# Patient Record
Sex: FEMALE | Race: WHITE
[De-identification: names, ages, dates, MRNs, and addresses within clinical notes are randomized per-mention and may not be internally consistent; named-entity substitution may affect disease eponyms.]

---

## 2018-01-29 ENCOUNTER — HOSPITAL ENCOUNTER (OUTPATIENT)
Dept: HOSPITAL 47 - ORWHC2ENDO | Age: 73
Discharge: HOME | End: 2018-01-29
Attending: SURGERY
Payer: MEDICARE

## 2018-01-29 VITALS — TEMPERATURE: 97.8 F | RESPIRATION RATE: 16 BRPM

## 2018-01-29 VITALS — HEART RATE: 60 BPM | DIASTOLIC BLOOD PRESSURE: 83 MMHG | SYSTOLIC BLOOD PRESSURE: 172 MMHG

## 2018-01-29 VITALS — BODY MASS INDEX: 36.6 KG/M2

## 2018-01-29 DIAGNOSIS — Z79.899: ICD-10-CM

## 2018-01-29 DIAGNOSIS — Z90.49: ICD-10-CM

## 2018-01-29 DIAGNOSIS — Z12.11: Primary | ICD-10-CM

## 2018-01-29 DIAGNOSIS — I10: ICD-10-CM

## 2018-01-29 DIAGNOSIS — K57.30: ICD-10-CM

## 2018-01-29 DIAGNOSIS — Z90.710: ICD-10-CM

## 2018-01-29 DIAGNOSIS — Z79.82: ICD-10-CM

## 2018-01-29 DIAGNOSIS — K63.5: ICD-10-CM

## 2018-01-29 PROCEDURE — 45385 COLONOSCOPY W/LESION REMOVAL: CPT

## 2018-01-29 PROCEDURE — 88305 TISSUE EXAM BY PATHOLOGIST: CPT

## 2018-01-29 NOTE — P.GSHP
History of Present Illness


H&P Date: 01/29/18


Chief Complaint: Screening colonoscopy





This a 72-year-old female for from Dr. Nj.  Patient presents today for 

screening colonoscopy.  Her last colonoscopy was or 14 years ago.





Past Medical History


Past Medical History: Hypertension


Additional Past Medical History / Comment(s): HX HEMMOROIDS


History of Any Multi-Drug Resistant Organisms: None Reported


Past Surgical History: Appendectomy, Hysterectomy


Additional Past Surgical History / Comment(s): HEMMOROIDECTOMY


Past Anesthesia/Blood Transfusion Reactions: No Reported Reaction


Smoking Status: Never smoker





- Past Family History


  ** Mother


Family Medical History: No Reported History





Medications and Allergies


 Home Medications











 Medication  Instructions  Recorded  Confirmed  Type


 


Aspirin [Adult Low Dose Aspirin EC] 81 mg PO DAILY 01/26/18 01/29/18 History


 


Cholecalciferol [Vitamin D3] 1,000 unit PO DAILY 01/26/18 01/29/18 History


 


Losartan/Hydrochlorothiazide 1 each PO QAM 01/26/18 01/29/18 History





[Hyzaar 100-25 Tablet]    


 


amLODIPine [Norvasc] 5 mg PO QAM 01/26/18 01/29/18 History











 Allergies











Allergy/AdvReac Type Severity Reaction Status Date / Time


 


No Known Allergies Allergy   Verified 01/26/18 09:23














Surgical - Exam


 Vital Signs











Temp Pulse Resp BP Pulse Ox


 


 97.8 F   85   16   196/92   99 


 


 01/29/18 09:06  01/29/18 09:06  01/29/18 09:06  01/29/18 09:06  01/29/18 09:06














- General


well developed, no distress





- Eyes


PERRL





- ENT


normal pinna





- Neck


no masses





- Respiratory


normal expansion





- Cardiovascular


Rhythm: regular





- Abdomen


Abdomen: soft, non tender





Assessment and Plan


Assessment: 





We'll perform screening colonoscopy.

## 2018-01-29 NOTE — P.OP
Date of Procedure: 01/29/18


Preoperative Diagnosis: 


Screening colonoscopy


Postoperative Diagnosis: 


Transverse colon polyp





Diverticulosis.


Procedure(s) Performed: 


Colonoscopy


Anesthesia: MAC


Surgeon: Edy Mon


Pathology: other (Transverse colon polyp)


Condition: stable


Disposition: PACU


Description of Procedure: 


The patient's placed on the endoscopy table in the lateral position.  She 

received IV sedation.  Digital rectal exam was performed which revealed no 

abnormalities.  The flexible colonoscope was then placed patient anus passed 

throughout the entire colon.  The ileocecal valve was visualized.  The cecum 

and ascending colon appeared normal.  In the transverse colon there were a few 

scattered diverticula.  There was also a small polyp seen this is removed with 

the snare.  Scope was then brought back the descending and; there is extensive 

diverticular changes.  Scope was then brought back the rectum and this appeared 

normal.  Scope was withdrawn for patient.

## 2018-04-18 ENCOUNTER — HOSPITAL ENCOUNTER (OUTPATIENT)
Dept: HOSPITAL 47 - LABPAT | Age: 73
Discharge: HOME | End: 2018-04-18
Payer: MEDICARE

## 2018-04-18 DIAGNOSIS — Z01.812: Primary | ICD-10-CM

## 2018-04-18 PROCEDURE — 87070 CULTURE OTHR SPECIMN AEROBIC: CPT

## 2018-06-07 ENCOUNTER — HOSPITAL ENCOUNTER (INPATIENT)
Dept: HOSPITAL 47 - 2ORMAIN | Age: 73
LOS: 2 days | Discharge: HOME | DRG: 470 | End: 2018-06-09
Payer: MEDICARE

## 2018-06-07 VITALS — BODY MASS INDEX: 36.1 KG/M2

## 2018-06-07 DIAGNOSIS — I10: ICD-10-CM

## 2018-06-07 DIAGNOSIS — N39.490: ICD-10-CM

## 2018-06-07 DIAGNOSIS — Z90.710: ICD-10-CM

## 2018-06-07 DIAGNOSIS — Z79.899: ICD-10-CM

## 2018-06-07 DIAGNOSIS — Z82.49: ICD-10-CM

## 2018-06-07 DIAGNOSIS — M17.12: Primary | ICD-10-CM

## 2018-06-07 DIAGNOSIS — J31.0: ICD-10-CM

## 2018-06-07 LAB
APTT BLD: 25.1 SEC (ref 22–30)
INR PPP: 1.1 (ref ?–1.2)
PT BLD: 10.3 SEC (ref 9–12)

## 2018-06-07 PROCEDURE — 84443 ASSAY THYROID STIM HORMONE: CPT

## 2018-06-07 PROCEDURE — 85610 PROTHROMBIN TIME: CPT

## 2018-06-07 PROCEDURE — 84550 ASSAY OF BLOOD/URIC ACID: CPT

## 2018-06-07 PROCEDURE — 84132 ASSAY OF SERUM POTASSIUM: CPT

## 2018-06-07 PROCEDURE — 83036 HEMOGLOBIN GLYCOSYLATED A1C: CPT

## 2018-06-07 PROCEDURE — 84439 ASSAY OF FREE THYROXINE: CPT

## 2018-06-07 PROCEDURE — 94760 N-INVAS EAR/PLS OXIMETRY 1: CPT

## 2018-06-07 PROCEDURE — 80053 COMPREHEN METABOLIC PANEL: CPT

## 2018-06-07 PROCEDURE — 0SRD0J9 REPLACEMENT OF LEFT KNEE JOINT WITH SYNTHETIC SUBSTITUTE, CEMENTED, OPEN APPROACH: ICD-10-PCS

## 2018-06-07 PROCEDURE — 85025 COMPLETE CBC W/AUTO DIFF WBC: CPT

## 2018-06-07 PROCEDURE — 85730 THROMBOPLASTIN TIME PARTIAL: CPT

## 2018-06-07 PROCEDURE — 80061 LIPID PANEL: CPT

## 2018-06-07 PROCEDURE — 88300 SURGICAL PATH GROSS: CPT

## 2018-06-07 RX ADMIN — POTASSIUM CHLORIDE SCH: 14.9 INJECTION, SOLUTION INTRAVENOUS at 13:54

## 2018-06-07 RX ADMIN — CEFAZOLIN SCH GM: 10 INJECTION, POWDER, FOR SOLUTION INTRAVENOUS at 20:02

## 2018-06-07 RX ADMIN — POTASSIUM CHLORIDE SCH: 14.9 INJECTION, SOLUTION INTRAVENOUS at 16:04

## 2018-06-07 RX ADMIN — DOCUSATE SODIUM AND SENNOSIDES SCH EACH: 50; 8.6 TABLET ORAL at 20:02

## 2018-06-07 RX ADMIN — ASPIRIN 325 MG ORAL TABLET SCH MG: 325 PILL ORAL at 20:02

## 2018-06-07 RX ADMIN — POTASSIUM CHLORIDE SCH MLS: 14.9 INJECTION, SOLUTION INTRAVENOUS at 10:40

## 2018-06-07 NOTE — XR
EXAMINATION TYPE: XR knee limited LT

 

DATE OF EXAM: 6/7/2018

 

COMPARISON: NONE

 

HISTORY: Post knee replacement

 

TECHNIQUE: 2 view left knee

 

FINDINGS: Postoperative changes are within the left knee. Tibial femoral components of in place.

 

IMPRESSION:

1.  No acute fractures post left knee replacement.

## 2018-06-07 NOTE — OP
OPERATIVE REPORT



DATE OF PROCEDURE:

06/07/2018.



PREOPERATIVE DIAGNOSIS:

Left knee osteoarthrosis.



POSTOPERATIVE DIAGNOSIS:

Left knee osteoarthrosis.



OPERATION:

Left total knee arthroplasty.



SURGEON:

Jordin Montemayor MD.



ASSISTANT:

Kendrick GARIBAY.



ANESTHESIA:

Spinal sedation.



ESTIMATED BLOOD LOSS:

100 mL.



TOURNIQUET TIME:

45 minutes at 250 mmHg.



COMPLICATIONS:

None apparent.



DRAINS:

None.



DISPOSITION:

Postanesthesia care unit.



INDICATIONS:

Chelsey is a 72-year-old female with longstanding history of left knee pain.  History

and physical examination are consistent with a left knee osteoarthrosis.  She has been

through significant nonoperative management up to this point.  Further treatment

options were discussed and she decided to go forward with left total knee arthroplasty.



The risks of procedure were discussed with her in detail.  These risks include, but are

not limited to risk of infection, nerve damage, bleeding, pain and risk of deep vein

thrombosis which could lead to fatal pulmonary embolism.  There is also risk of

loosening of the implant which could require revision operation.  The patient

understands these risks.  All of her questions with regard to the risks were answered

to her satisfaction.

Appropriate informed consent was obtained.



DESCRIPTION OF THE PROCEDURE:

The patient was identified in the preoperative holding area.  Surgical sites marked by

both the patient and myself.  She was given 2 g of Ancef IV for prophylactic purposes.

She was then transferred to the operative suite.  She was placed supine on the

operative table.  Spinal anesthetic was then administered and dosed per the anesthesia

without apparent complication.



Examination under anesthesia was then performed.  The patient was 2-3 degrees shy of

full extension.  She had 95 degrees of flexion.  The medial collateral ligament,

lateral collateral ligament and posterior cruciate ligaments were stable.



Tourniquet was then placed high on the left upper thigh well-padded in preparation for

surgery.  The patient's left lower extremity was then prepped and draped in usual

sterile fashion.  Standard surgical pause undertaken to ensure that we were operating

the correct site and that appropriate preop preoperative antibiotics were given.  All

staff in the room in agreement and we proceeded.



The outlines of the patella were marked with a surgical pen.  A planned 12 cm vertical

incision centered over the patella was marked surgical pen.  Leg was then exsanguinated

with an Esmarch dressing.  The knee was then flexed and the tourniquet was inflated to

250 mmHg.  The total tourniquet time for the procedure was 45 minutes.



Incision was then made with a 10 blade scalpel.  Dissection was carried down sharply

overlying fascia.  Great care was taken to minimize the skin flaps.  The knee was then

exposed using a standard medial parapatellar approach.  A small cuff of quadriceps

tendon was then left for suturing.



She was in a bit of varus preoperatively.  A standard medial release was then made.

Superficial medial and collateral ligaments were dissected off the bone around the

posterior aspect of the proximal tibia.  The medial meniscus was then excised as well.

The lateral meniscus was also released anteriorly.



The leg was then externally rotated.  The patella was everted and the knee was flexed.

The retractors were then placed to protect the collateral ligaments.



I then proceeded to remove the infrapatellar fat pad.  This was excised sharply

tangentially with the fibers of the patellar tendon.



I then proceeded to remove peripheral osteophytes.  This was done with a rongeur.  I

then proceed with the distal femoral resection.  She did have a small flexion

contracture and planned 9 mm resection was done.  The femoral canal was then entered in

midline of the femur approximately 10 mm anterior to the origin of the posterior

cruciate ligament.  The kelvin was then advanced down the center of the femur and placed

intramedullary.



Based on preoperative radiographs, the angle between the anatomic and mechanical axis

of the femur was approximately 4-5 degrees.  The valgus angle of this femoral cutting

guide was then set at 4 degrees for the left knee.  The distal femoral cutting guide

was then advanced over the intramedullary kelvin.  This was seated firmly against the

femur.  I then as mentioned planned to take 9 mm off the distal femur.



The cutting block was then secured to the femur with pins.  The jig was removed and the

distal femoral cut was made through the slot of the block.  The pins were then removed.

The distal femoral cutting block was removed.  The accuracy of the distal femoral cuts

was checked with 2 flat bars.



I then proceed with femoral sizing.  The posterior referencing sizing guide was held

firmly against the resected distal surface of the femur.  The posterior condyles were

resting on the posterior plane of the guide.  The sizing stylus was then placed onto

the anterior femur.  The size was measured as a size 7.



I then assessed for femoral rotation.  Plan was for 3 degrees of external rotation.

Three degrees of external rotation was placed onto the jig.  These holes were then

marked.  I then confirmed the rotation by 3 separate methods.  This was done using the

epicondylar axis as well as Whitesides line and posterior referencing.  It was deemed

that the external rotation was proper.  I then went forward with placing the femoral

cutting block.  This was placed over the previously placed pin holes.  The [place] wing

was then placed on the anterior slots to ensure that we would not notch the anterior

femur with the anterior femoral cut.  I then proceed with the anterior femoral cut.

This was flush with the anterior cortex of the femur.  Posterior cuts were then made

followed by the anterior chamfer cut, then the posterior chamfer cut.  The cutting

block was then removed.  Throughout the resection, the collateral ligaments were

protected with retractors.  I then placed a trial size 7 femur.  It fit very nice

medial-lateral and fit flush with the distal end of the femur.  The drill holes were

then made.



I then proceeded with the tibial cut.  I planned for cruciate retaining knee.  The

guide was placed and set for varus valgus and for slope.  The height was set for

approximate 2 mm resection from the medial tibial plateau which was the lower side.  I

was happy with the alignment of resection.  The cutting block was then pinned to the

proximal tibia.  The alignment rods were removed.  The proximal tibia was resected with

a reciprocating saw.  Again this was done with retractors protecting the collateral

ligaments as well as the posterior cruciate ligament.



I then proceeded to evaluate the flexion and extension gaps.  A 10 mm block was placed.

The flexion-extension gaps were equal.



I then proceeded with resection of posterior osteophytes.  She had fairly extensive

posterior osteophytes.  This was done using a curved osteotome.  This resected the

posterior osteophytes and posterior capsule stripping was also done off the posterior

aspect of the femur at this time.  The osteophytes were removed.



I then proceeded with resection of the patella.  The thickness of patella was measured

using the caliper.  The thickness was 22 mm.  The thickness of the anticipated patellar

dome was then taken into account.  Resection was then performed and confirmed to be

equal in 4 quadrants using a caliper.



Approximately 14 mm of bone remained after the resection.  A 29 x 8 standard patellar

trial was then placed.  The holes were drilled.  The trial was then placed.



I then proceeded with sizing the tibial plate.  A size E tibial plate fit very nicely.

I then placed the trial femur, the tibial tray and patellar button.  A 10 mm trial

tibial insert was also placed.  The components fit very nicely.  She had full extension

and flexion.  The extension flexion gaps were equal and stable to both varus and valgus

stress.  The patella tracked appropriately.  The tibial tray rotation was marked with a

Bovie.  This was externally rotated properly.



I then proceed with tibial preparation.  I first drilled the femoral holes and removed

femoral component.  The tibial tray was then set for proper external rotation as well

as mediolateral placement onto the tibia.  It was then pinned into place.  I then

proceeded with punching the keel.



I then decided to proceed with cementing of all of our components.  The knee was

thoroughly irrigated with sterile saline solution via pulse lavage.  The lateral

geniculate artery was identified and cauterized.  All blood was removed from the bone

of the tibia femur and patella with pulse lavage.  I then proceed with cementing.



Two packs of antibiotic bone cement were prepared on the back table by the surgical

tech.  I then proceed with cementing the tibia first.  The cement was impacted into the

keel as well as deeply seated into the bone.  A second coat of cement was then placed.

The tibia was then impacted into place.  Excess cement was removed with Cushing's and

jokers.



I then proceed with cementing the femoral component.  The femoral component was also

cemented using standard technique.  Excess cement was removed.  A 10 mm trial insert

was placed into the knee.  It was brought into full extension with a constant axial

load placed until the cement had hardened.



The patellar component was then cemented.  This was firmly held with a compressive

device until the cement had dried.



When the cement had dried, the knee was taken out of extension.  All excess cement was

removed from around the prosthesis.  I then trialed the knee with a 10 mm insert.  The

flexion-extension gaps were appropriate.  The knee was stable.  It came into full

extension.  I decided to go forward with the 10 mm cross-linked cruciate-retaining

tibial insert.



Polyethylene was then placed onto the tibial tray and locked into place.  The knee was

then reduced.  The knee was again further irrigated with sterile saline solution with

antibiotic added.  The tourniquet was then deflated.  The total tourniquet time for the

procedure was 45 minutes at 250 mmHg.  Final components were a Ingrid Persona size 7

cruciate-retaining femoral component, a size E tibial tray, a 10 mm medial congruent

cruciate-retaining polyethylene insert and a 29 x 8 patella.



I then proceeded with closure.  Again, the knee was thoroughly irrigated.  The

quadriceps tendon and the medial retinaculum were reapproximated with #2 Ethibond

suture.  The extensor mechanism was then closed with a running #2 Quill suture.



Subcutaneous tissues were closed with 2-0 Vicryl suture.  The skin was closed with a

running 3-0 Quill suture.  Dermabond was applied to the incision.



All sponge and needle counts were deemed correct prior to closure.  The patient

tolerated procedure without apparent complication.  She was transferred to recovery

room in stable condition.





MMODL / IJN: [id number] / Job#: [id number]

## 2018-06-08 LAB
BASOPHILS # BLD AUTO: 0 K/UL (ref 0–0.2)
BASOPHILS NFR BLD AUTO: 0 %
EOSINOPHIL # BLD AUTO: 0 K/UL (ref 0–0.7)
EOSINOPHIL NFR BLD AUTO: 0 %
ERYTHROCYTE [DISTWIDTH] IN BLOOD BY AUTOMATED COUNT: 3.96 M/UL (ref 3.8–5.4)
ERYTHROCYTE [DISTWIDTH] IN BLOOD: 13.5 % (ref 11.5–15.5)
HCT VFR BLD AUTO: 34 % (ref 34–46)
HGB BLD-MCNC: 11.4 GM/DL (ref 11.4–16)
LYMPHOCYTES # SPEC AUTO: 0.9 K/UL (ref 1–4.8)
LYMPHOCYTES NFR SPEC AUTO: 9 %
MCH RBC QN AUTO: 28.8 PG (ref 25–35)
MCHC RBC AUTO-ENTMCNC: 33.5 G/DL (ref 31–37)
MCV RBC AUTO: 86 FL (ref 80–100)
MONOCYTES # BLD AUTO: 0.5 K/UL (ref 0–1)
MONOCYTES NFR BLD AUTO: 5 %
NEUTROPHILS # BLD AUTO: 8.4 K/UL (ref 1.3–7.7)
NEUTROPHILS NFR BLD AUTO: 85 %
PLATELET # BLD AUTO: 262 K/UL (ref 150–450)
WBC # BLD AUTO: 9.9 K/UL (ref 3.8–10.6)

## 2018-06-08 RX ADMIN — DOCUSATE SODIUM AND SENNOSIDES SCH EACH: 50; 8.6 TABLET ORAL at 20:38

## 2018-06-08 RX ADMIN — POTASSIUM CHLORIDE SCH: 14.9 INJECTION, SOLUTION INTRAVENOUS at 03:37

## 2018-06-08 RX ADMIN — ACETAMINOPHEN PRN MG: 325 TABLET, FILM COATED ORAL at 09:34

## 2018-06-08 RX ADMIN — ASPIRIN 325 MG ORAL TABLET SCH MG: 325 PILL ORAL at 20:38

## 2018-06-08 RX ADMIN — ASPIRIN 325 MG ORAL TABLET SCH MG: 325 PILL ORAL at 09:36

## 2018-06-08 RX ADMIN — POTASSIUM CHLORIDE SCH: 14.9 INJECTION, SOLUTION INTRAVENOUS at 17:43

## 2018-06-08 RX ADMIN — CEFAZOLIN SCH GM: 10 INJECTION, POWDER, FOR SOLUTION INTRAVENOUS at 03:27

## 2018-06-08 RX ADMIN — THERA TABS SCH EACH: TAB at 15:20

## 2018-06-08 RX ADMIN — POTASSIUM CHLORIDE SCH: 14.9 INJECTION, SOLUTION INTRAVENOUS at 17:44

## 2018-06-08 RX ADMIN — LOSARTAN POTASSIUM AND HYDROCHLOROTHIAZIDE SCH EACH: 12.5; 5 TABLET ORAL at 09:34

## 2018-06-09 VITALS — SYSTOLIC BLOOD PRESSURE: 135 MMHG | TEMPERATURE: 97 F | DIASTOLIC BLOOD PRESSURE: 62 MMHG

## 2018-06-09 VITALS — RESPIRATION RATE: 16 BRPM

## 2018-06-09 VITALS — HEART RATE: 71 BPM

## 2018-06-09 RX ADMIN — ACETAMINOPHEN PRN MG: 325 TABLET, FILM COATED ORAL at 10:12

## 2018-06-09 RX ADMIN — ACETAMINOPHEN PRN MG: 325 TABLET, FILM COATED ORAL at 05:01

## 2018-06-09 RX ADMIN — ASPIRIN 325 MG ORAL TABLET SCH MG: 325 PILL ORAL at 10:08

## 2018-06-09 RX ADMIN — THERA TABS SCH EACH: TAB at 10:08

## 2018-06-09 RX ADMIN — POTASSIUM CHLORIDE SCH: 14.9 INJECTION, SOLUTION INTRAVENOUS at 10:18

## 2018-06-09 RX ADMIN — ASPIRIN 325 MG ORAL TABLET SCH MG: 325 PILL ORAL at 10:07

## 2018-06-09 RX ADMIN — LOSARTAN POTASSIUM AND HYDROCHLOROTHIAZIDE SCH EACH: 12.5; 5 TABLET ORAL at 10:08

## 2018-06-09 NOTE — P.DS
Providers


Date of admission: 


06/07/18 10:23





Expected date of discharge: 06/09/18


Attending physician: 


Jordin Montemayor





Consults: 





 





06/07/18 13:26


Consult Physician Routine 


   Consulting Provider: Neil Nj


   Consult Reason/Comments: Post Op medical management


   Do you want consulting provider notified?: Yes











Primary care physician: 


Neil Nj








- Discharge Diagnosis(es)


(1) Osteoarthritis of left knee


Current Visit: Yes   Status: Acute   





(2) S/P total knee arthroplasty


Current Visit: Yes   Status: Acute   Priority: Medium   


Hospital Course: 





This is a pleasant 72-year-old female last seen in our office with complaints 

of left knee pain.  Patient has known history of degenerative arthritis of the 

left knee and presented to discuss options.  After discussion and consideration

, the patient elected to proceed with a left total knee arthroplasty.  Patient 

was seen preoperatively, and medically cleared for surgery by her primary care 

physician.





Patient was admitted to [facility] underwent left total knee 

arthroplasty on 6/7/2018 with Dr. Montemayor.  The procedure was performed without 

complications or sequelae.  The patient is seen and evaluated at bedside today.

  Pain is well-controlled.  Patient has no new complaints today and denies any 

fevers, chills, nausea, vomiting, or shortness of breath.  Vital signs are 

stable.  Dressing is clean dry and intact.  Incision looks fine with no 

erythema or active drainage.  Calf is soft and nontender.  Patient has full 

foot and ankle motion without difficulty.  Patient's left lower extremity is 

neurovascularly intact.  





The patient is orthopedically stable for discharge today.


Pertinent Studies: 





 Laboratory Tests











  06/08/18





  06:46


 


WBC  9.9


 


RBC  3.96


 


Hgb  11.4


 


Hct  34.0


 


Neutrophils #  8.4 H


 


Lymphocytes #  0.9 L











Patient Condition at Discharge: Stable





Plan - Discharge Summary


Discharge Rx Participant: Yes


New Discharge Prescriptions: 


New


   Aspirin 325 mg PO BID #60 tab


   Docusate [Colace] 100 mg PO BID #60 capsule


   HYDROcodone/APAP 7.5-325MG [Norco 7.5-325] 1 - 2 each PO Q6HR PRN #90 tab


     PRN Reason: Pain





No Action


   amLODIPine [Norvasc] 5 mg PO QAM


   Cholecalciferol [Vitamin D3] 1,000 unit PO DAILY


   Losartan/Hydrochlorothiazide [Hyzaar 100-25 Tablet] 1 tab PO QAM


   Aspirin [Adult Low Dose Aspirin EC] 81 mg PO DAILY


Discharge Medication List





Aspirin [Adult Low Dose Aspirin EC] 81 mg PO DAILY 01/26/18 [History]


Cholecalciferol [Vitamin D3] 1,000 unit PO DAILY 01/26/18 [History]


Losartan/Hydrochlorothiazide [Hyzaar 100-25 Tablet] 1 tab PO QAM 01/26/18 [

History]


amLODIPine [Norvasc] 5 mg PO QAM 01/26/18 [History]


Aspirin 325 mg PO BID #60 tab 06/08/18 [Rx]


Docusate [Colace] 100 mg PO BID #60 capsule 06/08/18 [Rx]


HYDROcodone/APAP 7.5-325MG [Norco 7.5-325] 1 - 2 each PO Q6HR PRN #90 tab 06/08/ 18 [Rx]








Follow up Appointment(s)/Referral(s): 


[facility], [NON-STAFF] - 


Jordin Montemayor MD [STAFF PHYSICIAN] - 06/18/18 1:00 pm


Activity/Diet/Wound Care/Special Instructions: 


Keep wound clean and dry


Take meds as directed


Follow-up with Dr. Montemayor in office


Weight bear as tolerated 


May shower in 3 days if no bleeding





[facility]- [phone] - will deliver to bedside before discharge. 





Discharge Disposition: HOME WITH HOME HEALTH SERVICES

## 2018-06-09 NOTE — CONS
CONSULTATION



72-year-old white female with severe pain in the left knee.  The patient had a scope

earlier without it being effective.  At this time, was evaluated by myself and Dr. Jordin Montemayor.  The patient had a total left knee replacement.  She has had no problems while

being in the hospital at this period of time.  Her blood pressure has been fine.  No

chest pain.  No shortness of breath.



REVIEW OF SYSTEMS:

HEENT no problem with eyes, the patient is seeing well.  Neck no problems swallowing.

Abdomen:  No hematemesis, melena, hematochezia.   has been normal.  Neuromuscular:

Just the pain in the left knee but tolerable and integumentary is no rashes.



PHYSICAL EXAMINATION:

VITAL SIGNS: Reveals blood pressure 125/61, heart rate was in the 70s, respiratory rate

is 15, temperature is 98.6, and O2 is 92.

HEENT: Eyes, pupils are equal, round, react to light and accommodation.  ENT showed

tympanic membranes and pharynx to be negative.

NECK:  Supple.  Midline trachea.

CHEST essentially clear to auscultation.

HEART is sinus rhythm with no murmur.

ABDOMEN:  Soft, nontender with no organomegaly.

EXTREMITIES:  Lower extremity pulses are good.  Negative Marta.  The patient has a left

knee support, but also the dressing and the incision appears to be clean.

PSYCHIATRIC:  No depression or anxiety.



LABORATORY:

From yesterday, WBC is 9.9, hemoglobin is 11.4 potassium is up to 3.5.



PLAN:

Patient is being discharged home.  Medications she will be on:  Hydrocodone 7.5/325 q.6

hours p.r.n. pain, standard Norvasc 5 mg a day, 325 mg aspirin b.i.d., Dulcolax p.r.n.,

Norvasc 50/12.5 daily.  Orthopedic instructions per Dr. Montemayor.  The patient is doing

well and she will see me back in 2 weeks.  Spent 30 minutes with this patient.



MMODL / IJN: [id number] / Job#: [id number]

## 2018-12-11 ENCOUNTER — HOSPITAL ENCOUNTER (OUTPATIENT)
Dept: HOSPITAL 47 - RADMAMWWP | Age: 73
Discharge: HOME | End: 2018-12-11
Attending: FAMILY MEDICINE
Payer: MEDICARE

## 2018-12-11 DIAGNOSIS — Z12.31: Primary | ICD-10-CM

## 2018-12-11 PROCEDURE — 77063 BREAST TOMOSYNTHESIS BI: CPT

## 2018-12-11 PROCEDURE — 77067 SCR MAMMO BI INCL CAD: CPT

## 2018-12-14 NOTE — MM
Reason for exam: screening  (asymptomatic).

Last mammogram was performed 1 year and 1 month ago.



History:

Patient is postmenopausal.

Took estrogen for 3 years beginning at age 39.



Physical Findings:

A clinical breast exam by your physician is recommended on an annual basis and 

results should be correlated with mammographic findings.



MG 3D Screening Mammo W/Cad

Bilateral CC and MLO view(s) were taken.

Prior study comparison: October 30, 2017, bilateral MG 3d screening mammo w/cad.  

March 7, 2013, bilateral digital screening mammo w/CAD.

There are scattered fibroglandular densities.  No significant changes when 

compared with prior studies.





ASSESSMENT: Benign, BI-RAD 2



RECOMMENDATION:

Routine screening mammogram of both breasts in 1 year.

## 2019-11-29 NOTE — CONS
CONSULTATION



This is a 72-year-old white female who had severe pain in her left knee. Arthroscopic

surgery was attempted that was not successful.  Patient continued to worsen to the

point that she could not bear weight.  Evaluated by Dr. Jordin Montemayor.  He felt that the

knee was severe enough to have total replacement, and this was done today.  I did her

preoperative physical examination. At this time she has ALLERGIES to BIAXIN and LYRICA.



SOCIAL HISTORY:

She is a nonsmoker, nondrinker.



MEDICAL HISTORY:

Significant just basically for hypertension, and she has also had overflow

incontinence.



SURGERIES IN THE PAST:

1. Orthoscopic knee surgery on the left.

2. Complete hysterectomy.

3. Appendectomy.



REVIEW OF SYSTEMS:

CARDIOPULMONARY: No shortness of breath.  No chest pain.  No orthopnea.

GI: No nausea, vomiting.  No hematochezia.

: She has bladder incontinence, occasional urinary tract infection.  NEUROMUSCULAR:

Just the severe pain in her left knee.

Her skin has been normal.

PSYCHIATRIC: No depression and no anxiety.



PHYSICAL EXAMINATION:

Alert white female, postoperatively doing fine.  Blood pressure 130/76, heart rate in

the 70s, temperature 98.3, respiratory rate 16, and oxygen saturation is 97.

EYES: Pupils are equal, round, reactive to light and accommodation.

ENT showed tympanic membranes to be normal with a dry mouth.

NECK:  Supple.  Midline trachea.

CHEST: Essentially clear to auscultation.

HEART: Sinus rhythm with no murmur.

ABDOMEN:  Soft, nontender with no organomegaly.

LOWER EXTREMITY: Left knee swelling is apparent with decreased range of motion due to

pain. Right knee: She has arthritis also throughout the right knee, but weightbearing

is fine.

BACK: Full range of motion, both lumbar spine and thoracic spine.  Good palpable lower

extremity pulses.

Her skin is normal.



Urinalysis is normal.  Her lab work is within normal limits.



Chest x-ray and EKG were both normal.



ASSESSMENT:

1. Left knee meniscus tear with severe arthritis.

2. Right knee arthritis.

3. Hypertension.

4. Allergies.

5. Rhinitis.

6. Some mild negative depression.

7. She has some overflow incontinence.



PLAN:

Will follow her accordingly.  Medications have been reordered.  Please refer to my

physical examination.





MMODL / IJN: [id number] / Job#: [id number] [facility]                                                 Chief Complaint  25 y.o. male with Psychiatric Evaluation   -- SI, pt states he tried to Overdose with Heroin 3 days ago     History of Present Illness  Chema Cheek Jr presents to the emergency room with suicidal ideation  Patient states he tried to overdose on heroin 3 days ago, HX of addiction  Pt has long-term issues with anxiety and depression and substance abuse  Patient states that he no longer wants to live due to his addiction issues  Patient is nonviolent, his with his mother in the ER, would like help today    The history is provided by the patient   device was not used during this ER visit  Medical history: Addiction, anxiety, depression, substance use  Surgeries: PE tubes  Allergies: Augmentin, back, Cefzil    I have reviewed all of this patient's past medical, surgical, family, and social   histories as well as active allergies and medications documented in the  electronic medical record    Review of Systems and Physical Exam      Review of Systems  -- Constitution - no fever, denies fatigue, no weakness, no chills  -- Eyes - no tearing or redness, no visual disturbance  -- Ear, Nose - no tinnitus or earache, no nasal congestion or discharge  -- Mouth,Throat - no sore throat, no toothache, normal voice, normal swallowing  -- Respiratory - denies cough and congestion, no shortness of breath, no BRINK  -- Cardiovascular - denies chest pain, no palpitations, denies claudication  -- Gastrointestinal - denies abdominal pain, nausea, vomiting, or diarrhea  -- Genitourinary - no dysuria, denies flank pain, no hematuria, no STD risk  -- Musculoskeletal - denies back pain, negative for trauma or injury   -- Neurological - no headache, denies weakness or seizure; no LOC  -- Skin - denies pallor, rash, or changes in skin. no hives or welts noted  -- Psychiatric - suicidal ideation and depression, no psychosis      Vital Signs  His oral temperature is 97.8 °F (36.6 °C).   His blood pressure is 133/73 and his pulse is 108.   His respiration is 20 and oxygen saturation is 98%.     Physical Exam  -- Nursing note and vitals reviewed  -- Constitutional: Appears well-developed and well-nourished  -- Head: Atraumatic. Normocephalic. No obvious abnormality  -- Eyes: Pupils are equal and reactive to light. Normal conjunctiva and lids  -- Cardiac: Normal rate, regular rhythm and normal heart sounds  -- Pulmonary: Normal respiratory effort, breath sounds clear to auscultation  -- Abdominal: Soft, no tenderness. Normal bowel sounds. Normal liver edge  -- Musculoskeletal: Normal range of motion, no effusions. Joints stable   -- Neurological: No focal deficits. Showed good interaction with staff  -- Vascular: Posterior tibial, dorsalis pedis and radial pulses 2+ bilaterally    -- Lymphatics: No cervical or peripheral lymphadenopathy. No edema noted  -- Skin: Warm and dry. No evidence of rash or cellulitis    Emergency Room Course      Diagnosis  -- The primary encounter diagnosis was Depression with suicidal ideation.   -- A diagnosis of Polysubstance abuse was also pertinent to this visit.    Disposition and Plan  -- Disposition: PEC  -- Condition: stable  -- Pt will be placed in a psychiatric facility  -- The patient is a direct observation until placement  -- The patient has been made aware of his or her rights while under PEC in the ER  -- All questions have been answered; will follow ER protocols until placement    Lab work was performed in the ER, this patient is cleared for psychiatric placement     This note is dictated on Dragon Natural Speaking word recognition program.  There are word recognition mistakes that are occasionally missed on review.          Kwame Garvey MD  11/29/19 [id number]

## 2020-02-07 ENCOUNTER — HOSPITAL ENCOUNTER (EMERGENCY)
Dept: HOSPITAL 47 - EC | Age: 75
Discharge: HOME | End: 2020-02-07
Payer: MEDICARE

## 2020-02-07 VITALS
TEMPERATURE: 98.1 F | DIASTOLIC BLOOD PRESSURE: 82 MMHG | SYSTOLIC BLOOD PRESSURE: 170 MMHG | RESPIRATION RATE: 16 BRPM | HEART RATE: 64 BPM

## 2020-02-07 DIAGNOSIS — Z79.82: ICD-10-CM

## 2020-02-07 DIAGNOSIS — R42: ICD-10-CM

## 2020-02-07 DIAGNOSIS — Z79.899: ICD-10-CM

## 2020-02-07 DIAGNOSIS — I10: Primary | ICD-10-CM

## 2020-02-07 LAB
ALBUMIN SERPL-MCNC: 4.4 G/DL (ref 3.5–5)
ALP SERPL-CCNC: 105 U/L (ref 38–126)
ALT SERPL-CCNC: 14 U/L (ref 4–34)
ANION GAP SERPL CALC-SCNC: 11 MMOL/L
APTT BLD: 24.6 SEC (ref 22–30)
AST SERPL-CCNC: 22 U/L (ref 14–36)
BASOPHILS # BLD AUTO: 0 K/UL (ref 0–0.2)
BASOPHILS NFR BLD AUTO: 0 %
BUN SERPL-SCNC: 13 MG/DL (ref 7–17)
CALCIUM SPEC-MCNC: 9.3 MG/DL (ref 8.4–10.2)
CHLORIDE SERPL-SCNC: 109 MMOL/L (ref 98–107)
CO2 SERPL-SCNC: 21 MMOL/L (ref 22–30)
EOSINOPHIL # BLD AUTO: 0.1 K/UL (ref 0–0.7)
EOSINOPHIL NFR BLD AUTO: 1 %
ERYTHROCYTE [DISTWIDTH] IN BLOOD BY AUTOMATED COUNT: 4.87 M/UL (ref 3.8–5.4)
ERYTHROCYTE [DISTWIDTH] IN BLOOD: 13.4 % (ref 11.5–15.5)
GLUCOSE SERPL-MCNC: 112 MG/DL (ref 74–99)
HCT VFR BLD AUTO: 42.7 % (ref 34–46)
HGB BLD-MCNC: 13.9 GM/DL (ref 11.4–16)
HYALINE CASTS UR QL AUTO: 1 /LPF (ref 0–2)
INR PPP: 1 (ref ?–1.2)
LYMPHOCYTES # SPEC AUTO: 0.7 K/UL (ref 1–4.8)
LYMPHOCYTES NFR SPEC AUTO: 9 %
MCH RBC QN AUTO: 28.6 PG (ref 25–35)
MCHC RBC AUTO-ENTMCNC: 32.6 G/DL (ref 31–37)
MCV RBC AUTO: 87.7 FL (ref 80–100)
MONOCYTES # BLD AUTO: 0.2 K/UL (ref 0–1)
MONOCYTES NFR BLD AUTO: 3 %
NEUTROPHILS # BLD AUTO: 6 K/UL (ref 1.3–7.7)
NEUTROPHILS NFR BLD AUTO: 85 %
PH UR: 5.5 [PH] (ref 5–8)
PLATELET # BLD AUTO: 258 K/UL (ref 150–450)
POTASSIUM SERPL-SCNC: 3.8 MMOL/L (ref 3.5–5.1)
PROT SERPL-MCNC: 7.2 G/DL (ref 6.3–8.2)
PT BLD: 10.2 SEC (ref 9–12)
RBC UR QL: 1 /HPF (ref 0–5)
SODIUM SERPL-SCNC: 141 MMOL/L (ref 137–145)
SP GR UR: 1.01 (ref 1–1.03)
SQUAMOUS UR QL AUTO: 1 /HPF (ref 0–4)
UROBILINOGEN UR QL STRIP: <2 MG/DL (ref ?–2)
WBC # BLD AUTO: 7.1 K/UL (ref 3.8–10.6)
WBC #/AREA URNS HPF: 1 /HPF (ref 0–5)

## 2020-02-07 PROCEDURE — 99284 EMERGENCY DEPT VISIT MOD MDM: CPT

## 2020-02-07 PROCEDURE — 80053 COMPREHEN METABOLIC PANEL: CPT

## 2020-02-07 PROCEDURE — 84484 ASSAY OF TROPONIN QUANT: CPT

## 2020-02-07 PROCEDURE — 96360 HYDRATION IV INFUSION INIT: CPT

## 2020-02-07 PROCEDURE — 81001 URINALYSIS AUTO W/SCOPE: CPT

## 2020-02-07 PROCEDURE — 85730 THROMBOPLASTIN TIME PARTIAL: CPT

## 2020-02-07 PROCEDURE — 85610 PROTHROMBIN TIME: CPT

## 2020-02-07 PROCEDURE — 36415 COLL VENOUS BLD VENIPUNCTURE: CPT

## 2020-02-07 PROCEDURE — 85025 COMPLETE CBC W/AUTO DIFF WBC: CPT

## 2020-02-07 PROCEDURE — 71046 X-RAY EXAM CHEST 2 VIEWS: CPT

## 2020-02-07 PROCEDURE — 93005 ELECTROCARDIOGRAM TRACING: CPT

## 2020-02-07 NOTE — ED
Recheck HPI





- General


Chief Complaint: Recheck/Abnormal Lab/Rx


Stated Complaint: High BP


Time Seen by Provider: 02/07/20 10:55


Source: patient


Mode of arrival: ambulatory


Limitations: no limitations





- History of Present Illness


Initial Comments: 


75yo presenting for cc of elevated BP x 1 day.  Patient states that she noticed 

her blood pressure was elevated today at approximately 15-30 minutes after she 

took her medication of 100mg Lisinopril.  Patient states the diastolic was in 

the 100s.  Patient states her highest systolic was 200. Patient denies chest 

pain or SOB. Patient denies headache, speech changes, weakness of the upper or 

lower extremity sensation deficits patient states she did feel slightly 

lightheaded and that is why she initially took her blood pressure.  Patient 

denies any current lightheaded sensations. Patient states this does occur every 

few months then resolves. Patient denies any leg swelling, urinary changes, 

ripping/tearing back pain. Patient appears well on arrival there is no signs of 

acute distress. BP elevated. Patient took lisinopril approximately 2 hour prior 

to arrival. Upon arrival patient appears well there is no signs of acute 

distress.








- Related Data


                                Home Medications











 Medication  Instructions  Recorded  Confirmed


 


Aspirin [Adult Low Dose Aspirin EC] 81 mg PO DAILY 01/26/18 06/07/18


 


Cholecalciferol [Vitamin D3] 1,000 unit PO DAILY 01/26/18 06/07/18


 


Losartan/Hydrochlorothiazide 1 tab PO QAM 01/26/18 06/07/18





[Hyzaar 100-25 Tablet]   


 


amLODIPine [Norvasc] 5 mg PO QAM 01/26/18 06/07/18








                                  Previous Rx's











 Medication  Instructions  Recorded


 


Aspirin 325 mg PO BID #60 tab 06/08/18


 


Docusate [Colace] 100 mg PO BID #60 capsule 06/08/18


 


HYDROcodone/APAP 7.5-325MG [Norco 1 - 2 each PO Q6HR PRN #90 tab 06/08/18





7.5-325]  











                                    Allergies











Allergy/AdvReac Type Severity Reaction Status Date / Time


 


No Known Allergies Allergy   Verified 02/07/20 10:53














Review of Systems


ROS Statement: 


Those systems with pertinent positive or pertinent negative responses have been 

documented in the HPI.





ROS Other: All systems not noted in ROS Statement are negative.





Past Medical History


Past Medical History: Hypertension


Additional Past Medical History / Comment(s): HX HEMMOROIDS


History of Any Multi-Drug Resistant Organisms: None Reported


Past Surgical History: Appendectomy, Hysterectomy


Additional Past Surgical History / Comment(s): HEMMOROIDECTOMY


Past Anesthesia/Blood Transfusion Reactions: No Reported Reaction


Past Psychological History: No Psychological Hx Reported


Smoking Status: Never smoker


Past Alcohol Use History: None Reported


Past Drug Use History: None Reported





- Past Family History


  ** Mother


Family Medical History: No Reported History





General Exam





- General Exam Comments


Initial Comments: 





General:  The patient is awake and alert, in no distress, and does not appear 

acutely ill. 


Eye:  +3 mm pupils are equal, round and reactive to light, extra-ocular 

movements are intact.  No nystagmus.  There is normal conjunctiva bilaterally.  

No signs of icterus.  


Ears, nose, mouth and throat:  There are moist mucous membranes and no oral 

lesions. 


Neck:  The neck is supple, there is no tenderness or JVD.  


Cardiovascular:  There is a regular rate and rhythm. No murmur, rub or gallop is

appreciated.


Respiratory:  Lungs are clear to auscultation, respirations are non-labored, 

breath sounds are equal.  No wheezes, stridor, rales, or rhonchi.


Gastrointestinal:  Soft, non-distended, non-tender abdomen without masses or 

organomegaly noted. There is no rebound or guarding present.  


Musculoskeletal:  Normal ROM, no tenderness.  Strength 5/5. Sensation intact. 

Radial pulses equal bilaterally 2+.  


Neurological:  A&O x 3. CN II-XII intact, There are no obvious motor or sensory 

deficits. Coordination appears grossly intact. Speech is normal.


Skin:  Skin is warm and dry and no rashes or lesions are noted. No LE edema.


Psychiatric:  Cooperative, appropriate mood & affect, normal judgment.  


Limitations: no limitations





Course


                                   Vital Signs











  02/07/20 02/07/20 02/07/20





  10:51 12:14 13:13


 


Temperature 98.0 F  98.1 F


 


Pulse Rate 82 68 64


 


Respiratory 18 20 16





Rate   


 


Blood Pressure 202/111 160/86 170/82


 


O2 Sat by Pulse 98 97 98





Oximetry   














Medical Decision Making





- Medical Decision Making


Well-appearing 74-year-old female presenting for elevated blood pressure.  

Patient states she did feel slightly lightheaded at the time and this has been 

ongoing on and off for the past 3 months.  She denies any current lightheaded 

sensation any dizziness.  Patient has no current symptoms at this time.  

Patient's EKG no acute findings chest x-ray clear laboratory studies stable with

a negative troponin denied any chest pain or shortness of breath.  Without 

additional medications patient's blood pressure decreased most likely from the 

administered 100 mg of lisinopril prior to arrival.  At this time I feel patient

is stable for discharge with outpatient primary care follow-up by primary 

provider Dr. Negron is agreeable to this care plan and recommends discharge. 

Patient is agreeable and states she is ready to go home. 





Ventricular rate 75 bpm, MS interval 146 ms, QRS duration 84 ms, QT//448 

ms.  This is normal sinus with no ST elevation or depression.








- Lab Data


Result diagrams: 


                                 02/07/20 11:23





                                 02/07/20 11:23


                                   Lab Results











  02/07/20 02/07/20 02/07/20 Range/Units





  11:23 11:23 11:23 


 


WBC  7.1    (3.8-10.6)  k/uL


 


RBC  4.87    (3.80-5.40)  m/uL


 


Hgb  13.9    (11.4-16.0)  gm/dL


 


Hct  42.7    (34.0-46.0)  %


 


MCV  87.7    (80.0-100.0)  fL


 


MCH  28.6    (25.0-35.0)  pg


 


MCHC  32.6    (31.0-37.0)  g/dL


 


RDW  13.4    (11.5-15.5)  %


 


Plt Count  258    (150-450)  k/uL


 


Neutrophils %  85    %


 


Lymphocytes %  9    %


 


Monocytes %  3    %


 


Eosinophils %  1    %


 


Basophils %  0    %


 


Neutrophils #  6.0    (1.3-7.7)  k/uL


 


Lymphocytes #  0.7 L    (1.0-4.8)  k/uL


 


Monocytes #  0.2    (0-1.0)  k/uL


 


Eosinophils #  0.1    (0-0.7)  k/uL


 


Basophils #  0.0    (0-0.2)  k/uL


 


PT    10.2  (9.0-12.0)  sec


 


INR    1.0  (<1.2)  


 


APTT    24.6  (22.0-30.0)  sec


 


Sodium   141   (137-145)  mmol/L


 


Potassium   3.8   (3.5-5.1)  mmol/L


 


Chloride   109 H   ()  mmol/L


 


Carbon Dioxide   21 L   (22-30)  mmol/L


 


Anion Gap   11   mmol/L


 


BUN   13   (7-17)  mg/dL


 


Creatinine   0.65   (0.52-1.04)  mg/dL


 


Est GFR (CKD-EPI)AfAm   >90   (>60 ml/min/1.73 sqM)  


 


Est GFR (CKD-EPI)NonAf   89   (>60 ml/min/1.73 sqM)  


 


Glucose   112 H   (74-99)  mg/dL


 


Calcium   9.3   (8.4-10.2)  mg/dL


 


Total Bilirubin   0.5   (0.2-1.3)  mg/dL


 


AST   22   (14-36)  U/L


 


ALT   14   (4-34)  U/L


 


Alkaline Phosphatase   105   ()  U/L


 


Troponin I     (0.000-0.034)  ng/mL


 


Total Protein   7.2   (6.3-8.2)  g/dL


 


Albumin   4.4   (3.5-5.0)  g/dL


 


Urine Color     


 


Urine Appearance     (Clear)  


 


Urine pH     (5.0-8.0)  


 


Ur Specific Gravity     (1.001-1.035)  


 


Urine Protein     (Negative)  


 


Urine Glucose (UA)     (Negative)  


 


Urine Ketones     (Negative)  


 


Urine Blood     (Negative)  


 


Urine Nitrite     (Negative)  


 


Urine Bilirubin     (Negative)  


 


Urine Urobilinogen     (<2.0)  mg/dL


 


Ur Leukocyte Esterase     (Negative)  


 


Urine RBC     (0-5)  /hpf


 


Urine WBC     (0-5)  /hpf


 


Ur Squamous Epith Cells     (0-4)  /hpf


 


Hyaline Casts     (0-2)  /lpf


 


Urine Mucus     (None)  /hpf














  02/07/20 02/07/20 Range/Units





  11:23 11:23 


 


WBC    (3.8-10.6)  k/uL


 


RBC    (3.80-5.40)  m/uL


 


Hgb    (11.4-16.0)  gm/dL


 


Hct    (34.0-46.0)  %


 


MCV    (80.0-100.0)  fL


 


MCH    (25.0-35.0)  pg


 


MCHC    (31.0-37.0)  g/dL


 


RDW    (11.5-15.5)  %


 


Plt Count    (150-450)  k/uL


 


Neutrophils %    %


 


Lymphocytes %    %


 


Monocytes %    %


 


Eosinophils %    %


 


Basophils %    %


 


Neutrophils #    (1.3-7.7)  k/uL


 


Lymphocytes #    (1.0-4.8)  k/uL


 


Monocytes #    (0-1.0)  k/uL


 


Eosinophils #    (0-0.7)  k/uL


 


Basophils #    (0-0.2)  k/uL


 


PT    (9.0-12.0)  sec


 


INR    (<1.2)  


 


APTT    (22.0-30.0)  sec


 


Sodium    (137-145)  mmol/L


 


Potassium    (3.5-5.1)  mmol/L


 


Chloride    ()  mmol/L


 


Carbon Dioxide    (22-30)  mmol/L


 


Anion Gap    mmol/L


 


BUN    (7-17)  mg/dL


 


Creatinine    (0.52-1.04)  mg/dL


 


Est GFR (CKD-EPI)AfAm    (>60 ml/min/1.73 sqM)  


 


Est GFR (CKD-EPI)NonAf    (>60 ml/min/1.73 sqM)  


 


Glucose    (74-99)  mg/dL


 


Calcium    (8.4-10.2)  mg/dL


 


Total Bilirubin    (0.2-1.3)  mg/dL


 


AST    (14-36)  U/L


 


ALT    (4-34)  U/L


 


Alkaline Phosphatase    ()  U/L


 


Troponin I  <0.012   (0.000-0.034)  ng/mL


 


Total Protein    (6.3-8.2)  g/dL


 


Albumin    (3.5-5.0)  g/dL


 


Urine Color   Light Yellow  


 


Urine Appearance   Clear  (Clear)  


 


Urine pH   5.5  (5.0-8.0)  


 


Ur Specific Gravity   1.010  (1.001-1.035)  


 


Urine Protein   Negative  (Negative)  


 


Urine Glucose (UA)   Negative  (Negative)  


 


Urine Ketones   Negative  (Negative)  


 


Urine Blood   Trace H  (Negative)  


 


Urine Nitrite   Negative  (Negative)  


 


Urine Bilirubin   Negative  (Negative)  


 


Urine Urobilinogen   <2.0  (<2.0)  mg/dL


 


Ur Leukocyte Esterase   Negative  (Negative)  


 


Urine RBC   1  (0-5)  /hpf


 


Urine WBC   1  (0-5)  /hpf


 


Ur Squamous Epith Cells   1  (0-4)  /hpf


 


Hyaline Casts   1  (0-2)  /lpf


 


Urine Mucus   Occasional H  (None)  /hpf














Disposition


Clinical Impression: 


 Elevated blood pressure reading, Light headed





Disposition: HOME SELF-CARE


Condition: Good


Instructions (If sedation given, give patient instructions):  Chronic 

Hypertension (ED), Hypertension (ED)


Additional Instructions: 


Please use medication as discussed.  Please follow-up with family doctor in the 

next 2 days, please obtain records for reviewing-given age would recommend 

outpatient stress test. Return to ER for chest pain, sensation of passing out, 

headaches, visual changes, shortness of breath.  Please return to emergency room

if the symptoms increase or worsen or for any other concerns.


Is patient prescribed a controlled substance at d/c from ED?: No


Referrals: 


Neil Nj MD [Primary Care Provider] - 1-2 days


Time of Disposition: 12:42

## 2020-02-07 NOTE — XR
EXAMINATION TYPE: XR chest 2V

 

DATE OF EXAM: 2/7/2020

 

COMPARISON: None

 

INDICATION: Syncope elevated blood pressure

 

TECHNIQUE:  Frontal and lateral views of the chest are obtained.

 

FINDINGS:  

The heart size is normal.  

The pulmonary vasculature is normal.

The lungs are clear.

 

IMPRESSION:  

1. No acute pulmonary process.

## 2020-06-25 ENCOUNTER — HOSPITAL ENCOUNTER (INPATIENT)
Dept: HOSPITAL 47 - EC | Age: 75
LOS: 3 days | Discharge: HOME | DRG: 310 | End: 2020-06-28
Attending: INTERNAL MEDICINE | Admitting: INTERNAL MEDICINE
Payer: MEDICARE

## 2020-06-25 DIAGNOSIS — Z82.49: ICD-10-CM

## 2020-06-25 DIAGNOSIS — Z98.890: ICD-10-CM

## 2020-06-25 DIAGNOSIS — Z79.899: ICD-10-CM

## 2020-06-25 DIAGNOSIS — Z90.710: ICD-10-CM

## 2020-06-25 DIAGNOSIS — D35.2: ICD-10-CM

## 2020-06-25 DIAGNOSIS — I10: ICD-10-CM

## 2020-06-25 DIAGNOSIS — Z90.49: ICD-10-CM

## 2020-06-25 DIAGNOSIS — I65.22: ICD-10-CM

## 2020-06-25 DIAGNOSIS — Z79.82: ICD-10-CM

## 2020-06-25 DIAGNOSIS — I95.1: ICD-10-CM

## 2020-06-25 DIAGNOSIS — Z80.0: ICD-10-CM

## 2020-06-25 DIAGNOSIS — I25.10: ICD-10-CM

## 2020-06-25 DIAGNOSIS — I73.9: ICD-10-CM

## 2020-06-25 DIAGNOSIS — Z11.59: ICD-10-CM

## 2020-06-25 DIAGNOSIS — I47.1: Primary | ICD-10-CM

## 2020-06-25 DIAGNOSIS — Z87.19: ICD-10-CM

## 2020-06-25 LAB
ALBUMIN SERPL-MCNC: 4.4 G/DL (ref 3.5–5)
ALP SERPL-CCNC: 119 U/L (ref 38–126)
ALT SERPL-CCNC: 14 U/L (ref 4–34)
ANION GAP SERPL CALC-SCNC: 9 MMOL/L
APTT BLD: 23.4 SEC (ref 22–30)
AST SERPL-CCNC: 19 U/L (ref 14–36)
BASOPHILS # BLD AUTO: 0.1 K/UL (ref 0–0.2)
BASOPHILS NFR BLD AUTO: 1 %
BUN SERPL-SCNC: 14 MG/DL (ref 7–17)
CALCIUM SPEC-MCNC: 9.5 MG/DL (ref 8.4–10.2)
CHLORIDE SERPL-SCNC: 107 MMOL/L (ref 98–107)
CO2 SERPL-SCNC: 24 MMOL/L (ref 22–30)
EOSINOPHIL # BLD AUTO: 0.1 K/UL (ref 0–0.7)
EOSINOPHIL NFR BLD AUTO: 2 %
ERYTHROCYTE [DISTWIDTH] IN BLOOD BY AUTOMATED COUNT: 4.96 M/UL (ref 3.8–5.4)
ERYTHROCYTE [DISTWIDTH] IN BLOOD: 13.4 % (ref 11.5–15.5)
GLUCOSE SERPL-MCNC: 96 MG/DL (ref 74–99)
HCT VFR BLD AUTO: 43.4 % (ref 34–46)
HGB BLD-MCNC: 14.4 GM/DL (ref 11.4–16)
HYALINE CASTS UR QL AUTO: 5 /LPF (ref 0–2)
INR PPP: 1 (ref ?–1.2)
LYMPHOCYTES # SPEC AUTO: 1.6 K/UL (ref 1–4.8)
LYMPHOCYTES NFR SPEC AUTO: 22 %
MCH RBC QN AUTO: 29 PG (ref 25–35)
MCHC RBC AUTO-ENTMCNC: 33.1 G/DL (ref 31–37)
MCV RBC AUTO: 87.4 FL (ref 80–100)
MONOCYTES # BLD AUTO: 0.4 K/UL (ref 0–1)
MONOCYTES NFR BLD AUTO: 5 %
NEUTROPHILS # BLD AUTO: 4.9 K/UL (ref 1.3–7.7)
NEUTROPHILS NFR BLD AUTO: 68 %
PH UR: 5 [PH] (ref 5–8)
PLATELET # BLD AUTO: 329 K/UL (ref 150–450)
POTASSIUM SERPL-SCNC: 4.1 MMOL/L (ref 3.5–5.1)
PROT SERPL-MCNC: 7.4 G/DL (ref 6.3–8.2)
PT BLD: 10.5 SEC (ref 9–12)
RBC UR QL: 2 /HPF (ref 0–5)
SODIUM SERPL-SCNC: 140 MMOL/L (ref 137–145)
SP GR UR: 1.02 (ref 1–1.03)
SQUAMOUS UR QL AUTO: 6 /HPF (ref 0–4)
UROBILINOGEN UR QL STRIP: <2 MG/DL (ref ?–2)
WBC # BLD AUTO: 7.1 K/UL (ref 3.8–10.6)
WBC # UR AUTO: 18 /HPF (ref 0–5)

## 2020-06-25 PROCEDURE — 99285 EMERGENCY DEPT VISIT HI MDM: CPT

## 2020-06-25 PROCEDURE — 85610 PROTHROMBIN TIME: CPT

## 2020-06-25 PROCEDURE — 84484 ASSAY OF TROPONIN QUANT: CPT

## 2020-06-25 PROCEDURE — 93306 TTE W/DOPPLER COMPLETE: CPT

## 2020-06-25 PROCEDURE — 93880 EXTRACRANIAL BILAT STUDY: CPT

## 2020-06-25 PROCEDURE — 80053 COMPREHEN METABOLIC PANEL: CPT

## 2020-06-25 PROCEDURE — 96360 HYDRATION IV INFUSION INIT: CPT

## 2020-06-25 PROCEDURE — 71046 X-RAY EXAM CHEST 2 VIEWS: CPT

## 2020-06-25 PROCEDURE — 81001 URINALYSIS AUTO W/SCOPE: CPT

## 2020-06-25 PROCEDURE — 85730 THROMBOPLASTIN TIME PARTIAL: CPT

## 2020-06-25 PROCEDURE — 93005 ELECTROCARDIOGRAM TRACING: CPT

## 2020-06-25 PROCEDURE — 70470 CT HEAD/BRAIN W/O & W/DYE: CPT

## 2020-06-25 PROCEDURE — 85025 COMPLETE CBC W/AUTO DIFF WBC: CPT

## 2020-06-25 PROCEDURE — 95816 EEG AWAKE AND DROWSY: CPT

## 2020-06-25 PROCEDURE — 36415 COLL VENOUS BLD VENIPUNCTURE: CPT

## 2020-06-25 PROCEDURE — 87086 URINE CULTURE/COLONY COUNT: CPT

## 2020-06-25 RX ADMIN — CEFAZOLIN SCH MLS/HR: 330 INJECTION, POWDER, FOR SOLUTION INTRAMUSCULAR; INTRAVENOUS at 23:54

## 2020-06-25 NOTE — XR
EXAMINATION TYPE: XR chest 2V

 

DATE OF EXAM: 6/25/2020

 

COMPARISON: 2/7/2020

 

HISTORY: High blood pressure

 

TECHNIQUE: 2 views

 

FINDINGS: Heart is normal. Lungs are clear of consolidation. There are no hilar masses. Costophrenic 
angles are clear.

 

IMPRESSION: No active cardiopulmonary disease. No change.

## 2020-06-25 NOTE — ED
General Adult HPI





- General


Source: patient, RN notes reviewed


Mode of arrival: ambulatory


Limitations: no limitations





<Alvaro Morales - Last Filed: 06/25/20 22:15>





<Siddhartha Macdonald - Last Filed: 06/25/20 22:24>





- General


Chief complaint: Dizziness


Stated complaint: Dizziness


Time Seen by Provider: 06/25/20 20:00





- History of Present Illness


Initial comments: 





74-year-old female with a past medical history of hypertension presents to the 

emergency department for a chief complaint of lightheadedness.  Patient states 

for the past 2 months she has been lightheaded.  States she is lightheaded 

throughout the day.  States it is about 70% of her day that she feels this way. 

She denies any dizziness or room spinning.  Patient states she did start 

losartan about 2 months ago she started to feel this way.  She thought it could 

be related but she did not take her losartan today and again felt lightheaded.  

Patient states she has been drinking plenty of fluids.  Patient denies any chest

pain or shortness of breath.Patient has no other complaints at this time 

including shortness of breath, chest pain, abdominal pain, nausea or vomiting, 

headache, or visual changes. (Alvaro Morales)





- Related Data


                                Home Medications











 Medication  Instructions  Recorded  Confirmed


 


amLODIPine [Norvasc] 5 mg PO QAM 01/26/18 06/25/20


 


Lactulose 10 gm PO HS 06/25/20 06/25/20


 


Losartan Potassium 100 mg PO HS 06/25/20 06/25/20








                                  Previous Rx's











 Medication  Instructions  Recorded


 


Aspirin 325 mg PO BID #60 tab 06/08/18


 


Cephalexin [Keflex] 500 mg PO Q8H 10 Days #30 cap 06/25/20











                                    Allergies











Allergy/AdvReac Type Severity Reaction Status Date / Time


 


No Known Allergies Allergy   Verified 06/25/20 21:30














Review of Systems


ROS Other: All systems not noted in ROS Statement are negative.





<Alvaro Morales - Last Filed: 06/25/20 22:15>


ROS Other: All systems not noted in ROS Statement are negative.





<Siddhartha Macdonald - Last Filed: 06/25/20 22:24>


ROS Statement: 


Those systems with pertinent positive or pertinent negative responses have been 

documented in the HPI.








Past Medical History


Past Medical History: Hypertension


Additional Past Medical History / Comment(s): HX HEMMOROIDS


History of Any Multi-Drug Resistant Organisms: None Reported


Past Surgical History: Appendectomy, Hysterectomy


Additional Past Surgical History / Comment(s): HEMMOROIDECTOMY


Past Anesthesia/Blood Transfusion Reactions: No Reported Reaction


Past Psychological History: No Psychological Hx Reported


Smoking Status: Never smoker


Past Alcohol Use History: None Reported


Past Drug Use History: None Reported





- Past Family History


  ** Mother


Family Medical History: No Reported History





<Alvaro Morales - Last Filed: 06/25/20 22:15>





General Exam


Limitations: no limitations


General appearance: alert, in no apparent distress


Head exam: Present: atraumatic, normocephalic, normal inspection


Eye exam: Present: normal appearance, PERRL, EOMI.  Absent: scleral icterus, 

conjunctival injection


ENT exam: Present: normal exam, mucous membranes moist


Neck exam: Present: normal inspection, full ROM.  Absent: tenderness, 

meningismus, lymphadenopathy


Respiratory exam: Present: normal lung sounds bilaterally.  Absent: respiratory 

distress, wheezes, rales, rhonchi, stridor


Cardiovascular Exam: Present: regular rate, normal rhythm, normal heart sounds. 

 Absent: systolic murmur, diastolic murmur, rubs, gallop, clicks


GI/Abdominal exam: Present: soft, normal bowel sounds.  Absent: distended, 

tenderness, guarding, rebound, rigid


Neurological exam: Present: alert





<Alvaro Morales - Last Filed: 06/25/20 22:15>





Course





<Siddhartha Macdonald - Last Filed: 06/25/20 22:24>





                                   Vital Signs











  06/25/20 06/25/20 06/25/20





  19:46 20:16 21:16


 


Temperature 97.9 F  98.7 F


 


Pulse Rate 84 74 70


 


Respiratory 18 17 16





Rate   


 


Blood Pressure 162/88 161/94 135/81


 


O2 Sat by Pulse 99 97 98





Oximetry   














- Reevaluation(s)


Reevaluation #1: 





06/25/20 22:23


PA supervision: This is a 74-year-old female who presented with complaints of 

dizziness going on for up to 2 months.  She was found on evaluation to have a 

slight urinary tract infection.  She does not want to be hospitalized sign 

she'll be discharged with appropriate antibiotics.  She is follow-up with her 

doctor return when necessary return parameters were discussed. (Siddhartha Macdonald)





EKG Findings





- EKG Comments:


EKG Findings:: Ventricular rate 75,.WI int 172, QTc 446, normal sinus rhythm.





<Alvaro Morales - Last Filed: 06/25/20 22:15>





Medical Decision Making





- Lab Data


Result diagrams: 


                                 06/25/20 20:39





                                 06/25/20 20:39





<Alvaro Morales - Last Filed: 06/25/20 22:15>





- Lab Data


Result diagrams: 


                                 06/25/20 20:39





                                 06/25/20 20:39





<Siddhartha Macdonald - Last Filed: 06/25/20 22:24>





- Lab Data





                                   Lab Results











  06/25/20 06/25/20 06/25/20 Range/Units





  20:39 20:39 20:39 


 


WBC  7.1    (3.8-10.6)  k/uL


 


RBC  4.96    (3.80-5.40)  m/uL


 


Hgb  14.4    (11.4-16.0)  gm/dL


 


Hct  43.4    (34.0-46.0)  %


 


MCV  87.4    (80.0-100.0)  fL


 


MCH  29.0    (25.0-35.0)  pg


 


MCHC  33.1    (31.0-37.0)  g/dL


 


RDW  13.4    (11.5-15.5)  %


 


Plt Count  329    (150-450)  k/uL


 


Neutrophils %  68    %


 


Lymphocytes %  22    %


 


Monocytes %  5    %


 


Eosinophils %  2    %


 


Basophils %  1    %


 


Neutrophils #  4.9    (1.3-7.7)  k/uL


 


Lymphocytes #  1.6    (1.0-4.8)  k/uL


 


Monocytes #  0.4    (0-1.0)  k/uL


 


Eosinophils #  0.1    (0-0.7)  k/uL


 


Basophils #  0.1    (0-0.2)  k/uL


 


PT   10.5   (9.0-12.0)  sec


 


INR   1.0   (<1.2)  


 


APTT   23.4   (22.0-30.0)  sec


 


Sodium    140  (137-145)  mmol/L


 


Potassium    4.1  (3.5-5.1)  mmol/L


 


Chloride    107  ()  mmol/L


 


Carbon Dioxide    24  (22-30)  mmol/L


 


Anion Gap    9  mmol/L


 


BUN    14  (7-17)  mg/dL


 


Creatinine    0.74  (0.52-1.04)  mg/dL


 


Est GFR (CKD-EPI)AfAm    >90  (>60 ml/min/1.73 sqM)  


 


Est GFR (CKD-EPI)NonAf    81  (>60 ml/min/1.73 sqM)  


 


Glucose    96  (74-99)  mg/dL


 


Calcium    9.5  (8.4-10.2)  mg/dL


 


Total Bilirubin    0.4  (0.2-1.3)  mg/dL


 


AST    19  (14-36)  U/L


 


ALT    14  (4-34)  U/L


 


Alkaline Phosphatase    119  ()  U/L


 


Troponin I     (0.000-0.034)  ng/mL


 


Total Protein    7.4  (6.3-8.2)  g/dL


 


Albumin    4.4  (3.5-5.0)  g/dL


 


Urine Color     


 


Urine Appearance     (Clear)  


 


Urine pH     (5.0-8.0)  


 


Ur Specific Gravity     (1.001-1.035)  


 


Urine Protein     (Negative)  


 


Urine Glucose (UA)     (Negative)  


 


Urine Ketones     (Negative)  


 


Urine Blood     (Negative)  


 


Urine Nitrite     (Negative)  


 


Urine Bilirubin     (Negative)  


 


Urine Urobilinogen     (<2.0)  mg/dL


 


Ur Leukocyte Esterase     (Negative)  


 


Urine RBC     (0-5)  /hpf


 


Urine WBC     (0-5)  /hpf


 


Ur Squamous Epith Cells     (0-4)  /hpf


 


Hyaline Casts     (0-2)  /lpf


 


Urine Mucus     (None)  /hpf














  06/25/20 06/25/20 Range/Units





  20:39 20:51 


 


WBC    (3.8-10.6)  k/uL


 


RBC    (3.80-5.40)  m/uL


 


Hgb    (11.4-16.0)  gm/dL


 


Hct    (34.0-46.0)  %


 


MCV    (80.0-100.0)  fL


 


MCH    (25.0-35.0)  pg


 


MCHC    (31.0-37.0)  g/dL


 


RDW    (11.5-15.5)  %


 


Plt Count    (150-450)  k/uL


 


Neutrophils %    %


 


Lymphocytes %    %


 


Monocytes %    %


 


Eosinophils %    %


 


Basophils %    %


 


Neutrophils #    (1.3-7.7)  k/uL


 


Lymphocytes #    (1.0-4.8)  k/uL


 


Monocytes #    (0-1.0)  k/uL


 


Eosinophils #    (0-0.7)  k/uL


 


Basophils #    (0-0.2)  k/uL


 


PT    (9.0-12.0)  sec


 


INR    (<1.2)  


 


APTT    (22.0-30.0)  sec


 


Sodium    (137-145)  mmol/L


 


Potassium    (3.5-5.1)  mmol/L


 


Chloride    ()  mmol/L


 


Carbon Dioxide    (22-30)  mmol/L


 


Anion Gap    mmol/L


 


BUN    (7-17)  mg/dL


 


Creatinine    (0.52-1.04)  mg/dL


 


Est GFR (CKD-EPI)AfAm    (>60 ml/min/1.73 sqM)  


 


Est GFR (CKD-EPI)NonAf    (>60 ml/min/1.73 sqM)  


 


Glucose    (74-99)  mg/dL


 


Calcium    (8.4-10.2)  mg/dL


 


Total Bilirubin    (0.2-1.3)  mg/dL


 


AST    (14-36)  U/L


 


ALT    (4-34)  U/L


 


Alkaline Phosphatase    ()  U/L


 


Troponin I  <0.012   (0.000-0.034)  ng/mL


 


Total Protein    (6.3-8.2)  g/dL


 


Albumin    (3.5-5.0)  g/dL


 


Urine Color   Yellow  


 


Urine Appearance   Cloudy H  (Clear)  


 


Urine pH   5.0  (5.0-8.0)  


 


Ur Specific Gravity   1.025  (1.001-1.035)  


 


Urine Protein   Trace H  (Negative)  


 


Urine Glucose (UA)   Negative  (Negative)  


 


Urine Ketones   Negative  (Negative)  


 


Urine Blood   Small H  (Negative)  


 


Urine Nitrite   Negative  (Negative)  


 


Urine Bilirubin   Negative  (Negative)  


 


Urine Urobilinogen   <2.0  (<2.0)  mg/dL


 


Ur Leukocyte Esterase   Large H  (Negative)  


 


Urine RBC   2  (0-5)  /hpf


 


Urine WBC   18 H  (0-5)  /hpf


 


Ur Squamous Epith Cells   6 H  (0-4)  /hpf


 


Hyaline Casts   5 H  (0-2)  /lpf


 


Urine Mucus   Many H  (None)  /hpf














Disposition


Is patient prescribed a controlled substance at d/c from ED?: No


Time of Disposition: 22:15





<Alvaro Morales - Last Filed: 06/25/20 22:15>





<Siddhartha Macdonald - Last Filed: 06/25/20 22:24>


Clinical Impression: 


 Lightheadedness, Urinary tract infection





Disposition: HOME SELF-CARE


Condition: Good


Instructions (If sedation given, give patient instructions):  Lightheadedness 

(ED), Urinary Tract Infection in Women (ED)


Additional Instructions: 


Please follow up with primary care in 1-2 days for possible cardiology referral.

  If you have any worsening symptoms or have an episode of passing out return 

immediately to the emergency room.  Take antibiotic as directed.


Prescriptions: 


Cephalexin [Keflex] 500 mg PO Q8H 10 Days #30 cap


Referrals: 


Neil Nj MD [Primary Care Provider] - 1-2 days

## 2020-06-26 RX ADMIN — CEFAZOLIN SCH MLS/HR: 330 INJECTION, POWDER, FOR SOLUTION INTRAMUSCULAR; INTRAVENOUS at 15:21

## 2020-06-26 RX ADMIN — ASPIRIN 81 MG CHEWABLE TABLET SCH MG: 81 TABLET CHEWABLE at 09:57

## 2020-06-26 NOTE — P.HPIM
History of Present Illness


H&P Date: 06/26/20


Chief Complaint: Dizziness





History of Present Illness


This is a 74-year-old  female patient of Dr. Nj with past medical 

history of hypertension.  Patient states that she has had dizziness on and off 

for the last 3 months.  About 2 months ago she was placed on losartan 100 mg 

daily and thinks that her symptoms are worse since that time.  Last initial 

episode where she did feel palpitations in her heart rate was up to 140s and she

went to [facility] was told that she had hypertension was sent 

home.  She states her episode that time was very severe and she had tingling in 

her hands and arms.  She was previously in the ER and February at [facility] as well.  She states she never loses consciousness but she usually sits 

mostly today when she's had an episode.  Episodes come and go.  Yesterday 

morning it was 180/116.  A blood pressure is low.  She denies any spinning 

sensation, chest pain, shortness of breath.





Patient came into [facility] emergency center for evaluation. 

EKG reveals sinus mechanism heart rate is 75 with no acute ST or T wave 

abnormalities noted. Chest xray negative for an acute cardiopulmonary process. 

CBC unremarkable, sodium 140, potassium 4.1, creatinine 0.74, troponin negative 

3.  Patient has been seen by cardiology, echocardiogram is pending, 

orthostatics to be checked, apply cardiac monitoring, carotid Doppler ordered.  

At rest, patient denies any symptoms at this time.





Review of Systems


Constitutional: No fever, no chills, no night sweats.  No weight change.  No 

weakness, fatigue or lethargy.  No daytime sleepiness.


EENT: No headache.  No blurred vision or double vision, no loss of vision.  No 

loss of Hearing, no ringing in the ears, no dizziness.  No nasal drainage or con

gestion.  No epistaxis.  No sore throat.


Lungs: No shortness of breath, cough, no sputum production.  No wheezing.


Cardiovascular: No chest pain, no lower extremity edema.  No palpitations.  No 

paroxysmal nocturnal dyspnea.  No orthopnea.  Reports lightheadedness or 

dizziness.  No syncopal episodes.


Abdominal: No abdominal pain.  No nausea, vomiting.  No diarrhea.  No 

constipation.  No bloody or tarry stools..  No loss of appetite.


Genitourinary: No dysuria, increased frequency, urgency.  No urinary retention.


Musculoskeletal: No myalgias.  No muscle weakness, no gait dysfunction, no 

frequent falls.  No back pain.  No neck pain.


Integumentary: No wounds, no lesions.  No rash or pruritus.  No unusual 

bruising.  No change in hair or nails.


Neurologic: No aphasia. No facial droop. No change in mentation. No head injury.

No headache. No paralysis. No paresthesia.


Psychiatric: No depression.  No anxiety.  No mood swings.


Endocrine: No abnormal blood sugars.  No weight change.  No excessive sweating 

or thirst.  No cold intolerance.  








Physical Examination


Gen: This is 74-year-old  female patient resting on the ER stretcher 

and appears comfortable at rest.


HEENT: Head is atraumatic, normocephalic. Pupils equal, round. Sclerae is 

anicteric. 


NECK: Supple. No JVD. No lymphadenopathy. No thyromegaly. 


LUNGS: Clear to auscultation. No wheezes or rhonchi.  No intercostal retracti

ons.


HEART: Regular rate and rhythm. No murmur. 


ABDOMEN: Soft. Bowel sounds are present. No masses.  No tenderness.


EXTREMITIES: No pedal edema.  No calf tenderness.  Dorsalis pedis palpable 

bilaterally.


NEUROLOGICAL: Patient is awake, alert and oriented x3. Cranial nerves 2 through 

12 are grossly intact. 








Assessment and Plan


1.  Near syncopal episode.  Orthostatic vital signs, echocardiogram, carotid 

Doppler, cardiac monitoring.  Cardiology consult appreciated.





2.  Hypertension.  Losartan discontinued.  Continue amlodipine 5 mg daily and 

add hydralazine 50 mg twice daily.





3.  Peripheral vascular disease, stable





4.  DVT prophylaxis.  Heparin subcu.





5.  GI prophylaxis.  Protonix.





6.  COVID-19 testing in process.





Patient placed as an observation status.





Discharge plan: Return home





Impression and plan of care have been directed as dictated by the signing 

physician.  Princess Nava nurse practitioner acting as scribe for signing 

physician.








Past Medical History


Past Medical History: Hypertension


Additional Past Medical History / Comment(s): HX HEMMOROIDS


History of Any Multi-Drug Resistant Organisms: None Reported


Past Surgical History: Appendectomy, Hysterectomy


Additional Past Surgical History / Comment(s): HEMMOROIDECTOMY


Past Anesthesia/Blood Transfusion Reactions: No Reported Reaction


Past Psychological History: No Psychological Hx Reported


Smoking Status: Never smoker


Past Alcohol Use History: None Reported


Past Drug Use History: None Reported





- Past Family History


  ** Mother


Family Medical History: No Reported History





Medications and Allergies


                                Home Medications











 Medication  Instructions  Recorded  Confirmed  Type


 


amLODIPine [Norvasc] 5 mg PO QAM 01/26/18 06/25/20 History


 


Aspirin 325 mg PO BID #60 tab 06/08/18 06/25/20 Rx


 


Cephalexin [Keflex] 500 mg PO Q8H 10 Days #30 cap 06/25/20  Rx


 


Lactulose 10 gm PO HS 06/25/20 06/25/20 History


 


Losartan Potassium 100 mg PO HS 06/25/20 06/25/20 History








                                    Allergies











Allergy/AdvReac Type Severity Reaction Status Date / Time


 


No Known Allergies Allergy   Verified 06/25/20 21:30














Physical Exam


Vitals: 


                                   Vital Signs











  Temp Pulse Resp BP Pulse Ox


 


 06/26/20 05:00  98.4 F  71  16  144/73  99


 


 06/26/20 01:41  98.4 F  73  15  161/76  97


 


 06/25/20 23:00   72  20  164/80  99


 


 06/25/20 22:00   66  20  125/69 


 


 06/25/20 21:16  98.7 F  70  16  135/81  98


 


 06/25/20 20:16   74  17  161/94  97


 


 06/25/20 19:46  97.9 F  84  18  162/88  99








                                Intake and Output











 06/25/20 06/26/20 06/26/20





 22:59 06:59 14:59


 


Other:   


 


  Weight 101.151 kg  














Results


CBC & Chem 7: 


                                 06/25/20 20:39





                                 06/25/20 20:39


Labs: 


                  Abnormal Lab Results - Last 24 Hours (Table)











  06/25/20 Range/Units





  20:51 


 


Urine Appearance  Cloudy H  (Clear)  


 


Urine Protein  Trace H  (Negative)  


 


Urine Blood  Small H  (Negative)  


 


Ur Leukocyte Esterase  Large H  (Negative)  


 


Urine WBC  18 H  (0-5)  /hpf


 


Ur Squamous Epith Cells  6 H  (0-4)  /hpf


 


Hyaline Casts  5 H  (0-2)  /lpf


 


Urine Mucus  Many H  (None)  /hpf








                      Microbiology - Last 24 Hours (Table)











 06/25/20 20:51 Urine Culture - Preliminary





 Urine,Voided

## 2020-06-26 NOTE — P.CRDCN
History of Present Illness


History of present illness: 





HISTORY OF PRESENTING ILLNESS


This is a pleasant 74-year-old  female past medical history significant

for hypertension and peripheral vascular disease.  According to the patient she 

has carotid artery disease with a 60% blockage on the right and a 40% blockage 

on the left and she follows with Dr. Adam.  He denies prior history of 

coronary artery disease and does not follow in the office with cardiologist. We 

have been asked to see in consultation for near syncope.  She states for the 

previous 3-months she has been having intermittent episodes of feeling 

lightheaded like she is going to pass out however she has not passed out or lost

consciousness.  Her symptoms have no specific precipitating or exacerbating 

factors.  She denies feeling chest pain, shortness of breath, nausea or 

palpitations prior to feeling dizzy.  She did describe one episode last week 

where she was sitting down and getting ready to go to [employer] she felt her heart 

start racing rapidly.  She did check her blood pressure at that time and her 

heart rate read with the 140 bpm.  She states it felt very regular and rapid.  

It lasted for less than 5 minutes and resolved on its own.  It was associated 

with some feeling of lightheadedness.  That was the first time she had felt 

palpitations.  She was recently started on losartan 100 mg at bedtime by her 

primary care physician 3 months ago.  She checks her blood pressure regularly at

home and it fluctuates between 124 systolic up to 180 systolic.  She states she 

underwent cardiac catheterization over 10 years ago that she states was normal.





DIAGNOSTICS


EKG reveals sinus mechanism heart rate is 75 with no acute ST or T wave 

abnormalities noted..


Chest xray negative for an acute cardiopulmonary process.


Laboratory reviewed, CBC unremarkable, sodium 140, potassium 4.1, creatinine 

0.74, troponin negative 3.


Current cardiac medications include aspirin 325 mg twice a day, amlodipine 5 mg 

in the morning and losartan 100 mg at bedtime. 





REVIEW OF SYSTEMS


At the time of my exam:


CONSTITUTIONAL: Denies fever or chills.


CARDIOVASCULAR: Denies chest pain, shortness of breath, orthopnea, PND or 

palpitations.


RESPIRATORY: Denies cough. 


GASTROINTESTINAL: Denies abdominal pain, diarrhea, constipation, nausea or 

vomiting.


MUSCULOSKELETAL: Denies myalgias.


NEUROLOGIC: Denies numbness, tingling or weakness.


ENDOCRINE: Denies fatigue, weight change,  polydipsia or polyurina.


GENITOURINARY: Denies burning, hematuria or urgency with micturation.


HEMATOLOGIC: Denies history of anemia or bleeding. 





PHYSICAL EXAMINATION


Blood pressure 144/73 heart rate 71 afebrile and maintaining oxygen saturation 

on room air.


CONSTITUTIONAL: No apparent distress. 


HEENT: Head is normocephalic. Pupils are equal, round. Sclerae anicteric. Mucous

membranes of the mouth are moist.  No JVD. No carotid bruit.


CHEST EXAMINATION: Lungs are clear to auscultation. No chest wall tenderness is 

noted on palpation or with deep breathing. 


HEART EXAMINATION: Regular rate and rhythm. S1, S2 heard. No murmurs, gallops or

rub.


ABDOMEN: Soft, nontender. Positive bowel sounds.


EXTREMITIES: 2+ peripheral pulses, no lower extremity edema and no calf 

tenderness.


NEUROLOGIC EXAMINATION: Patient is awake, alert and oriented x3. 





ASSESSMENT


Near syncope


Hypertension


Peripheral vascular disease





PLAN


An acute coronary event has been ruled out.


Obtain 2-D echocardiogram and Doppler study to assess cardiac structure and 

function.


Check for orthostatic changes.


Apply telemetry monitor to assess for an acute arrhythmia.


Obtain bilateral carotid Doppler.


Further recommendations to follow based upon clinical course.


Thank you kindly for this consultation.








Nurse Practitioner note has been reviewed, I agree with a documented findings 

and plan of care.  Patient was seen and examined.











Past Medical History


Past Medical History: Hypertension


Additional Past Medical History / Comment(s): HX HEMMOROIDS


History of Any Multi-Drug Resistant Organisms: None Reported


Past Surgical History: Appendectomy, Hysterectomy


Additional Past Surgical History / Comment(s): HEMMOROIDECTOMY


Past Anesthesia/Blood Transfusion Reactions: No Reported Reaction


Past Psychological History: No Psychological Hx Reported


Smoking Status: Never smoker


Past Alcohol Use History: None Reported


Past Drug Use History: None Reported





- Past Family History


  ** Mother


Family Medical History: No Reported History





Medications and Allergies


                                Home Medications











 Medication  Instructions  Recorded  Confirmed  Type


 


amLODIPine [Norvasc] 5 mg PO QAM 01/26/18 06/25/20 History


 


Aspirin 325 mg PO BID #60 tab 06/08/18 06/25/20 Rx


 


Cephalexin [Keflex] 500 mg PO Q8H 10 Days #30 cap 06/25/20  Rx


 


Lactulose 10 gm PO HS 06/25/20 06/25/20 History


 


Losartan Potassium 100 mg PO HS 06/25/20 06/25/20 History








                                    Allergies











Allergy/AdvReac Type Severity Reaction Status Date / Time


 


No Known Allergies Allergy   Verified 06/25/20 21:30














Physical Exam


Vitals: 


                                   Vital Signs











  Temp Pulse Resp BP Pulse Ox


 


 06/26/20 05:00  98.4 F  71  16  144/73  99


 


 06/26/20 01:41  98.4 F  73  15  161/76  97


 


 06/25/20 23:00   72  20  164/80  99


 


 06/25/20 22:00   66  20  125/69 


 


 06/25/20 21:16  98.7 F  70  16  135/81  98


 


 06/25/20 20:16   74  17  161/94  97


 


 06/25/20 19:46  97.9 F  84  18  162/88  99








                                Intake and Output











 06/25/20 06/26/20 06/26/20





 22:59 06:59 14:59


 


Other:   


 


  Weight 101.151 kg  














Results





                                 06/25/20 20:39





                                 06/25/20 20:39


                                 Cardiac Enzymes











  06/25/20 06/25/20 06/26/20 Range/Units





  20:39 20:39 00:18 


 


AST  19    (14-36)  U/L


 


Troponin I   <0.012  <0.012  (0.000-0.034)  ng/mL














  06/26/20 Range/Units





  07:54 


 


AST   (14-36)  U/L


 


Troponin I  <0.012  (0.000-0.034)  ng/mL








                                   Coagulation











  06/25/20 Range/Units





  20:39 


 


PT  10.5  (9.0-12.0)  sec


 


APTT  23.4  (22.0-30.0)  sec








                                       CBC











  06/25/20 Range/Units





  20:39 


 


WBC  7.1  (3.8-10.6)  k/uL


 


RBC  4.96  (3.80-5.40)  m/uL


 


Hgb  14.4  (11.4-16.0)  gm/dL


 


Hct  43.4  (34.0-46.0)  %


 


Plt Count  329  (150-450)  k/uL








                          Comprehensive Metabolic Panel











  06/25/20 Range/Units





  20:39 


 


Sodium  140  (137-145)  mmol/L


 


Potassium  4.1  (3.5-5.1)  mmol/L


 


Chloride  107  ()  mmol/L


 


Carbon Dioxide  24  (22-30)  mmol/L


 


BUN  14  (7-17)  mg/dL


 


Creatinine  0.74  (0.52-1.04)  mg/dL


 


Glucose  96  (74-99)  mg/dL


 


Calcium  9.5  (8.4-10.2)  mg/dL


 


AST  19  (14-36)  U/L


 


ALT  14  (4-34)  U/L


 


Alkaline Phosphatase  119  ()  U/L


 


Total Protein  7.4  (6.3-8.2)  g/dL


 


Albumin  4.4  (3.5-5.0)  g/dL








                               Current Medications











Generic Name Dose Route Start Last Admin





  Trade Name Freq  PRN Reason Stop Dose Admin


 


Amlodipine Besylate  5 mg  06/26/20 09:15 





  Norvasc  PO  





  QAM LYLE  


 


Hydralazine HCl  50 mg  06/26/20 09:15 





  Apresoline  PO  





  BID LYLE  


 


Sodium Chloride  1,000 mls @ 75 mls/hr  06/25/20 22:45  06/25/20 23:54





  Saline 0.9%  IV   75 mls/hr





  .[id number] LYLE   Administration


 


Naloxone HCl  0.2 mg  06/25/20 22:44 





  Narcan  IV  





  Q2M PRN  





  Opioid Reversal  








                                Intake and Output











 06/25/20 06/26/20 06/26/20





 22:59 06:59 14:59


 


Other:   


 


  Weight 101.151 kg  








                                        





                                 06/25/20 20:39 





                                 06/25/20 20:39

## 2020-06-26 NOTE — ECHOF
Referral Reason:near syncope



MEASUREMENTS

--------

HEIGHT: 170.2 cm

WEIGHT: 101.2 kg

BP: 144/73

RVIDd:   3.5 cm     (< 3.3)

IVSd:   1.0 cm     (0.6 - 1.1)

LVIDd:   3.2 cm     (3.9 - 5.3)

LVPWd:   1.0 cm     (0.6 - 1.1)

IVSs:   1.8 cm

LVIDs:   2.7 cm

LVPWs:   1.7 cm

LA Diam:   3.6 cm     (2.7 - 3.8)

LAESV Index (A-L):   17.94 ml/m

Ao Diam:   3.5 cm     (2.0 - 3.7)

AV Cusp:   1.9 cm     (1.5 - 2.6)

MV EXCURSION:   13.189 mm     (> 18.000)

MV EF SLOPE:   46 mm/s     (70 - 150)

EPSS:   0.6 cm

MV E Herm[place]:   0.85 m/s

MV DecT:   343 ms

MV A Herm[place]indo:   1.18 m/s

MV E/A Ratio:   0.72 







FINDINGS

--------

Sinus rhythm.

This was a technically good study.

The left ventricular size is normal.   Left ventricular wall thickness is normal.   Overall left vent
ricular systolic function is normal with, an EF between 55 - 60 %.

The right ventricle is mildly enlarged.

Normal LA  size by volume 22+/-6 ml/m2.

The right atrial size is normal.

Interatrial and interventricular septum intact.

The aortic valve is trileaflet, and appears structurally normal. No aortic stenosis or regurgitation.


Mild mitral annular calcification present.   No mitral regurgitation.

The tricuspid valve appears structurally normal.

The pulmonic valve was not well visualized.   There is no pulmonic regurgitation present.

The aortic root size is normal.

IVC Not well visulized.

There is no pericardial effusion.



CONCLUSIONS

--------

1. Sinus rhythm.

2. Left ventricular wall thickness is normal.

3. Overall left ventricular systolic function is normal with, an EF between 55 - 60 %.

4. The right ventricle is mildly enlarged.

5. Normal LA size by volume 22+/-6 ml/m2.

6. The aortic valve is trileaflet, and appears structurally normal. No aortic stenosis or regurgitati
on.

7. Mild mitral annular calcification present.

8. The tricuspid valve appears structurally normal.

9. There is no pericardial effusion.





SONOGRAPHER: Ashanti Pepe RDCS

## 2020-06-26 NOTE — US
EXAMINATION TYPE: US carotid duplex BILAT

 

DATE OF EXAM: 6/26/2020

 

COMPARISON: NONE

 

CLINICAL HISTORY: near syncope. HTN.  Patient states feeling dizzy. 

 

EXAM MEASUREMENTS: 

 

RIGHT:  Peak Systolic Velocity (PSV) cm/sec

----- Right CCA:  76.9  

----- Right ICA:  110.4     

----- Right ECA:  108.1   

ICA/CCA ratio:  1.4    

 

RIGHT:  End Diastole cm/sec

----- Right CCA:  18.8   

----- Right ICA:  33.3      

----- Right ECA:  7.9     

 

LEFT:  Peak Systolic Velocity (PSV) cm/sec

----- Left CCA:  73.9  

----- Left ICA:  245.4   

----- Left ECA:  104.3  

ICA/CCA ratio:  3.3  

 

LEFT:  End Diastole cm/sec

----- Left CCA:  21.0  

----- Left ICA:  61.1   

----- Left ECA:  13.6 

 

VERTEBRALS (direction of flow):

Right Vertebral: Antegrade

Left Vertebral: Antegrade

 

Rhythm:  Normal

 

Bilateral wall thickening.  Left significant stenosis.  Elevated left mid and distal ICA.  Plaque see
n in bilateral ICA.

 

 

 

IMPRESSION:  

1. Severe stenosis greater than 70% within the left internal carotid artery.   

 

 

Criteria for Assigning % of Stenosis / Diameter reduction

(Estimation based on the indirect measurements of the internal carotid artery velocities (ICA PSV).

1.  Normal (no stenosis)=ICA PSV < 125 cm/s: ratio < 2.0: ICA EDV<40 cm/s.

2. Less than 50% stenosis=ICA PSV < 125 cm/s: ratio < 2.0: ICA EDV<40 cm/s.

3.  50 to 69% stenosis=ICA PSV of 125 to 230 cm/s: ration 2.0 ? 4.0: ICA EDV  cm/s.

4.  Greater than 70% stenosis to near occlusion= ICA PSV > 230 cm/s: ratio > 4.0: ICA EDV > 100 cm/s.
 

5.  Near occlusion= ICA PSV velocities may be low or undetectable: variable ratio and ICA EDV.

6.  Total occlusion=unable to detect flow.

## 2020-06-27 VITALS — RESPIRATION RATE: 16 BRPM

## 2020-06-27 RX ADMIN — CEFAZOLIN SCH MLS/HR: 330 INJECTION, POWDER, FOR SOLUTION INTRAMUSCULAR; INTRAVENOUS at 01:35

## 2020-06-27 RX ADMIN — ASPIRIN 81 MG CHEWABLE TABLET SCH MG: 81 TABLET CHEWABLE at 08:26

## 2020-06-27 RX ADMIN — LACTULOSE SCH GM: 20 SOLUTION ORAL at 16:07

## 2020-06-27 RX ADMIN — CEFAZOLIN SCH MLS/HR: 330 INJECTION, POWDER, FOR SOLUTION INTRAMUSCULAR; INTRAVENOUS at 16:07

## 2020-06-27 RX ADMIN — MECLIZINE SCH MG: 12.5 TABLET ORAL at 16:08

## 2020-06-27 RX ADMIN — MECLIZINE SCH MG: 12.5 TABLET ORAL at 21:51

## 2020-06-27 NOTE — PN
PROGRESS NOTE



Mrs. Ott is a 74-year-old female who presented with symptoms of dizziness and

presyncope, but did not have a full syncopal episode.  She is feeling tired this

morning, but had no syncope.  She has no arrhythmia on the monitor.  She has known

history of carotid disease, followed by Dr. Adam.  She denies any palpitation.  No

chest discomfort.  No PND.  No orthopnea.  She underwent an echocardiogram yesterday

that revealed a preserved systolic function with no significant valvular disease.  She

underwent a carotid duplex scan that revealed significant obstructive disease in the

left internal carotid artery.



Her medication at this point includes amlodipine 5 mg daily, aspirin, hydralazine 50 mg

twice a day.



PHYSICAL EXAMINATION:

Blood pressure running in the 140s/80 with a heart rate in 70s.

LUNGS:  Clear.

Heart regular rate and rhythm. S1, S2.  No S3 with systolic murmur.  No diastolic

murmur.  No rub.

ABDOMEN:  Soft, nontender.  Positive bowel sounds.  No organomegaly.

EXTREMITIES:  No edema.



LAB DATA:

Revealed a troponin less than 0.012 for 3 samples.



IMPRESSION:

1. Symptoms of dizziness and near syncope.  No evidence of malignant arrhythmia with

    carotid disease.

2. History of hypertension.

3. History of coronary artery disease.



RECOMMENDATIONS:

From the cardiac standpoint, we will continue the present medical regimen.  In view of

her history of carotid disease,  I will add a statin to her regimen.  We will continue

on the aspirin.  Increase her activity.  We will await the input of Dr. Adam in

regard to her carotid disease.





MMODL / IJN: [id number] / Job#: [id number]

## 2020-06-27 NOTE — CT
EXAMINATION TYPE: CT brain wo/w con

 

DATE OF EXAM: 6/27/2020

 

COMPARISON:

 

HISTORY: DIZZINESS AND HYPERTENSION

 

CT DLP: 2095.4 mGycm

Automated exposure control for dose reduction was used.

 

CONTRAST: 

Performed without and with IV Contrast, patient injected with 100 mL of Isovue 300.

 

Ventricles and sulci appear normal. There is no mass effect nor midline shift. There is no sign of in
tracranial hemorrhage. The calvarium is intact. Contrast images show no pathologic enhancement. Skull
 base is intact. Temporal bones appear normal. Calvarium is intact.

There is slight enlargement of this pituitary gland. There is fairly uniform enhancement on the contr
ast images.

IMPRESSION:

There is some enlargement of the pituitary gland with uniform enhancement that could relate to macroa
denoma.

## 2020-06-27 NOTE — EEG
ELECTROENCEPHALOGRAM REPORT



DATE OF SERVICE:

June 26, 2020.



DATE OF INTERPRETATION:

June 27, 2020.



REQUESTING PHYSICIAN:

Dr. Yang Gardiner.



HISTORY:

Not available.



TECHNICAL REPORT:

This is an inpatient EEG performed on the [employer] PC EEG monitor with electrodes placed

according to the International 10-20 system and a single EKG channel.  Simultaneous

video EEG monitoring was performed.  This EEG was reviewed in both longitudinal

bipolar, average referential and transverse montages.  Photic stimulation was performed

at various flash frequencies.



The recording begins with the patient in quiet wakefulness.  A well modulated

synchronous 8 Hz posterior dominant rhythm is present that attenuates with eye opening.

Intermittent muscle and movement artifact are noted, often associated with frequent eye

blinking.



Photic stimulation was performed at various flash frequencies and failed to elicit a

consistent driving response.



Following photic stimulation, the patient transitions into stage 1 sleep (drowsiness).

This is associated with further attenuation of the background rhythm.  The appearance

of mixed theta frequencies between 6-7 Hz.  Increase in beta activity is now noted

anteriorly and centrally.



The patient briefly transitions into stage II non rapid eye movement sleep.  This is

associated with further attenuation of the background.  The appearance of rare

synchronous sleep spindles, a slowing of the background down to 6-7 Hz.



Deeper stages of sleep were not achieved.



IMPRESSION:

This is a normal wake drowsy sleep EEG study.  No epileptiform activity, focal or

hemispheric slowing was noted.  No clinical events were noted.  No abnormalities were

noted during photic stimulation, nor in the EKG.



CLINICAL CORRELATION:

A single awake sleep EEG study does not preclude an underlying seizure tendency thus

further clinical correlation is needed.  If clinically indicated, a more prolonged

overnight study or serial EEGs could provide additional information.





MMODL / IJN: [id number] / Job#: [id number]

## 2020-06-27 NOTE — P.GSCN
History of Present Illness


Consult date: 06/27/20


History of present illness: 





The patient is a 74 year old female was it past medical history of hypertension 

who came into the hospital with dizziness. Upon workup and evaluation she was 

found to have >70% stenosis of her left internal carotid artery. She denies any 

vision changes, facial droop, weakness slurred speech or other TIA symptoms. She

states that when she moves her head she feels most dizzy. She denies any 

peripheral arterial disease or pain with ambulation





Past Medical History


Past Medical History: Hypertension


Additional Past Medical History / Comment(s): HX HEMMOROIDS


History of Any Multi-Drug Resistant Organisms: None Reported


Past Surgical History: Appendectomy, Hysterectomy


Additional Past Surgical History / Comment(s): HEMMOROIDECTOMY


Past Anesthesia/Blood Transfusion Reactions: No Reported Reaction


Past Psychological History: No Psychological Hx Reported


Smoking Status: Never smoker


Past Alcohol Use History: None Reported


Past Drug Use History: None Reported





- Past Family History


  ** Mother


Family Medical History: No Reported History





Medications and Allergies


                                Home Medications











 Medication  Instructions  Recorded  Confirmed  Type


 


amLODIPine [Norvasc] 5 mg PO QAM 01/26/18 06/25/20 History


 


Aspirin 325 mg PO BID #60 tab 06/08/18 06/25/20 Rx


 


Cephalexin [Keflex] 500 mg PO Q8H 10 Days #30 cap 06/25/20  Rx


 


Lactulose 10 gm PO HS 06/25/20 06/25/20 History


 


Losartan Potassium 100 mg PO HS 06/25/20 06/25/20 History








                                    Allergies











Allergy/AdvReac Type Severity Reaction Status Date / Time


 


No Known Allergies Allergy   Verified 06/25/20 21:30














Surgical - Exam


                                   Vital Signs











Temp Pulse Resp BP Pulse Ox


 


 97.9 F   84   18   162/88   99 


 


 06/25/20 19:46  06/25/20 19:46  06/25/20 19:46  06/25/20 19:46  06/25/20 19:46














- General


well developed, well nourished, no distress





- Eyes


PERRL, normal ocular movement





- ENT


normal pinna, normal nares





- Neck


no masses, no bruits





- Respiratory


normal expansion, normal respiratory effort





- Cardiovascular


Rhythm: regular





- Abdomen


Abdomen: soft, tender





- Integumentary


no rash





- Neurologic


normal coordination, normal sensation





- Psychiatric


oriented to time, oriented to person, oriented to place, speech is normal





Results





US is reviewed. , ICA/CCA 3.3





- Labs





                                 06/25/20 20:39





                                 06/25/20 20:39


                      Microbiology - Last 24 Hours (Table)











 06/25/20 20:51 Urine Culture - Final





 Urine,Voided 














Assessment and Plan


Assessment: 





1. Asymptomatic carotid artery stenosis >70%


2. dizziness


3. orthostatic hypotension





Plan: 





Discuseed imaging findings with the patient. She will need a CTA of the neck 

moving forward for surgical planning. All of this may be done as an outpatient. 

She did state in closing that she has seen another surgeon for this in the 

recent past and was told her narrowing was around 60% at that testing. She was 

to have follow up with this Dr in the next few months. She may follow up with us

 upon discharge, or return to her previous DR. She seemingly understands and is 

willing to proceed as such.

## 2020-06-28 VITALS — DIASTOLIC BLOOD PRESSURE: 75 MMHG | HEART RATE: 75 BPM | TEMPERATURE: 97.9 F | SYSTOLIC BLOOD PRESSURE: 137 MMHG

## 2020-06-28 RX ADMIN — MECLIZINE SCH MG: 12.5 TABLET ORAL at 08:50

## 2020-06-28 RX ADMIN — CEFAZOLIN SCH MLS/HR: 330 INJECTION, POWDER, FOR SOLUTION INTRAMUSCULAR; INTRAVENOUS at 04:35

## 2020-06-28 RX ADMIN — ASPIRIN 81 MG CHEWABLE TABLET SCH MG: 81 TABLET CHEWABLE at 08:49

## 2020-06-28 RX ADMIN — LACTULOSE SCH GM: 20 SOLUTION ORAL at 08:50

## 2020-06-28 NOTE — P.DS
Providers


Date of admission: 


06/27/20 09:19





Expected date of discharge: 06/28/20


Attending physician: 


Yang Gardiner





Consults: 





                                        





06/25/20 22:45


Consult Physician Routine 


   Consulting Provider: [facility]


   Consult Reason/Comments: near syncope


   Do you want consulting provider notified?: Yes





06/27/20 15:06


Consult Physician Routine 


   Consulting Provider: Rubi Torres


   Consult Reason/Comments: carotid stenosis left vertigo ronald syncope


   Do you want consulting provider notified?: Yes











Primary care physician: 


Neil Nj





[facility] Course: 








History of Present Illness


This is a 74-year-old  female patient of Dr. Nj with past medical 

history of hypertension.  Patient states that she has had dizziness on and off 

for the last 3 months.  About 2 months ago she was placed on losartan 100 mg 

daily and thinks that her symptoms are worse since that time.  Last initial 

episode where she did feel palpitations in her heart rate was up to 140s and she

went to [facility] was told that she had hypertension was sent 

home.  She states her episode that time was very severe and she had tingling in 

her hands and arms.  She was previously in the ER and February at [facility] as well.  She states she never loses consciousness but she usually sits 

mostly today when she's had an episode.  Episodes come and go.  Yesterday 

morning it was 180/116.  A blood pressure is low.  She denies any spinning 

sensation, chest pain, shortness of breath.





Patient came into [facility] emergency center for evaluation. 

EKG reveals sinus mechanism heart rate is 75 with no acute ST or T wave 

abnormalities noted. Chest xray negative for an acute cardiopulmonary process. 

CBC unremarkable, sodium 140, potassium 4.1, creatinine 0.74, troponin negative 

3.  Patient has been seen by cardiology, echocardiogram is pending, 

orthostatics to be checked, apply cardiac monitoring, carotid Doppler ordered.  

At rest, patient denies any symptoms at this time.





6/27: Patient was seen for follow-up today, she had a dizziness spell yesterday 

evening, patient denies any palpitations during this time, however her symptoms 

seems to be worse with positional changes of the head.  Not positional changes 

for orthostasis..  Patient denies any chest pain palpitations during this event,

not dyspnea, patient does not have any edema, no pleurisy, no dysplasia or 

aspirate events.  Patient denies any diplopia or headache.  She still will be 

seen by vascular surgery secondary to critical stenosis left side, or 70% left 

ICA, EEG of the brain is essentially unremarkable cat scan of the brain 

available for review, we'll going to request 41, patient would need ENT eval for

benign positional vertigo and eval for vestibular vertigo, patient might need 

MRI of the brain plus minus MRI of the internal auditory canal.  Patient denies 

any tinnitus , to evaluate for central vertigo, patient might need at least a 15

day or 30 day event monitor as an outpatient, urinalysis negative for pathogen. 

We will keep the patient another day,, orthostatic vitals to be done, along with

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 

maneuver, start meclizine





628, patient's doing better, no further vertigo, orthostatics were positive, 

patient received meclizine during this procedure, patient was cleared for 

discharge by both Dr. Torres and cardiology, home medications were reconciled, 

new medication hydralazine, and statin, and aspirin for carotid stenosis, 

outpatient follow-up with PCP, needing MRI of the brain for the pituitary 

macroadenoma, attention pituitary, also outpatient follow-up with Dr. Pitt or ENT preference by Dr. Nj, short course of midodrine, 

secondary to orthostatic hypotension, patient required IV fluids while here, 

however patient is still orthostatic.  Autonomic dysfunction is considered, 

however midodrine can be considered to determine if orthostasis still persist 

despite    nutritional supplementation.  Further workup discussed with the 

patient include a 15 or 30 day event monitor should vertigo persist





Review of Systems


Constitutional: No fever, no chills, no night sweats.  No weight change.  No 

weakness, fatigue or lethargy.  No daytime sleepiness.


EENT: No headache.  No blurred vision or double vision, no loss of vision.  No 

loss of Hearing, no ringing in the ears, no dizziness.  No nasal drainage or 

congestion.  No epistaxis.  No sore throat.


Lungs: No shortness of breath, cough, no sputum production.  No wheezing.


Cardiovascular: No chest pain, no lower extremity edema.  No palpitations.  No 

paroxysmal nocturnal dyspnea.  No orthopnea.  Reports lightheadedness or 

dizziness.  No syncopal episodes.


Abdominal: No abdominal pain.  No nausea, vomiting.  No diarrhea.  No constipa

tion.  No bloody or tarry stools..  No loss of appetite.


Genitourinary: No dysuria, increased frequency, urgency.  No urinary retention.


Musculoskeletal: No myalgias.  No muscle weakness, no gait dysfunction, no 

frequent falls.  No back pain.  No neck pain.


Integumentary: No wounds, no lesions.  No rash or pruritus.  No unusual 

bruising.  No change in hair or nails.


Neurologic: No aphasia. No facial droop. No change in mentation. No head injury.

No headache. No paralysis. No paresthesia.


Psychiatric: No depression.  No anxiety.  No mood swings.


Endocrine: No abnormal blood sugars.  No weight change.  No excessive sweating 

or thirst.  No cold intolerance.  








FINAL DIAGNOSIS


1.  Near syncopal episode episodes of SVT,.  With orthostatic hypotension, 

following tests are performed Orthostatic vital signs, echocardiogram, carotid 

Doppler, cardiac monitoring.  Cardiology consult appreciated.  Patient can 

benefit from arrhythmia evaluation with either 15 or 30 day monitor event, 

midodrine short-term use, patient's acquired to increase fluids, increase 

protein intake, meclizine now will be when necessary repeat outpatient 

orthostatics when seen Dr. Nj





2.  Pituitary macroadenoma, incidental finding, outpatient follow-up currently 

asymptomatic  need MRI of the pituitary





3.  Critical internal carotid stenosis left side, vascular surgeon to see the 

patient, Dr. Torres as an outpatient aspirin, and statin





4.  Vertigo multifactorial, suspect  positional vertigo, along with orthostatic 

hypotension cannot rule out central vertigo, CAT scan of the brain to be done 

for screening purposes, patient may need MRI of the brain with and without 

contrast as well as MRI of the internal carotid auditory canal, start meclizine,

patient can benefit from outpatient physical therapy should this be benign 

positional vertigo.  Patient needs to be evaluated f  ENT consult to be done as 

an outpatient





2.  Hypertension.  Losartan 100 mg daily.  Continue amlodipine 5 mg daily and 

add hydralazine 50 mg twice daily.  Patient did not require any beta blocker 

while here, cardiology was cleared for discharge, recommendations based on 

regimen provided from the hospital





3.  Peripheral vascular disease, stable





4.  DVT prophylaxis.  Heparin subcu.





5.  GI prophylaxis.  Protonix.





6.  COVID-19 testing in process.





Patient is on cephalexin, patient's to finish course of original treatment





Patient placed as an observation status.





Discharge plan: Return home





Patient Condition at Discharge: Good





Plan - Discharge Summary


Discharge Rx Participant: No


New Discharge Prescriptions: 


New


   Cephalexin [Keflex] 500 mg PO Q8H 10 Days #30 cap


   Meclizine [Antivert] 12.5 mg PO TID PRN #20 tab


     PRN Reason: Vertigo


   hydrALAZINE HCL [Apresoline] 50 mg PO BID #60 tab


   Aspirin 81 mg PO DAILY  chew


   Atorvastatin [Lipitor] 40 mg PO DAILY #30 tab


   amLODIPine [Norvasc] 5 mg PO QAM #30 tab


   Midodrine [ProAmatine] 5 mg PO TID #60 tablet





Continue


   amLODIPine [Norvasc] 5 mg PO QAM


   Aspirin 325 mg PO BID #60 tab


   Losartan Potassium 100 mg PO HS


   Lactulose 10 gm PO HS


Discharge Medication List





amLODIPine [Norvasc] 5 mg PO QAM 01/26/18 [History]


Aspirin 325 mg PO BID #60 tab 06/08/18 [Rx]


Cephalexin [Keflex] 500 mg PO Q8H 10 Days #30 cap 06/25/20 [Rx]


Lactulose 10 gm PO HS 06/25/20 [History]


Losartan Potassium 100 mg PO HS 06/25/20 [History]


Aspirin 81 mg PO DAILY  chew 06/28/20 [Rx]


Atorvastatin [Lipitor] 40 mg PO DAILY #30 tab 06/28/20 [Rx]


Meclizine [Antivert] 12.5 mg PO TID PRN #20 tab 06/28/20 [Rx]


Midodrine [ProAmatine] 5 mg PO TID #60 tablet 06/28/20 [Rx]


amLODIPine [Norvasc] 5 mg PO QAM #30 tab 06/28/20 [Rx]


hydrALAZINE HCL [Apresoline] 50 mg PO BID #60 tab 06/28/20 [Rx]








Follow up Appointment(s)/Referral(s): 


Moe Santana MD [STAFF PHYSICIAN] - 2 Weeks


Jordan Hernandez DO [Doctor of Osteopathic Medicine] - 10 Days


Rubi Torres DO [STAFF PHYSICIAN] - 3 Weeks


Neil Nj MD [Primary Care Provider] - 1-2 days


Patient Instructions/Handouts:  Urinary Tract Infection in Women (ED), 

Lightheadedness (ED)


Activity/Diet/Wound Care/Special Instructions: 


Please follow up with primary care in 1-2 days for possible cardiology referral.

 If you have any worsening symptoms or have an episode of passing out return 

immediately to the emergency room.  Take antibiotic as directed.


Discharge Disposition: HOME SELF-CARE

## 2020-06-28 NOTE — PN
PROGRESS NOTE



Mrs. Ott is a 74-year-old female who presented with symptoms of lightheadedness and

dizziness.  She underwent an echocardiogram that revealed preserved left ventricular

size systolic function.  Her carotid duplex scan revealed significant obstructive

disease in the left internal carotid artery.  She was evaluated by Dr. Torres yesterday.

She is feeling better today.  Her breathing is stable.  She has no further dizziness or

lightheadedness.  She is ambulating without any difficulty.  She continues to be at

this time on amlodipine 5 mg daily, aspirin once a day, hydralazine 50 mg twice a day,

meclizine on a p.r.n. basis.



PHYSICAL EXAMINATION:

Blood pressure running in the 140s with a heart rate in the 80s.

LUNGS:  Clear.

HEART regular rate  and rhythm. S1, S2.  No S3.  No rub.

ABDOMEN:  Soft nontender.

EXTREMITIES:  No edema.



IMPRESSION:

1. Lightheadedness with carotid disease could be related to it, although no evidence

    of clear neurological event.

2. History of hypertension, stable.

3. History of coronary artery disease stable.



RECOMMENDATION:

From the cardiac standpoint, she should be able to be discharged home and followed as

an outpatient.  She will be following with her vascular surgeon regarding further

intervention if needed on her carotid artery disease.





MMODL / IJN: [id number] / Job#: [id number]

## 2021-02-27 ENCOUNTER — HOSPITAL ENCOUNTER (OUTPATIENT)
Dept: HOSPITAL 47 - EC | Age: 76
Setting detail: OBSERVATION
LOS: 2 days | End: 2021-03-01
Attending: INTERNAL MEDICINE | Admitting: INTERNAL MEDICINE
Payer: MEDICARE

## 2021-02-27 DIAGNOSIS — R07.89: Primary | ICD-10-CM

## 2021-02-27 DIAGNOSIS — I73.9: ICD-10-CM

## 2021-02-27 DIAGNOSIS — Z90.49: ICD-10-CM

## 2021-02-27 DIAGNOSIS — Z86.73: ICD-10-CM

## 2021-02-27 DIAGNOSIS — I65.22: ICD-10-CM

## 2021-02-27 DIAGNOSIS — I65.23: ICD-10-CM

## 2021-02-27 DIAGNOSIS — Z79.82: ICD-10-CM

## 2021-02-27 DIAGNOSIS — Z20.828: ICD-10-CM

## 2021-02-27 DIAGNOSIS — I36.1: ICD-10-CM

## 2021-02-27 DIAGNOSIS — Z79.899: ICD-10-CM

## 2021-02-27 DIAGNOSIS — Z87.19: ICD-10-CM

## 2021-02-27 DIAGNOSIS — Z90.710: ICD-10-CM

## 2021-02-27 DIAGNOSIS — Z82.49: ICD-10-CM

## 2021-02-27 DIAGNOSIS — R60.0: ICD-10-CM

## 2021-02-27 DIAGNOSIS — R55: ICD-10-CM

## 2021-02-27 DIAGNOSIS — I10: ICD-10-CM

## 2021-02-27 DIAGNOSIS — R06.09: ICD-10-CM

## 2021-02-27 DIAGNOSIS — Z80.0: ICD-10-CM

## 2021-02-27 LAB
ALBUMIN SERPL-MCNC: 4.4 G/DL (ref 3.5–5)
ALP SERPL-CCNC: 122 U/L (ref 38–126)
ALT SERPL-CCNC: 13 U/L (ref 4–34)
ANION GAP SERPL CALC-SCNC: 10 MMOL/L
APTT BLD: 23.8 SEC (ref 22–30)
AST SERPL-CCNC: 18 U/L (ref 14–36)
BASOPHILS # BLD AUTO: 0 K/UL (ref 0–0.2)
BASOPHILS NFR BLD AUTO: 1 %
BUN SERPL-SCNC: 13 MG/DL (ref 7–17)
CALCIUM SPEC-MCNC: 9.6 MG/DL (ref 8.4–10.2)
CHLORIDE SERPL-SCNC: 107 MMOL/L (ref 98–107)
CO2 SERPL-SCNC: 24 MMOL/L (ref 22–30)
EOSINOPHIL # BLD AUTO: 0.2 K/UL (ref 0–0.7)
EOSINOPHIL NFR BLD AUTO: 2 %
ERYTHROCYTE [DISTWIDTH] IN BLOOD BY AUTOMATED COUNT: 5.03 M/UL (ref 3.8–5.4)
ERYTHROCYTE [DISTWIDTH] IN BLOOD: 13.4 % (ref 11.5–15.5)
GLUCOSE SERPL-MCNC: 100 MG/DL (ref 74–99)
HCT VFR BLD AUTO: 43.8 % (ref 34–46)
HGB BLD-MCNC: 14.6 GM/DL (ref 11.4–16)
INR PPP: 1 (ref ?–1.2)
LYMPHOCYTES # SPEC AUTO: 1.4 K/UL (ref 1–4.8)
LYMPHOCYTES NFR SPEC AUTO: 18 %
MAGNESIUM SPEC-SCNC: 2.2 MG/DL (ref 1.6–2.3)
MCH RBC QN AUTO: 29 PG (ref 25–35)
MCHC RBC AUTO-ENTMCNC: 33.3 G/DL (ref 31–37)
MCV RBC AUTO: 87.2 FL (ref 80–100)
MONOCYTES # BLD AUTO: 0.5 K/UL (ref 0–1)
MONOCYTES NFR BLD AUTO: 6 %
NEUTROPHILS # BLD AUTO: 5.4 K/UL (ref 1.3–7.7)
NEUTROPHILS NFR BLD AUTO: 72 %
PLATELET # BLD AUTO: 303 K/UL (ref 150–450)
POTASSIUM SERPL-SCNC: 3.9 MMOL/L (ref 3.5–5.1)
PROT SERPL-MCNC: 7.2 G/DL (ref 6.3–8.2)
PT BLD: 10.4 SEC (ref 9–12)
SODIUM SERPL-SCNC: 141 MMOL/L (ref 137–145)
WBC # BLD AUTO: 7.5 K/UL (ref 3.8–10.6)

## 2021-02-27 PROCEDURE — 93005 ELECTROCARDIOGRAM TRACING: CPT

## 2021-02-27 PROCEDURE — 80048 BASIC METABOLIC PNL TOTAL CA: CPT

## 2021-02-27 PROCEDURE — 85730 THROMBOPLASTIN TIME PARTIAL: CPT

## 2021-02-27 PROCEDURE — 93306 TTE W/DOPPLER COMPLETE: CPT

## 2021-02-27 PROCEDURE — 85025 COMPLETE CBC W/AUTO DIFF WBC: CPT

## 2021-02-27 PROCEDURE — 94760 N-INVAS EAR/PLS OXIMETRY 1: CPT

## 2021-02-27 PROCEDURE — 96374 THER/PROPH/DIAG INJ IV PUSH: CPT

## 2021-02-27 PROCEDURE — 84443 ASSAY THYROID STIM HORMONE: CPT

## 2021-02-27 PROCEDURE — 99285 EMERGENCY DEPT VISIT HI MDM: CPT

## 2021-02-27 PROCEDURE — 96372 THER/PROPH/DIAG INJ SC/IM: CPT

## 2021-02-27 PROCEDURE — 71046 X-RAY EXAM CHEST 2 VIEWS: CPT

## 2021-02-27 PROCEDURE — 36415 COLL VENOUS BLD VENIPUNCTURE: CPT

## 2021-02-27 PROCEDURE — 80053 COMPREHEN METABOLIC PANEL: CPT

## 2021-02-27 PROCEDURE — 83735 ASSAY OF MAGNESIUM: CPT

## 2021-02-27 PROCEDURE — 87635 SARS-COV-2 COVID-19 AMP PRB: CPT

## 2021-02-27 PROCEDURE — 84484 ASSAY OF TROPONIN QUANT: CPT

## 2021-02-27 PROCEDURE — 85610 PROTHROMBIN TIME: CPT

## 2021-02-27 NOTE — ED
General Adult HPI





- General


Chief complaint: Arrhythmia/Palpitations


Stated complaint: rapid heart rate, dizziness


Time Seen by Provider: 02/27/21 18:08


Source: patient


Mode of arrival: wheelchair


Limitations: no limitations





- History of Present Illness


Initial comments: 





Patient is a 75 year old female with past medical history of hypertension who 

presents emergency Department with reported chest pain and palpitations.  

Patient states that she has had chest tightness with palpitations for the past 2

days.  She has also had episodes where she feels faint.  Patient will take her 

blood pressure and has noted that it has been high recently.  She normally keeps

a daily log.  Blood pressures were increasing when she had seen her primary care

doctor last and he did put her on lisinopril.  He states she's been taking his 

medication as directed however over the past couple of days the blood pressure 

has been increasingly higher.  She denies previous history of cardiac disease.  

Does see Dr. Santana.  Denies any fevers or chills.  No cough.  Denies feeling 

shortness of breath.  No other alleviating, precipitating or modifying factors





- Related Data


                                Home Medications











 Medication  Instructions  Recorded  Confirmed


 


Lactulose 10 gm PO DAILY 06/25/20 02/27/21


 


Aspirin EC [Ecotrin Low Dose] 81 mg PO BID 02/27/21 02/27/21


 


Carboxymethylcellulose Sodium 1 drop BOTH EYES QID 02/27/21 02/27/21





[Refresh Tears]   


 


amLODIPine [Norvasc] 10 mg PO HS 02/27/21 02/27/21








                                  Previous Rx's











 Medication  Instructions  Recorded


 


Atorvastatin [Lipitor] 40 mg PO HS #30 tab 03/01/21


 


lisinopriL [Zestril] 20 mg PO DAILY #30 tab 03/01/21











                                    Allergies











Allergy/AdvReac Type Severity Reaction Status Date / Time


 


No Known Allergies Allergy   Verified 02/27/21 20:32














Review of Systems


ROS Statement: 


Those systems with pertinent positive or pertinent negative responses have been 

documented in the HPI.





ROS Other: All systems not noted in ROS Statement are negative.





Past Medical History


Past Medical History: Hypertension


Additional Past Medical History / Comment(s): HX HEMMOROIDS


History of Any Multi-Drug Resistant Organisms: None Reported


Past Surgical History: Appendectomy, Hysterectomy


Additional Past Surgical History / Comment(s): HEMMOROIDECTOMY


Past Anesthesia/Blood Transfusion Reactions: No Reported Reaction


Past Psychological History: No Psychological Hx Reported


Smoking Status: Never smoker


Past Alcohol Use History: None Reported


Past Drug Use History: None Reported





- Past Family History


  ** Mother


Family Medical History: No Reported History





General Exam


Limitations: no limitations


General appearance: alert, in no apparent distress


Head exam: Present: atraumatic, normocephalic, normal inspection


Eye exam: Present: normal appearance, PERRL, EOMI.  Absent: scleral icterus, 

conjunctival injection, periorbital swelling


ENT exam: Present: normal exam, mucous membranes moist


Neck exam: Present: normal inspection.  Absent: tenderness, meningismus, 

lymphadenopathy


Respiratory exam: Present: normal lung sounds bilaterally.  Absent: respiratory 

distress, wheezes, rales, rhonchi, stridor


Cardiovascular Exam: Present: regular rate, normal rhythm, normal heart sounds. 

Absent: systolic murmur, diastolic murmur, rubs, gallop, clicks


GI/Abdominal exam: Present: soft, normal bowel sounds.  Absent: distended, 

tenderness, guarding, rebound, rigid


Extremities exam: Present: normal inspection, full ROM, normal capillary refill.

 Absent: tenderness, pedal edema, joint swelling, calf tenderness


Back exam: Present: normal inspection


Neurological exam: Present: alert, oriented X3, CN II-XII intact


Psychiatric exam: Present: normal affect, normal mood


Skin exam: Present: warm, dry, intact, normal color.  Absent: rash





Course


                                   Vital Signs











  02/27/21 02/27/21 02/27/21





  17:56 18:41 18:43


 


Temperature 98 F  


 


Pulse Rate 95  


 


Pulse Rate [   86





Cardiac Monitor   





]   


 


Respiratory 16  





Rate   


 


Blood Pressure 157/92 145/101 


 


O2 Sat by Pulse 99  





Oximetry   














  02/27/21





  21:05


 


Temperature 


 


Pulse Rate 79


 


Pulse Rate [ 





Cardiac Monitor 





] 


 


Respiratory 22





Rate 


 


Blood Pressure 141/95


 


O2 Sat by Pulse 97





Oximetry 














EKG Findings





- EKG Comments:


EKG Findings:: EKG demonstrates normal sinus rhythm with a ventricular rate of 

87. OH interval 156.  QRS 84.  .  No acute ST segment elevations or 

depressions concerning for ischemic changes





Medical Decision Making





- Medical Decision Making





Upon arrival patient is placed into room 2.  A thorough history and physical 

exam was performed.  12-lead EKG is performed.  IV is established and the 

patient was sent for chest x-ray.  Laboratory studies are reviewed and discussed

with the patient.  As she is reporting to new onset chest pain I did recommend 

overnight observation in order to trend her troponins for which the patient did 

agree to.  I spoke with Dr. Gardiner who accepted admission.  Patient is awaiting a

bed on the floor





- Lab Data


Result diagrams: 


                                 02/28/21 06:39





                                 02/28/21 06:39


                                   Lab Results











  02/27/21 02/27/21 02/27/21 Range/Units





  18:33 18:33 18:33 


 


WBC  7.5    (3.8-10.6)  k/uL


 


RBC  5.03    (3.80-5.40)  m/uL


 


Hgb  14.6    (11.4-16.0)  gm/dL


 


Hct  43.8    (34.0-46.0)  %


 


MCV  87.2    (80.0-100.0)  fL


 


MCH  29.0    (25.0-35.0)  pg


 


MCHC  33.3    (31.0-37.0)  g/dL


 


RDW  13.4    (11.5-15.5)  %


 


Plt Count  303    (150-450)  k/uL


 


MPV  7.2    


 


Neutrophils %  72    %


 


Lymphocytes %  18    %


 


Monocytes %  6    %


 


Eosinophils %  2    %


 


Basophils %  1    %


 


Neutrophils #  5.4    (1.3-7.7)  k/uL


 


Lymphocytes #  1.4    (1.0-4.8)  k/uL


 


Monocytes #  0.5    (0-1.0)  k/uL


 


Eosinophils #  0.2    (0-0.7)  k/uL


 


Basophils #  0.0    (0-0.2)  k/uL


 


PT   10.4   (9.0-12.0)  sec


 


INR   1.0   (<1.2)  


 


APTT   23.8   (22.0-30.0)  sec


 


Sodium    141  (137-145)  mmol/L


 


Potassium    3.9  (3.5-5.1)  mmol/L


 


Chloride    107  ()  mmol/L


 


Carbon Dioxide    24  (22-30)  mmol/L


 


Anion Gap    10  mmol/L


 


BUN    13  (7-17)  mg/dL


 


Creatinine    0.66  (0.52-1.04)  mg/dL


 


Est GFR (CKD-EPI)AfAm    >90  (>60 ml/min/1.73 sqM)  


 


Est GFR (CKD-EPI)NonAf    87  (>60 ml/min/1.73 sqM)  


 


Glucose    100 H  (74-99)  mg/dL


 


Calcium    9.6  (8.4-10.2)  mg/dL


 


Magnesium    2.2  (1.6-2.3)  mg/dL


 


Total Bilirubin    0.4  (0.2-1.3)  mg/dL


 


AST    18  (14-36)  U/L


 


ALT    13  (4-34)  U/L


 


Alkaline Phosphatase    122  ()  U/L


 


Troponin I     (0.000-0.034)  ng/mL


 


Total Protein    7.2  (6.3-8.2)  g/dL


 


Albumin    4.4  (3.5-5.0)  g/dL


 


TSH    2.070  (0.465-4.680)  mIU/L














  02/27/21 Range/Units





  18:33 


 


WBC   (3.8-10.6)  k/uL


 


RBC   (3.80-5.40)  m/uL


 


Hgb   (11.4-16.0)  gm/dL


 


Hct   (34.0-46.0)  %


 


MCV   (80.0-100.0)  fL


 


MCH   (25.0-35.0)  pg


 


MCHC   (31.0-37.0)  g/dL


 


RDW   (11.5-15.5)  %


 


Plt Count   (150-450)  k/uL


 


MPV   


 


Neutrophils %   %


 


Lymphocytes %   %


 


Monocytes %   %


 


Eosinophils %   %


 


Basophils %   %


 


Neutrophils #   (1.3-7.7)  k/uL


 


Lymphocytes #   (1.0-4.8)  k/uL


 


Monocytes #   (0-1.0)  k/uL


 


Eosinophils #   (0-0.7)  k/uL


 


Basophils #   (0-0.2)  k/uL


 


PT   (9.0-12.0)  sec


 


INR   (<1.2)  


 


APTT   (22.0-30.0)  sec


 


Sodium   (137-145)  mmol/L


 


Potassium   (3.5-5.1)  mmol/L


 


Chloride   ()  mmol/L


 


Carbon Dioxide   (22-30)  mmol/L


 


Anion Gap   mmol/L


 


BUN   (7-17)  mg/dL


 


Creatinine   (0.52-1.04)  mg/dL


 


Est GFR (CKD-EPI)AfAm   (>60 ml/min/1.73 sqM)  


 


Est GFR (CKD-EPI)NonAf   (>60 ml/min/1.73 sqM)  


 


Glucose   (74-99)  mg/dL


 


Calcium   (8.4-10.2)  mg/dL


 


Magnesium   (1.6-2.3)  mg/dL


 


Total Bilirubin   (0.2-1.3)  mg/dL


 


AST   (14-36)  U/L


 


ALT   (4-34)  U/L


 


Alkaline Phosphatase   ()  U/L


 


Troponin I  <0.012  (0.000-0.034)  ng/mL


 


Total Protein   (6.3-8.2)  g/dL


 


Albumin   (3.5-5.0)  g/dL


 


TSH   (0.465-4.680)  mIU/L














Disposition


Clinical Impression: 


 Chest pain, Palpitations, Accelerated hypertension





Disposition: ADMITTED AS IP TO THIS [facility]


Condition: Good


Is patient prescribed a controlled substance at d/c from ED?: No


Decision to Admit Reason: Admit from EC


Decision Date: 02/27/21


Decision Time: 20:59

## 2021-02-27 NOTE — XR
EXAMINATION TYPE: XR chest 2V

 

DATE OF EXAM: 2/27/2021

 

COMPARISON: 6/25/2020

 

HISTORY: Dysrhythmia. Dizziness.

 

TECHNIQUE:

 

FINDINGS: There is no heart failure nor confluent pneumonic infiltrate. Costophrenic angles are clear
. There are no hilar masses. There are chest leads. Thoracic aorta shows mild atheromatous change.

 

IMPRESSION: No active cardiopulmonary disease. No change.

## 2021-02-28 LAB
ANION GAP SERPL CALC-SCNC: 8 MMOL/L
BASOPHILS # BLD AUTO: 0 K/UL (ref 0–0.2)
BASOPHILS NFR BLD AUTO: 1 %
BUN SERPL-SCNC: 16 MG/DL (ref 7–17)
CALCIUM SPEC-MCNC: 9.4 MG/DL (ref 8.4–10.2)
CHLORIDE SERPL-SCNC: 106 MMOL/L (ref 98–107)
CO2 SERPL-SCNC: 28 MMOL/L (ref 22–30)
EOSINOPHIL # BLD AUTO: 0.2 K/UL (ref 0–0.7)
EOSINOPHIL NFR BLD AUTO: 3 %
ERYTHROCYTE [DISTWIDTH] IN BLOOD BY AUTOMATED COUNT: 4.73 M/UL (ref 3.8–5.4)
ERYTHROCYTE [DISTWIDTH] IN BLOOD: 13.5 % (ref 11.5–15.5)
GLUCOSE SERPL-MCNC: 107 MG/DL (ref 74–99)
HCT VFR BLD AUTO: 41.7 % (ref 34–46)
HGB BLD-MCNC: 13.6 GM/DL (ref 11.4–16)
LYMPHOCYTES # SPEC AUTO: 1.5 K/UL (ref 1–4.8)
LYMPHOCYTES NFR SPEC AUTO: 25 %
MCH RBC QN AUTO: 28.8 PG (ref 25–35)
MCHC RBC AUTO-ENTMCNC: 32.7 G/DL (ref 31–37)
MCV RBC AUTO: 88.1 FL (ref 80–100)
MONOCYTES # BLD AUTO: 0.4 K/UL (ref 0–1)
MONOCYTES NFR BLD AUTO: 7 %
NEUTROPHILS # BLD AUTO: 3.9 K/UL (ref 1.3–7.7)
NEUTROPHILS NFR BLD AUTO: 63 %
PLATELET # BLD AUTO: 280 K/UL (ref 150–450)
POTASSIUM SERPL-SCNC: 4.1 MMOL/L (ref 3.5–5.1)
SODIUM SERPL-SCNC: 142 MMOL/L (ref 137–145)
WBC # BLD AUTO: 6.1 K/UL (ref 3.8–10.6)

## 2021-02-28 RX ADMIN — PANTOPRAZOLE SODIUM SCH MG: 40 TABLET, DELAYED RELEASE ORAL at 09:13

## 2021-02-28 RX ADMIN — ASPIRIN 81 MG CHEWABLE TABLET SCH MG: 81 TABLET CHEWABLE at 08:32

## 2021-02-28 RX ADMIN — HEPARIN SODIUM SCH UNIT: 5000 INJECTION, SOLUTION INTRAVENOUS; SUBCUTANEOUS at 22:41

## 2021-02-28 RX ADMIN — LACTULOSE SCH GM: 20 SOLUTION ORAL at 08:32

## 2021-02-28 RX ADMIN — ASPIRIN 81 MG CHEWABLE TABLET SCH MG: 81 TABLET CHEWABLE at 22:42

## 2021-02-28 NOTE — P.CRDCN
History of Present Illness


Consult date: 02/28/21


Requesting physician: Yang Gardiner


Reason for Consult (text): 


chest pain, accelerated HTN





Chief complaint: elevated blood pressure, lightheadedness


History of present illness: 


This is a pleasant 75-year-old female patient who follows with Dr. Santana in the 

office.  She has a past medical history of hypertension, hyperlipidemia for 

which she does not take any statins due to fear of side effects, carotid artery 

disease for which she follows with Dr. Adam regularly and this has been 

stable with 60% blockage on the right and 40% on the left.  She presented to the

emergency department due to elevated blood pressure which had been fluctuating 

at home with symptoms of lightheadedness and near syncope.  She has been under 

more stress at home but otherwise no major changes.  Although she does not add 

salt to her food she does not follow a low-sodium diet.  She was last in to see 

Dr. Burnett about 6 months ago at which time she underwent stress testing and 

event monitor due to some complaints of palpitations and according to her both 

were unremarkable.  She does have occasional palpitations where her heart rate 

will go up in the 130s to 140s which she notes on her blood pressure monitor.  

Upon presentation patient's blood pressure 157/92.  Her heart rate has been 

normal.  Chest x-ray showed no active cardiopulmonary disease, no change.  EKG 

showed normal sinus rhythm with no evidence of acute ischemia.  Echocardiogram 

with Doppler study was done during a previous admission in June 2020 which 

showed normal LV systolic function and mildly enlarged RV with no significant 

valvular abnormalities and no segmental wall motion abnormalities.  Blood 

pressure remained high since admission and lisinopril was increased by primary. 

She's had no evidence of tachycardia since admission.  She's been afebrile.  

Laboratory values showed 4 levels negative 3 with normal CBC, electrolytes and 

renal function.  Upon examination she is resting comfortably in bed.  She denies

any current complaints.  She's had only mild lightheadedness since admission but

no near syncope.  She denies any complaints of chest discomfort.  She does have 

dyspnea on exertion with climbing stairs but feels this is related to her 

inactivity home.  She has no orthopnea or PND.  She does get occasional lower 

extremity edema that she notices at night but is gone in the morning.








Past Medical History


Past Medical History: Hypertension


Additional Past Medical History / Comment(s): HX HEMMOROIDS


History of Any Multi-Drug Resistant Organisms: None Reported


Past Surgical History: Appendectomy, Hysterectomy


Additional Past Surgical History / Comment(s): HEMMOROIDECTOMY


Past Anesthesia/Blood Transfusion Reactions: No Reported Reaction


Past Psychological History: No Psychological Hx Reported


Smoking Status: Never smoker


Past Alcohol Use History: None Reported


Past Drug Use History: None Reported





- Past Family History


  ** Mother


Family Medical History: No Reported History





Medications and Allergies


                                Home Medications











 Medication  Instructions  Recorded  Confirmed  Type


 


Lactulose 10 gm PO DAILY 06/25/20 02/27/21 History


 


Aspirin EC [Ecotrin Low Dose] 81 mg PO BID 02/27/21 02/27/21 History


 


Carboxymethylcellulose Sodium 1 drop BOTH EYES QID 02/27/21 02/27/21 History





[Refresh Tears]    


 


Lisinopril [Prinivil] 10 mg PO DAILY 02/27/21 02/27/21 History


 


amLODIPine [Norvasc] 10 mg PO HS 02/27/21 02/27/21 History








                                    Allergies











Allergy/AdvReac Type Severity Reaction Status Date / Time


 


No Known Allergies Allergy   Verified 02/27/21 20:32














Physical Exam


Vitals: 


                                   Vital Signs











  Temp Pulse Pulse Resp BP BP Pulse Ox


 


 02/28/21 07:00  97.8 F   77  18   152/84  98


 


 02/28/21 02:00    66    


 


 02/27/21 22:25  98.0 F   82  18   163/78  99


 


 02/27/21 22:16  98.2 F  80   20  142/90   98


 


 02/27/21 21:05   79   22  141/95   97


 


 02/27/21 18:43    86    


 


 02/27/21 18:41      145/101  


 


 02/27/21 17:56  98 F  95   16  157/92   99








                                Intake and Output











 02/27/21 02/28/21 02/28/21





 22:59 06:59 14:59


 


Other:   


 


  Voiding Method  Toilet 


 


  Weight 98.883 kg  











PHYSICAL EXAMINATION: This is a 75-year-old female in no apparent distress at 

the time of my examination.





VITAL SIGNS: Blood pressure 152/84, heart rate 77, respirations 18, temp 0.8F. 

Patient is 98 % on . 





HEENT: Head is atraumatic, normocephalic. Pupils are equal, round. Sclerae 

anicteric. Conjunctivae are clear. Mucous membranes of the mouth are moist. Neck

 is supple. There is no elevated jugular venous pressure. No carotid bruit is 

heard.


 


CHEST EXAMINATION: Clear to auscultation bilaterally.  No wheezes rales or 

rhonchi. Respirations even and nonlabored.


 


HEART EXAMINATION:  Heart regular, positive S1 and S2.  No S3. No S4.  No 

clicks, rubs or murmurs 





ABDOMEN: Soft, nontender. Bowel sounds are heard. No organomegaly noted.


 


EXTREMITIES: 2+ peripheral pulses with no evidence of peripheral edema and no 

calf tenderness noted.


 


NEUROLOGIC EXAMINATION: Patient is awake, alert and oriented x3.


 











Results





                                 02/28/21 06:39





                                 02/28/21 06:39


                                 Cardiac Enzymes











  02/27/21 02/27/21 02/27/21 Range/Units





  18:33 18:33 21:28 


 


AST  18    (14-36)  U/L


 


Troponin I   <0.012  <0.012  (0.000-0.034)  ng/mL














  02/28/21 Range/Units





  00:50 


 


AST   (14-36)  U/L


 


Troponin I  <0.012  (0.000-0.034)  ng/mL








                                   Coagulation











  02/27/21 Range/Units





  18:33 


 


PT  10.4  (9.0-12.0)  sec


 


APTT  23.8  (22.0-30.0)  sec








                                       CBC











  02/27/21 02/28/21 Range/Units





  18:33 06:39 


 


WBC  7.5  6.1  (3.8-10.6)  k/uL


 


RBC  5.03  4.73  (3.80-5.40)  m/uL


 


Hgb  14.6  13.6  (11.4-16.0)  gm/dL


 


Hct  43.8  41.7  (34.0-46.0)  %


 


Plt Count  303  280  (150-450)  k/uL








                          Comprehensive Metabolic Panel











  02/27/21 02/28/21 Range/Units





  18:33 06:39 


 


Sodium  141  142  (137-145)  mmol/L


 


Potassium  3.9  4.1  (3.5-5.1)  mmol/L


 


Chloride  107  106  ()  mmol/L


 


Carbon Dioxide  24  28  (22-30)  mmol/L


 


BUN  13  16  (7-17)  mg/dL


 


Creatinine  0.66  0.75  (0.52-1.04)  mg/dL


 


Glucose  100 H  107 H  (74-99)  mg/dL


 


Calcium  9.6  9.4  (8.4-10.2)  mg/dL


 


AST  18   (14-36)  U/L


 


ALT  13   (4-34)  U/L


 


Alkaline Phosphatase  122   ()  U/L


 


Total Protein  7.2   (6.3-8.2)  g/dL


 


Albumin  4.4   (3.5-5.0)  g/dL








                               Current Medications











Generic Name Dose Route Start Last Admin





  Trade Name Freq  PRN Reason Stop Dose Admin


 


Amlodipine Besylate  10 mg  02/27/21 22:15  02/27/21 23:36





  Amlodipine 10 Mg Tab  PO   10 mg





  HS LYLE   Administration


 


Aspirin  81 mg  02/28/21 09:00  02/28/21 08:32





  Aspirin 81 Mg  PO   81 mg





  BID LYLE   Administration


 


Lactulose  10 gm  02/28/21 09:00  02/28/21 08:32





  Lactulose 20 Gm/30 Ml Cup  PO   10 gm





  DAILY LYLE   Administration


 


Lisinopril  20 mg  03/01/21 09:00 





  Lisinopril 20 Mg Tab  PO  





  DAILY LYLE  


 


Naloxone HCl  0.2 mg  02/27/21 20:59 





  Naloxone 0.4 Mg/Ml 1 Ml Vial  IV  





  Q2M PRN  





  Opioid Reversal  


 


Pantoprazole Sodium  40 mg  02/28/21 08:45  02/28/21 09:13





  Pantoprazole 40 Mg Tablet  PO   40 mg





  AC-[id number] LYLE   Administration








                                Intake and Output











 02/27/21 02/28/21 02/28/21





 22:59 06:59 14:59


 


Other:   


 


  Voiding Method  Toilet 


 


  Weight 98.883 kg  








                                        





                                 02/28/21 06:39 





                                 02/28/21 06:39 











Assessment and Plan


Assessment: 


#1 symptoms of lightheadedness and near syncope with elevated blood pressure


#2 poorly controlled hypertension


#3 carotid artery disease


#4 hyperlipidemia, not currently on a statin


#5 palpitations





Plan: 


From cardiology's perspective I discussed in detail the rationale for use of 

statins especially in patients with known carotid artery disease.  We will add 

atorvastatin 40 mg by mouth daily at bedtime.  We will review echocardiogram 

ordered by primary.  Continue to follow the blood pressure.  If blood pressure 

remains poorly controlled patient may benefit from thiazide diuretic.  Patient 

was also educated on the importance of following a low sodium diet and reading 

food labels.





NP note has been reviewed, I agree with a documented findings and plan of care. 

 Patient was seen and examined.

## 2021-02-28 NOTE — P.HPIM
History of Present Illness


H&P Date: 21





History of Present Illness


This is a 74-year-old  female patient of Dr. Nj with past medical 

history of hypertension, peripheral vascular disease, carotid artery stenosis of

70% on the left. Patient was last hospitalized in 2020 at which time she 

tended with a near syncopal episode with episodes of SVT and uncontrolled 

hypertension.  Patient was started on hydralazine 50 mg twice daily, aspirin 81 

mg daily, atorvastatin 40 mg daily, amlodipine 5 mg daily, midodrine 5 mg 3 

times daily with instructions to hold if systolic blood pressure greater than 1

60 along with  Antivert.  At that time, CAT scan of the brain revealed some 

enlargement of the pituitary gland with uniform enhancement that could relate to

macroadenoma.  She states she has followed with Dr. Crowley, endocrinologist, and no

abnormality was found.  She also follows with Dr. Crawford and pituitary 

abnormality was ruled out.  She was told that she had TIA and she follows with 

Dr. Adam due to carotid artery disease.  Echocardiogram in  of last year 

revealed EF of 55-60%, and no significant valvular abnormalities.  She denies 

history of COVID-19 infection.





Patient states that she has feeling fine but has noticed that her blood 

pressures in the diastolic number running in the 90s.  Yesterday, during the day

she was feeling fine and all of a sudden she was feeling woozy like she was 

going to pass out and did not feel well.  She checked her blood pressure was 

175/101 and her heart rate was 136.  She states she felt some nausea.  She 

denies having any chest pain, tightness in her chest but she does have 

occasional brief sensation of pain.  Her cardiologist is Dr. Santana and she has 

had a stress test done in the past year which she reports was normal also had an

event monitor that she reports as normal.  





Patient came into [facility] emergency center for evaluation.  

Her initial blood pressure 157/92 and 145/101.  Heart rate in the 80s and 90s, 

afebrile, pulse ox 99% on room air. Chest x-ray shows no acute cardiopulmonary 

disease.  EKG was a sinus rhythm with no acute ST changes.  CBC was 

unremarkable.  Electrolytes normal, creatinine 0.66.  Troponins negative on 3 

draws.  Liver function tests normal.  Magnesium 2.2.  Coronavirus PCR not 

detected.  TSH 2.070.  Patient placed on the observation unit and cardiology 

consult requested.





Review of Systems


Constitutional: No fever, no chills, no night sweats.  No weight change.  No 

weakness, fatigue or lethargy.  No daytime sleepiness.


EENT: No headache.  No blurred vision or double vision, no loss of vision.  No 

loss of Hearing, no ringing in the ears, no dizziness.  No nasal drainage or 

congestion.  No epistaxis.  No sore throat.


Lungs: No shortness of breath, cough, no sputum production.  No wheezing.


Cardiovascular: No chest pain, no lower extremity edema.  No palpitations.  No 

paroxysmal nocturnal dyspnea.  No orthopnea.  Reports lightheadedness or 

dizziness.  Reports near syncopal episodes.


Abdominal: No abdominal pain.  No nausea, vomiting.  No diarrhea.  No 

constipation.  No bloody or tarry stools..  No loss of appetite.


Genitourinary: No dysuria, increased frequency, urgency.  No urinary retention.


Musculoskeletal: No myalgias.  No muscle weakness, no gait dysfunction, no 

frequent falls.  No back pain.  No neck pain.


Integumentary: No wounds, no lesions.  No rash or pruritus.  No unusual 

bruising.  No change in hair or nails.


Neurologic: No aphasia. No facial droop. No change in mentation. No head injury.

No headache. No paralysis. No paresthesia.


Psychiatric: No depression.  No anxiety.  No mood swings.


Endocrine: No abnormal blood sugars.  No weight change.    





Social history


Patient is a lifelong nonsmoker, no alcohol marijuana or illicit drug use.  She 

is a  and lives alone.





Family history


Mother is alive at age 98 with history of coronary artery disease.  Father  

at age 92 with history of coronary artery disease.  Patient has total of 3 

brothers.  One  from a motor vehicle accident at age 18.  One is alive with 

history of MI and CABG.  One has history of passing out episodes with unclear 

etiology.  Patient has one sister with history of colon cancer.  Patient has 5 

children with no major medical problems.





Physical Examination


Gen: This is 74-year-old  female patient resting in bed and appears 

comfortable at rest.


HEENT: Head is atraumatic, normocephalic. Pupils equal, round. Sclerae is 

anicteric. 


NECK: Supple. No JVD. No lymphadenopathy. No thyromegaly. 


LUNGS: Clear to auscultation. No wheezes or rhonchi.  No intercostal 

retractions.


HEART: Regular rate and rhythm. No murmur. 


ABDOMEN: Soft. Bowel sounds are present. No masses.  No tenderness.


EXTREMITIES: No pedal edema.  No calf tenderness.  Dorsalis pedis palpable 

bilaterally.


NEUROLOGICAL: Patient is awake, alert and oriented x3. Cranial nerves 2 through 

12 are grossly intact. 








Assessment and Plan


1.  Lightheadedness and near syncopal episode with elevated blood pressure.  

Orthostatic vital signs, echocardiogram.  Cardiology consult appreciated.  

Anginal monitor overnight and probable discharge tomorrow.





2.  Hypertension, uncontrolled.  Continue amlodipine 10 mg at bedtime and 

increase lisinopril to 20 mg daily. 





3.  Carotid artery disease with 70% stenosis on the left carotid artery.  

Cardiology has started patient on Lipitor 40 mg at bedtime





4.  Peripheral vascular disease, stable





5.  DVT prophylaxis.  Heparin subcu.





6.  GI prophylaxis.  Protonix.





Patient placed as an observation status.





Discharge plan: Return home





Impression and plan of care have been directed as dictated by the signing 

physician.  Princess Nava nurse practitioner acting as scribe for signing 

physician.





Past Medical History


Past Medical History: Hypertension


Additional Past Medical History / Comment(s): HX HEMMOROIDS


History of Any Multi-Drug Resistant Organisms: None Reported


Past Surgical History: Appendectomy, Hysterectomy


Additional Past Surgical History / Comment(s): HEMMOROIDECTOMY


Past Anesthesia/Blood Transfusion Reactions: No Reported Reaction


Past Psychological History: No Psychological Hx Reported


Smoking Status: Never smoker


Past Alcohol Use History: None Reported


Past Drug Use History: None Reported





- Past Family History


  ** Mother


Family Medical History: No Reported History





Medications and Allergies


                                Home Medications











 Medication  Instructions  Recorded  Confirmed  Type


 


Lactulose 10 gm PO DAILY 20 History


 


Aspirin EC [Ecotrin Low Dose] 81 mg PO BID 21 History


 


Carboxymethylcellulose Sodium 1 drop BOTH EYES QID 21 History





[Refresh Tears]    


 


Lisinopril [Prinivil] 10 mg PO DAILY 21 History


 


amLODIPine [Norvasc] 10 mg PO HS 21 History








                                    Allergies











Allergy/AdvReac Type Severity Reaction Status Date / Time


 


No Known Allergies Allergy   Verified 02/27/21 20:32














Physical Exam


Vitals: 


                                   Vital Signs











  Temp Pulse Pulse Resp BP BP Pulse Ox


 


 21 07:00  97.8 F   77  18   152/84  98


 


 21 02:00    66    


 


 21 22:25  98.0 F   82  18   163/78  99


 


 21 22:16  98.2 F  80   20  142/90   98


 


 21 21:05   79   22  141/95   97


 


 21 18:43    86    


 


 21 18:41      145/101  


 


 21 17:56  98 F  95   16  157/92   99








                                Intake and Output











 21





 22:59 06:59 14:59


 


Other:   


 


  Voiding Method  Toilet 


 


  Weight 98.883 kg  














Results


CBC & Chem 7: 


                                 21 06:39





                                 21 06:39


Labs: 


                  Abnormal Lab Results - Last 24 Hours (Table)











  21 Range/Units





  18:33 06:39 


 


Glucose  100 H  107 H  (74-99)  mg/dL














Thrombosis Risk Factor Assmnt





- Choose All That Apply


Each Risk Factor Represents 2 Points: Age 61-74 years


Thrombosis Risk Factor Assessment Total Risk Factor Score: 2


Thrombosis Risk Factor Assessment Level: Low Risk

## 2021-03-01 VITALS
TEMPERATURE: 97.9 F | RESPIRATION RATE: 18 BRPM | DIASTOLIC BLOOD PRESSURE: 79 MMHG | HEART RATE: 93 BPM | SYSTOLIC BLOOD PRESSURE: 146 MMHG

## 2021-03-01 RX ADMIN — LACTULOSE SCH: 20 SOLUTION ORAL at 10:39

## 2021-03-01 RX ADMIN — HEPARIN SODIUM SCH UNIT: 5000 INJECTION, SOLUTION INTRAVENOUS; SUBCUTANEOUS at 08:20

## 2021-03-01 RX ADMIN — ASPIRIN 81 MG CHEWABLE TABLET SCH MG: 81 TABLET CHEWABLE at 08:20

## 2021-03-01 RX ADMIN — PANTOPRAZOLE SODIUM SCH MG: 40 TABLET, DELAYED RELEASE ORAL at 08:20

## 2021-03-01 NOTE — P.DS
Providers


Date of admission: 


02/27/21 20:59





Expected date of discharge: 03/01/21


Attending physician: 


Yang Gardiner





Consults: 





                                        





02/27/21 21:00


Consult Physician Urgent 


   Consulting Provider: [facility]


   Consult Reason/Comments: acute chest pain, accelerated htn


   Do you want consulting provider notified?: Yes











Primary care physician: 


Neil Nj





[facility] Course: 





History of Present Illness


This is a 74-year-old  female patient of Dr. Nj with past medical 

history of hypertension, peripheral vascular disease, carotid artery stenosis of

70% on the left. Patient was last hospitalized in June 2020 at which time she 

tended with a near syncopal episode with episodes of SVT and uncontrolled 

hypertension.  Patient was started on hydralazine 50 mg twice daily, aspirin 81 

mg daily, atorvastatin 40 mg daily, amlodipine 5 mg daily, midodrine 5 mg 3 

times daily with instructions to hold if systolic blood pressure greater than 

160 along with  Antivert.  At that time, CAT scan of the brain revealed some 

enlargement of the pituitary gland with uniform enhancement that could relate to

macroadenoma.  She states she has followed with Dr. Crowley, endocrinologist, and no

abnormality was found.  She also follows with Dr. Crawford and pituitary 

abnormality was ruled out.  She was told that she had TIA and she follows with 

Dr. Adam due to carotid artery disease.  Echocardiogram in June of last year 

revealed EF of 55-60%, and no significant valvular abnormalities.  She denies 

history of COVID-19 infection.





Patient states that she has feeling fine but has noticed that her blood 

pressures in the diastolic number running in the 90s.  Yesterday, during the day

she was feeling fine and all of a sudden she was feeling woozy like she was nelly

g to pass out and did not feel well.  She checked her blood pressure was 175/101

and her heart rate was 136.  She states she felt some nausea.  She denies having

any chest pain, tightness in her chest but she does have occasional brief 

sensation of pain.  Her cardiologist is Dr. Santana and she has had a stress test 

done in the past year which she reports was normal also had an event monitor 

that she reports as normal.  





Patient came into [facility] emergency center for evaluation.  

Her initial blood pressure 157/92 and 145/101.  Heart rate in the 80s and 90s, 

afebrile, pulse ox 99% on room air. Chest x-ray shows no acute cardiopulmonary 

disease.  EKG was a sinus rhythm with no acute ST changes.  CBC was 

unremarkable.  Electrolytes normal, creatinine 0.66.  Troponins negative on 3 

draws.  Liver function tests normal.  Magnesium 2.2.  Coronavirus PCR not 

detected.  TSH 2.070.  Patient placed on the observation unit and cardiology 

consult requested.





3/1: She denies having any chest pain or shortness of breath.  Her blood 

pressure is improved and she was orthostatic positive.  She's been afebrile, 

heart rate in the 90s, pulse ox 96% on room air.  Echocardiogram reveals EF of 

55-60% with trace mitral regurgitation, mild tricuspid regurgitation, borderline

pulmonary artery hypertension.


She will be discharged home today in stable condition.





Assessment and Plan


1.  Lightheadedness and near syncopal episode with elevated blood pressure. 


2.  Hypertension, uncontrolled.  


3.  Carotid artery disease with 70% stenosis on the left carotid artery. 


4.  Peripheral vascular disease, stable








Discharge plan: Return home





Impression and plan of care have been directed as dictated by the signing 

physician.  Princess Nava nurse practitioner acting as scribe for signing 

physician.


Patient Condition at Discharge: Good





Plan - Discharge Summary


Discharge Rx Participant: No


New Discharge Prescriptions: 


New


   Atorvastatin [Lipitor] 40 mg PO HS #30 tab


   lisinopriL [Zestril] 20 mg PO DAILY #30 tab





Continue


   Lactulose 10 gm PO DAILY


   amLODIPine [Norvasc] 10 mg PO HS


   Aspirin EC [Ecotrin Low Dose] 81 mg PO BID


   Carboxymethylcellulose Sodium [Refresh Tears] 1 drop BOTH EYES QID





Discontinued


   Lisinopril [Prinivil] 10 mg PO DAILY


Discharge Medication List





Lactulose 10 gm PO DAILY 06/25/20 [History]


Aspirin EC [Ecotrin Low Dose] 81 mg PO BID 02/27/21 [History]


Carboxymethylcellulose Sodium [Refresh Tears] 1 drop BOTH EYES QID 02/27/21 

[History]


amLODIPine [Norvasc] 10 mg PO HS 02/27/21 [History]


Atorvastatin [Lipitor] 40 mg PO HS #30 tab 03/01/21 [Rx]


lisinopriL [Zestril] 20 mg PO DAILY #30 tab 03/01/21 [Rx]








Follow up Appointment(s)/Referral(s): 


Moe Santana MD [STAFF PHYSICIAN] - 2 Weeks


Neil Nj MD [Primary Care Provider] - 1-2 days

## 2021-03-01 NOTE — P.PN
Subjective


Progress Note Date: 03/01/21





HISTORY OF PRESENT ILLNESS:  Patient examined this morning at the bedside.  She 

denies chest pain or pressure.  Denies shortness of breath.  Denies dizziness or

lightheadedness.  Blood pressure is stable.  Echocardiogram completed revealed 

ejection fraction 55-60%, trace mitral regurgitation, and mild tricuspid 

regurgitation.





PHYSICAL EXAM: 


VITAL SIGNS: Reviewed.


GENERAL: Well-developed in no acute distress. 


NECK: Supple. No JVD or thyromegaly


LUNGS: Respirations even and unlabored. Lungs essentially clear to auscultation 

bilaterally.


HEART: Regular rate and rhythm.  S1 and S2 heard.


EXTREMITIES: Normal range of motion.  No clubbing or cyanosis.  Peripheral 

pulses intact.  No lower extremity edema





ASSESSMENT: 


#1 symptoms of lightheadedness and near syncope with elevated blood pressure


#2 poorly controlled hypertension


#3 carotid artery disease


#4 hyperlipidemia, not currently on a statin


#5 palpitations








PLAN: 


Patient is stable for discharge home today from a cardiac standpoint.  She is to

follow up outpatient.





Nurse practitioner note has been reviewed by physician. Signing provider agrees 

with the documented findings, assessment, and plan of care. 








Objective





- Vital Signs


Vital signs: 


                                   Vital Signs











Temp  97.9 F   03/01/21 06:47


 


Pulse  93   03/01/21 06:47


 


Resp  18   03/01/21 06:47


 


BP  146/79   03/01/21 06:47


 


Pulse Ox  98   03/01/21 10:44








                                 Intake & Output











 02/28/21 03/01/21 03/01/21





 18:59 06:59 18:59


 


Intake Total 480  


 


Balance 480  


 


Intake:   


 


  Oral 480  


 


Other:   


 


  Voiding Method   Toilet


 


  # Voids 3 1 1














- Labs


CBC & Chem 7: 


                                 02/28/21 06:39





                                 02/28/21 06:39

## 2021-03-01 NOTE — ECHOF
Referral Reason:LVF



MEASUREMENTS

--------

HEIGHT: 170.2 cm

WEIGHT: 98.9 kg

BP: 146/79

RVIDd:   3.8 cm     (< 3.3)

IVSd:   1.8 cm     (0.6 - 1.1)

LVIDd:   2.1 cm     (3.9 - 5.3)

LVPWd:   1.6 cm     (0.6 - 1.1)

IVSs:   2.3 cm

LVIDs:   1.6 cm

LVPWs:   2.4 cm

LAESV Index (A-L):   23.79 ml/m

Ao Diam:   3.3 cm     (2.0 - 3.7)

AV Cusp:   2.0 cm     (1.5 - 2.6)

LA Diam:   3.5 cm     (2.7 - 3.8)

MV E Hermel[place]:   0.79 m/s

MV DecT:   87 ms

MV A Hermelindo:   1.28 m/s

MV E/A Ratio:   0.62 

RAP:   5.00 mmHg

RVSP:   33.77 mmHg







FINDINGS

--------

Sinus rhythm.

This was a technically adequate study.

The left ventricular size is normal.   There is severe concentric left ventricular hypertrophy.   Ove
rall left ventricular systolic function is normal with, an EF between 55 - 60 %.   The diastolic fill
ing pattern is normal for the age of the patient 11.64.

The right ventricle is mild to moderately enlarged.

Normal LA  size by volume 22+/-6 ml/m2.

The right atrial size is normal.

Interatrial and interventricular septum intact.

There is mild aortic valve sclerosis.   There is no evidence of aortic regurgitation.   There is no e
vidence of aortic stenosis.

There is trace mitral regurgitation.

Mild tricuspid regurgitation present.   There is borderline pulmonary artery hypertension.   The righ
t ventricular systolic pressure, as measured by Doppler, is 33.77mmHg.

Trace/mild (physiologic)  pulmonic regurgitation.

The aortic root size is normal.

IVC Not well visulized.

There is no pericardial effusion.



CONCLUSIONS

--------

1. The left ventricular size is normal.

2. There is severe concentric left ventricular hypertrophy.

3. Overall left ventricular systolic function is normal with, an EF between 55 - 60 %.

4. The right ventricle is mild to moderately enlarged.

5. There is mild aortic valve sclerosis.

6. There is trace mitral regurgitation.

7. Mild tricuspid regurgitation present.

8. There is borderline pulmonary artery hypertension.

9. The right ventricular systolic pressure, as measured by Doppler, is 33.77mmHg.

10. Trace/mild (physiologic)  pulmonic regurgitation.





SONOGRAPHER: Sada Ward RDCS

## 2021-12-03 ENCOUNTER — HOSPITAL ENCOUNTER (OUTPATIENT)
Dept: HOSPITAL 47 - EC | Age: 76
Setting detail: OBSERVATION
LOS: 2 days | Discharge: HOME | End: 2021-12-05
Attending: INTERNAL MEDICINE | Admitting: INTERNAL MEDICINE
Payer: MEDICARE

## 2021-12-03 DIAGNOSIS — R53.83: ICD-10-CM

## 2021-12-03 DIAGNOSIS — Z79.899: ICD-10-CM

## 2021-12-03 DIAGNOSIS — I47.1: ICD-10-CM

## 2021-12-03 DIAGNOSIS — U07.1: Primary | ICD-10-CM

## 2021-12-03 DIAGNOSIS — I73.9: ICD-10-CM

## 2021-12-03 DIAGNOSIS — R09.81: ICD-10-CM

## 2021-12-03 DIAGNOSIS — E78.5: ICD-10-CM

## 2021-12-03 DIAGNOSIS — I65.22: ICD-10-CM

## 2021-12-03 DIAGNOSIS — I07.1: ICD-10-CM

## 2021-12-03 DIAGNOSIS — Z82.49: ICD-10-CM

## 2021-12-03 DIAGNOSIS — R51.9: ICD-10-CM

## 2021-12-03 DIAGNOSIS — R11.2: ICD-10-CM

## 2021-12-03 DIAGNOSIS — D72.810: ICD-10-CM

## 2021-12-03 DIAGNOSIS — R42: ICD-10-CM

## 2021-12-03 DIAGNOSIS — Z96.652: ICD-10-CM

## 2021-12-03 DIAGNOSIS — Z79.82: ICD-10-CM

## 2021-12-03 DIAGNOSIS — Z90.710: ICD-10-CM

## 2021-12-03 DIAGNOSIS — I27.20: ICD-10-CM

## 2021-12-03 DIAGNOSIS — R05.3: ICD-10-CM

## 2021-12-03 DIAGNOSIS — M54.6: ICD-10-CM

## 2021-12-03 DIAGNOSIS — Z80.0: ICD-10-CM

## 2021-12-03 DIAGNOSIS — R00.2: ICD-10-CM

## 2021-12-03 DIAGNOSIS — I44.0: ICD-10-CM

## 2021-12-03 DIAGNOSIS — R94.4: ICD-10-CM

## 2021-12-03 DIAGNOSIS — R00.1: ICD-10-CM

## 2021-12-03 DIAGNOSIS — R53.1: ICD-10-CM

## 2021-12-03 DIAGNOSIS — I10: ICD-10-CM

## 2021-12-03 LAB
ALBUMIN SERPL-MCNC: 3.6 G/DL (ref 3.5–5)
ALBUMIN SERPL-MCNC: 3.9 G/DL (ref 3.5–5)
ALP SERPL-CCNC: 116 U/L (ref 38–126)
ALP SERPL-CCNC: 83 U/L (ref 38–126)
ALT SERPL-CCNC: 19 U/L (ref 4–34)
ALT SERPL-CCNC: 32 U/L (ref 4–34)
ANION GAP SERPL CALC-SCNC: 10 MMOL/L
ANION GAP SERPL CALC-SCNC: 8 MMOL/L
AST SERPL-CCNC: 32 U/L (ref 14–36)
AST SERPL-CCNC: 39 U/L (ref 14–36)
BASOPHILS # BLD AUTO: 0 K/UL (ref 0–0.2)
BASOPHILS # BLD AUTO: 0 K/UL (ref 0–0.2)
BASOPHILS NFR BLD AUTO: 1 %
BASOPHILS NFR BLD AUTO: 1 %
BUN SERPL-SCNC: 14 MG/DL (ref 7–17)
BUN SERPL-SCNC: 57 MG/DL (ref 7–17)
CALCIUM SPEC-MCNC: 8.5 MG/DL (ref 8.4–10.2)
CALCIUM SPEC-MCNC: 8.8 MG/DL (ref 8.4–10.2)
CHLORIDE SERPL-SCNC: 103 MMOL/L (ref 98–107)
CHLORIDE SERPL-SCNC: 104 MMOL/L (ref 98–107)
CO2 SERPL-SCNC: 24 MMOL/L (ref 22–30)
CO2 SERPL-SCNC: 27 MMOL/L (ref 22–30)
EOSINOPHIL # BLD AUTO: 0 K/UL (ref 0–0.7)
EOSINOPHIL # BLD AUTO: 0.1 K/UL (ref 0–0.7)
EOSINOPHIL NFR BLD AUTO: 1 %
EOSINOPHIL NFR BLD AUTO: 2 %
ERYTHROCYTE [DISTWIDTH] IN BLOOD BY AUTOMATED COUNT: 3.05 M/UL (ref 3.8–5.4)
ERYTHROCYTE [DISTWIDTH] IN BLOOD BY AUTOMATED COUNT: 4.85 M/UL (ref 3.8–5.4)
ERYTHROCYTE [DISTWIDTH] IN BLOOD: 13.3 % (ref 11.5–15.5)
ERYTHROCYTE [DISTWIDTH] IN BLOOD: 14.3 % (ref 11.5–15.5)
GLUCOSE SERPL-MCNC: 104 MG/DL (ref 74–99)
GLUCOSE SERPL-MCNC: 105 MG/DL (ref 74–99)
HCT VFR BLD AUTO: 27.8 % (ref 34–46)
HCT VFR BLD AUTO: 41.1 % (ref 34–46)
HGB BLD-MCNC: 14.1 GM/DL (ref 11.4–16)
HGB BLD-MCNC: 9.3 GM/DL (ref 11.4–16)
LYMPHOCYTES # SPEC AUTO: 0.4 K/UL (ref 1–4.8)
LYMPHOCYTES # SPEC AUTO: 0.9 K/UL (ref 1–4.8)
LYMPHOCYTES NFR SPEC AUTO: 32 %
LYMPHOCYTES NFR SPEC AUTO: 6 %
MCH RBC QN AUTO: 29.1 PG (ref 25–35)
MCH RBC QN AUTO: 30.3 PG (ref 25–35)
MCHC RBC AUTO-ENTMCNC: 33.3 G/DL (ref 31–37)
MCHC RBC AUTO-ENTMCNC: 34.3 G/DL (ref 31–37)
MCV RBC AUTO: 84.8 FL (ref 80–100)
MCV RBC AUTO: 91.2 FL (ref 80–100)
MONOCYTES # BLD AUTO: 0.2 K/UL (ref 0–1)
MONOCYTES # BLD AUTO: 0.5 K/UL (ref 0–1)
MONOCYTES NFR BLD AUTO: 7 %
MONOCYTES NFR BLD AUTO: 8 %
NEUTROPHILS # BLD AUTO: 1.8 K/UL (ref 1.3–7.7)
NEUTROPHILS # BLD AUTO: 5 K/UL (ref 1.3–7.7)
NEUTROPHILS NFR BLD AUTO: 59 %
NEUTROPHILS NFR BLD AUTO: 79 %
PH UR: 5.5 [PH] (ref 5–8)
PLATELET # BLD AUTO: 143 K/UL (ref 150–450)
PLATELET # BLD AUTO: 177 K/UL (ref 150–450)
POTASSIUM SERPL-SCNC: 3.5 MMOL/L (ref 3.5–5.1)
POTASSIUM SERPL-SCNC: 4.6 MMOL/L (ref 3.5–5.1)
PROT SERPL-MCNC: 6.7 G/DL (ref 6.3–8.2)
PROT SERPL-MCNC: 6.8 G/DL (ref 6.3–8.2)
RBC UR QL: 2 /HPF (ref 0–5)
SODIUM SERPL-SCNC: 138 MMOL/L (ref 137–145)
SODIUM SERPL-SCNC: 138 MMOL/L (ref 137–145)
SP GR UR: 1.02 (ref 1–1.03)
SQUAMOUS UR QL AUTO: 1 /HPF (ref 0–4)
T4 FREE SERPL-MCNC: 1.64 NG/DL (ref 0.78–2.19)
UROBILINOGEN UR QL STRIP: 2 MG/DL (ref ?–2)
WBC # BLD AUTO: 3 K/UL (ref 3.8–10.6)
WBC # BLD AUTO: 6.3 K/UL (ref 3.8–10.6)
WBC # UR AUTO: 12 /HPF (ref 0–5)

## 2021-12-03 PROCEDURE — 71046 X-RAY EXAM CHEST 2 VIEWS: CPT

## 2021-12-03 PROCEDURE — 93005 ELECTROCARDIOGRAM TRACING: CPT

## 2021-12-03 PROCEDURE — 96374 THER/PROPH/DIAG INJ IV PUSH: CPT

## 2021-12-03 PROCEDURE — 85025 COMPLETE CBC W/AUTO DIFF WBC: CPT

## 2021-12-03 PROCEDURE — 82306 VITAMIN D 25 HYDROXY: CPT

## 2021-12-03 PROCEDURE — 87635 SARS-COV-2 COVID-19 AMP PRB: CPT

## 2021-12-03 PROCEDURE — 83735 ASSAY OF MAGNESIUM: CPT

## 2021-12-03 PROCEDURE — 84443 ASSAY THYROID STIM HORMONE: CPT

## 2021-12-03 PROCEDURE — 81001 URINALYSIS AUTO W/SCOPE: CPT

## 2021-12-03 PROCEDURE — 36415 COLL VENOUS BLD VENIPUNCTURE: CPT

## 2021-12-03 PROCEDURE — 96372 THER/PROPH/DIAG INJ SC/IM: CPT

## 2021-12-03 PROCEDURE — 84484 ASSAY OF TROPONIN QUANT: CPT

## 2021-12-03 PROCEDURE — 96376 TX/PRO/DX INJ SAME DRUG ADON: CPT

## 2021-12-03 PROCEDURE — 87086 URINE CULTURE/COLONY COUNT: CPT

## 2021-12-03 PROCEDURE — 80053 COMPREHEN METABOLIC PANEL: CPT

## 2021-12-03 PROCEDURE — 84439 ASSAY OF FREE THYROXINE: CPT

## 2021-12-03 PROCEDURE — 96361 HYDRATE IV INFUSION ADD-ON: CPT

## 2021-12-03 PROCEDURE — 85379 FIBRIN DEGRADATION QUANT: CPT

## 2021-12-03 PROCEDURE — 96375 TX/PRO/DX INJ NEW DRUG ADDON: CPT

## 2021-12-03 PROCEDURE — 99285 EMERGENCY DEPT VISIT HI MDM: CPT

## 2021-12-03 PROCEDURE — 80061 LIPID PANEL: CPT

## 2021-12-03 NOTE — XR
EXAMINATION TYPE: XR chest 2V

 

DATE OF EXAM: 12/3/2021

 

COMPARISON: 2/27/2021

 

HISTORY: Tachycardia

 

TECHNIQUE:

 

FINDINGS: There is no heart failure nor confluent pneumonic infiltrate. Costophrenic angles are clear
. There are chest leads. Bony thorax is intact.

 

IMPRESSION: No active cardiac pulmonary disease. No change.

## 2021-12-03 NOTE — ED
General Adult HPI





- General


Chief complaint: Dizziness


Stated complaint: high bp,heart rate,dizzy


Time Seen by Provider: 12/03/21 20:23


Source: patient, RN notes reviewed


Mode of arrival: wheelchair


Limitations: no limitations





- History of Present Illness


Initial comments: 





Patient is a pleasant 75-year-old female presenting to the emergency department 

with concerns for palpitations.  Symptoms have been present for about one year. 

Patient feels her heart racing at times.  Patient states when it improves she 

gets lightheaded.  Patient states this is happening multiple times per day.  

Patient seems like it has been somewhat worse with the past few days.  Patient's

blood pressure has been labile.  Patient did have somewhat high blood pressure 

however recently has had blood pressures with systolic as low as 9120.  Patient 

did recently see her cardiologist with changes in her medication.  Patient does 

have some discomfort of her upper back that has been present for several days, 

mild and feels like an ache.  No chest discomfort.





- Related Data


                                Home Medications











 Medication  Instructions  Recorded  Confirmed


 


Lactulose 10 gm PO DAILY PRN 06/25/20 12/03/21


 


Aspirin EC [Ecotrin Low Dose] 81 mg PO DAILY 02/27/21 12/03/21


 


Metoprolol Succinate (ER) [Toprol 25 mg PO DAILY 12/03/21 12/03/21





Xl]   


 


amLODIPine [Norvasc] 5 mg PO DAILY 12/03/21 12/03/21


 


lisinopriL 30 mg PO HS 12/03/21 12/03/21











                                    Allergies











Allergy/AdvReac Type Severity Reaction Status Date / Time


 


No Known Allergies Allergy   Verified 12/03/21 21:26














Review of Systems


ROS Statement: 


Those systems with pertinent positive or pertinent negative responses have been 

documented in the HPI.





ROS Other: All systems not noted in ROS Statement are negative.


Constitutional: Denies: fever, chills


Eyes: Denies: eye pain


ENT: Denies: ear pain


Respiratory: Denies: cough, dyspnea


Cardiovascular: Denies: chest pain


Endocrine: Denies: fatigue


Gastrointestinal: Denies: abdominal pain


Genitourinary: Denies: dysuria


Musculoskeletal: Reports: as per HPI


Skin: Denies: rash


Neurological: Denies: weakness





Past Medical History


Past Medical History: Hypertension


Additional Past Medical History / Comment(s): HX HEMMOROIDS


History of Any Multi-Drug Resistant Organisms: None Reported


Past Surgical History: Appendectomy, Hysterectomy


Additional Past Surgical History / Comment(s): HEMMOROIDECTOMY


Past Anesthesia/Blood Transfusion Reactions: No Reported Reaction


Past Psychological History: No Psychological Hx Reported


Smoking Status: Never smoker


Past Alcohol Use History: None Reported


Past Drug Use History: None Reported





- Past Family History


  ** Mother


Family Medical History: No Reported History





General Exam


Limitations: no limitations


General appearance: alert, in no apparent distress


Head exam: Present: normocephalic


Eye exam: Present: normal appearance


Neck exam: Present: normal inspection


Respiratory exam: Present: normal lung sounds bilaterally.  Absent: chest wall 

tenderness


Cardiovascular Exam: Present: regular rate, normal rhythm


  ** Expanded


Peripheral pulses: 2+: Radial (R), Radial (L), Posterior Tibialis (R), Posterior

Tibialis (L)


GI/Abdominal exam: Present: soft.  Absent: tenderness


Extremities exam: Present: normal inspection.  Absent: pedal edema, calf 

tenderness


Back exam: Present: normal inspection.  Absent: tenderness


Neurological exam: Present: alert


Psychiatric exam: Present: normal affect, normal mood


Skin exam: Present: normal color





Course


                                   Vital Signs











  12/03/21 12/03/21 12/03/21





  16:05 20:24 20:25


 


Temperature 98.1 F  


 


Pulse Rate 111 H 123 H 


 


Pulse Rate [   120 H





Cardiac Monitor   





]   


 


Respiratory 18 20 





Rate   


 


Blood Pressure 101/70 140/79 


 


O2 Sat by Pulse 99 98 





Oximetry   














  12/03/21





  22:09


 


Temperature 


 


Pulse Rate 123 H


 


Pulse Rate [ 





Cardiac Monitor 





] 


 


Respiratory 18





Rate 


 


Blood Pressure 142/89


 


O2 Sat by Pulse 95





Oximetry 














- Reevaluation(s)


Reevaluation #1: 





12/03/21 23:20


EKG #2 shows sinus arrhythmia with tachycardia converting to sinus rhythm.  

Overall rate 81.  .  QRS 84.  .  QTc 425.  Normal axis.  Normal QRS.

 No acute ST change.


EKG #3 shows no sinus rhythm rate 74.  .  QRS 84.  .  QTC 4:15.  

Axis.  Normal QRS.  No acute ST change.





EKG Findings





- EKG Comments:


EKG Findings:: Sinus tachycardia with rate of 110.  For screening AV block SC 

220.  QRS 82.  .  QTc 433.  Normal axis.  Normal QRS.  No acute ST change.





Medical Decision Making





- Medical Decision Making





Patient reevaluated and resting comfortably in bed.  Normal sinus rhythm.  

Patient positive for cold.  Patient is a candidate for monoclonal antibodies and

will receive this prior to discharge.  Patient updated on results and need for 

follow-up, including with cardiology.





- Lab Data


Result diagrams: 


                                 12/03/21 19:48





                                 12/03/21 19:48


                                   Lab Results











  12/03/21 12/03/21 12/03/21 Range/Units





  13:00 13:00 19:48 


 


WBC   6.3  3.0 L  (3.8-10.6)  k/uL


 


RBC   3.05 L  4.85  (3.80-5.40)  m/uL


 


Hgb   9.3 L  14.1  D  (11.4-16.0)  gm/dL


 


Hct   27.8 L  41.1  (34.0-46.0)  %


 


MCV   91.2  84.8  D  (80.0-100.0)  fL


 


MCH   30.3  29.1  (25.0-35.0)  pg


 


MCHC   33.3  34.3  (31.0-37.0)  g/dL


 


RDW   14.3  13.3  (11.5-15.5)  %


 


Plt Count   143 L  177  (150-450)  k/uL


 


MPV   10.0  7.8  


 


Neutrophils %   79  59  %


 


Lymphocytes %   6  32  %


 


Monocytes %   8  7  %


 


Eosinophils %   2  1  %


 


Basophils %   1  1  %


 


Neutrophils #   5.0  1.8  (1.3-7.7)  k/uL


 


Lymphocytes #   0.4 L  0.9 L  (1.0-4.8)  k/uL


 


Monocytes #   0.5  0.2  (0-1.0)  k/uL


 


Eosinophils #   0.1  0.0  (0-0.7)  k/uL


 


Basophils #   0.0  0.0  (0-0.2)  k/uL


 


D-Dimer     (<0.60)  mg/L FEU


 


Sodium  138    (137-145)  mmol/L


 


Potassium  4.6    (3.5-5.1)  mmol/L


 


Chloride  104    ()  mmol/L


 


Carbon Dioxide  24    (22-30)  mmol/L


 


Anion Gap  10    mmol/L


 


BUN  57 H    (7-17)  mg/dL


 


Creatinine  1.61 H    (0.52-1.04)  mg/dL


 


Est GFR (CKD-EPI)AfAm  36    (>60 ml/min/1.73 sqM)  


 


Est GFR (CKD-EPI)NonAf  31    (>60 ml/min/1.73 sqM)  


 


Glucose  104 H    (74-99)  mg/dL


 


Calcium  8.8    (8.4-10.2)  mg/dL


 


Magnesium     (1.6-2.3)  mg/dL


 


Total Bilirubin  0.4    (0.2-1.3)  mg/dL


 


AST  32    (14-36)  U/L


 


ALT  19    (4-34)  U/L


 


Alkaline Phosphatase  116    ()  U/L


 


Troponin I     (0.000-0.034)  ng/mL


 


Total Protein  6.8    (6.3-8.2)  g/dL


 


Albumin  3.6    (3.5-5.0)  g/dL


 


TSH  1.850    (0.465-4.680)  mIU/L


 


Free T4  1.64    (0.78-2.19)  ng/dL


 


Urine Color     


 


Urine Appearance     (Clear)  


 


Urine pH     (5.0-8.0)  


 


Ur Specific Gravity     (1.001-1.035)  


 


Urine Protein     (Negative)  


 


Urine Glucose (UA)     (Negative)  


 


Urine Ketones     (Negative)  


 


Urine Blood     (Negative)  


 


Urine Nitrite     (Negative)  


 


Urine Bilirubin     (Negative)  


 


Urine Urobilinogen     (<2.0)  mg/dL


 


Ur Leukocyte Esterase     (Negative)  


 


Urine RBC     (0-5)  /hpf


 


Urine WBC     (0-5)  /hpf


 


Ur Squamous Epith Cells     (0-4)  /hpf


 


Urine Bacteria     (None)  /hpf


 


Urine Mucus     (None)  /hpf


 


Coronavirus (PCR)     (Not Detectd)  














  12/03/21 12/03/21 12/03/21 Range/Units





  19:48 19:48 19:48 


 


WBC     (3.8-10.6)  k/uL


 


RBC     (3.80-5.40)  m/uL


 


Hgb     (11.4-16.0)  gm/dL


 


Hct     (34.0-46.0)  %


 


MCV     (80.0-100.0)  fL


 


MCH     (25.0-35.0)  pg


 


MCHC     (31.0-37.0)  g/dL


 


RDW     (11.5-15.5)  %


 


Plt Count     (150-450)  k/uL


 


MPV     


 


Neutrophils %     %


 


Lymphocytes %     %


 


Monocytes %     %


 


Eosinophils %     %


 


Basophils %     %


 


Neutrophils #     (1.3-7.7)  k/uL


 


Lymphocytes #     (1.0-4.8)  k/uL


 


Monocytes #     (0-1.0)  k/uL


 


Eosinophils #     (0-0.7)  k/uL


 


Basophils #     (0-0.2)  k/uL


 


D-Dimer     (<0.60)  mg/L FEU


 


Sodium  138    (137-145)  mmol/L


 


Potassium  3.5    (3.5-5.1)  mmol/L


 


Chloride  103    ()  mmol/L


 


Carbon Dioxide  27    (22-30)  mmol/L


 


Anion Gap  8    mmol/L


 


BUN  14    (7-17)  mg/dL


 


Creatinine  0.76    (0.52-1.04)  mg/dL


 


Est GFR (CKD-EPI)AfAm  90    (>60 ml/min/1.73 sqM)  


 


Est GFR (CKD-EPI)NonAf  78    (>60 ml/min/1.73 sqM)  


 


Glucose  105 H    (74-99)  mg/dL


 


Calcium  8.5    (8.4-10.2)  mg/dL


 


Magnesium    2.0  (1.6-2.3)  mg/dL


 


Total Bilirubin  0.4    (0.2-1.3)  mg/dL


 


AST  39 H    (14-36)  U/L


 


ALT  32    (4-34)  U/L


 


Alkaline Phosphatase  83    ()  U/L


 


Troponin I   <0.012   (0.000-0.034)  ng/mL


 


Total Protein  6.7    (6.3-8.2)  g/dL


 


Albumin  3.9    (3.5-5.0)  g/dL


 


TSH  1.790    (0.465-4.680)  mIU/L


 


Free T4     (0.78-2.19)  ng/dL


 


Urine Color     


 


Urine Appearance     (Clear)  


 


Urine pH     (5.0-8.0)  


 


Ur Specific Gravity     (1.001-1.035)  


 


Urine Protein     (Negative)  


 


Urine Glucose (UA)     (Negative)  


 


Urine Ketones     (Negative)  


 


Urine Blood     (Negative)  


 


Urine Nitrite     (Negative)  


 


Urine Bilirubin     (Negative)  


 


Urine Urobilinogen     (<2.0)  mg/dL


 


Ur Leukocyte Esterase     (Negative)  


 


Urine RBC     (0-5)  /hpf


 


Urine WBC     (0-5)  /hpf


 


Ur Squamous Epith Cells     (0-4)  /hpf


 


Urine Bacteria     (None)  /hpf


 


Urine Mucus     (None)  /hpf


 


Coronavirus (PCR)     (Not Detectd)  














  12/03/21 12/03/21 12/03/21 Range/Units





  21:16 22:09 22:11 


 


WBC     (3.8-10.6)  k/uL


 


RBC     (3.80-5.40)  m/uL


 


Hgb     (11.4-16.0)  gm/dL


 


Hct     (34.0-46.0)  %


 


MCV     (80.0-100.0)  fL


 


MCH     (25.0-35.0)  pg


 


MCHC     (31.0-37.0)  g/dL


 


RDW     (11.5-15.5)  %


 


Plt Count     (150-450)  k/uL


 


MPV     


 


Neutrophils %     %


 


Lymphocytes %     %


 


Monocytes %     %


 


Eosinophils %     %


 


Basophils %     %


 


Neutrophils #     (1.3-7.7)  k/uL


 


Lymphocytes #     (1.0-4.8)  k/uL


 


Monocytes #     (0-1.0)  k/uL


 


Eosinophils #     (0-0.7)  k/uL


 


Basophils #     (0-0.2)  k/uL


 


D-Dimer    0.64 H  (<0.60)  mg/L FEU


 


Sodium     (137-145)  mmol/L


 


Potassium     (3.5-5.1)  mmol/L


 


Chloride     ()  mmol/L


 


Carbon Dioxide     (22-30)  mmol/L


 


Anion Gap     mmol/L


 


BUN     (7-17)  mg/dL


 


Creatinine     (0.52-1.04)  mg/dL


 


Est GFR (CKD-EPI)AfAm     (>60 ml/min/1.73 sqM)  


 


Est GFR (CKD-EPI)NonAf     (>60 ml/min/1.73 sqM)  


 


Glucose     (74-99)  mg/dL


 


Calcium     (8.4-10.2)  mg/dL


 


Magnesium     (1.6-2.3)  mg/dL


 


Total Bilirubin     (0.2-1.3)  mg/dL


 


AST     (14-36)  U/L


 


ALT     (4-34)  U/L


 


Alkaline Phosphatase     ()  U/L


 


Troponin I     (0.000-0.034)  ng/mL


 


Total Protein     (6.3-8.2)  g/dL


 


Albumin     (3.5-5.0)  g/dL


 


TSH     (0.465-4.680)  mIU/L


 


Free T4     (0.78-2.19)  ng/dL


 


Urine Color  Yellow    


 


Urine Appearance  Clear    (Clear)  


 


Urine pH  5.5    (5.0-8.0)  


 


Ur Specific Gravity  1.020    (1.001-1.035)  


 


Urine Protein  Trace H    (Negative)  


 


Urine Glucose (UA)  Negative    (Negative)  


 


Urine Ketones  Negative    (Negative)  


 


Urine Blood  Trace H    (Negative)  


 


Urine Nitrite  Negative    (Negative)  


 


Urine Bilirubin  Negative    (Negative)  


 


Urine Urobilinogen  2.0    (<2.0)  mg/dL


 


Ur Leukocyte Esterase  Large H    (Negative)  


 


Urine RBC  2    (0-5)  /hpf


 


Urine WBC  12 H    (0-5)  /hpf


 


Ur Squamous Epith Cells  1    (0-4)  /hpf


 


Urine Bacteria  Rare H    (None)  /hpf


 


Urine Mucus  Few H    (None)  /hpf


 


Coronavirus (PCR)   Detected A   (Not Detectd)  














- Radiology Data


Radiology results: image reviewed (Chest x-ray shows no acute process)





Disposition


Clinical Impression: 


 Palpitations, COVID-19





Disposition: HOME SELF-CARE


Instructions (If sedation given, give patient instructions):  Atrial Tachycardia

(ED), Sick Sinus Syndrome (ED), Coronavirus Disease 2019 (COVID-19)


Additional Instructions: 


Please follow-up with primary care physician in the next couple days for 

recheck.  Please also follow-up to cardiologist.  Cardiologist review EKGs.  

Please continue to quarantined for the next week per cc guidelines.  

Over-the-counter vitamin C, vitamin D, and zinc.  Melatonin at bedtime may help.

 Tylenol as needed


Is patient prescribed a controlled substance at d/c from ED?: No


Referrals: 


Yang Gardiner MD [Primary Care Provider] - 1-2 days


Time of Disposition: 23:22

## 2021-12-04 LAB
CHOLEST SERPL-MCNC: 130 MG/DL (ref 0–200)
HDLC SERPL-MCNC: 61.2 MG/DL (ref 40–60)
LDLC SERPL CALC-MCNC: 53.9 MG/DL (ref 0–131)
TRIGL SERPL-MCNC: 74.6 MG/DL (ref 0–149)
VLDLC SERPL CALC-MCNC: 14.92 MG/DL (ref 5–40)

## 2021-12-04 RX ADMIN — PANTOPRAZOLE SODIUM SCH MG: 40 INJECTION, POWDER, FOR SOLUTION INTRAVENOUS at 10:07

## 2021-12-04 RX ADMIN — CEFAZOLIN SCH MLS/HR: 330 INJECTION, POWDER, FOR SOLUTION INTRAMUSCULAR; INTRAVENOUS at 03:14

## 2021-12-04 RX ADMIN — ENOXAPARIN SODIUM SCH MG: 40 INJECTION SUBCUTANEOUS at 13:17

## 2021-12-04 RX ADMIN — ASPIRIN 81 MG CHEWABLE TABLET SCH MG: 81 TABLET CHEWABLE at 10:06

## 2021-12-04 RX ADMIN — CEFAZOLIN SCH MLS/HR: 330 INJECTION, POWDER, FOR SOLUTION INTRAMUSCULAR; INTRAVENOUS at 13:17

## 2021-12-04 RX ADMIN — METOPROLOL SUCCINATE SCH MG: 25 TABLET, EXTENDED RELEASE ORAL at 10:06

## 2021-12-04 NOTE — P.HPIM
History of Present Illness


H&P Date: 21





History of Present Illness


This is a 75-year-old female patient of Dr. Gardiner with past medical history of 

hypertension, peripheral vascular disease, carotid artery stenosis of 70% on the

left. Patient had previous hospitalizations for near syncopal episode with 

episodes of SVT and uncontrolled hypertension.  Patient complains of onset of 

dizziness last .  She is denies feeling congested or shortness of breath 

no sinus drainage.  She has noted to have a cough and she states she has 

occasional headache.  She denies any known over 19 contacts recently but her 

granddaughter was sick a few weeks ago.  Patient apparently presented for 

concerns for palpitations in her heart racing that were happening multiple times

per day and worsening.  Patient was found to be afebrile, heart rate 111, blood 

pressure 101/70 and pulse ox 99%.  Repeat blood pressure 140/79 with heart rate 

in the 120s.  EKG was sinus arrhythmia/sinus tachycardia with first-degree AV 

block, followed by normal sinus rhythm, with no acute ST changes.  WBC 3.0 

otherwise CBC unremarkable.  Electrolytes and renal function normal.  Blood 

sugar 105.  TSH 1.850 and free T4 1 0.64.  Magnesium 2.0, potassium 3.5.  

Troponin negative.  D-dimer 0.64.  Urinalysis was nitrite negative, 

leukoesterase large, WBC 12.  Coronavirus PCR detected.  


Chest x-ray reveals no active cardiac pulmonary disease.  Patient apparently was

prepared for discharge but was feeling weak and dizzy and did not feel comfortab

le going home and patient was admitted to the observation status.  She is 

currently being seen in the emergency center and consult in place with 

cardiology and pulmonary medicine.  Regarding her blood pressure medications, 

patient states she takes lisinopril 30 mg daily and recently stopped taking a 

water pill due to low blood pressure.


Echocardiogram done 3/1/2021 revealed EF of 55-60%, severe concentric left 

ventricle hypertrophy, mild aortic valve sclerosis, trace mitral regurgitation, 

mild tricuspid regurgitation, borderline pulmonary artery hypertension, RVSP 

33.77 mmHg.





Review of Systems


Constitutional: No fever, no chills, no night sweats.  No weight change.  No 

weakness, fatigue or lethargy.  No daytime sleepiness.


EENT: No headache.  No blurred vision or double vision, no loss of vision.  No 

loss of Hearing, no ringing in the ears, no dizziness.  No nasal drainage or 

congestion.  No epistaxis.  No sore throat.


Lungs: No shortness of breath, noted cough, no sputum production.  No wheezing.


Cardiovascular: No chest pain, no lower extremity edema.  Reports palpitations. 

No paroxysmal nocturnal dyspnea.  No orthopnea.  Reports lightheadedness or diz

ziness.  Reports near syncopal episodes.


Abdominal: No abdominal pain.  No nausea, vomiting.  No diarrhea.  No 

constipation.  No bloody or tarry stools..  No loss of appetite.


Genitourinary: No dysuria, increased frequency, urgency.  No urinary retention.


Musculoskeletal: No myalgias.  No muscle weakness, no gait dysfunction, no 

frequent falls.  No back pain.  No neck pain.


Integumentary: No wounds, no lesions.  No rash or pruritus.  No unusual 

bruising.  No change in hair or nails.


Neurologic: No aphasia. No facial droop. No change in mentation. No head injury.

No headache. No paralysis. No paresthesia.


Psychiatric: No depression.  No anxiety.  No mood swings.


Endocrine: No abnormal blood sugars.  No weight change.    





Social history


Patient is a lifelong nonsmoker, no alcohol marijuana or illicit drug use.  She 

is a  and lives alone in a fifth wheel on her son's property.





Family history


Mother is alive at age 98 with history of coronary artery disease.  Father  

at age 92 with history of coronary artery disease.  Patient has total of 3 

brothers.  One  from a motor vehicle accident at age 18.  One is alive with 

history of MI and CABG.  One has history of passing out episodes with unclear 

etiology.  Patient has one sister with history of colon cancer.  Patient has 5 

children with no major medical problems.





Physical Examination


Gen: This is 75-year-old  female patient resting on ER stretcher and 

appears comfortable at rest.  Noted frequent coughing.


HEENT: Head is atraumatic, normocephalic. Pupils equal, round. Sclerae is 

anicteric. 


NECK: Supple. No JVD. No lymphadenopathy. No thyromegaly. 


LUNGS: Clear to auscultation. No wheezes or rhonchi.  No intercostal retractions

.


HEART: Regular rate and rhythm. No murmur. 


ABDOMEN: Soft. Bowel sounds are present. No masses.  No tenderness.


EXTREMITIES: No pedal edema.  No calf tenderness.  Dorsalis pedis palpable 

bilaterally.


NEUROLOGICAL: Patient is awake, alert and oriented x3. Cranial nerves 2 through 

12 are grossly intact. 





Assessment and Plan


1.  Lightheadedness and palpitations most likely secondary to arrhythmia with 

noted sinus arrhythmia, sinus tachycardia, first-degree block.  Cardiology 

consult, continue cardiac monitoring, orthostatic vital signs, echocardiogram.  

Cardiology consult appreciated.  Anginal monitor overnight and probable 

discharge tomorrow.





2.  Hypertension, uncontrolled.  Blood pressure is currently on the soft side.  

We will resume amlodipine 5 mg at bedtime and lisinopril 30 mg daily with 

parameters. 





3.   COVID-19 infection.  Patient started on vitamin supplements, dexamethasone 

and Lovenox, pulmonary consult.





4.  Carotid artery disease with 70% stenosis on the left carotid artery.  

Unclear why patient is not on Lipitor.





5.  Peripheral vascular disease, stable





6.  DVT prophylaxis.  Lovenox subcu.





7.  GI prophylaxis.  Protonix.





Patient placed as an observation status.





Discharge plan: Return home





Impression and plan of care have been directed as dictated by the signing 

physician.  Princess Nava nurse practitioner acting as scribe for signing 

physician.








Past Medical History


Past Medical History: Hypertension


Additional Past Medical History / Comment(s): HX HEMMOROIDS


History of Any Multi-Drug Resistant Organisms: None Reported


Past Surgical History: Appendectomy, Hysterectomy


Additional Past Surgical History / Comment(s): HEMMOROIDECTOMY


Past Anesthesia/Blood Transfusion Reactions: No Reported Reaction


Past Psychological History: No Psychological Hx Reported


Smoking Status: Never smoker


Past Alcohol Use History: None Reported


Past Drug Use History: None Reported





- Past Family History


  ** Mother


Family Medical History: No Reported History





Medications and Allergies


                                Home Medications











 Medication  Instructions  Recorded  Confirmed  Type


 


Lactulose 10 gm PO DAILY PRN 20 History


 


Aspirin EC [Ecotrin Low Dose] 81 mg PO DAILY 21 History


 


Metoprolol Succinate (ER) [Toprol 25 mg PO DAILY 21 History





Xl]    


 


amLODIPine [Norvasc] 5 mg PO DAILY 21 History


 


lisinopriL 30 mg PO HS 21 History








                                    Allergies











Allergy/AdvReac Type Severity Reaction Status Date / Time


 


No Known Allergies Allergy   Verified 21 21:26














Physical Exam


Vitals: 


                                   Vital Signs











  Temp Pulse Pulse Resp BP Pulse Ox


 


 21 06:13   82   16  124/61  96


 


 21 03:17   92   17  154/87  97


 


 21 01:55     18  


 


 21 23:33   75   18   96


 


 21 22:09   123 H   18  142/89  95


 


 21 20:25    120 H   


 


 21 20:24   123 H   20  140/79  98


 


 21 16:05  98.1 F  111 H   18  101/70  99








                                Intake and Output











 21





 22:59 06:59 14:59


 


Other:   


 


  Weight 101.605 kg  














Results


CBC & Chem 7: 


                                 21 19:48





                                 21 19:48


Labs: 


                  Abnormal Lab Results - Last 24 Hours (Table)











  21 Range/Units





  13:00 13:00 19:48 


 


WBC    3.0 L  (3.8-10.6)  k/uL


 


RBC   3.05 L   (3.80-5.40)  m/uL


 


Hgb   9.3 L   (11.4-16.0)  gm/dL


 


Hct   27.8 L   (34.0-46.0)  %


 


Plt Count   143 L   (150-450)  k/uL


 


Lymphocytes #   0.4 L  0.9 L  (1.0-4.8)  k/uL


 


D-Dimer     (<0.60)  mg/L FEU


 


BUN  57 H    (7-17)  mg/dL


 


Creatinine  1.61 H    (0.52-1.04)  mg/dL


 


Glucose  104 H    (74-99)  mg/dL


 


AST     (14-36)  U/L


 


Urine Protein     (Negative)  


 


Urine Blood     (Negative)  


 


Ur Leukocyte Esterase     (Negative)  


 


Urine WBC     (0-5)  /hpf


 


Urine Bacteria     (None)  /hpf


 


Urine Mucus     (None)  /hpf


 


Coronavirus (PCR)     (Not Detectd)  














  21 Range/Units





  19:48 21:16 22:09 


 


WBC     (3.8-10.6)  k/uL


 


RBC     (3.80-5.40)  m/uL


 


Hgb     (11.4-16.0)  gm/dL


 


Hct     (34.0-46.0)  %


 


Plt Count     (150-450)  k/uL


 


Lymphocytes #     (1.0-4.8)  k/uL


 


D-Dimer     (<0.60)  mg/L FEU


 


BUN     (7-17)  mg/dL


 


Creatinine     (0.52-1.04)  mg/dL


 


Glucose  105 H    (74-99)  mg/dL


 


AST  39 H    (14-36)  U/L


 


Urine Protein   Trace H   (Negative)  


 


Urine Blood   Trace H   (Negative)  


 


Ur Leukocyte Esterase   Large H   (Negative)  


 


Urine WBC   12 H   (0-5)  /hpf


 


Urine Bacteria   Rare H   (None)  /hpf


 


Urine Mucus   Few H   (None)  /hpf


 


Coronavirus (PCR)    Detected A  (Not Detectd)  














  21 Range/Units





  22:11 


 


WBC   (3.8-10.6)  k/uL


 


RBC   (3.80-5.40)  m/uL


 


Hgb   (11.4-16.0)  gm/dL


 


Hct   (34.0-46.0)  %


 


Plt Count   (150-450)  k/uL


 


Lymphocytes #   (1.0-4.8)  k/uL


 


D-Dimer  0.64 H  (<0.60)  mg/L FEU


 


BUN   (7-17)  mg/dL


 


Creatinine   (0.52-1.04)  mg/dL


 


Glucose   (74-99)  mg/dL


 


AST   (14-36)  U/L


 


Urine Protein   (Negative)  


 


Urine Blood   (Negative)  


 


Ur Leukocyte Esterase   (Negative)  


 


Urine WBC   (0-5)  /hpf


 


Urine Bacteria   (None)  /hpf


 


Urine Mucus   (None)  /hpf


 


Coronavirus (PCR)   (Not Detectd)  








                      Microbiology - Last 24 Hours (Table)











 21 21:16 Urine Culture - Preliminary





 Urine,Voided

## 2021-12-04 NOTE — P.CNPUL
History of Present Illness


Consult date: 12/04/21


Chief complaint: Lightheadedness and palpitations


History of present illness: 





COVID 19 infection








I was consulted on this patient because of a COVID 19 affectionate was 

identified during this current hospitalization.  The patient was having some 

lightheadedness and she has also noted some fluctuation her heart rate with 

episodic tachycardia.  During her hospital stay, she was noted to have a atrial 

fibrillation currently she is back into normal sinus rhythm.  Cardiology has 

been consulted.  Her cardiac rhythm is sinus for now.  She is resting 

comfortably in bed.  He is on room air oxygen.  Her chest x-rays clear.  No 

other signs of COVID 19 infection manifestations no nausea.  No vomiting.  No 

diarrhea.  No abdominal pain.  No chest pain.  She is not vaccinated.  Her d-

dimer is at 0.64.  Urinalysis is negative.  Was a causative 3.  She does have a 

lymphopenia with a lymphocyte count of 0.9.  Thyroid function tests have been 

essentially within normal limits.  Cardiology is to comment on her cardiac 

arrhythmias.  Otherwise no other significant issues for now.





Review of Systems








Constitutional: No fever, no chills, no night sweats.  No weight change.  No 

weakness, fatigue or lethargy.  No daytime sleepiness.


EENT: No headache.  No blurred vision or double vision, no loss of vision.  No 

loss of Hearing, no ringing in the ears, no dizziness.  No nasal drainage or 

congestion.  No epistaxis.  No sore throat.


Lungs: No shortness of breath, noted cough, no sputum production.  No wheezing.


Cardiovascular: No chest pain, no lower extremity edema.  Reports palpitations. 

No paroxysmal nocturnal dyspnea.  No orthopnea.  Reports lightheadedness or 

dizziness.  Reports near syncopal episodes.


Abdominal: No abdominal pain.  No nausea, vomiting.  No diarrhea.  No 

constipation.  No bloody or tarry stools..  No loss of appetite.


Genitourinary: No dysuria, increased frequency, urgency.  No urinary retention.


Musculoskeletal: No myalgias.  No muscle weakness, no gait dysfunction, no 

frequent falls.  No back pain.  No neck pain.


Integumentary: No wounds, no lesions.  No rash or pruritus.  No unusual 

bruising.  No change in hair or nails.


Neurologic: No aphasia. No facial droop. No change in mentation. No head injury.

No headache. No paralysis. No paresthesia.


Psychiatric: No depression.  No anxiety.  No mood swings.


Endocrine: No abnormal blood sugars.  No weight change.    





Past Medical History


Past Medical History: Hypertension


Additional Past Medical History / Comment(s): HX HEMMOROIDS


History of Any Multi-Drug Resistant Organisms: None Reported


Past Surgical History: Appendectomy, Hysterectomy, Orthopedic Surgery


Additional Past Surgical History / Comment(s): left knee replacement 

HEMMOROIDECTOMY


Past Anesthesia/Blood Transfusion Reactions: No Reported Reaction


Past Psychological History: No Psychological Hx Reported


Smoking Status: Never smoker


Past Alcohol Use History: None Reported


Past Drug Use History: None Reported





- Past Family History


  ** Mother


Family Medical History: Coronary Artery Disease (CAD)


Additional Family Medical History / Comment(s): HAD STENTS





  ** Brother(s)


Family Medical History: Myocardial Infarction (MI)


Additional Family Medical History / Comment(s): CABG





  ** Sister(s)


Family Medical History: Cancer


Additional Family Medical History / Comment(s): colon cancer





  ** Father


Family Medical History: Coronary Artery Disease (CAD)


Additional Family Medical History / Comment(s): HAD STENTS





Medications and Allergies


                                Home Medications











 Medication  Instructions  Recorded  Confirmed  Type


 


Lactulose 10 gm PO DAILY PRN 06/25/20 12/03/21 History


 


Aspirin EC [Ecotrin Low Dose] 81 mg PO DAILY 02/27/21 12/03/21 History


 


Metoprolol Succinate (ER) [Toprol 25 mg PO DAILY 12/03/21 12/03/21 History





Xl]    


 


amLODIPine [Norvasc] 5 mg PO DAILY 12/03/21 12/03/21 History


 


lisinopriL 30 mg PO HS 12/03/21 12/03/21 History








                                    Allergies











Allergy/AdvReac Type Severity Reaction Status Date / Time


 


No Known Allergies Allergy   Verified 12/04/21 13:05














Physical Exam


Vitals: 


                                   Vital Signs











  Temp Pulse Pulse Pulse Resp BP BP


 


 12/04/21 13:51  98.6 F    133 H  17   114/64


 


 12/04/21 10:41  99.8 F H   79   18   133/75


 


 12/04/21 06:13   82    16  124/61 


 


 12/04/21 03:17   92    17  154/87 


 


 12/04/21 01:55      18  


 


 12/03/21 23:33   75    18  


 


 12/03/21 22:09   123 H    18  142/89 


 


 12/03/21 20:25    120 H    


 


 12/03/21 20:24   123 H    20  140/79 


 


 12/03/21 16:05  98.1 F  111 H    18  101/70 














  Pulse Ox


 


 12/04/21 13:51  95


 


 12/04/21 10:41  96


 


 12/04/21 06:13  96


 


 12/04/21 03:17  97


 


 12/04/21 01:55 


 


 12/03/21 23:33  96


 


 12/03/21 22:09  95


 


 12/03/21 20:25 


 


 12/03/21 20:24  98


 


 12/03/21 16:05  99








                                Intake and Output











 12/04/21 12/04/21 12/04/21





 06:59 14:59 22:59


 


Other:   


 


  # Voids  1 


 


  Weight  101.605 kg 














Gen. appearance the patient is calm comfortable active distress the patient is 

currently on room air oxygen.


HEENT: Head is atraumatic, normocephalic. Pupils equal, round. Sclerae is 

anicteric. 


NECK: Supple. No JVD. No lymphadenopathy. No thyromegaly. 


LUNGS: Clear to auscultation. No wheezes or rhonchi.  No intercostal ret

ractions.


HEART: Regular rate and rhythm. No murmur. 


ABDOMEN: Soft. Bowel sounds are present. No masses.  No tenderness.


EXTREMITIES: No pedal edema.  No calf tenderness.  Dorsalis pedis palpable kory

aterally.


NEUROLOGICAL: Patient is awake, alert and oriented x3. Cranial nerves 2 through 

12 are grossly intact. 








Results





- Laboratory Findings


CBC and BMP: 


                                 12/03/21 19:48





                                 12/03/21 19:48


PT/INR, D-dimer











D-Dimer  0.64 mg/L FEU (<0.60)  H  12/03/21  22:11    








Abnormal lab findings: 


                                  Abnormal Labs











  12/03/21 12/03/21 12/03/21





  13:00 13:00 19:48


 


WBC    3.0 L


 


RBC   3.05 L 


 


Hgb   9.3 L 


 


Hct   27.8 L 


 


Plt Count   143 L 


 


Lymphocytes #   0.4 L  0.9 L


 


D-Dimer   


 


BUN  57 H  


 


Creatinine  1.61 H  


 


Glucose  104 H  


 


AST   


 


HDL Cholesterol  61.20 H  


 


Urine Protein   


 


Urine Blood   


 


Ur Leukocyte Esterase   


 


Urine WBC   


 


Urine Bacteria   


 


Urine Mucus   


 


Coronavirus (PCR)   














  12/03/21 12/03/21 12/03/21





  19:48 21:16 22:09


 


WBC   


 


RBC   


 


Hgb   


 


Hct   


 


Plt Count   


 


Lymphocytes #   


 


D-Dimer   


 


BUN   


 


Creatinine   


 


Glucose  105 H  


 


AST  39 H  


 


HDL Cholesterol   


 


Urine Protein   Trace H 


 


Urine Blood   Trace H 


 


Ur Leukocyte Esterase   Large H 


 


Urine WBC   12 H 


 


Urine Bacteria   Rare H 


 


Urine Mucus   Few H 


 


Coronavirus (PCR)    Detected A














  12/03/21





  22:11


 


WBC 


 


RBC 


 


Hgb 


 


Hct 


 


Plt Count 


 


Lymphocytes # 


 


D-Dimer  0.64 H


 


BUN 


 


Creatinine 


 


Glucose 


 


AST 


 


HDL Cholesterol 


 


Urine Protein 


 


Urine Blood 


 


Ur Leukocyte Esterase 


 


Urine WBC 


 


Urine Bacteria 


 


Urine Mucus 


 


Coronavirus (PCR) 














- Diagnostic Findings


Chest x-ray: image reviewed





Assessment and Plan


Plan: 











1 asymptomatic overnight and infection with limited lower minimal amount of 

manifestations if any as the patient reports to have chronic cough without any 

interval worsening.  Chest x-rays clear.  The oxygenation is within normal 

limits.





2 palpitations/lightheadedness with questionable approximately 2 fibrillation 

currently under investigation.  The cardiac rhythm is sinus and the patient had 

an echocardiogram before in March 2021 showing severe concentric LVH and mild 

aortic sclerosis with a moderate pulmonary hypertension.  She has a preserved LV

 function.  Cardiology consultation has been placed forth.





3 hypertension





4 coronary artery disease 70% on the left





5 peripheral vascular disease





Plan





No evidence of a pneumonia.  No evidence of knee distally compromise.  We'll 

leave the management to medicine.  We'll sign off the case.  Management of 

palpitation and approximately atrial fibrillation per cardiology.

## 2021-12-04 NOTE — P.CRDCN
History of Present Illness


Consult date: 12/04/21


Requesting physician: Yang Gardiner


Reason for Consult (text): 


carmina





Chief complaint: palpitations


History of present illness: 


A pleasant 75-year-old female patient who follows with Dr. Burnett in the office. 

Has a history of palpitations in the past and has been worked up with no 

significant findings.  Presented to the emergency department because she's been 

having more frequent rotations over the past week.  She apparently also 

developed a cough a little under a week ago with some nasal congestion and 

drainage.  Upon presentation in the emergency department EKG did show sinus 

mechanism with some episodes of sinus tachycardia and brief atrial runs.  She 

was found to be positive for COVID-19 receive monoclonal antibodies and was 

going to be discharged home but felt quite weak and not well therefore she is 

being held for observation.  We were consulted for bradycardia however there is 

been no evidence of bradycardia noted on EKGs or telemetry monitoring.  Overall 

she's feeling a bit better.  She continues to complain of fatigue and weakness. 

No significant complaints this morning of palpitations or dizziness.  Appetite 

values show normal thyroid function.  Running creatinine were elevated on 

admission at 57 and 1.61 which have normalized post hydration at 14 and 0.76. 

It'll signs have been stable. 








Past Medical History


Past Medical History: Hypertension


Additional Past Medical History / Comment(s): HX HEMMOROIDS


History of Any Multi-Drug Resistant Organisms: None Reported


Past Surgical History: Appendectomy, Hysterectomy, Orthopedic Surgery


Additional Past Surgical History / Comment(s): left knee replacement 

HEMMOROIDECTOMY


Past Anesthesia/Blood Transfusion Reactions: No Reported Reaction


Past Psychological History: No Psychological Hx Reported


Smoking Status: Never smoker


Past Alcohol Use History: None Reported


Past Drug Use History: None Reported





- Past Family History


  ** Mother


Family Medical History: Coronary Artery Disease (CAD)


Additional Family Medical History / Comment(s): HAD STENTS





  ** Brother(s)


Family Medical History: Myocardial Infarction (MI)


Additional Family Medical History / Comment(s): CABG





  ** Sister(s)


Family Medical History: Cancer


Additional Family Medical History / Comment(s): colon cancer





  ** Father


Family Medical History: Coronary Artery Disease (CAD)


Additional Family Medical History / Comment(s): HAD STENTS





Medications and Allergies


                                Home Medications











 Medication  Instructions  Recorded  Confirmed  Type


 


Lactulose 10 gm PO DAILY PRN 06/25/20 12/03/21 History


 


Aspirin EC [Ecotrin Low Dose] 81 mg PO DAILY 02/27/21 12/03/21 History


 


Metoprolol Succinate (ER) [Toprol 25 mg PO DAILY 12/03/21 12/03/21 History





Xl]    


 


amLODIPine [Norvasc] 5 mg PO DAILY 12/03/21 12/03/21 History


 


lisinopriL 30 mg PO HS 12/03/21 12/03/21 History








                                    Allergies











Allergy/AdvReac Type Severity Reaction Status Date / Time


 


No Known Allergies Allergy   Verified 12/04/21 13:05














Physical Exam


Vitals: 


                                   Vital Signs











  Temp Pulse Pulse Pulse Resp BP BP


 


 12/04/21 13:51  98.6 F    133 H  17   114/64


 


 12/04/21 10:41  99.8 F H   79   18   133/75


 


 12/04/21 06:13   82    16  124/61 


 


 12/04/21 03:17   92    17  154/87 


 


 12/04/21 01:55      18  


 


 12/03/21 23:33   75    18  


 


 12/03/21 22:09   123 H    18  142/89 


 


 12/03/21 20:25    120 H    


 


 12/03/21 20:24   123 H    20  140/79 


 


 12/03/21 16:05  98.1 F  111 H    18  101/70 














  Pulse Ox


 


 12/04/21 13:51  95


 


 12/04/21 10:41  96


 


 12/04/21 06:13  96


 


 12/04/21 03:17  97


 


 12/04/21 01:55 


 


 12/03/21 23:33  96


 


 12/03/21 22:09  95


 


 12/03/21 20:25 


 


 12/03/21 20:24  98


 


 12/03/21 16:05  99








                                Intake and Output











 12/03/21 12/04/21 12/04/21





 22:59 06:59 14:59


 


Other:   


 


  Weight 101.605 kg  101.605 kg











PHYSICAL EXAMINATION: This is a 75-year-old female in no apparent distress at 

the time of my examination.  The examination was deferred secondary to COVID-19 

infection





VITAL SIGNS: Blood pressure on 133/75, heart rate 79, respirations 18, temp 99.8

 degrees Fahrenheit. Patient is 96% on room air. 








 











Results





                                 12/03/21 19:48





                                 12/03/21 19:48


                                 Cardiac Enzymes











  12/03/21 12/03/21 12/03/21 Range/Units





  13:00 19:48 19:48 


 


AST  32  39 H   (14-36)  U/L


 


Troponin I    <0.012  (0.000-0.034)  ng/mL








                                     Lipids











  12/03/21 Range/Units





  13:00 


 


Triglycerides  74.60  (0..00)  mg/dL


 


Cholesterol  130.00  (0..00)  mg/dL


 


HDL Cholesterol  61.20 H  (40.00-60.00)  mg/dL


 


Cholesterol/HDL Ratio  2.12  Ratio








                                       CBC











  12/03/21 12/03/21 Range/Units





  13:00 19:48 


 


WBC  6.3  3.0 L  (3.8-10.6)  k/uL


 


RBC  3.05 L  4.85  (3.80-5.40)  m/uL


 


Hgb  9.3 L  14.1  D  (11.4-16.0)  gm/dL


 


Hct  27.8 L  41.1  (34.0-46.0)  %


 


Plt Count  143 L  177  (150-450)  k/uL








                          Comprehensive Metabolic Panel











  12/03/21 12/03/21 Range/Units





  13:00 19:48 


 


Sodium  138  138  (137-145)  mmol/L


 


Potassium  4.6  3.5  (3.5-5.1)  mmol/L


 


Chloride  104  103  ()  mmol/L


 


Carbon Dioxide  24  27  (22-30)  mmol/L


 


BUN  57 H  14  (7-17)  mg/dL


 


Creatinine  1.61 H  0.76  (0.52-1.04)  mg/dL


 


Glucose  104 H  105 H  (74-99)  mg/dL


 


Calcium  8.8  8.5  (8.4-10.2)  mg/dL


 


AST  32  39 H  (14-36)  U/L


 


ALT  19  32  (4-34)  U/L


 


Alkaline Phosphatase  116  83  ()  U/L


 


Total Protein  6.8  6.7  (6.3-8.2)  g/dL


 


Albumin  3.6  3.9  (3.5-5.0)  g/dL








                               Current Medications











Generic Name Dose Route Start Last Admin





  Trade Name Freq  PRN Reason Stop Dose Admin


 


Amlodipine Besylate  5 mg  12/04/21 09:00  12/04/21 10:07





  Amlodipine 5 Mg Tab  PO   5 mg





  DAILY LYLE   Administration


 


Ascorbic Acid  1,000 mg  12/05/21 09:00 





  Ascorbic Acid 500 Mg Tab  PO  





  DAILY LYLE  


 


Aspirin  81 mg  12/04/21 09:00  12/04/21 10:06





  Aspirin 81 Mg  PO   81 mg





  DAILY LYLE   Administration


 


Cholecalciferol  50 mcg  12/05/21 09:00 





  Cholecalciferol 25 Mcg (1000 Iu) Tablet  PO  





  DAILY LYLE  


 


Dexamethasone  6 mg  12/04/21 10:00  12/04/21 13:17





  Dexamethasone 2 Mg Tab  PO   6 mg





  DAILY LYLE   Administration


 


Enoxaparin Sodium  40 mg  12/04/21 10:00  12/04/21 13:17





  Enoxaparin 40 Mg/0.4 Ml Syringe  SQ   40 mg





  DAILY LYLE   Administration


 


Sodium Chloride  1,000 mls @ 130 mls/hr  12/04/21 01:30  12/04/21 13:17





  Saline 0.9%  IV   130 mls/hr





  .[id number] LYLE   Administration


 


Lisinopril  30 mg  12/04/21 21:00 





  Lisinopril 10 Mg Tab  PO  





  HS LYLE  


 


Lorazepam  0.5 mg  12/04/21 01:19  12/04/21 04:17





  Lorazepam 2 Mg/Ml Inj  IV   0.5 mg





  Q6HR PRN   Administration





  Anxiety  


 


Metoprolol Succinate  25 mg  12/04/21 09:00  12/04/21 10:06





  Metoprolol Succinate (Er) 25 Mg Tab.Er.24h  PO   25 mg





  DAILY LYLE   Administration


 


Morphine Sulfate  4 mg  12/04/21 01:19 





  Morphine Sulfate 4 Mg/Ml Syringe  IV  





  Q4HR PRN  





  Severe Pain  


 


Naloxone HCl  0.2 mg  12/04/21 01:19 





  Naloxone 0.4 Mg/Ml 1 Ml Vial  IV  





  Q2M PRN  





  Opioid Reversal  


 


Ondansetron HCl  4 mg  12/04/21 01:19 





  Ondansetron 4 Mg/2 Ml Vial  IVP  





  Q8HR PRN  





  Nausea And Vomiting  


 


Pantoprazole Sodium  40 mg  12/04/21 09:00  12/04/21 10:07





  Pantoprazole 40 Mg/10 Ml Vial  IV   40 mg





  DAILY LYLE   Administration


 


Zinc Sulfate  220 mg  12/05/21 09:00 





  Zinc Sulfate 220 Mg Cap  PO  





  DAILY LYLE  








                                Intake and Output











 12/03/21 12/04/21 12/04/21





 22:59 06:59 14:59


 


Other:   


 


  Weight 101.605 kg  101.605 kg








                                 Patient Weight











 12/05/21





 06:59


 


Weight 101.605 kg








                                        





                                 12/03/21 19:48 





                                 12/03/21 19:48 











Assessment and Plan


Assessment: 


#1 COVID-19


#2 symptoms of palpitations with atrial runs noted on EKG, likely exacerbated by

 underlying infection


#3 hypertension


#4 hyperlipidemia


#5 carotid artery disease followed by Dr. Adam





Plan: 


From Cardiology's perspective patient's underlying arrhythmia likely exacerbated

 by infection.  We will obtain a 2-D echo with Doppler study to assess cardiac 

structure and function.  We will continue to monitor patient on telemetry and 

assess for further arrhythmia.  Patient may require outpatient evaluation of 

arrhythmia once she is recovered from COVID-19. 





The above dictated assessment and findings were discussed with signing 

physician. The impression and plan of care have been directed as dictated. 

Airam oHyos, Nurse Practitioner, acting as scribe for signing physician.

## 2021-12-04 NOTE — ED
Medical Decision Making





- Medical Decision Making





This is a 75-year-old female DF for evaluation patient continues to feel weak 

and dizzy here in the emergency department despite treatment.  Patient does not 

fill comfortable at this time currently with discharge home.  Patient be 

admitted for observation for coronavirus and abnormal heart rate





- Lab Data


Result diagrams: 


                                 12/03/21 19:48





                                 12/03/21 19:48





                                   Lab Results











  12/03/21 12/03/21 12/03/21 Range/Units





  13:00 13:00 19:48 


 


WBC   6.3  3.0 L  (3.8-10.6)  k/uL


 


RBC   3.05 L  4.85  (3.80-5.40)  m/uL


 


Hgb   9.3 L  14.1  D  (11.4-16.0)  gm/dL


 


Hct   27.8 L  41.1  (34.0-46.0)  %


 


MCV   91.2  84.8  D  (80.0-100.0)  fL


 


MCH   30.3  29.1  (25.0-35.0)  pg


 


MCHC   33.3  34.3  (31.0-37.0)  g/dL


 


RDW   14.3  13.3  (11.5-15.5)  %


 


Plt Count   143 L  177  (150-450)  k/uL


 


MPV   10.0  7.8  


 


Neutrophils %   79  59  %


 


Lymphocytes %   6  32  %


 


Monocytes %   8  7  %


 


Eosinophils %   2  1  %


 


Basophils %   1  1  %


 


Neutrophils #   5.0  1.8  (1.3-7.7)  k/uL


 


Lymphocytes #   0.4 L  0.9 L  (1.0-4.8)  k/uL


 


Monocytes #   0.5  0.2  (0-1.0)  k/uL


 


Eosinophils #   0.1  0.0  (0-0.7)  k/uL


 


Basophils #   0.0  0.0  (0-0.2)  k/uL


 


D-Dimer     (<0.60)  mg/L FEU


 


Sodium  138    (137-145)  mmol/L


 


Potassium  4.6    (3.5-5.1)  mmol/L


 


Chloride  104    ()  mmol/L


 


Carbon Dioxide  24    (22-30)  mmol/L


 


Anion Gap  10    mmol/L


 


BUN  57 H    (7-17)  mg/dL


 


Creatinine  1.61 H    (0.52-1.04)  mg/dL


 


Est GFR (CKD-EPI)AfAm  36    (>60 ml/min/1.73 sqM)  


 


Est GFR (CKD-EPI)NonAf  31    (>60 ml/min/1.73 sqM)  


 


Glucose  104 H    (74-99)  mg/dL


 


Calcium  8.8    (8.4-10.2)  mg/dL


 


Magnesium     (1.6-2.3)  mg/dL


 


Total Bilirubin  0.4    (0.2-1.3)  mg/dL


 


AST  32    (14-36)  U/L


 


ALT  19    (4-34)  U/L


 


Alkaline Phosphatase  116    ()  U/L


 


Troponin I     (0.000-0.034)  ng/mL


 


Total Protein  6.8    (6.3-8.2)  g/dL


 


Albumin  3.6    (3.5-5.0)  g/dL


 


TSH  1.850    (0.465-4.680)  mIU/L


 


Free T4  1.64    (0.78-2.19)  ng/dL


 


Urine Color     


 


Urine Appearance     (Clear)  


 


Urine pH     (5.0-8.0)  


 


Ur Specific Gravity     (1.001-1.035)  


 


Urine Protein     (Negative)  


 


Urine Glucose (UA)     (Negative)  


 


Urine Ketones     (Negative)  


 


Urine Blood     (Negative)  


 


Urine Nitrite     (Negative)  


 


Urine Bilirubin     (Negative)  


 


Urine Urobilinogen     (<2.0)  mg/dL


 


Ur Leukocyte Esterase     (Negative)  


 


Urine RBC     (0-5)  /hpf


 


Urine WBC     (0-5)  /hpf


 


Ur Squamous Epith Cells     (0-4)  /hpf


 


Urine Bacteria     (None)  /hpf


 


Urine Mucus     (None)  /hpf


 


Coronavirus (PCR)     (Not Detectd)  














  12/03/21 12/03/21 12/03/21 Range/Units





  19:48 19:48 19:48 


 


WBC     (3.8-10.6)  k/uL


 


RBC     (3.80-5.40)  m/uL


 


Hgb     (11.4-16.0)  gm/dL


 


Hct     (34.0-46.0)  %


 


MCV     (80.0-100.0)  fL


 


MCH     (25.0-35.0)  pg


 


MCHC     (31.0-37.0)  g/dL


 


RDW     (11.5-15.5)  %


 


Plt Count     (150-450)  k/uL


 


MPV     


 


Neutrophils %     %


 


Lymphocytes %     %


 


Monocytes %     %


 


Eosinophils %     %


 


Basophils %     %


 


Neutrophils #     (1.3-7.7)  k/uL


 


Lymphocytes #     (1.0-4.8)  k/uL


 


Monocytes #     (0-1.0)  k/uL


 


Eosinophils #     (0-0.7)  k/uL


 


Basophils #     (0-0.2)  k/uL


 


D-Dimer     (<0.60)  mg/L FEU


 


Sodium  138    (137-145)  mmol/L


 


Potassium  3.5    (3.5-5.1)  mmol/L


 


Chloride  103    ()  mmol/L


 


Carbon Dioxide  27    (22-30)  mmol/L


 


Anion Gap  8    mmol/L


 


BUN  14    (7-17)  mg/dL


 


Creatinine  0.76    (0.52-1.04)  mg/dL


 


Est GFR (CKD-EPI)AfAm  90    (>60 ml/min/1.73 sqM)  


 


Est GFR (CKD-EPI)NonAf  78    (>60 ml/min/1.73 sqM)  


 


Glucose  105 H    (74-99)  mg/dL


 


Calcium  8.5    (8.4-10.2)  mg/dL


 


Magnesium    2.0  (1.6-2.3)  mg/dL


 


Total Bilirubin  0.4    (0.2-1.3)  mg/dL


 


AST  39 H    (14-36)  U/L


 


ALT  32    (4-34)  U/L


 


Alkaline Phosphatase  83    ()  U/L


 


Troponin I   <0.012   (0.000-0.034)  ng/mL


 


Total Protein  6.7    (6.3-8.2)  g/dL


 


Albumin  3.9    (3.5-5.0)  g/dL


 


TSH  1.790    (0.465-4.680)  mIU/L


 


Free T4     (0.78-2.19)  ng/dL


 


Urine Color     


 


Urine Appearance     (Clear)  


 


Urine pH     (5.0-8.0)  


 


Ur Specific Gravity     (1.001-1.035)  


 


Urine Protein     (Negative)  


 


Urine Glucose (UA)     (Negative)  


 


Urine Ketones     (Negative)  


 


Urine Blood     (Negative)  


 


Urine Nitrite     (Negative)  


 


Urine Bilirubin     (Negative)  


 


Urine Urobilinogen     (<2.0)  mg/dL


 


Ur Leukocyte Esterase     (Negative)  


 


Urine RBC     (0-5)  /hpf


 


Urine WBC     (0-5)  /hpf


 


Ur Squamous Epith Cells     (0-4)  /hpf


 


Urine Bacteria     (None)  /hpf


 


Urine Mucus     (None)  /hpf


 


Coronavirus (PCR)     (Not Detectd)  














  12/03/21 12/03/21 12/03/21 Range/Units





  21:16 22:09 22:11 


 


WBC     (3.8-10.6)  k/uL


 


RBC     (3.80-5.40)  m/uL


 


Hgb     (11.4-16.0)  gm/dL


 


Hct     (34.0-46.0)  %


 


MCV     (80.0-100.0)  fL


 


MCH     (25.0-35.0)  pg


 


MCHC     (31.0-37.0)  g/dL


 


RDW     (11.5-15.5)  %


 


Plt Count     (150-450)  k/uL


 


MPV     


 


Neutrophils %     %


 


Lymphocytes %     %


 


Monocytes %     %


 


Eosinophils %     %


 


Basophils %     %


 


Neutrophils #     (1.3-7.7)  k/uL


 


Lymphocytes #     (1.0-4.8)  k/uL


 


Monocytes #     (0-1.0)  k/uL


 


Eosinophils #     (0-0.7)  k/uL


 


Basophils #     (0-0.2)  k/uL


 


D-Dimer    0.64 H  (<0.60)  mg/L FEU


 


Sodium     (137-145)  mmol/L


 


Potassium     (3.5-5.1)  mmol/L


 


Chloride     ()  mmol/L


 


Carbon Dioxide     (22-30)  mmol/L


 


Anion Gap     mmol/L


 


BUN     (7-17)  mg/dL


 


Creatinine     (0.52-1.04)  mg/dL


 


Est GFR (CKD-EPI)AfAm     (>60 ml/min/1.73 sqM)  


 


Est GFR (CKD-EPI)NonAf     (>60 ml/min/1.73 sqM)  


 


Glucose     (74-99)  mg/dL


 


Calcium     (8.4-10.2)  mg/dL


 


Magnesium     (1.6-2.3)  mg/dL


 


Total Bilirubin     (0.2-1.3)  mg/dL


 


AST     (14-36)  U/L


 


ALT     (4-34)  U/L


 


Alkaline Phosphatase     ()  U/L


 


Troponin I     (0.000-0.034)  ng/mL


 


Total Protein     (6.3-8.2)  g/dL


 


Albumin     (3.5-5.0)  g/dL


 


TSH     (0.465-4.680)  mIU/L


 


Free T4     (0.78-2.19)  ng/dL


 


Urine Color  Yellow    


 


Urine Appearance  Clear    (Clear)  


 


Urine pH  5.5    (5.0-8.0)  


 


Ur Specific Gravity  1.020    (1.001-1.035)  


 


Urine Protein  Trace H    (Negative)  


 


Urine Glucose (UA)  Negative    (Negative)  


 


Urine Ketones  Negative    (Negative)  


 


Urine Blood  Trace H    (Negative)  


 


Urine Nitrite  Negative    (Negative)  


 


Urine Bilirubin  Negative    (Negative)  


 


Urine Urobilinogen  2.0    (<2.0)  mg/dL


 


Ur Leukocyte Esterase  Large H    (Negative)  


 


Urine RBC  2    (0-5)  /hpf


 


Urine WBC  12 H    (0-5)  /hpf


 


Ur Squamous Epith Cells  1    (0-4)  /hpf


 


Urine Bacteria  Rare H    (None)  /hpf


 


Urine Mucus  Few H    (None)  /hpf


 


Coronavirus (PCR)   Detected A   (Not Detectd)  














Disposition


Clinical Impression: 


 Palpitations, COVID-19, Chest pain, Lightheadedness, Nausea & vomiting





Disposition: ADMITTED AS IP TO THIS HOSP


Condition: Good


Instructions (If sedation given, give patient instructions):  Coronavirus 

Disease 2019 (COVID-19), Atrial Tachycardia (ED), Sick Sinus Syndrome (ED)


Additional Instructions: 


Please follow-up with primary care physician in the next couple days for 

recheck.  Please also follow-up to cardiologist.  Cardiologist review EKGs.  Pl

ease continue to quarantined for the next week per cc guidelines.  

Over-the-counter vitamin C, vitamin D, and zinc.  Melatonin at bedtime may help.

 Tylenol as needed


Is patient prescribed a controlled substance at d/c from ED?: No


Referrals: 


Yang Gardiner MD [Primary Care Provider] - 1-2 days

## 2021-12-05 VITALS — TEMPERATURE: 98 F | DIASTOLIC BLOOD PRESSURE: 79 MMHG | SYSTOLIC BLOOD PRESSURE: 136 MMHG | HEART RATE: 95 BPM

## 2021-12-05 VITALS — RESPIRATION RATE: 18 BRPM

## 2021-12-05 LAB
ALBUMIN SERPL-MCNC: 3.4 G/DL (ref 3.8–4.9)
ALBUMIN/GLOB SERPL: 1.7 G/DL (ref 1.6–3.17)
ALP SERPL-CCNC: 74 U/L (ref 41–126)
ALT SERPL-CCNC: 26 U/L (ref 8–44)
ANION GAP SERPL CALC-SCNC: 9.2 MMOL/L (ref 10–18)
AST SERPL-CCNC: 27 U/L (ref 13–35)
BASOPHILS # BLD AUTO: 0 X 10*3/UL (ref 0–0.1)
BASOPHILS NFR BLD AUTO: 0 %
BUN SERPL-SCNC: 13.4 MG/DL (ref 9–27)
BUN/CREAT SERPL: 19.14 RATIO (ref 12–20)
CALCIUM SPEC-MCNC: 8 MG/DL (ref 8.7–10.3)
CHLORIDE SERPL-SCNC: 110 MMOL/L (ref 96–109)
CO2 SERPL-SCNC: 21.8 MMOL/L (ref 20–27.5)
EOSINOPHIL # BLD AUTO: 0 X 10*3/UL (ref 0.04–0.35)
EOSINOPHIL NFR BLD AUTO: 0 %
ERYTHROCYTE [DISTWIDTH] IN BLOOD BY AUTOMATED COUNT: 4.11 X 10*6/UL (ref 4.1–5.2)
ERYTHROCYTE [DISTWIDTH] IN BLOOD: 13.9 % (ref 11.5–14.5)
GLOBULIN SER CALC-MCNC: 2 G/DL (ref 1.6–3.3)
GLUCOSE SERPL-MCNC: 106 MG/DL (ref 70–110)
HCT VFR BLD AUTO: 36.5 % (ref 37.2–46.3)
HGB BLD-MCNC: 11.5 G/DL (ref 12–15)
LYMPHOCYTES # SPEC AUTO: 0.64 X 10*3/UL (ref 0.9–5)
LYMPHOCYTES NFR SPEC AUTO: 26.3 %
MCH RBC QN AUTO: 28 PG (ref 27–32)
MCHC RBC AUTO-ENTMCNC: 31.5 G/DL (ref 32–37)
MCV RBC AUTO: 88.8 FL (ref 80–97)
MONOCYTES # BLD AUTO: 0.22 X 10*3/UL (ref 0.2–1)
MONOCYTES NFR BLD AUTO: 9.1 %
NEUTROPHILS # BLD AUTO: 1.56 X 10*3/UL (ref 1.8–7.7)
NEUTROPHILS NFR BLD AUTO: 64.2 %
PLATELET # BLD AUTO: 135 X 10*3/UL (ref 140–440)
POTASSIUM SERPL-SCNC: 3.6 MMOL/L (ref 3.5–5.5)
PROT SERPL-MCNC: 5.4 G/DL (ref 6.2–8.2)
SODIUM SERPL-SCNC: 141 MMOL/L (ref 135–145)
WBC # BLD AUTO: 2.43 X 10*3/UL (ref 4.5–10)

## 2021-12-05 RX ADMIN — CEFAZOLIN SCH MLS/HR: 330 INJECTION, POWDER, FOR SOLUTION INTRAMUSCULAR; INTRAVENOUS at 02:48

## 2021-12-05 RX ADMIN — ENOXAPARIN SODIUM SCH MG: 40 INJECTION SUBCUTANEOUS at 09:12

## 2021-12-05 RX ADMIN — PANTOPRAZOLE SODIUM SCH MG: 40 INJECTION, POWDER, FOR SOLUTION INTRAVENOUS at 09:11

## 2021-12-05 RX ADMIN — CEFAZOLIN SCH: 330 INJECTION, POWDER, FOR SOLUTION INTRAMUSCULAR; INTRAVENOUS at 15:02

## 2021-12-05 RX ADMIN — METOPROLOL SUCCINATE SCH: 25 TABLET, EXTENDED RELEASE ORAL at 09:13

## 2021-12-05 RX ADMIN — ASPIRIN 81 MG CHEWABLE TABLET SCH MG: 81 TABLET CHEWABLE at 09:12

## 2021-12-05 RX ADMIN — CEFAZOLIN SCH MLS/HR: 330 INJECTION, POWDER, FOR SOLUTION INTRAMUSCULAR; INTRAVENOUS at 02:50

## 2021-12-05 NOTE — P.PN
Subjective


Progress Note Date: 12/05/21


This is a pleasant 75-year-old female patient who follows with Dr. Santana in the 

office.  Has a history of palpitations in the past and has been worked up with 

no significant findings.  Presented to the emergency department because she's 

been having more frequent rotations over the past week.  She apparently also 

developed a cough a little under a week ago with some nasal congestion and 

drainage.  Upon presentation in the emergency department EKG did show sinus 

mechanism with some episodes of sinus tachycardia and brief atrial runs.  She 

was found to be positive for COVID-19 receive monoclonal antibodies and was 

going to be discharged home but felt quite weak and not well therefore she is 

being held for observation.  We were consulted for bradycardia however there is 

been no evidence of bradycardia noted on EKGs or telemetry monitoring.  Overall 

she's feeling a bit better.  She continues to complain of fatigue and weakness. 

No significant complaints this morning of palpitations or dizziness.  Appetite 

values show normal thyroid function.  Running creatinine were elevated on 

admission at 57 and 1.61 which have normalized post hydration at 14 and 0.76. 

Vital signs have been stable. 





12/5/2021


Was seen and interviewed resting currently event.  Overall she feels worse 

today.  Continues to feel fatigued and somewhat lightheaded when she sits up in 

bed but feels this is due to inactivity.  She denies any complaints of chest 

discomfort, shortness of breath, palpitations or syncope.  Vital signs of been 

stable there has December and some documentation of bradycardia however upon 

review of the rhythm strips it appears patient's heart rate has not been lower 

than high 50s.  She continues to have episodes of tachycardia with some 

appearing to be sinus tachycardia, some PACs, some runs of what appear to be 

brief PAT with RVR no clear evidence of atrial fibrillation.








Objective





- Vital Signs


Vital signs: 


                                   Vital Signs











Temp  97.9 F   12/05/21 07:00


 


Pulse  58 L  12/05/21 07:00


 


Resp  18   12/05/21 07:00


 


BP  130/68   12/05/21 07:00


 


Pulse Ox  95   12/05/21 07:00








                                 Intake & Output











 12/04/21 12/05/21 12/05/21





 18:59 06:59 18:59


 


Intake Total  500 


 


Balance  500 


 


Weight 101.605 kg  


 


Intake:   


 


  Oral  500 


 


Other:   


 


  Voiding Method  Toilet 


 


  # Voids 1 1 














- Exam


PHYSICAL EXAMINATION: This is a 75-year-old female in no apparent distress at 

the time of my examination.  The examination was deferred secondary to COVID-19 

infection





VITAL SIGNS: Blood pressure on 130/68, heart rate 58, respirations 18, temp 

97.9F. Patient is 95% on room air. 








- Labs


CBC & Chem 7: 


                                 12/05/21 05:19





                                 12/05/21 05:19


Labs: 


                  Abnormal Lab Results - Last 24 Hours (Table)











  12/05/21 12/05/21 Range/Units





  05:19 05:19 


 


WBC  2.43 L   (4.50-10.00)  X 10*3/uL


 


Hgb  11.5 L   (12.0-15.0)  g/dL


 


Hct  36.5 L   (37.2-46.3)  %


 


MCHC  31.5 L   (32.0-37.0)  g/dL


 


Plt Count  135 L   (140-440)  X 10*3/uL


 


Neutrophils #  1.56 L   (1.80-7.70)  X 10*3/uL


 


Lymphocytes #  0.64 L   (0.90-5.00)  X 10*3/uL


 


Eosinophils #  0 L   (0.04-0.35)  X 10*3/uL


 


Chloride   110 H  ()  mmol/L


 


Anion Gap   9.20 L  (10.00-18.00)  mmol/L


 


Calcium   8.0 L  (8.7-10.3)  mg/dL


 


Total Bilirubin   0.20 L  (0.30-1.20)  mg/dL


 


Total Protein   5.4 L  (6.2-8.2)  g/dL


 


Albumin   3.4 L  (3.8-4.9)  g/dL














Assessment and Plan


Assessment: 


#1 COVID-19


#2 symptoms of palpitations with atrial runs noted on EKG, likely exacerbated by

underlying infection


#3 hypertension


#4 hyperlipidemia


#5 carotid artery disease followed by Dr. Adam





Plan: 


From Cardiology's perspective patient's underlying arrhythmia likely exacerbated

by infection.  The patient may be discharged home today from our standpoint.  

She will need to follow-up in the office with Dr. Santana for an event monitor when

she is recovered from COVID-19. 





The above dictated assessment and findings were discussed with signing 

physician. The impression and plan of care have been directed as dictated. 

Airam Hoyos, Nurse Practitioner, acting as scribe for signing physician.

## 2021-12-05 NOTE — P.DS
Providers


Date of admission: 


12/04/21 01:19





Expected date of discharge: 12/05/21


Attending physician: 


Yang Gardiner





Consults: 





                                        





12/04/21 01:20


Consult Physician Routine 


   Consulting Provider: Emely Lynn


   Consult Reason/Comments: covid


   Do you want consulting provider notified?: Yes


Consult Physician Routine 


   Consulting Provider: Yoanna Cope


   Consult Reason/Comments: carmina


   Do you want consulting provider notified?: Yes











Primary care physician: 


Yang Abdifatah





[facility] Course: 





History of Present Illness


This is a 75-year-old female patient of Dr. Gardiner with past medical history of 

hypertension, peripheral vascular disease, carotid artery stenosis of 70% on the

left. Patient had previous hospitalizations for near syncopal episode with 

episodes of SVT and uncontrolled hypertension.  Patient complains of onset of 

dizziness last Sunday.  She is denies feeling congested or shortness of breath 

no sinus drainage.  She has noted to have a cough and she states she has 

occasional headache.  She denies any known over 19 contacts recently but her 

granddaughter was sick a few weeks ago.  Patient apparently presented for 

concerns for palpitations in her heart racing that were happening multiple times

per day and worsening.  Patient was found to be afebrile, heart rate 111, blood 

pressure 101/70 and pulse ox 99%.  Repeat blood pressure 140/79 with heart rate 

in the 120s.  EKG was sinus arrhythmia/sinus tachycardia with first-degree AV 

block, followed by normal sinus rhythm, with no acute ST changes.  WBC 3.0 

otherwise CBC unremarkable.  Electrolytes and renal function normal.  Blood 

sugar 105.  TSH 1.850 and free T4 1 0.64.  Magnesium 2.0, potassium 3.5.  

Troponin negative.  D-dimer 0.64.  Urinalysis was nitrite negative, 

leukoesterase large, WBC 12.  Coronavirus PCR detected.  


Chest x-ray reveals no active cardiac pulmonary disease.  Patient apparently was

prepared for discharge but was feeling weak and dizzy and did not feel 

comfortable going home and patient was admitted to the observation status.  She 

is currently being seen in the emergency center and consult in place with 

cardiology and pulmonary medicine.  Regarding her blood pressure medications, 

patient states she takes lisinopril 30 mg daily and recently stopped taking a 

water pill due to low blood pressure.


Echocardiogram done 3/1/2021 revealed EF of 55-60%, severe concentric left 

ventricle hypertrophy, mild aortic valve sclerosis, trace mitral regurgitation, 

mild tricuspid regurgitation, borderline pulmonary artery hypertension, RVSP 

33.77 mmHg.





12/5: Patient complains today only of an annoying cough.  She denies any 

shortness of breath.  Cardiology has reviewed her cardiac monitor and she has 

had episodes of bradycardia but not less than 50s.  She has had episodes of 

tachycardia which appears to be sinus tachycardia with PACs without clear 

evidence of atrial fibrillation.  Patient has been cleared by cardiology for 

discharge with plan for follow-up with Dr. Santana for an event monitor when she is

recovered from Covid 19.  Patient will be discharged home today in stable 

condition.








DISCHARGE DIAGNOSES


1.  Lightheadedness and palpitations most likely secondary to  COVID-19, rule 

out arrhythmia.  


2.  Hypertension, uncontrolled.  


3.  COVID-19 infection.  


4.  Carotid artery disease with 70% stenosis on the left carotid artery.  


5.  Peripheral vascular disease, stable





Discharge plan: Return home


Greater than 35 minutes was utilized and coordinating patient's discharge.


Impression and plan of care have been directed as dictated by the signing 

physician.  Princess Nava nurse practitioner acting as scribe for signing 

physician.








Patient Condition at Discharge: Good





Plan - Discharge Summary


New Discharge Prescriptions: 


New


   Zinc Sulfate [Orazinc] 220 mg PO DAILY  cap


   Ascorbic Acid [Vitamin C] 1,000 mg PO DAILY  tab


   Cholecalciferol [Vitamin D3 (25 Mcg = 1000 Iu)] 50 mcg PO DAILY  tablet


   Dexamethasone [Decadron] 6 mg PO DAILY #5 tablet





Continue


   Lactulose 10 gm PO DAILY PRN


     PRN Reason: Constipation


   Aspirin EC [Ecotrin Low Dose] 81 mg PO DAILY


   lisinopriL 30 mg PO HS


   amLODIPine [Norvasc] 5 mg PO DAILY


   Metoprolol Succinate (ER) [Toprol XL] 25 mg PO DAILY


Discharge Medication List





Lactulose 10 gm PO DAILY PRN 06/25/20 [History]


Aspirin EC [Ecotrin Low Dose] 81 mg PO DAILY 02/27/21 [History]


Metoprolol Succinate (ER) [Toprol XL] 25 mg PO DAILY 12/03/21 [History]


amLODIPine [Norvasc] 5 mg PO DAILY 12/03/21 [History]


lisinopriL 30 mg PO HS 12/03/21 [History]


Ascorbic Acid [Vitamin C] 1,000 mg PO DAILY  tab 12/05/21 [Rx]


Cholecalciferol [Vitamin D3 (25 Mcg = 1000 Iu)] 50 mcg PO DAILY  tablet 12/05/21

[Rx]


Dexamethasone [Decadron] 6 mg PO DAILY #5 tablet 12/05/21 [Rx]


Zinc Sulfate [Orazinc] 220 mg PO DAILY  cap 12/05/21 [Rx]








Follow up Appointment(s)/Referral(s): 


Yang Gardiner MD [Primary Care Provider] - 1 Week


Walter Madera MD [STAFF PHYSICIAN] - 3 Weeks (ARRHYTHMIA WORK UP OP)


Patient Instructions/Handouts:  Coronavirus Disease 2019 (COVID-19), Atrial 

Tachycardia (ED), Sick Sinus Syndrome (ED)


Activity/Diet/Wound Care/Special Instructions: 


Please continue to quarantine for the next 10 DAYS  TO 2 weeks per cc 

guidelines.  Over-the-counter vitamin C, vitamin D, and zinc.  Melatonin at 

bedtime may help.  Tylenol as needed


Discharge Disposition: HOME SELF-CARE

## 2022-06-13 ENCOUNTER — HOSPITAL ENCOUNTER (OUTPATIENT)
Dept: HOSPITAL 47 - RADNMMAIN | Age: 77
Discharge: HOME | End: 2022-06-13
Attending: INTERNAL MEDICINE
Payer: MEDICARE

## 2022-06-13 DIAGNOSIS — R07.9: Primary | ICD-10-CM

## 2022-06-13 PROCEDURE — 78452 HT MUSCLE IMAGE SPECT MULT: CPT

## 2022-06-13 PROCEDURE — 93017 CV STRESS TEST TRACING ONLY: CPT

## 2022-06-13 NOTE — CA
Lexiscan Nuclear Stress Test Report 

 

 Name:    Chelsey Ott 

 

 MRN:    [id number] 

 

 Exam Date: 2022 10:15 

 

 Exam Location:      [facility]  

                     Stress 

 

 Ht (in):     67     Wt (lb):     230    BSA:    2.15 

 

 Ordering Phys:       Yang Gardiner MD 

 

 Referring Phys:      Jasmyn Edwards 

 

 Technologist:        Yemi Mahoney 

 

 Age:    76    Gender:    F 

 

 :    1945 

 Procedure CPT: 

 

 Indications:              R07.9 

 

 ICD-10 Codes: 

 

 Patient History:          CP, DIFFICULTY IN BREATHING, HTN, FAMILY HX  

                           OF HEART DISEASE 

 

 Medications:              LISINOPRIL,,,,,, AMLODIPINE,,,,,,  

                           ISOBIDEL,,,,, 

 

 Meds past 24 hrs: 

 

 Pretest Chest Pain: 

 

 STRESS TEST      Lexiscan 

 

 Protocol 

 

 

 

 

 Exercise Duration (min:sec):         01:02 

 Max ST Depressions (mm): 

 Angina Score: 

 Boss Score: 

 Resting HR (bpm):      79 

 

 Peak HR (bpm):         87 

 

 Resting BP (mmHg):       171    /   83 

 

 Peak BP (mmHg):       171   /   83 

 

 MPHR:    145     Target HR:      123 

 

 % MPHR:     60 

 METS:  1.0 

 

 Total Dose: 

 Peak Dose: 

 Atropine: 

 Double Product:       52657 

 

 BP Response: 

 

 Stress Termination:       INFUSION COMPLETE 

 

 Stress Symptoms: 

 SHOULDER BLADE PAIN 

 

 Stress Summary: 

 

 

  ECG ANALYSIS 

 

 Resting ECG: 

 

 Stress ECG: 

 

 CONCLUSIONS 

 Baseline EKG revealed normal sinus rhythm with nonspecific ST  

 abnormality.  With Lexiscan administration heart rate went up  

 from 79 bpm to 87 bpm.  The blood pressure was about 170/80.   

 Patient did not have any anginal symptoms.  EKG was inconclusive  

 because of resting EKG changes. 

 By EKG criteria this is an inconclusive Lexiscan stress and  

 resting EKG changes.  The nuclear scan results which are more  

 pertinent will be reported by the radiologist 

 

 Dr. Rachel Krause MD 

 (Electronically Signed) 

 Final Date:      2022 12:42

## 2022-06-13 NOTE — NM
EXAMINATION TYPE: NM stress lexiscan cardiolite

 

DATE OF EXAM: 6/13/2022

 

COMPARISON: NONE

 

HISTORY: R07.9 

 

 

TECHNIQUE:  After the intravenous administration of 9.1 mCi Tc 99m Sestamibi - Cardiolite resting SPE
CT images acquired 50 minutes post injection. 

 

The patient received 0.4mg Lexiscan, 25.5 mCi Tc 99m Sestamibi - Stress images obtained 35 minutes po
st injection 

 

FINDINGS:  

 

Review of stress and rest SPECT images demonstrates decreased perfusion apical lateral wall most of w
hich appears to be faxed however there may be a small reversible component. Correlate for stress-roxie
segun ischemia. Gated analysis shows normal wall motion with an estimated left ventricular ejection fra
ction of 62 %.

 

 

 

IMPRESSION:  

 

Decreased perfusion apical lateral wall most of which appears to be faxed however there may be a smal
l reversible component. Correlate for stress-induced ischemia.

## 2022-06-24 ENCOUNTER — HOSPITAL ENCOUNTER (INPATIENT)
Dept: HOSPITAL 47 - CATHCVL | Age: 77
LOS: 12 days | Discharge: NURSING, REHAB OR LONG-TERM CARE | DRG: 233 | End: 2022-07-06
Attending: THORACIC SURGERY (CARDIOTHORACIC VASCULAR SURGERY) | Admitting: THORACIC SURGERY (CARDIOTHORACIC VASCULAR SURGERY)
Payer: MEDICARE

## 2022-06-24 VITALS — BODY MASS INDEX: 37.8 KG/M2

## 2022-06-24 DIAGNOSIS — K59.00: ICD-10-CM

## 2022-06-24 DIAGNOSIS — I25.110: Primary | ICD-10-CM

## 2022-06-24 DIAGNOSIS — Z79.82: ICD-10-CM

## 2022-06-24 DIAGNOSIS — G47.10: ICD-10-CM

## 2022-06-24 DIAGNOSIS — Z20.822: ICD-10-CM

## 2022-06-24 DIAGNOSIS — I34.0: ICD-10-CM

## 2022-06-24 DIAGNOSIS — Z79.02: ICD-10-CM

## 2022-06-24 DIAGNOSIS — Z90.710: ICD-10-CM

## 2022-06-24 DIAGNOSIS — J80: ICD-10-CM

## 2022-06-24 DIAGNOSIS — I11.0: ICD-10-CM

## 2022-06-24 DIAGNOSIS — Z96.652: ICD-10-CM

## 2022-06-24 DIAGNOSIS — Z86.16: ICD-10-CM

## 2022-06-24 DIAGNOSIS — I47.1: ICD-10-CM

## 2022-06-24 DIAGNOSIS — I48.0: ICD-10-CM

## 2022-06-24 DIAGNOSIS — E78.5: ICD-10-CM

## 2022-06-24 DIAGNOSIS — R32: ICD-10-CM

## 2022-06-24 DIAGNOSIS — M79.7: ICD-10-CM

## 2022-06-24 DIAGNOSIS — D62: ICD-10-CM

## 2022-06-24 DIAGNOSIS — Z86.73: ICD-10-CM

## 2022-06-24 DIAGNOSIS — I50.32: ICD-10-CM

## 2022-06-24 DIAGNOSIS — D72.829: ICD-10-CM

## 2022-06-24 DIAGNOSIS — Z28.310: ICD-10-CM

## 2022-06-24 DIAGNOSIS — E66.9: ICD-10-CM

## 2022-06-24 DIAGNOSIS — Z82.49: ICD-10-CM

## 2022-06-24 DIAGNOSIS — Z79.899: ICD-10-CM

## 2022-06-24 DIAGNOSIS — I65.22: ICD-10-CM

## 2022-06-24 LAB
ALBUMIN SERPL-MCNC: 4.1 G/DL (ref 3.5–5)
ALBUMIN SERPL-MCNC: 4.2 G/DL (ref 3.5–5)
ALP SERPL-CCNC: 103 U/L (ref 38–126)
ALP SERPL-CCNC: 115 U/L (ref 38–126)
ALT SERPL-CCNC: 13 U/L (ref 4–34)
ALT SERPL-CCNC: 15 U/L (ref 4–34)
ANION GAP SERPL CALC-SCNC: 6 MMOL/L
ANION GAP SERPL CALC-SCNC: 9 MMOL/L
APTT BLD: 34.3 SEC (ref 22–30)
AST SERPL-CCNC: 19 U/L (ref 14–36)
AST SERPL-CCNC: 23 U/L (ref 14–36)
BASOPHILS # BLD AUTO: 0 K/UL (ref 0–0.2)
BASOPHILS NFR BLD AUTO: 1 %
BUN SERPL-SCNC: 12 MG/DL (ref 7–17)
BUN SERPL-SCNC: 15 MG/DL (ref 7–17)
CALCIUM SPEC-MCNC: 8.8 MG/DL (ref 8.4–10.2)
CALCIUM SPEC-MCNC: 9.1 MG/DL (ref 8.4–10.2)
CHLORIDE SERPL-SCNC: 109 MMOL/L (ref 98–107)
CHLORIDE SERPL-SCNC: 111 MMOL/L (ref 98–107)
CO2 SERPL-SCNC: 23 MMOL/L (ref 22–30)
CO2 SERPL-SCNC: 24 MMOL/L (ref 22–30)
EOSINOPHIL # BLD AUTO: 0.1 K/UL (ref 0–0.7)
EOSINOPHIL NFR BLD AUTO: 2 %
ERYTHROCYTE [DISTWIDTH] IN BLOOD BY AUTOMATED COUNT: 4.68 M/UL (ref 3.8–5.4)
ERYTHROCYTE [DISTWIDTH] IN BLOOD: 14 % (ref 11.5–15.5)
GLUCOSE SERPL-MCNC: 101 MG/DL (ref 74–99)
GLUCOSE SERPL-MCNC: 96 MG/DL (ref 74–99)
HCT VFR BLD AUTO: 42.3 % (ref 34–46)
HGB BLD-MCNC: 13.9 GM/DL (ref 11.4–16)
INR PPP: 1 (ref ?–1.2)
LYMPHOCYTES # SPEC AUTO: 1.5 K/UL (ref 1–4.8)
LYMPHOCYTES NFR SPEC AUTO: 24 %
MAGNESIUM SPEC-SCNC: 2 MG/DL (ref 1.6–2.3)
MCH RBC QN AUTO: 29.7 PG (ref 25–35)
MCHC RBC AUTO-ENTMCNC: 32.8 G/DL (ref 31–37)
MCV RBC AUTO: 90.5 FL (ref 80–100)
MONOCYTES # BLD AUTO: 0.3 K/UL (ref 0–1)
MONOCYTES NFR BLD AUTO: 5 %
NEUTROPHILS # BLD AUTO: 4.2 K/UL (ref 1.3–7.7)
NEUTROPHILS NFR BLD AUTO: 67 %
PH UR: 6.5 [PH] (ref 5–8)
PLATELET # BLD AUTO: 361 K/UL (ref 150–450)
POTASSIUM SERPL-SCNC: 3.7 MMOL/L (ref 3.5–5.1)
POTASSIUM SERPL-SCNC: 3.9 MMOL/L (ref 3.5–5.1)
PROT SERPL-MCNC: 6.7 G/DL (ref 6.3–8.2)
PROT SERPL-MCNC: 7.1 G/DL (ref 6.3–8.2)
PT BLD: 10.7 SEC (ref 9–12)
RBC UR QL: 2 /HPF (ref 0–5)
SODIUM SERPL-SCNC: 141 MMOL/L (ref 137–145)
SODIUM SERPL-SCNC: 141 MMOL/L (ref 137–145)
SP GR UR: 1.02 (ref 1–1.03)
SQUAMOUS UR QL AUTO: <1 /HPF (ref 0–4)
UROBILINOGEN UR QL STRIP: <2 MG/DL (ref ?–2)
WBC # BLD AUTO: 6.3 K/UL (ref 3.8–10.6)
WBC # UR AUTO: <1 /HPF (ref 0–5)

## 2022-06-24 PROCEDURE — B2151ZZ FLUOROSCOPY OF LEFT HEART USING LOW OSMOLAR CONTRAST: ICD-10-PCS

## 2022-06-24 PROCEDURE — 94150 VITAL CAPACITY TEST: CPT

## 2022-06-24 PROCEDURE — 4A023N7 MEASUREMENT OF CARDIAC SAMPLING AND PRESSURE, LEFT HEART, PERCUTANEOUS APPROACH: ICD-10-PCS

## 2022-06-24 PROCEDURE — 93970 EXTREMITY STUDY: CPT

## 2022-06-24 PROCEDURE — 94760 N-INVAS EAR/PLS OXIMETRY 1: CPT

## 2022-06-24 PROCEDURE — 85610 PROTHROMBIN TIME: CPT

## 2022-06-24 PROCEDURE — 33967 INSERT I-AORT PERCUT DEVICE: CPT

## 2022-06-24 PROCEDURE — 85025 COMPLETE CBC W/AUTO DIFF WBC: CPT

## 2022-06-24 PROCEDURE — 71046 X-RAY EXAM CHEST 2 VIEWS: CPT

## 2022-06-24 PROCEDURE — 80048 BASIC METABOLIC PNL TOTAL CA: CPT

## 2022-06-24 PROCEDURE — B2111ZZ FLUOROSCOPY OF MULTIPLE CORONARY ARTERIES USING LOW OSMOLAR CONTRAST: ICD-10-PCS

## 2022-06-24 PROCEDURE — 80061 LIPID PANEL: CPT

## 2022-06-24 PROCEDURE — 93306 TTE W/DOPPLER COMPLETE: CPT

## 2022-06-24 PROCEDURE — 76604 US EXAM CHEST: CPT

## 2022-06-24 PROCEDURE — 71045 X-RAY EXAM CHEST 1 VIEW: CPT

## 2022-06-24 PROCEDURE — 87635 SARS-COV-2 COVID-19 AMP PRB: CPT

## 2022-06-24 PROCEDURE — 80074 ACUTE HEPATITIS PANEL: CPT

## 2022-06-24 PROCEDURE — 85730 THROMBOPLASTIN TIME PARTIAL: CPT

## 2022-06-24 PROCEDURE — 85520 HEPARIN ASSAY: CPT

## 2022-06-24 PROCEDURE — 83036 HEMOGLOBIN GLYCOSYLATED A1C: CPT

## 2022-06-24 PROCEDURE — 93880 EXTRACRANIAL BILAT STUDY: CPT

## 2022-06-24 PROCEDURE — 86891 AUTOLOGOUS BLOOD OP SALVAGE: CPT

## 2022-06-24 PROCEDURE — 94640 AIRWAY INHALATION TREATMENT: CPT

## 2022-06-24 PROCEDURE — 80202 ASSAY OF VANCOMYCIN: CPT

## 2022-06-24 PROCEDURE — 94002 VENT MGMT INPAT INIT DAY: CPT

## 2022-06-24 PROCEDURE — 82330 ASSAY OF CALCIUM: CPT

## 2022-06-24 PROCEDURE — 81001 URINALYSIS AUTO W/SCOPE: CPT

## 2022-06-24 PROCEDURE — 83735 ASSAY OF MAGNESIUM: CPT

## 2022-06-24 PROCEDURE — 80053 COMPREHEN METABOLIC PANEL: CPT

## 2022-06-24 PROCEDURE — 84145 PROCALCITONIN (PCT): CPT

## 2022-06-24 PROCEDURE — 85027 COMPLETE CBC AUTOMATED: CPT

## 2022-06-24 PROCEDURE — 84443 ASSAY THYROID STIM HORMONE: CPT

## 2022-06-24 PROCEDURE — 94660 CPAP INITIATION&MGMT: CPT

## 2022-06-24 PROCEDURE — 87070 CULTURE OTHR SPECIMN AEROBIC: CPT

## 2022-06-24 PROCEDURE — 93458 L HRT ARTERY/VENTRICLE ANGIO: CPT

## 2022-06-24 PROCEDURE — 82805 BLOOD GASES W/O2 SATURATION: CPT

## 2022-06-24 RX ADMIN — HEPARIN SODIUM SCH MLS/HR: 10000 INJECTION, SOLUTION INTRAVENOUS at 20:13

## 2022-06-24 RX ADMIN — METOPROLOL SUCCINATE SCH MG: 25 TABLET, EXTENDED RELEASE ORAL at 15:53

## 2022-06-24 RX ADMIN — ISOSORBIDE MONONITRATE SCH MG: 30 TABLET, EXTENDED RELEASE ORAL at 15:53

## 2022-06-24 RX ADMIN — CEFAZOLIN SCH MLS/HR: 330 INJECTION, POWDER, FOR SOLUTION INTRAMUSCULAR; INTRAVENOUS at 13:15

## 2022-06-24 NOTE — P.GSCN
History of Present Illness


Consult date: 06/24/22


Reason for Consult: 





Coronary artery disease with left main disease


Requesting physician: Moe Santana


History of present illness: 





This is a 76-year-old female patient who follows on an outpatient basis with Dr. Gardiner for primary care and Dr. Santana for cardiology.  She has a previous medical 

history of hypertension, hyperlipidemia, SVT, severe left internal carotid 

artery stenosis followed by Dr. Adam, never smoker, and family history of 

coronary artery disease with both parents having stent placement and brother 

having open heart surgery after myocardial infarction.  She reports symptoms of 

chest pain with exertion concerning for angina for approximately 3 weeks.  She 

states pain was relieved with rest and sublingual nitro, denies any shortness of

breath, diaphoresis or any other symptoms.  She does state she gets peripheral 

edema occasionally and this has been going on for some time.  She underwent 

stress testing which was abnormal demonstrating lateral wall ischemia and was 

recommended to undergo elective heart catheterization which was completed today 

by Dr. Santana and which demonstrated distal left main stenosis 80-90% at the 

bifurcation, ostial circumflex stenosis 99%, ostial LAD stenosis 80-90% with 

proximal LAD stenosis 60-70%, LVEDP 18 mmHg with no gradient across the aortic 

valve.  Due to these findings the patient will be kept inpatient and 

consultation was placed to cardiothoracic surgery for surgical revascularization

recommendations.





Review of Systems





Review of systems was completed and was negative except as noted





- Cardiovascular


Reports as per HPI, Reports chest pain





Past Medical History


Past Medical History: Fibromyalgia, Hyperlipidemia, Hypertension, Osteoarthritis

(OA), Supraventricular Tachycardia (SVT)


Additional Past Medical History / Comment(s): urinary incontinence-wears pad, 

past MRI should ?stroke, severe left internal carotid artery stenosis followed 

by Dr. Adam, frequent constipation


History of Any Multi-Drug Resistant Organisms: None Reported


Past Surgical History: Appendectomy, Hysterectomy, Joint Replacement, Orthopedic

Surgery


Additional Past Surgical History / Comment(s): left knee replacement, 

HEMORROIDECTOMY, kory cataracts removed, surg. for glaucoma right eye


Past Anesthesia/Blood Transfusion Reactions: No Reported Reaction


Smoking Status: Never smoker


Past Alcohol Use History: None Reported


Past Drug Use History: None Reported





- Past Family History


  ** Mother


Family Medical History: Coronary Artery Disease (CAD)


Additional Family Medical History / Comment(s): HAD STENTS





  ** Brother(s)


Family Medical History: Myocardial Infarction (MI)


Additional Family Medical History / Comment(s): CABG





  ** Sister(s)


Family Medical History: Cancer


Additional Family Medical History / Comment(s): colon cancer





  ** Father


Family Medical History: Coronary Artery Disease (CAD)


Additional Family Medical History / Comment(s): HAD STENTS





Medications and Allergies


                                Home Medications











 Medication  Instructions  Recorded  Confirmed  Type


 


Aspirin EC [Ecotrin Low Dose] 81 mg PO BID 02/27/21 06/24/22 History


 


Metoprolol Succinate (ER) [Toprol 25 mg PO DAILY 12/03/21 06/24/22 History





XL]    


 


amLODIPine [Norvasc] 5 mg PO DAILY 12/03/21 06/24/22 History


 


lisinopriL 30 mg PO HS 12/03/21 06/24/22 History


 


Ascorbic Acid [Vitamin C] 1,000 mg PO DAILY  tab 12/05/21 06/24/22 Rx


 


Cholecalciferol [Vitamin D3 (25 50 mcg PO DAILY  tablet 12/05/21 06/24/22 Rx





Mcg = 1000 Iu)]    


 


Zinc Sulfate [Orazinc] 220 mg PO DAILY  cap 12/05/21 06/24/22 Rx


 


Isosorbide Mononitrate ER [Imdur] 30 mg PO DAILY 06/22/22 06/22/22 History


 


Lactulose 1 - 2 tbsp PO AS DIRECTED PRN 06/22/22 06/22/22 History








                                    Allergies











Allergy/AdvReac Type Severity Reaction Status Date / Time


 


No Known Allergies Allergy   Verified 06/24/22 10:40














Surgical - Exam


                                   Vital Signs











Temp Pulse Resp BP Pulse Ox


 


 97.8 F   77   16   162/80   98 


 


 06/24/22 10:31  06/24/22 10:31  06/24/22 10:31  06/24/22 10:31  06/24/22 10:31














CONSTITUTIONAL: Awake and alert, appears comfortable, cooperative, well-

developed, well-nourished, no pain, no acute distress


EYES:  Pupils equal, round, reactive to light, normal ocular movement


ENT:  Moist mucous membranes without oral lesions present 


NECK:  No masses, no bruits, trachea midline


RESPIRATORY:  Lungs sounds clear to auscultation bilaterally.  Respirations 

even, nonlabored.  Currently on room air with oxygen saturation 100%.  Strong 

cough.  No chest wall deformities.  No clubbing or cyanosis present


CARDIOVASCULAR:  S1, S2 present.  Regular rate and rhythm, sinus rhythm on 

telemetry.  Palpable peripheral pulses bilaterally.  No edema present.  No calf 

pain or tenderness noted.  No significant lower extremity varicosities noted.  


GASTROINTESTINAL:  Abdomen soft, nontender, nondistended without masses or 

organomegaly noted.  There is no rebound or guarding present.  Active bowel 

sounds present 4 quadrants.  


GENITOURINARY:  Deferred


INTEGUMENTARY:  Skin is warm and dry with evidence of good perfusion.  


NEUROLOGIC:  Cranial nerves II through XII intact, normal coordination, no 

obvious motor or sensory deficits, speech is normal


MUSKULOSKELETAL:  Able to move all extremities, strength equal bilaterally, 

normal posture


PSYCHIATRIC:  Alert and oriented to person place and time, appropriate affect, 

intact judgment and insight














Results





- Labs





                                 06/24/22 10:08





                                 06/24/22 15:04


                  Abnormal Lab Results - Last 24 Hours (Table)











  06/24/22 06/24/22 Range/Units





  10:08 14:02 


 


Chloride  109 H   ()  mmol/L


 


Glucose  101 H   (74-99)  mg/dL


 


Urine Blood   Trace H  (Negative)  


 


Urine Mucus   Rare H  (None)  /hpf








                                 Diabetes panel











  06/24/22 Range/Units





  10:08 


 


Sodium  141  (137-145)  mmol/L


 


Potassium  3.9  (3.5-5.1)  mmol/L


 


Chloride  109 H  ()  mmol/L


 


Carbon Dioxide  23  (22-30)  mmol/L


 


BUN  15  (7-17)  mg/dL


 


Creatinine  0.67  (0.52-1.04)  mg/dL


 


Glucose  101 H  (74-99)  mg/dL


 


Calcium  9.1  (8.4-10.2)  mg/dL








                                  Calcium panel











  06/24/22 Range/Units





  10:08 


 


Calcium  9.1  (8.4-10.2)  mg/dL








                                 Pituitary panel











  06/24/22 Range/Units





  10:08 


 


Sodium  141  (137-145)  mmol/L


 


Potassium  3.9  (3.5-5.1)  mmol/L


 


Chloride  109 H  ()  mmol/L


 


Carbon Dioxide  23  (22-30)  mmol/L


 


BUN  15  (7-17)  mg/dL


 


Creatinine  0.67  (0.52-1.04)  mg/dL


 


Glucose  101 H  (74-99)  mg/dL


 


Calcium  9.1  (8.4-10.2)  mg/dL








                                  Adrenal panel











  06/24/22 Range/Units





  10:08 


 


Sodium  141  (137-145)  mmol/L


 


Potassium  3.9  (3.5-5.1)  mmol/L


 


Chloride  109 H  ()  mmol/L


 


Carbon Dioxide  23  (22-30)  mmol/L


 


BUN  15  (7-17)  mg/dL


 


Creatinine  0.67  (0.52-1.04)  mg/dL


 


Glucose  101 H  (74-99)  mg/dL


 


Calcium  9.1  (8.4-10.2)  mg/dL














- Imaging


Chest x-ray: report reviewed, image reviewed


Additional studies: 





Heart catheterization studies reviewed





Assessment and Plan


Assessment: 





1.  Coronary artery disease with left main disease


2.  Hypertension


3.  Hyperlipidemia


4.  SVT


5.  Left internal carotid artery stenosis


6.  Obesity


7.  Never smoker


8.  Remains unvaccinated against Covid


9.  Family history of coronary artery disease





Agree with documentation by NP.


Patient examined, chart, cath and echo reviewed.


Will need CABG this week.


Will schedule.


JR





Plan: 





The patient was seen and examined in the extended stay area.  Chart/diagnostics 

were reviewed.  The case was discussed in detail with Dr. Austin.  The usual 

perioperative course of coronary artery bypass surgery was discussed in detail 

with the patient, risks and benefits were reviewed, all questions were answered.

  The patient does consent to surgery and preoperative testing was initiated.  

Once testing has been completed we will calculate STS risk score and discuss 

with the patient.  We'll complete a 5 m walk test.  Recommend continuing to 

maximize medical therapy with aspirin, statin, beta blocker.  Medical management

 of other comorbidities per primary care, cardiology.





Thank you Dr. Santana for this consult.  We will continue to follow along with you 

and make recommendations as appropriate.





I have personally seen and examined the patient, performed the documentation and

 the assessment and plan as written.  Number of minutes spent on the visit: 30. 

 OSMEL Carey

## 2022-06-24 NOTE — P.PCN
Date of Procedure: 06/24/22


Operative Findings: 








CARDIAC CATHETERIZATION





PERFORMING PHYSICIAN: 


Moe Santana MD, RPVI





PROCEDURE PERFORMED:


1.  Selective right and left coronary angiogram


2.  Left heart catheterization





INDICATION:


This is a 76-year-old female patient was carotid atherosclerosis who sees Dr. Su regularly as well as hypertension and dyslipidemia who was seen in the 

office recently for intermittent episodes of chest discomfort.  She underwent 

myocardial perfusion imaging stress test which revealed lateral ischemia.  In 

the light of that she was started on beta blocker with Toprol-XL as well as iso

sorbide mononitrate and she was scheduled to undergo a heart catheterization.





COMPLICATION:


None





APPROACH:


Right radial artery





LEVEL OF SEDATION:


Moderate with a sedation length of 16 minutes





PROCEDURE DESCRIPTION:


After obtaining an informed consent, the patient was brought to cardiac cath 

lab.  Local anesthesia was performed using lidocaine subcutaneously.  The right 

radial artery was cannulated using Seldinger technique, the guidewire passed 

easily, following that we advanced a 5-Spanish sheath dilator assembly, the wire 

and dilator were removed and sheath was flushed.  





Following that, 2 mg of verapamil along with 5000 unit heparin were given.





Selective right and left coronary angiogram using a 6-Spanish JR4 and JL 3.5 

catheters.  





Following that we did left heart catheterization using 6-Spanish pigtail 

catheter.  





The procedure was completed there was no complication.





SELECTIVE CORONARY ANGIOGRAM:


The right coronary artery:


Is a large caliber vessel and a dominant vessel.  The RCA by the ostium has a 

lesion appeared to be in the range of 30-40%.  The mid RCA has mild disease 

only.  The RCA distally appeared to be angiographically normal and bifurcates 

into PDA and PLV branches both appeared to be angiographically normal





Left main:


The distal left main by the bifurcation of LCx and LAD has a lesion appeared to 

be in the range of 80-90% and the lesion eccentric in the left main is c

alcified.  The lesion is involving the ostial LCx and ostial LAD





The left circumflex:


The ostial LCx has a lesion appeared to be in the range of 95-99%.  The LCx is a

large caliber vessel.  It gives rises into a large OM which trifurcates into 3 

branches and all appeared to have mild disease only.  The circumflex continue 

after that as a small-caliber vessel in the AV groove





The left anterior descending artery:


The ostial LAD is also involved in the lesion from the left main.  The ostial 

LAD lesion appeared to be in the range of 80-90%.  The proximal LAD has another 

lesion appears to be in the range of 60-70%.  The mid and distal LAD appears to 

have mild disease only.





HEMODYNAMICS:


The LVEDP was about 18 mmHg was no significant gradient across aortic valve





CONCLUSION:


1.  Critical and complex lesion involving the distal left main coronary artery 

and also involving the ostial LCx and ostial LAD.  The lesion appeared to be in 

the range of 80-90%.


2.  Elevated left-sided filling pressure was LVEDP of 18 mmHg





POSTPROCEDURE MANAGEMENT:


Giving the above anatomy I advised the patient to be seen and evaluated by 

cardiothoracic surgeon for evaluation of coronary artery that is grafting

## 2022-06-24 NOTE — US
EXAMINATION TYPE: US carotid duplex BILAT

 

DATE OF EXAM: 6/24/2022

 

COMPARISON: NONE

 

CLINICAL HISTORY: preop cardiac surgery.

 

EXAM MEASUREMENTS: 

 

RIGHT:  Peak Systolic Velocity (PSV) cm/sec

----- Right CCA:  76.7  

----- Right ICA:  124.0     

----- Right ECA:  120.0   

ICA/CCA ratio:  1.62    

 

RIGHT:  End Diastole cm/sec

----- Right CCA:  14.3   

----- Right ICA:  34.4      

----- Right ECA:  10.4     

 

LEFT:  Peak Systolic Velocity (PSV) cm/sec

----- Left CCA:  70.8  

----- Left ICA:  212.0   

----- Left ECA:  105.0  

ICA/CCA ratio:  2.99  

 

LEFT:  End Diastole cm/sec

----- Left CCA:  14.3  

----- Left ICA:  46.6   

----- Left ECA:  0.0 

 

VERTEBRALS (direction of flow):

Right Vertebral: Antegrade

Left Vertebral: Antegrade

 

Rhythm:  Normal

 

Elevated left ICA velocities. 

 

 

 

IMPRESSION:  No evidence of hemodynamically significant stenosis.   

 

 

Criteria for Assigning % of Stenosis / Diameter reduction

(Estimation based on the indirect measurements of the internal carotid artery velocities (ICA PSV).

1.  Normal (no stenosis)=ICA PSV < 125 cm/s: ratio < 2.0: ICA EDV<40 cm/s.

2. Less than 50% stenosis=ICA PSV < 125 cm/s: ratio < 2.0: ICA EDV<40 cm/s.

3.  50 to 69% stenosis=ICA PSV of 125 to 230 cm/s: ration 2.0 ? 4.0: ICA EDV  cm/s.

4.  Greater than 70% stenosis to near occlusion= ICA PSV > 230 cm/s: ratio > 4.0: ICA EDV > 100 cm/s.
 

5.  Near occlusion= ICA PSV velocities may be low or undetectable: variable ratio and ICA EDV.

6.  Total occlusion=unable to detect flow.

## 2022-06-24 NOTE — XR
EXAMINATION TYPE: XR chest 2V

 

DATE OF EXAM: 6/24/2022

 

COMPARISON: 12/3/2021

 

HISTORY: Shortness of breath

 

TECHNIQUE:  Frontal and lateral views of the chest are obtained.

 

FINDINGS:

 

Scattered senescent parenchymal changes noted. Hyperinflation compatible with COPD. 

 

There is pulmonary venous congestion with interstitial edema. Tiny effusions suspected on the lateral
 projection.

 

Heart size is stable.

 

Mediastinal structures are stable and grossly unremarkable.

 

No evidence for hilar prominence.

 

Degenerative changes dorsal spine. 

 

IMPRESSION:

1. Mild interstitial edema noted.

## 2022-06-25 LAB
CHOLEST SERPL-MCNC: 182 MG/DL (ref 0–200)
HDLC SERPL-MCNC: 37.2 MG/DL (ref 40–60)
LDLC SERPL CALC-MCNC: 122 MG/DL (ref 0–131)
TRIGL SERPL-MCNC: 114 MG/DL (ref 0–149)
VLDLC SERPL CALC-MCNC: 22.8 MG/DL (ref 5–40)

## 2022-06-25 RX ADMIN — ATORVASTATIN CALCIUM SCH MG: 80 TABLET, FILM COATED ORAL at 08:49

## 2022-06-25 RX ADMIN — CEFAZOLIN SCH MLS/HR: 330 INJECTION, POWDER, FOR SOLUTION INTRAMUSCULAR; INTRAVENOUS at 13:06

## 2022-06-25 RX ADMIN — CEFAZOLIN SCH: 330 INJECTION, POWDER, FOR SOLUTION INTRAMUSCULAR; INTRAVENOUS at 13:02

## 2022-06-25 RX ADMIN — ASPIRIN 81 MG CHEWABLE TABLET SCH MG: 81 TABLET CHEWABLE at 08:49

## 2022-06-25 RX ADMIN — METOPROLOL SUCCINATE SCH MG: 25 TABLET, EXTENDED RELEASE ORAL at 08:49

## 2022-06-25 RX ADMIN — ASPIRIN 81 MG CHEWABLE TABLET SCH MG: 81 TABLET CHEWABLE at 20:21

## 2022-06-25 RX ADMIN — ISOSORBIDE MONONITRATE SCH MG: 30 TABLET, EXTENDED RELEASE ORAL at 08:49

## 2022-06-25 RX ADMIN — CEFAZOLIN SCH: 330 INJECTION, POWDER, FOR SOLUTION INTRAMUSCULAR; INTRAVENOUS at 07:35

## 2022-06-25 RX ADMIN — CEFAZOLIN SCH: 330 INJECTION, POWDER, FOR SOLUTION INTRAMUSCULAR; INTRAVENOUS at 05:21

## 2022-06-25 RX ADMIN — CEFAZOLIN SCH: 330 INJECTION, POWDER, FOR SOLUTION INTRAMUSCULAR; INTRAVENOUS at 07:34

## 2022-06-25 NOTE — P.PN
Subjective


Progress Note Date: 06/25/22


Principal diagnosis: 





Coronary artery disease with left main disease.  Previous medical history of 

hypertension, hyperlipidemia, SVT, left internal carotid artery stenosis, 

obesity, never smoker, remains unvaccinated against Covid, family history of 

coronary artery disease





The patient was seen and examined sitting up in bed of the cardiac stepdown unit

eating breakfast.  Apparently she did have an episode of chest pain last night 

which resolved with Imdur.  She remains in sinus rhythm and hemodynamically 

stable.  Currently on IV heparin.  Preoperative testing completed, we will 

calculate STS risk score once echocardiogram has been officially read.  Dr. Austin to review films and meet with patient today.  5 meter walk test was 

completed without difficulty, 4.95 seconds, 4.80 seconds, 4.77 seconds.





Objective





- Vital Signs


Vital signs: 


                                   Vital Signs











Temp  98.4 F   06/25/22 03:37


 


Pulse  68   06/25/22 03:37


 


Resp  15   06/25/22 03:37


 


BP  137/77   06/25/22 03:37


 


Pulse Ox  95   06/25/22 03:37


 


FiO2      








                                 Intake & Output











 06/24/22 06/25/22 06/25/22





 18:59 06:59 18:59


 


Intake Total 450 89.036 


 


Output Total 500  


 


Balance -50 89.036 


 


Weight 102.5 kg 103.6 kg 


 


Intake:   


 


    


 


  Intake, IV Titration  89.036 





  Amount   


 


    Heparin Sod,Pork in 0.45%  89.036 





    NaCl 25,000 unit In 0.45   





    % NaCl 1 250ml.bag @ 9.76   





    UNITS/KG/HR 10.004 mls/   





    hr IV .Q24H [facility] Rx#:   





    [id number]   


 


Output:   


 


  Urine 500  


 


Other:   


 


  # Voids 1  














- Exam





CONSTITUTIONAL: Appears comfortable, cooperative, no acute distress


RESPIRATORY:  Lungs sounds diminished bilaterally.  Respirations even, 

nonlabored.  Currently on room air with oxygen saturation 95%.  Able to achieve 

1250 mL on incentive spirometry.  Strong cough.  


CARDIOVASCULAR:  S1, S2 present.  Regular rate and rhythm, sinus rhythm on 

telemetry.  Palpable peripheral pulses bilaterally.  No edema present.  No calf 

pain or tenderness noted.  


GASTROINTESTINAL:  Abdomen soft, nontender, nondistended.  Active bowel sounds 

present 4 quadrants.  Tolerating diet. 


GENITOURINARY:  Continues to void


INTEGUMENTARY:  Skin is warm and dry with evidence of good perfusion. 


NEUROLOGIC:  Cranial nerves II through XII intact


MUSKULOSKELETAL:  Able to move all extremities, strength equal bilaterally, gait

normal


PSYCHIATRIC:  Alert and oriented to person place and time, appropriate affect, 

intact judgment and insight








- Labs


CBC & Chem 7: 


                                 06/24/22 10:08





                                 06/24/22 15:04


Labs: 


                  Abnormal Lab Results - Last 24 Hours (Table)











  06/24/22 06/24/22 06/24/22 Range/Units





  10:08 14:02 15:04 


 


APTT    34.3 H  (22.0-30.0)  sec


 


Chloride  109 H    ()  mmol/L


 


Glucose  101 H    (74-99)  mg/dL


 


HDL Cholesterol     (40.00-60.00)  mg/dL


 


Urine Blood   Trace H   (Negative)  


 


Urine Mucus   Rare H   (None)  /hpf














  06/24/22 06/25/22 Range/Units





  15:04 03:47 


 


APTT   37.1 H  (22.0-30.0)  sec


 


Chloride  111 H   ()  mmol/L


 


Glucose    (74-99)  mg/dL


 


HDL Cholesterol  37.20 L   (40.00-60.00)  mg/dL


 


Urine Blood    (Negative)  


 


Urine Mucus    (None)  /hpf








                      Microbiology - Last 24 Hours (Table)











 06/24/22 15:04 Nasal Screen MRSA/MSSA - Preliminary





 Nasal Swab 














- Imaging and Cardiology


Chest x-ray: report reviewed, image reviewed





Carotid Doppler report, vein mapping report reviewed





Assessment and Plan


Assessment: 





1.  Coronary artery disease with left main disease


2.  Hypertension


3.  Hyperlipidemia, cholesterol 182, 


4.  SVT


5.  Left internal carotid artery stenosis


6.  Obesity


7.  Never smoker, preoperative FEV1 72% of predicted


8.  Remains unvaccinated against Covid


9.  Family history of coronary artery disease


Plan: 





1.  Continue to maximize medical management with aspirin, statin, beta blocker, 

IV heparin


2.  Will review films today with Dr. Austin and discuss with the patient


3.  Will calculate STS risk score and discuss with the patient


4.  Encourage incentive spirometry use


5.  Medical management of other comorbidities per primary care, cardiology


6.  More recommendations to follow regarding timing of surgery

## 2022-06-25 NOTE — CA
Transthoracic Echo Report 

 Name: Chelsey Ott 

 MRN:    [id number] 

 Age:    76     Gender:     F 

 

 :    1945 

 Exam Date:     2022 13:50 

 Exam Location: [facility] 

 Ht (in):     67     Wt (lb):     225 

 Ordering Physician:        Mel De La Fuente 

 Attending/Referring Phys: 

 Technician         Ashanti Pepe RDCS 

 Procedure CPT: 

 Indications:       preop cardiac surgery 

 

 Cardiac Hx: 

 Technical Quality:      Fair 

 Contrast 1:                                Total Dose (mL): 

 Contrast 2:                                Total Dose (mL): 

 

 MEASUREMENTS  (Male / Female) Normal Values 

 2D ECHO 

 LV Diastolic Diameter PLAX        5.1 cm                4.2 - 5.9 / 3.9 - 5.3 cm 

 LV Systolic Diameter PLAX         3.5 cm                 

 IVS Diastolic Thickness           1.2 cm                0.6 - 1.0 / 0.6 - 0.9 cm 

 LVPW Diastolic Thickness          1.3 cm                0.6 - 1.0 / 0.6 - 0.9 cm 

 LV Relative Wall Thickness        0.5                    

 RV Internal Dim ED PLAX           2.9 cm                 

 LA Systolic Diameter LX           3.9 cm                3.0 - 4.0 / 2.7 - 3.8 cm 

 LV Diastolic Volume MOD BP        89.1 cm???              67 - 155 / 56 - 104 cm??? 

 LV Systolic Volume MOD BP         32.3 cm???              22 - 58 / 19 - 49 cm??? 

 LV Ejection Fraction MOD BP       63.7 %                >= 55  % 

 LV Diastolic Volume MOD 4C        88.2 cm???               

 LV Systolic Volume MOD 4C         24.5 cm???               

 LV Ejection Fraction MOD 4C       72.2 %                 

 LV Diastolic Length 4C            8.7 cm                 

 LV Systolic Length 4C             7.2 cm                 

 LV Diastolic Volume MOD 2C        88.9 cm???               

 LV Systolic Volume MOD 2C         43.5 cm???               

 LV Ejection Fraction MOD 2C       51.1 %                 

 LV Diastolic Length 2C            8.2 cm                 

 LV Systolic Length 2C             7.2 cm                 

 LA Volume                         62.4 cm???              18 - 58 / 22 - 52 cm??? 

 

 M-MODE 

 Aortic Root Diameter MM           3.2 cm                 

 MV E Point Septal Separation      0.6 cm                 

 AV Cusp Separation MM             2.0 cm                 

 

 DOPPLER 

 AV Peak Velocity                  145.8 cm/s             

 AV Peak Gradient                  8.5 mmHg               

 MV Area PHT                       2.6 cm???                

 Mitral E Point Velocity           115.4 cm/s             

 Mitral A Point Velocity           95.3 cm/s              

 Mitral E to A Ratio               1.2                    

 MV Deceleration Time              293.1 ms               

 MV E' Velocity                    6.7 cm/s               

 Mitral E to MV E' Ratio           17.3                   

 TR Peak Velocity                  300.6 cm/s             

 TR Peak Gradient                  36.2 mmHg              

 Right Ventricular Systolic Press  41.2 mmHg              

 

 

 FINDINGS 

 Left Ventricle 

 Left ventricular ejection fraction is estimated at 50. Left ventricular cavity  

 size normal. Mild concentric left ventricular hypertrophy.  Mild lateral wall  

 hypokinesia.  Contrast study may be constricted for further evaluation 

 

 Right Ventricle 

 Normal right ventricular size and function. Mild pulmonary hypertension. 

 

 Right Atrium 

 Normal right atrial size. 

 

 Left Atrium 

 Mildly increased left atrial diameter. Mildly increased left atrial volume. No  

 evidence for an atrial septal defect. 

 

 Mitral Valve 

 Mitral valve thickened. Mitral annular calcification. Mild-to-moderate mitral  

 regurgitation. 

 

 Aortic Valve 

 Trileaflet aortic valve. No aortic valve stenosis or regurgitation. Focal  

 thickening of the aortic valve cusps. 

 

 Tricuspid Valve 

 Mild tricuspid regurgitation. 

 

 Pulmonic Valve 

 Mild pulmonic regurgitation. 

 

 Pericardium 

 Normal pericardium. No pericardial effusion. 

 

 Aorta 

 Normal size aortic root and proximal ascending aorta. 

 

 CONCLUSIONS 

 #1.  Left ventricle size is normal with an ejection fraction about 50-55%.   

 Suggestive of mild hypokinesis of the lateral wall. 

 #2.  Mild to moderate mitral regurgitation #3.  Mild tricuspid regurgitation 

 Previewed by:  

 Dr. Cuba Salazar MD 

 (Electronically Signed) 

 Final Date:      2022 15:31

## 2022-06-25 NOTE — P.PN
Subjective


Progress Note Date: 06/25/22


This is a 76-year-old female who has been experiencing chest pains and had a 

positive stress test, had a cardiac catheterization and was found to have 

complex left main disease involving the ostium of the circumflex and also LAD.  

Patient is seen by surgeons and is being scheduled for open-heart surgery next 

week.  Patient seemed to be clinically stable.  Denies any active chest pain, 

shortness of breath or dizziness.  Her puncture site in the right wrist seem to 

be healing well.  Lungs are clear.  Heart is regular.  No JVD or peripheral 

edema.  Seen by pulmonology also will continue current medical therapy








Objective





- Vital Signs


Vital signs: 


                                   Vital Signs











Temp  97.4 F L  06/25/22 11:56


 


Pulse  68   06/25/22 11:56


 


Resp  18   06/25/22 08:00


 


BP  128/80   06/25/22 11:56


 


Pulse Ox  96   06/25/22 11:56


 


FiO2      








                                 Intake & Output











 06/24/22 06/25/22 06/25/22





 18:59 06:59 18:59


 


Intake Total 450 89.036 600


 


Output Total 500  


 


Balance -50 89.036 600


 


Weight 102.5 kg 103.6 kg 


 


Intake:   


 


    


 


  Intake, IV Titration  89.036 





  Amount   


 


    Heparin Sod,Pork in 0.45%  89.036 





    NaCl 25,000 unit In 0.45   





    % NaCl 1 250ml.bag @ 9.76   





    UNITS/KG/HR 10.004 mls/   





    hr IV .Q24H [facility] Rx#:   





    [id number]   


 


  Oral   600


 


Output:   


 


  Urine 500  


 


Other:   


 


  # Voids 1  1














- Exam





GENERAL EXAM: Patient is alert and oriented and doesn't appear to be in any 

acute distress


HEENT: Normocephalic. Normal reaction of pupils, equal size, normal range of 

extraocular motion. No erythema or exudates in the throat.


NECK: No masses, no nuchal rigidity.


CHEST: No chest wall deformity.


LUNGS: Equal air entry with no crackles or wheeze.


HEART: S1 and S2 normal with no audible mumurs or gallops. Regular rhythm, 

femorals equal on both sides..


ABDOMEN: No hepatosplenomegaly, normal bowel sounds, no guarding or rigidity.


SKIN: No rashes


CENTRAL NERVOUS SYSTEM: No focal deficits.


EXTREMITIES: No cyanosis, clubbing or edema.


Right wrist: The puncture site seemed to be healing well





- Labs


CBC & Chem 7: 


                                 06/24/22 10:08





                                 06/24/22 15:04


Labs: 


                  Abnormal Lab Results - Last 24 Hours (Table)











  06/24/22 06/24/22 06/24/22 Range/Units





  10:08 15:04 15:04 


 


APTT   34.3 H   (22.0-30.0)  sec


 


Chloride  109 H   111 H  ()  mmol/L


 


Glucose  101 H    (74-99)  mg/dL


 


HDL Cholesterol    37.20 L  (40.00-60.00)  mg/dL














  06/25/22 06/25/22 Range/Units





  03:47 10:04 


 


APTT  37.1 H  39.7 H  (22.0-30.0)  sec


 


Chloride    ()  mmol/L


 


Glucose    (74-99)  mg/dL


 


HDL Cholesterol    (40.00-60.00)  mg/dL








                      Microbiology - Last 24 Hours (Table)











 06/24/22 15:04 Nasal Screen MRSA/MSSA - Preliminary





 Nasal Swab 














Assessment and Plan


(1) CAD (coronary artery disease)


Current Visit: Yes   Status: Acute   Code(s): I25.10 - ATHSCL HEART DISEASE OF N

ATIVE CORONARY ARTERY W/O ANG PCTRS   SNOMED Code(s): 09331960


   





(2) Hypertension


Current Visit: Yes   Status: Acute   Code(s): I10 - ESSENTIAL (PRIMARY) 

HYPERTENSION   SNOMED Code(s): 71172652


   





(3) Accelerated hypertension


Current Visit: No   Status: Acute   Code(s): I10 - ESSENTIAL (PRIMARY) 

HYPERTENSION   SNOMED Code(s): 82690632


   


Plan: 


Patient is clinically stable.  Slated to have bypass surgery next week

## 2022-06-25 NOTE — P.CNPUL
History of Present Illness


Consult date: 06/25/22


Requesting physician: Moe Santana


Reason for consult: chest pain, other


Chief complaint: ASHD.


History of present illness: 





Pulmonary and critical care consult dated 06/25/2022.





This is a patient who had a recent heart catheterization, showing a critical and

complex lesion involving the distal left main coronary artery, also, the ostial 

left circumflex, and ostial LAD.  The lesions appear to be in the range of 80-

90%, and the patient was also noted to have elevated left ventricular end-diast

olic filling pressures.  The patient is currently being evaluated by 

cardiothoracic surgery for possible bypass grafting.  We will reassess to see 

the patient preoperatively, and evaluate her lung function.  The patient is a 

lifelong nonsmoker.  She has no history of lung disease.  Based on her FEV1, and

her MVV, she was in the low operative risk range.  Outpatient medications 

included amlodipine, aspirin, metoprolol, lisinopril, zinc, vitamin D3, ascorbic

acid, lactulose, and indoor.  Her only major medical problem is hypertension.  

She does have a history of previous coronavirus infection and was seen by my 

partner in December 2021.  CBC is completely normal.  PTT is 39.7.  Sodium 141, 

potassium 3.7, chlorides 111, CO2 24, BUN 12, creatinine 0.63.  Cholesterol was 

182.  Urine was negative.  Testing for coronavirus was negative.  Chest x-ray 

shows mild interstitial edema.  Currently she is on room air.





Review of Systems





REVIEW OF SYSTEMS:  


CONSTITUTIONAL:  [Negative.]


NEUROLOGIC: [ Negative.]


HEENT:  [ Negative.]


CARDIAC: Chest pain.


PULMONARY:  [Negative.]


GI:  [Negative.]


:  [Negative.]


RHEUMATOLOGIC: [ Negative.]


IMMUNOLOGIC: [ Negative.]


ENDOCRINE:  [Negative.  ] 


DERMATOLOGIC: [Negative.]








Past Medical History


Past Medical History: Fibromyalgia, Hyperlipidemia, Hypertension, Osteoarthritis

 (OA), Supraventricular Tachycardia (SVT)


Additional Past Medical History / Comment(s): urinary incontinence-wears pad, 

past MRI should ?stroke, severe left internal carotid artery stenosis followed 

by Dr. Adam, frequent constipation


History of Any Multi-Drug Resistant Organisms: None Reported


Past Surgical History: Appendectomy, Hysterectomy, Joint Replacement, Orthopedic

 Surgery


Additional Past Surgical History / Comment(s): left knee replacement, 

HEMORROIDECTOMY, kory cataracts removed, surg. for glaucoma right eye


Past Anesthesia/Blood Transfusion Reactions: No Reported Reaction


Smoking Status: Never smoker


Past Alcohol Use History: None Reported


Past Drug Use History: None Reported





- Past Family History


  ** Mother


Family Medical History: Coronary Artery Disease (CAD)


Additional Family Medical History / Comment(s): HAD STENTS





  ** Brother(s)


Family Medical History: Myocardial Infarction (MI)


Additional Family Medical History / Comment(s): CABG





  ** Sister(s)


Family Medical History: Cancer


Additional Family Medical History / Comment(s): colon cancer





  ** Father


Family Medical History: Coronary Artery Disease (CAD)


Additional Family Medical History / Comment(s): HAD STENTS





Medications and Allergies


                                Home Medications











 Medication  Instructions  Recorded  Confirmed  Type


 


Aspirin EC [Ecotrin Low Dose] 81 mg PO BID 02/27/21 06/24/22 History


 


Metoprolol Succinate (ER) [Toprol 25 mg PO DAILY 12/03/21 06/24/22 History





XL]    


 


amLODIPine [Norvasc] 5 mg PO DAILY 12/03/21 06/24/22 History


 


lisinopriL 30 mg PO HS 12/03/21 06/24/22 History


 


Ascorbic Acid [Vitamin C] 1,000 mg PO DAILY  tab 12/05/21 06/24/22 Rx


 


Cholecalciferol [Vitamin D3 (25 50 mcg PO DAILY  tablet 12/05/21 06/24/22 Rx





Mcg = 1000 Iu)]    


 


Zinc Sulfate [Orazinc] 220 mg PO DAILY  cap 12/05/21 06/24/22 Rx


 


Isosorbide Mononitrate ER [Imdur] 30 mg PO DAILY 06/22/22 06/22/22 History


 


Lactulose 1 - 2 tbsp PO AS DIRECTED PRN 06/22/22 06/22/22 History








                                    Allergies











Allergy/AdvReac Type Severity Reaction Status Date / Time


 


No Known Allergies Allergy   Verified 06/24/22 10:40














Physical Exam


Osteopathic Statement: *.  No significant issues noted on an osteopathic 

structural exam other than those noted in the History and Physical/Consult.


Vitals: 


                                   Vital Signs











  Temp Pulse Resp BP BP Pulse Ox


 


 06/25/22 11:56  97.4 F L  68    128/80  96


 


 06/25/22 08:00  96.4 F L  79  18   132/63  95


 


 06/25/22 03:37  98.4 F  68  15   137/77  95


 


 06/25/22 01:24    18   


 


 06/25/22 00:00  98 F  72  18  147/76   95


 


 06/24/22 20:00   75    


 


 06/24/22 19:23  97.7 F  75  16   133/70  96


 


 06/24/22 17:13   95  16   135/77  95


 


 06/24/22 16:31   77  16   184/84  98


 


 06/24/22 15:09   82  16  155/67   92 L


 


 06/24/22 14:25   76  16  160/78   92 L


 


 06/24/22 13:55   72  16  160/74   94 L


 


 06/24/22 13:40   74  16  162/74   93 L


 


 06/24/22 13:25   80  16  159/84   94 L


 


 06/24/22 13:10   74  16  151/78   94 L








                                Intake and Output











 06/24/22 06/25/22 06/25/22





 22:59 06:59 14:59


 


Intake Total  89.036 240


 


Balance  89.036 240


 


Intake:   


 


  Intake, IV Titration  89.036 





  Amount   


 


    Heparin Sod,Pork in 0.45%  89.036 





    NaCl 25,000 unit In 0.45   





    % NaCl 1 250ml.bag @ 9.76   





    UNITS/KG/HR 10.004 mls/   





    hr IV .Q24H [facility] Rx#:   





    [id number]   


 


  Oral   240


 


Other:   


 


  # Voids   1


 


  Weight 102.5 kg 103.6 kg 














No acute distress, oriented 3.  Room air saturation 96%.





HEENT examination is grossly unremarkable.  





Neck supple.  Full range of motion.  No adenopathy thyromegaly or neck vein 

distention.





Cardiovascular examination reveals regular rhythm rate.  S1-S2 normal.  No S3 or

 S4.  No discernible murmur noted.  Heart rate 60 bpm.





Lungs reveal clear breath sounds.  Breath sounds are equal bilaterally.  No 

adventitious lung sounds including wheezes rhonchi or crackles.





Abdomen soft bowel sounds are heard.  No masses or tenderness.





Extremities are intact.  No cyanosis clubbing or edema.





Skin is without rash or lesion.





Neurologic examination is brief but nonfocal.





Results





- Laboratory Findings


CBC and BMP: 


                                 06/24/22 10:08





                                 06/24/22 15:04


PT/INR, D-dimer











PT  10.7 sec (9.0-12.0)   06/24/22  15:04    


 


INR  1.0  (<1.2)   06/24/22  15:04    








Abnormal lab findings: 


                                  Abnormal Labs











  06/24/22 06/24/22 06/24/22





  10:08 14:02 15:04


 


APTT    34.3 H


 


Chloride  109 H  


 


Glucose  101 H  


 


HDL Cholesterol   


 


Urine Blood   Trace H 


 


Urine Mucus   Rare H 














  06/24/22 06/25/22 06/25/22





  15:04 03:47 10:04


 


APTT   37.1 H  39.7 H


 


Chloride  111 H  


 


Glucose   


 


HDL Cholesterol  37.20 L  


 


Urine Blood   


 


Urine Mucus   














- Diagnostic Findings


Chest x-ray: image reviewed





Assessment and Plan


Assessment: 





Symptomatic coronary disease, with anticipated bypass grafting, over the next 2-

3 days.





History of hypertension.





No history of any lung disease, and patient at low increased operative risk 

based on lung function.





Prior history of coronavirus infection, December 2021.


Plan: 





Plan dated 06/25/2022.





The patient is seen and evaluated.  Labs, x-rays, and medications are reviewed. 

 PFT's are also reviewed.  Based on the FEV1, and the MVV, the patient's at low 

increased operative risk.  The FEV1 was 1.72 L which is 72% of predicted.  The 

MVV was 64.5 L/m.  We will continue to follow the patient make recommendations 

where appropriate.  Prognosis is thought to be generally good.


Time with Patient: Greater than 30

## 2022-06-26 LAB
ALBUMIN SERPL-MCNC: 3.7 G/DL (ref 3.5–5)
ALP SERPL-CCNC: 105 U/L (ref 38–126)
ALT SERPL-CCNC: 13 U/L (ref 4–34)
ANION GAP SERPL CALC-SCNC: 6 MMOL/L
AST SERPL-CCNC: 19 U/L (ref 14–36)
BASOPHILS # BLD AUTO: 0 K/UL (ref 0–0.2)
BASOPHILS NFR BLD AUTO: 1 %
BUN SERPL-SCNC: 12 MG/DL (ref 7–17)
CALCIUM SPEC-MCNC: 8.6 MG/DL (ref 8.4–10.2)
CHLORIDE SERPL-SCNC: 111 MMOL/L (ref 98–107)
CO2 SERPL-SCNC: 24 MMOL/L (ref 22–30)
EOSINOPHIL # BLD AUTO: 0.2 K/UL (ref 0–0.7)
EOSINOPHIL NFR BLD AUTO: 3 %
ERYTHROCYTE [DISTWIDTH] IN BLOOD BY AUTOMATED COUNT: 4.28 M/UL (ref 3.8–5.4)
ERYTHROCYTE [DISTWIDTH] IN BLOOD: 14.1 % (ref 11.5–15.5)
GLUCOSE SERPL-MCNC: 101 MG/DL (ref 74–99)
HCT VFR BLD AUTO: 39.1 % (ref 34–46)
HGB BLD-MCNC: 12.8 GM/DL (ref 11.4–16)
INR PPP: 1.1 (ref ?–1.2)
LYMPHOCYTES # SPEC AUTO: 1.2 K/UL (ref 1–4.8)
LYMPHOCYTES NFR SPEC AUTO: 20 %
MAGNESIUM SPEC-SCNC: 2.1 MG/DL (ref 1.6–2.3)
MCH RBC QN AUTO: 29.9 PG (ref 25–35)
MCHC RBC AUTO-ENTMCNC: 32.8 G/DL (ref 31–37)
MCV RBC AUTO: 91.3 FL (ref 80–100)
MONOCYTES # BLD AUTO: 0.3 K/UL (ref 0–1)
MONOCYTES NFR BLD AUTO: 6 %
NEUTROPHILS # BLD AUTO: 4.2 K/UL (ref 1.3–7.7)
NEUTROPHILS NFR BLD AUTO: 70 %
PLATELET # BLD AUTO: 301 K/UL (ref 150–450)
POTASSIUM SERPL-SCNC: 3.6 MMOL/L (ref 3.5–5.1)
PROT SERPL-MCNC: 6.2 G/DL (ref 6.3–8.2)
PT BLD: 11.9 SEC (ref 9–12)
SODIUM SERPL-SCNC: 141 MMOL/L (ref 137–145)
WBC # BLD AUTO: 6.1 K/UL (ref 3.8–10.6)

## 2022-06-26 RX ADMIN — HEPARIN SODIUM SCH MLS/HR: 10000 INJECTION, SOLUTION INTRAVENOUS at 12:52

## 2022-06-26 RX ADMIN — ASPIRIN 81 MG CHEWABLE TABLET SCH MG: 81 TABLET CHEWABLE at 09:18

## 2022-06-26 RX ADMIN — CEFAZOLIN SCH: 330 INJECTION, POWDER, FOR SOLUTION INTRAMUSCULAR; INTRAVENOUS at 00:11

## 2022-06-26 RX ADMIN — ATORVASTATIN CALCIUM SCH MG: 80 TABLET, FILM COATED ORAL at 09:18

## 2022-06-26 RX ADMIN — ASPIRIN 81 MG CHEWABLE TABLET SCH MG: 81 TABLET CHEWABLE at 20:31

## 2022-06-26 RX ADMIN — CEFAZOLIN SCH: 330 INJECTION, POWDER, FOR SOLUTION INTRAMUSCULAR; INTRAVENOUS at 07:31

## 2022-06-26 RX ADMIN — CEFAZOLIN SCH: 330 INJECTION, POWDER, FOR SOLUTION INTRAMUSCULAR; INTRAVENOUS at 07:27

## 2022-06-26 RX ADMIN — MUPIROCIN SCH APPLIC: 20 OINTMENT TOPICAL at 20:31

## 2022-06-26 RX ADMIN — ISOSORBIDE MONONITRATE SCH MG: 30 TABLET, EXTENDED RELEASE ORAL at 09:18

## 2022-06-26 RX ADMIN — METOPROLOL SUCCINATE SCH MG: 25 TABLET, EXTENDED RELEASE ORAL at 09:18

## 2022-06-26 RX ADMIN — MUPIROCIN SCH APPLIC: 20 OINTMENT TOPICAL at 09:19

## 2022-06-26 RX ADMIN — CEFAZOLIN SCH MLS/HR: 330 INJECTION, POWDER, FOR SOLUTION INTRAMUSCULAR; INTRAVENOUS at 17:36

## 2022-06-26 RX ADMIN — HEPARIN SODIUM SCH: 10000 INJECTION, SOLUTION INTRAVENOUS at 07:27

## 2022-06-26 NOTE — P.CONS
History of Present Illness





- History of Present Illness





This is a pleasant 76 years old female with past medical history of Fibromyal

merritt, Hyperlipidemia, Hypertension, Osteoarthritis , Supraventricular Tachycardia

,urinary incontinence-wears pad, severe left internal carotid artery stenosis 

followed by Dr. Adam, frequent constipation, she was admitted under 

cardiology service for right arm pain, chest pain and back pain for the last 3 

weeks, where she underwent cardiac cath and 60/24 showing critical and complex 

lesion involving the distal left main coronary arteries and also involving the 

ostial left circumflex and the distal LAD with the stenotic range is 80-90% with

increase in LVEDP  , Patient will need bypass procedure to open his coronary 

arteries.


Currently she denies chest pain or dyspnea.  No incontinence of urine or bowel. 

No fever


Patient is hemodynamically stable


Labs including CBC, INR, BMP and liver enzymes are unremarkable.  TSH is 1.6.


Urine analysis showing trace blood.


coronavirus not detected.   Hepatitis panel is negative


Chest x-ray: Minimal interstitial edema noted


Carotid duplex: No significant stenosis


Echocardiogram showing ejection fraction of 50-55% with mild-to-moderate mitral 

regurgitation and mild tricuspid regurgitation


Patient currently on heparin drip, aspirin 81 mg twice a day and normal saline 

at 75 mL/h as well as Lipitor and metoprolol


However patient this morning she is asymptomatic she denies chest pain or 

dyspnea or abdominal pain.  No diarrhea or vomiting or dysuria.  No headache or 

weakness or numbness.








Review of Systems





CONSTITUTIONAL: No fever, no malaise, no fatigue. 


HEENT: No recent visual problems or hearing problems. Denied any sore throat. 


CARDIOVASCULAR: No  orthopnea, PND, no palpitations, no syncope. 


PULMONARY: No shortness of breath, no cough, no hemoptysis. 


GASTROINTESTINAL: No diarrhea, no nausea, no vomiting, no abdominal pain. 

Normoactive bowel sounds. 


NEUROLOGICAL: No headaches, no weakness, no numbness. 


HEMATOLOGICAL: Denies any bleeding or petechiae. 


GENITOURINARY: Denies any burning micturition, frequency, or urgency. 


MUSCULOSKELETAL/RHEUMATOLOGICAL: Denies any joint pain, swelling, or any muscle 

pain. 


ENDOCRINE: Denies any polyuria or polydipsia.











Past Medical History


Past Medical History: Fibromyalgia, Hyperlipidemia, Hypertension, Osteoarthritis

(OA), Supraventricular Tachycardia (SVT)


Additional Past Medical History / Comment(s): urinary incontinence-wears pad, 

past MRI should ?stroke, severe left internal carotid artery stenosis followed 

by Dr. Adam, frequent constipation


History of Any Multi-Drug Resistant Organisms: None Reported


Past Surgical History: Appendectomy, Hysterectomy, Joint Replacement, Orthopedic

Surgery


Additional Past Surgical History / Comment(s): left knee replacement, 

HEMORROIDECTOMY, kory cataracts removed, surg. for glaucoma right eye


Past Anesthesia/Blood Transfusion Reactions: No Reported Reaction


Smoking Status: Never smoker


Past Alcohol Use History: None Reported


Past Drug Use History: None Reported





- Past Family History


  ** Mother


Family Medical History: Coronary Artery Disease (CAD)


Additional Family Medical History / Comment(s): HAD STENTS





  ** Brother(s)


Family Medical History: Myocardial Infarction (MI)


Additional Family Medical History / Comment(s): CABG





  ** Sister(s)


Family Medical History: Cancer


Additional Family Medical History / Comment(s): colon cancer





  ** Father


Family Medical History: Coronary Artery Disease (CAD)


Additional Family Medical History / Comment(s): HAD STENTS





Medications and Allergies


                                Home Medications











 Medication  Instructions  Recorded  Confirmed  Type


 


Aspirin EC [Ecotrin Low Dose] 81 mg PO BID 02/27/21 06/24/22 History


 


Metoprolol Succinate (ER) [Toprol 25 mg PO DAILY 12/03/21 06/24/22 History





XL]    


 


amLODIPine [Norvasc] 5 mg PO DAILY 12/03/21 06/24/22 History


 


lisinopriL 30 mg PO HS 12/03/21 06/24/22 History


 


Ascorbic Acid [Vitamin C] 1,000 mg PO DAILY  tab 12/05/21 06/24/22 Rx


 


Cholecalciferol [Vitamin D3 (25 50 mcg PO DAILY  tablet 12/05/21 06/24/22 Rx





Mcg = 1000 Iu)]    


 


Zinc Sulfate [Orazinc] 220 mg PO DAILY  cap 12/05/21 06/24/22 Rx


 


Isosorbide Mononitrate ER [Imdur] 30 mg PO DAILY 06/22/22 06/22/22 History


 


Lactulose 1 - 2 tbsp PO AS DIRECTED PRN 06/22/22 06/22/22 History








                                    Allergies











Allergy/AdvReac Type Severity Reaction Status Date / Time


 


No Known Allergies Allergy   Verified 06/24/22 10:40














Physical Exam


Vitals: 


                                   Vital Signs











  Temp Pulse Resp BP Pulse Ox


 


 06/26/22 03:57  98 F  73  16  152/83  96


 


 06/25/22 23:25  98.4 F  72  19  137/72  95


 


 06/25/22 19:37  98.3 F  79  18  167/91  96


 


 06/25/22 16:00  97.9 F  74   117/73  97


 


 06/25/22 14:00   68  18  


 


 06/25/22 11:56  97.4 F L  68   128/80  96








                                Intake and Output











 06/25/22 06/26/22 06/26/22





 22:59 06:59 14:59


 


Intake Total 489.982  


 


Balance 489.982  


 


Intake:   


 


  Intake, IV Titration 129.982  





  Amount   


 


    Heparin Sod,Pork in 0.45% 129.982  





    NaCl 25,000 unit In 0.45   





    % NaCl 1 250ml.bag @ 9.76   





    UNITS/KG/HR 10.004 mls/   





    hr IV .Q24H [facility] Rx#:   





    [id number]   


 


  Oral 360  


 


Other:   


 


  # Voids  1 














GENERAL: The patient is alert and oriented x3, not in any acute distress. Well 

developed, well nourished. 


HEENT: Pupils are round and equally reacting to light. EOMI. No scleral icterus.

 No conjunctival pallor. Normocephalic, atraumatic. No pharyngeal erythema. No 

thyromegaly. 


CARDIOVASCULAR: S1 and S2 present. No murmurs, rubs, or gallops. 


PULMONARY: Chest is clear to auscultation, no wheezing or crackles. 


ABDOMEN: Soft, nontender, nondistended, normoactive bowel sounds. No palpable 

organomegaly. 


MUSCULOSKELETAL: No joint swelling or deformity. 


EXTREMITIES: No cyanosis, clubbing, or pedal edema. 


NEUROLOGICAL: Gross neurological examination did not reveal any focal deficits. 


SKIN: No rashes. No petechiae








Results


CBC & Chem 7: 


                                 06/26/22 05:19





                                 06/26/22 05:19


Labs: 


                  Abnormal Lab Results - Last 24 Hours (Table)











  06/25/22 06/25/22 06/26/22 Range/Units





  10:04 21:36 05:19 


 


APTT  39.7 H  52.0 H  60.2 H  (22.0-30.0)  sec








                      Microbiology - Last 24 Hours (Table)











 06/24/22 15:04 Nasal Screen MRSA/MSSA - Final





 Nasal Swab    Staphylococcus aureus,Not MRSA














Assessment and Plan


Assessment: 





Critical coronary artery lesion involving the left main stem and left circumflex

 and distal LAD were in the range of 80-90% requiring bypass procedure


Hypertension


Hyperlipidemia


History of fibromyalgia


History of osteoarthritis 











Plan: 








this is a pleasant 76 years old female with critical coronary artery disease sana

l need to go for CABG


Continue with heparin and aspirin


Continue with Lipitor, metoprolol


Cardiology primary team of the case


Pulmonary and vascular surgery consulted 


Labs and medication were reviewed..  Continue same treatment.  Continue with 

symptomatic treatment.  Resume home medication.  Monitor lytes and vitals.  DVT 

and GI prophylaxis.  Further recommendations depends on the clinical course of 

the patient


DVT prophylaxis: Subcutaneous heparin


GI Prophylaxis: Pepcid


Prognosis is guarded

## 2022-06-26 NOTE — P.PN
Subjective


Progress Note Date: 06/26/22


Principal diagnosis: 





Coronary disease.





Pulmonary and critical care consult dated 06/25/2022.





This is a patient who had a recent heart catheterization, showing a critical and

complex lesion involving the distal left main coronary artery, also, the ostial 

left circumflex, and ostial LAD.  The lesions appear to be in the range of 80-

90%, and the patient was also noted to have elevated left ventricular end-

diastolic filling pressures.  The patient is currently being evaluated by 

cardiothoracic surgery for possible bypass grafting.  We will reassess to see 

the patient preoperatively, and evaluate her lung function.  The patient is a 

lifelong nonsmoker.  She has no history of lung disease.  Based on her FEV1, and

her MVV, she was in the low operative risk range.  Outpatient medications 

included amlodipine, aspirin, metoprolol, lisinopril, zinc, vitamin D3, ascorbic

acid, lactulose, and indoor.  Her only major medical problem is hypertension.  

She does have a history of previous coronavirus infection and was seen by my 

partner in December 2021.  CBC is completely normal.  PTT is 39.7.  Sodium 141, 

potassium 3.7, chlorides 111, CO2 24, BUN 12, creatinine 0.63.  Cholesterol was 

182.  Urine was negative.  Testing for coronavirus was negative.  Chest x-ray 

shows mild interstitial edema.  Currently she is on room air.





Progress note dated 06/26/2022.





76-year-old female we saw yesterday in consultation.  The patient has 

significant coronary disease, involving the left main coronary artery.  The 

patient is apparently going to have open heart surgery one day this week.  The 

exact day has not been decided yet.  Clinically, she's.  Stable.  She is on room

air.  She is a lifelong nonsmoker.  Lung function would suggest that she's at a 

very low increased operative risk based on her FEV1 and MVV.  Labs today show 

white count of 6.1, with a normal hemoglobin, hematocrit, and platelet count.  

The patient's PTT is 60.2.  Sodium 141, potassium 3.6, chlorides 111, CO2 24, 

BUN 12, creatinine 0.72.





Objective





- Vital Signs


Vital signs: 


                                   Vital Signs











Temp  98.4 F   06/26/22 08:00


 


Pulse  79   06/26/22 08:00


 


Resp  16   06/26/22 08:00


 


BP  138/84   06/26/22 08:00


 


Pulse Ox  97   06/26/22 08:00


 


FiO2      








                                 Intake & Output











 06/25/22 06/26/22 06/26/22





 18:59 06:59 18:59


 


Intake Total 1389.982  


 


Balance 1389.982  


 


Intake:   


 


  Intake, IV Titration 429.982  





  Amount   


 


    Heparin Sod,Pork in 0.45% 129.982  





    NaCl 25,000 unit In 0.45   





    % NaCl 1 250ml.bag @ 9.76   





    UNITS/KG/HR 10.004 mls/   





    hr IV .Q24H [facility] Rx#:   





    [id number]   


 


    Sodium Chloride 0.9% 1, 300  





    000 ml @ 75 mls/hr IV .   





    [id number] [facility] Rx#:[id number]   


 


  Oral 960  


 


Other:   


 


  # Voids 1 1 














- Exam





No acute distress, oriented 3.  Room air saturation 97 %.





HEENT examination is grossly unremarkable.  





Neck supple.  Full range of motion.  No adenopathy thyromegaly or neck vein 

distention.





Cardiovascular examination reveals regular rhythm rate.  S1-S2 normal.  No S3 or

S4.  No discernible murmur noted.  Heart rate 71 bpm.





Lungs reveal clear breath sounds.  Breath sounds are equal bilaterally.  No 

adventitious lung sounds including wheezes rhonchi or crackles.





Abdomen soft bowel sounds are heard.  No masses or tenderness.





Extremities are intact.  No cyanosis clubbing or edema.





Skin is without rash or lesion.





Neurologic examination is brief but nonfocal.





- Labs


CBC & Chem 7: 


                                 06/26/22 05:19





                                 06/26/22 05:19


Labs: 


                  Abnormal Lab Results - Last 24 Hours (Table)











  06/25/22 06/26/22 06/26/22 Range/Units





  21:36 05:19 05:19 


 


APTT  52.0 H  60.2 H   (22.0-30.0)  sec


 


Chloride    111 H  ()  mmol/L


 


Glucose    101 H  (74-99)  mg/dL


 


Total Protein    6.2 L  (6.3-8.2)  g/dL








                      Microbiology - Last 24 Hours (Table)











 06/24/22 15:04 Nasal Screen MRSA/MSSA - Final





 Nasal Swab    Staphylococcus aureus,Not MRSA














Assessment and Plan


Assessment: 





Symptomatic coronary disease, with anticipated bypass grafting, over the next 2-

3 days.





History of hypertension.





No history of any lung disease, and patient at low increased operative risk 

based on lung function.





Prior history of coronavirus infection, December 2021.


Plan: 





Plan dated 06/25/2022.





The patient is seen and evaluated.  Labs, x-rays, and medications are reviewed. 

PFT's are also reviewed.  Based on the FEV1, and the MVV, the patient's at low 

increased operative risk.  The FEV1 was 1.72 L which is 72% of predicted.  The 

MVV was 64.5 L/m.  We will continue to follow the patient make recommendations 

where appropriate.  Prognosis is thought to be generally good.





Plan dated 06/26/2022.





The patient will have open heart surgery one day this week.  The exact day has 

not yet been decided.  The patient remains on room air.  We will continue to 

follow and make recommendations where appropriate.  Prognosis is guarded.  

Pulmonary function have been reviewed.  FEV1 is 1.72 L, and MVV is 64.5 L/m.


Time with Patient: Less than 30

## 2022-06-26 NOTE — P.PN
Subjective


Progress Note Date: 06/26/22


Principal diagnosis: 





Coronary artery disease with left main disease.  Previous medical history of 

hypertension, hyperlipidemia, SVT, left internal carotid artery stenosis, 

obesity, never smoker, remains unvaccinated against Covid, family history of 

coronary artery disease





The patient was seen and examined sitting up in bed of the cardiac stepdown unit

eating breakfast.  She does continue to complain of some intermittent right-

sided right arm pain although she says it is not really pain, she is "just aware

something is there".   She remains in sinus rhythm and hemodynamically stable.  

Currently on IV heparin.  STS risk score was calculated, patient is felt to be 

low risk for surgery.  Dr. Austin with patient yesterday, recommends surgery, 

anticipates surgical myocardial revascularization with left internal mammary 

artery, endoscopic vein harvest, left atrial appendage ligation tomorrow 

afternoon with Dr. Austin.  The patient is agreeable and has no new questions.  

Dr. Santana was made aware as well.





Objective





- Vital Signs


Vital signs: 


                                   Vital Signs











Temp  98 F   06/26/22 03:57


 


Pulse  73   06/26/22 03:57


 


Resp  16   06/26/22 03:57


 


BP  152/83   06/26/22 03:57


 


Pulse Ox  96   06/26/22 03:57


 


FiO2      








                                 Intake & Output











 06/25/22 06/26/22 06/26/22





 18:59 06:59 18:59


 


Intake Total 1389.982  


 


Balance 1389.982  


 


Intake:   


 


  Intake, IV Titration 429.982  





  Amount   


 


    Heparin Sod,Pork in 0.45% 129.982  





    NaCl 25,000 unit In 0.45   





    % NaCl 1 250ml.bag @ 9.76   





    UNITS/KG/HR 10.004 mls/   





    hr IV .Q24H [facility] Rx#:   





    [id number]   


 


    Sodium Chloride 0.9% 1, 300  





    000 ml @ 75 mls/hr IV .   





    [id number] [facility] Rx#:[id number]   


 


  Oral 960  


 


Other:   


 


  # Voids 1 1 














- Exam





CONSTITUTIONAL: Appears comfortable, cooperative, no acute distress


RESPIRATORY:  Lungs sounds diminished bilaterally.  Respirations even, 

nonlabored.  Currently on room air with oxygen saturation 96%.  Able to achieve 

2000 mL on incentive spirometry.  Strong cough.  


CARDIOVASCULAR:  S1, S2 present.  Regular rate and rhythm, sinus rhythm on 

telemetry.  Palpable peripheral pulses bilaterally.  No edema present.  No calf 

pain or tenderness noted.  


GASTROINTESTINAL:  Abdomen soft, nontender, nondistended.  Active bowel sounds 

present 4 quadrants.  Tolerating diet. 


GENITOURINARY:  Continues to void


INTEGUMENTARY:  Skin is warm and dry with evidence of good perfusion. 


NEUROLOGIC:  Cranial nerves II through XII intact


MUSKULOSKELETAL:  Able to move all extremities, strength equal bilaterally, gait

normal


PSYCHIATRIC:  Alert and oriented to person place and time, appropriate affect, 

intact judgment and insight








- Allied health notes


Allied health notes reviewed: nursing





- Labs


CBC & Chem 7: 


                                 06/24/22 10:08





                                 06/24/22 15:04


Labs: 


                  Abnormal Lab Results - Last 24 Hours (Table)











  06/25/22 06/25/22 06/26/22 Range/Units





  10:04 21:36 05:19 


 


APTT  39.7 H  52.0 H  60.2 H  (22.0-30.0)  sec








                      Microbiology - Last 24 Hours (Table)











 06/24/22 15:04 Nasal Screen MRSA/MSSA - Final





 Nasal Swab    Staphylococcus aureus,Not MRSA














Assessment and Plan


Assessment: 





1.  Coronary artery disease with left main disease


2.  Hypertension


3.  Hyperlipidemia, cholesterol 182, 


4.  SVT


5.  Left internal carotid artery stenosis


6.  Obesity


7.  Never smoker, preoperative FEV1 72% of predicted


8.  Remains unvaccinated against Covid


9.  Family history of coronary artery disease


10.  Nasal swab positive for MSSA


11.  Preserved LV function with mild to moderate mitral regurgitation on 

transthoracic echocardiogram


Plan: 





1.  Continue to maximize medical management with aspirin, statin, beta blocker, 

IV heparin


2.  Our plan is for surgical myocardial revascularization with left internal 

mammary artery, endoscopic vein harvest, left atrial appendage ligation tomorrow

afternoon, 06/27/2022 with Dr. Austin


3.  Nothing to eat or drink after midnight


4.  Encourage incentive spirometry use


5.  Medical management of other comorbidities per primary care, cardiology


6.  More recommendations to follow

## 2022-06-26 NOTE — P.PN
Subjective


Progress Note Date: 06/26/22


This is a 76-year-old female who has been experiencing chest pains and had a 

positive stress test, had a cardiac catheterization and was found to have 

complex left main disease involving the ostium of the circumflex and also LAD.  

Patient is seen by surgeons and is being scheduled for open-heart surgery next 

week.  Patient seemed to be clinically stable.  Denies any active chest pain, 

shortness of breath or dizziness.  Her puncture site in the right wrist seem to 

be healing well.  Lungs are clear.  Heart is regular.  No JVD or peripheral 

edema.  Seen by pulmonology also will continue current medical therapy.





06/26/2022: The patient remains stable.  Complaints of some right-sided 

discomfort which has been stable since admission.  No acute changes.  Having 

some issues with bowel movements.  No complaints of shortness of breath.  No 

arrhythmias.  Patient is seen by cardiovascular surgeon and planning for surgery

tomorrow afternoon with the LIMA graft to the LAD and ligation of the atrial 

appendage.  Lungs are clear.  Heart is regular.  Continue current medical 

therapy.  Patient is on have IV heparin








Objective





- Vital Signs


Vital signs: 


                                   Vital Signs











Temp  98 F   06/26/22 03:57


 


Pulse  73   06/26/22 03:57


 


Resp  16   06/26/22 03:57


 


BP  152/83   06/26/22 03:57


 


Pulse Ox  96   06/26/22 03:57


 


FiO2      








                                 Intake & Output











 06/25/22 06/26/22 06/26/22





 18:59 06:59 18:59


 


Intake Total 1389.982  


 


Balance 1389.982  


 


Intake:   


 


  Intake, IV Titration 429.982  





  Amount   


 


    Heparin Sod,Pork in 0.45% 129.982  





    NaCl 25,000 unit In 0.45   





    % NaCl 1 250ml.bag @ 9.76   





    UNITS/KG/HR 10.004 mls/   





    hr IV .Q24H [facility] Rx#:   





    [id number]   


 


    Sodium Chloride 0.9% 1, 300  





    000 ml @ 75 mls/hr IV .   





    [id number] [facility] Rx#:[id number]   


 


  Oral 960  


 


Other:   


 


  # Voids 1 1 














- Exam





GENERAL EXAM: Patient is alert and oriented and doesn't appear to be in any 

acute distress


HEENT: Normocephalic. Normal reaction of pupils, equal size, normal range of 

extraocular motion. No erythema or exudates in the throat.


NECK: No masses, no nuchal rigidity.


CHEST: No chest wall deformity.


LUNGS: Equal air entry with no crackles or wheeze.


HEART: S1 and S2 normal with no audible mumurs or gallops. Regular rhythm, 

femorals equal on both sides..


ABDOMEN: No hepatosplenomegaly, normal bowel sounds, no guarding or rigidity.


SKIN: No rashes


CENTRAL NERVOUS SYSTEM: No focal deficits.


EXTREMITIES: No cyanosis, clubbing or edema.


Right wrist: The puncture site seemed to be healing well





- Labs


CBC & Chem 7: 


                                 06/24/22 10:08





                                 06/24/22 15:04


Labs: 


                  Abnormal Lab Results - Last 24 Hours (Table)











  06/25/22 06/25/22 06/26/22 Range/Units





  10:04 21:36 05:19 


 


APTT  39.7 H  52.0 H  60.2 H  (22.0-30.0)  sec








                      Microbiology - Last 24 Hours (Table)











 06/24/22 15:04 Nasal Screen MRSA/MSSA - Final





 Nasal Swab    Staphylococcus aureus,Not MRSA














Assessment and Plan


(1) CAD (coronary artery disease)


Current Visit: Yes   Status: Acute   Code(s): I25.10 - ATHSCL HEART DISEASE OF 

NATIVE CORONARY ARTERY W/O ANG PCTRS   SNOMED Code(s): 88586166


   





(2) Hypertension


Current Visit: Yes   Status: Acute   Code(s): I10 - ESSENTIAL (PRIMARY) 

HYPERTENSION   SNOMED Code(s): 99256136


   





(3) Accelerated hypertension


Current Visit: No   Status: Acute   Code(s): I10 - ESSENTIAL (PRIMARY) 

HYPERTENSION   SNOMED Code(s): 57035529


   


Plan: 


Patient's remains stable.  Planned surgery tomorrow.  On IV heparin

## 2022-06-27 LAB
ALBUMIN SERPL-MCNC: 3 G/DL (ref 3.5–5)
ALP SERPL-CCNC: 59 U/L (ref 38–126)
ALT SERPL-CCNC: 34 U/L (ref 4–34)
ANION GAP SERPL CALC-SCNC: 10 MMOL/L
ANION GAP SERPL CALC-SCNC: 5 MMOL/L
APTT BLD: 28.2 SEC (ref 22–30)
AST SERPL-CCNC: 59 U/L (ref 14–36)
BASOPHILS # BLD AUTO: 0 K/UL (ref 0–0.2)
BASOPHILS # BLD AUTO: 0 K/UL (ref 0–0.2)
BASOPHILS NFR BLD AUTO: 0 %
BASOPHILS NFR BLD AUTO: 0 %
BUN SERPL-SCNC: 12 MG/DL (ref 7–17)
BUN SERPL-SCNC: 12 MG/DL (ref 7–17)
CA-I BLDA-MCNC: 4.8 MG/DL (ref 4.5–5.3)
CA-I BLDA-MCNC: 4.8 MG/DL (ref 4.5–5.3)
CA-I BLDA-MCNC: 4.9 MG/DL (ref 4.5–5.3)
CA-I BLDA-MCNC: 5 MG/DL (ref 4.5–5.3)
CALCIUM SPEC-MCNC: 7.9 MG/DL (ref 8.4–10.2)
CALCIUM SPEC-MCNC: 8.9 MG/DL (ref 8.4–10.2)
CHLORIDE SERPL-SCNC: 110 MMOL/L (ref 98–107)
CHLORIDE SERPL-SCNC: 110 MMOL/L (ref 98–107)
CO2 BLDA-SCNC: 21 MMOL/L (ref 19–24)
CO2 BLDA-SCNC: 23 MMOL/L (ref 19–24)
CO2 BLDA-SCNC: 23 MMOL/L (ref 19–24)
CO2 BLDA-SCNC: 24 MMOL/L (ref 19–24)
CO2 BLDA-SCNC: 26 MMOL/L (ref 19–24)
CO2 BLDA-SCNC: 27 MMOL/L (ref 19–24)
CO2 SERPL-SCNC: 20 MMOL/L (ref 22–30)
CO2 SERPL-SCNC: 26 MMOL/L (ref 22–30)
EOSINOPHIL # BLD AUTO: 0 K/UL (ref 0–0.7)
EOSINOPHIL # BLD AUTO: 0 K/UL (ref 0–0.7)
EOSINOPHIL NFR BLD AUTO: 0 %
EOSINOPHIL NFR BLD AUTO: 0 %
ERYTHROCYTE [DISTWIDTH] IN BLOOD BY AUTOMATED COUNT: 3.1 M/UL (ref 3.8–5.4)
ERYTHROCYTE [DISTWIDTH] IN BLOOD BY AUTOMATED COUNT: 3.19 M/UL (ref 3.8–5.4)
ERYTHROCYTE [DISTWIDTH] IN BLOOD BY AUTOMATED COUNT: 4.32 M/UL (ref 3.8–5.4)
ERYTHROCYTE [DISTWIDTH] IN BLOOD: 13.9 % (ref 11.5–15.5)
ERYTHROCYTE [DISTWIDTH] IN BLOOD: 14 % (ref 11.5–15.5)
ERYTHROCYTE [DISTWIDTH] IN BLOOD: 14.3 % (ref 11.5–15.5)
GLUCOSE BLD-MCNC: 161 MG/DL (ref 70–110)
GLUCOSE BLD-MCNC: 162 MG/DL (ref 70–110)
GLUCOSE BLD-MCNC: 163 MG/DL (ref 70–110)
GLUCOSE BLD-MCNC: 165 MG/DL (ref 70–110)
GLUCOSE BLD-MCNC: 179 MG/DL (ref 70–110)
GLUCOSE BLD-MCNC: 193 MG/DL (ref 70–110)
GLUCOSE BLDA-MCNC: 139 MG/DL (ref 75–99)
GLUCOSE BLDA-MCNC: 179 MG/DL (ref 75–99)
GLUCOSE BLDA-MCNC: 227 MG/DL (ref 75–99)
GLUCOSE BLDA-MCNC: 98 MG/DL (ref 75–99)
GLUCOSE SERPL-MCNC: 112 MG/DL (ref 74–99)
GLUCOSE SERPL-MCNC: 150 MG/DL (ref 74–99)
HCO3 BLDA-SCNC: 20 MMOL/L (ref 21–25)
HCO3 BLDA-SCNC: 22 MMOL/L (ref 21–25)
HCO3 BLDA-SCNC: 22 MMOL/L (ref 21–25)
HCO3 BLDA-SCNC: 23 MMOL/L (ref 21–25)
HCO3 BLDA-SCNC: 24 MMOL/L (ref 21–25)
HCO3 BLDA-SCNC: 26 MMOL/L (ref 21–25)
HCT VFR BLD AUTO: 27.9 % (ref 34–46)
HCT VFR BLD AUTO: 29 % (ref 34–46)
HCT VFR BLD AUTO: 39 % (ref 34–46)
HCT VFR BLDA CALC: 30 % (ref 34–46)
HCT VFR BLDA CALC: 30 % (ref 34–46)
HCT VFR BLDA CALC: 33 % (ref 34–46)
HCT VFR BLDA CALC: 33 % (ref 34–46)
HGB BLD-MCNC: 12.5 GM/DL (ref 11.4–16)
HGB BLD-MCNC: 9.2 GM/DL (ref 11.4–16)
HGB BLD-MCNC: 9.4 GM/DL (ref 11.4–16)
INR PPP: 1.2 (ref ?–1.2)
LACTATE BLDA-MCNC: 1.2 MMOL/L (ref 0.5–1.6)
LACTATE BLDA-MCNC: 1.6 MMOL/L (ref 0.5–1.6)
LACTATE BLDA-MCNC: 2.7 MMOL/L (ref 0.5–1.6)
LACTATE BLDA-MCNC: 2.8 MMOL/L (ref 0.5–1.6)
LYMPHOCYTES # SPEC AUTO: 0.5 K/UL (ref 1–4.8)
LYMPHOCYTES # SPEC AUTO: 0.7 K/UL (ref 1–4.8)
LYMPHOCYTES NFR SPEC AUTO: 4 %
LYMPHOCYTES NFR SPEC AUTO: 6 %
MAGNESIUM SPEC-SCNC: 2.2 MG/DL (ref 1.6–2.3)
MCH RBC QN AUTO: 28.9 PG (ref 25–35)
MCH RBC QN AUTO: 29.5 PG (ref 25–35)
MCH RBC QN AUTO: 29.7 PG (ref 25–35)
MCHC RBC AUTO-ENTMCNC: 32.1 G/DL (ref 31–37)
MCHC RBC AUTO-ENTMCNC: 32.5 G/DL (ref 31–37)
MCHC RBC AUTO-ENTMCNC: 33 G/DL (ref 31–37)
MCV RBC AUTO: 89.9 FL (ref 80–100)
MCV RBC AUTO: 90.2 FL (ref 80–100)
MCV RBC AUTO: 90.8 FL (ref 80–100)
MONOCYTES # BLD AUTO: 0.4 K/UL (ref 0–1)
MONOCYTES # BLD AUTO: 0.6 K/UL (ref 0–1)
MONOCYTES NFR BLD AUTO: 4 %
MONOCYTES NFR BLD AUTO: 5 %
NEUTROPHILS # BLD AUTO: 10.4 K/UL (ref 1.3–7.7)
NEUTROPHILS # BLD AUTO: 10.6 K/UL (ref 1.3–7.7)
NEUTROPHILS NFR BLD AUTO: 89 %
NEUTROPHILS NFR BLD AUTO: 90 %
PCO2 BLDA: 37 MMHG (ref 35–45)
PCO2 BLDA: 37 MMHG (ref 35–45)
PCO2 BLDA: 38 MMHG (ref 35–45)
PCO2 BLDA: 39 MMHG (ref 35–45)
PCO2 BLDA: 40 MMHG (ref 35–45)
PCO2 BLDA: 50 MMHG (ref 35–45)
PH BLDA: 7.32 [PH] (ref 7.35–7.45)
PH BLDA: 7.34 [PH] (ref 7.35–7.45)
PH BLDA: 7.34 [PH] (ref 7.35–7.45)
PH BLDA: 7.37 [PH] (ref 7.35–7.45)
PH BLDA: 7.41 [PH] (ref 7.35–7.45)
PH BLDA: 7.41 [PH] (ref 7.35–7.45)
PLATELET # BLD AUTO: 190 K/UL (ref 150–450)
PLATELET # BLD AUTO: 215 K/UL (ref 150–450)
PLATELET # BLD AUTO: 260 K/UL (ref 150–450)
PO2 BLDA: 168 MMHG (ref 83–108)
PO2 BLDA: 187 MMHG (ref 83–108)
PO2 BLDA: 203 MMHG (ref 83–108)
PO2 BLDA: 80 MMHG (ref 83–108)
PO2 BLDA: 82 MMHG (ref 83–108)
PO2 BLDA: 93 MMHG (ref 83–108)
POTASSIUM BLDA-SCNC: 3.6 MMOL/L (ref 3.4–4.5)
POTASSIUM BLDA-SCNC: 3.7 MMOL/L (ref 3.4–4.5)
POTASSIUM BLDA-SCNC: 3.9 MMOL/L (ref 3.4–4.5)
POTASSIUM BLDA-SCNC: 4 MMOL/L (ref 3.4–4.5)
POTASSIUM SERPL-SCNC: 3.6 MMOL/L (ref 3.5–5.1)
POTASSIUM SERPL-SCNC: 3.6 MMOL/L (ref 3.5–5.1)
PROT SERPL-MCNC: 4.7 G/DL (ref 6.3–8.2)
PT BLD: 12.8 SEC (ref 9–12)
SODIUM BLDA-SCNC: 140 MMOL/L (ref 135–146)
SODIUM BLDA-SCNC: 141 MMOL/L (ref 135–146)
SODIUM BLDA-SCNC: 142 MMOL/L (ref 135–146)
SODIUM BLDA-SCNC: 142 MMOL/L (ref 135–146)
SODIUM SERPL-SCNC: 140 MMOL/L (ref 137–145)
SODIUM SERPL-SCNC: 141 MMOL/L (ref 137–145)
WBC # BLD AUTO: 11.6 K/UL (ref 3.8–10.6)
WBC # BLD AUTO: 11.8 K/UL (ref 3.8–10.6)
WBC # BLD AUTO: 6.2 K/UL (ref 3.8–10.6)

## 2022-06-27 PROCEDURE — 021009W BYPASS CORONARY ARTERY, ONE ARTERY FROM AORTA WITH AUTOLOGOUS VENOUS TISSUE, OPEN APPROACH: ICD-10-PCS

## 2022-06-27 PROCEDURE — 02L70CK OCCLUSION OF LEFT ATRIAL APPENDAGE WITH EXTRALUMINAL DEVICE, OPEN APPROACH: ICD-10-PCS

## 2022-06-27 PROCEDURE — 02100Z9 BYPASS CORONARY ARTERY, ONE ARTERY FROM LEFT INTERNAL MAMMARY, OPEN APPROACH: ICD-10-PCS

## 2022-06-27 PROCEDURE — 5A02210 ASSISTANCE WITH CARDIAC OUTPUT USING BALLOON PUMP, CONTINUOUS: ICD-10-PCS

## 2022-06-27 RX ADMIN — POTASSIUM CHLORIDE SCH MLS/HR: 14.9 INJECTION, SOLUTION INTRAVENOUS at 18:38

## 2022-06-27 RX ADMIN — SODIUM CHLORIDE, PRESERVATIVE FREE SCH ML: 5 INJECTION INTRAVENOUS at 21:27

## 2022-06-27 RX ADMIN — NOREPINEPHRINE BITARTRATE SCH MLS/HR: 1 INJECTION, SOLUTION, CONCENTRATE INTRAVENOUS at 19:00

## 2022-06-27 RX ADMIN — ASPIRIN 81 MG CHEWABLE TABLET SCH MG: 81 TABLET CHEWABLE at 08:07

## 2022-06-27 RX ADMIN — ATORVASTATIN CALCIUM SCH MG: 80 TABLET, FILM COATED ORAL at 08:07

## 2022-06-27 RX ADMIN — HEPARIN SODIUM SCH UNIT: 5000 INJECTION INTRAVENOUS; SUBCUTANEOUS at 23:29

## 2022-06-27 RX ADMIN — ACETAMINOPHEN SCH MLS/HR: 10 INJECTION, SOLUTION INTRAVENOUS at 20:08

## 2022-06-27 RX ADMIN — CEFAZOLIN SCH: 330 INJECTION, POWDER, FOR SOLUTION INTRAMUSCULAR; INTRAVENOUS at 17:55

## 2022-06-27 RX ADMIN — MUPIROCIN SCH APPLIC: 20 OINTMENT TOPICAL at 21:26

## 2022-06-27 RX ADMIN — ISOSORBIDE MONONITRATE SCH MG: 30 TABLET, EXTENDED RELEASE ORAL at 08:07

## 2022-06-27 RX ADMIN — INSULIN HUMAN SCH MLS/HR: 100 INJECTION, SOLUTION PARENTERAL at 18:37

## 2022-06-27 RX ADMIN — AMIODARONE HYDROCHLORIDE SCH: 50 INJECTION, SOLUTION INTRAVENOUS at 23:26

## 2022-06-27 RX ADMIN — MUPIROCIN SCH APPLIC: 20 OINTMENT TOPICAL at 08:09

## 2022-06-27 RX ADMIN — AMIODARONE HYDROCHLORIDE PRN MLS/HR: 50 INJECTION, SOLUTION INTRAVENOUS at 18:54

## 2022-06-27 RX ADMIN — ALBUMIN (HUMAN) PRN MLS/HR: 2.5 SOLUTION INTRAVENOUS at 19:00

## 2022-06-27 RX ADMIN — CEFAZOLIN ONE MLS: 330 INJECTION, POWDER, FOR SOLUTION INTRAMUSCULAR; INTRAVENOUS at 17:14

## 2022-06-27 RX ADMIN — METOPROLOL SUCCINATE SCH MG: 25 TABLET, EXTENDED RELEASE ORAL at 08:07

## 2022-06-27 RX ADMIN — CEFAZOLIN ONE MLS: 330 INJECTION, POWDER, FOR SOLUTION INTRAMUSCULAR; INTRAVENOUS at 14:07

## 2022-06-27 RX ADMIN — ACETAMINOPHEN SCH MLS/HR: 10 INJECTION, SOLUTION INTRAVENOUS at 23:29

## 2022-06-27 NOTE — XR
EXAMINATION TYPE: XR chest 1V portable

 

DATE OF EXAM: 6/27/2022

 

COMPARISON: 6/24/2022

 

HISTORY: Postop cardiac surgery

 

TECHNIQUE:

 

FINDINGS: There is left-sided chest tube. Endotracheal tube is 2.5 cm from the leonard. There is right
 jugular catheter with the tip in the main pulmonary artery. There is nasogastric tube in the distal 
esophagus.

 

There is no heart failure. Lungs are clear of consolidation. Trachea is midline. No pleural effusion.
 No pneumothorax.

 

IMPRESSION: The nasogastric tube has the tip in the lower esophagus. No evidence of heart failure or 
pulmonary consolidation.

## 2022-06-27 NOTE — P.PN
Subjective


Progress Note Date: 06/27/22





 HISTORY OF PRESENT ILLNESS


This is a pleasant 76 years old female with past medical history of Fibromyalg

ia, Hyperlipidemia, Hypertension, Osteoarthritis , Supraventricular Tachycardia 

,urinary incontinence-wears pad, severe left internal carotid artery stenosis 

followed by Dr. Adam, frequent constipation, she was admitted under 

cardiology service for right arm pain, chest pain and back pain for the last 3 

weeks, where she underwent cardiac cath and 60/24 showing critical and complex 

lesion involving the distal left main coronary arteries and also involving the 

ostial left circumflex and the distal LAD with the stenotic range is 80-90% with

increase in LVEDP  , Patient will need bypass procedure to open his coronary 

arteries.


Currently she denies chest pain or dyspnea.  No incontinence of urine or bowel. 

No fever


Patient is hemodynamically stable


Labs including CBC, INR, BMP and liver enzymes are unremarkable.  TSH is 1.6.


Urine analysis showing trace blood.


coronavirus not detected.   Hepatitis panel is negative


Chest x-ray: Minimal interstitial edema noted


Carotid duplex: No significant stenosis


Echocardiogram showing ejection fraction of 50-55% with mild-to-moderate mitral 

regurgitation and mild tricuspid regurgitation


Patient currently on heparin drip, aspirin 81 mg twice a day and normal saline 

at 75 mL/h as well as Lipitor and metoprolol


However patient this morning she is asymptomatic she denies chest pain or 

dyspnea or abdominal pain.  No diarrhea or vomiting or dysuria.  No headache or 

weakness or numbness.





6/27: Patient is denying any chest pain, shortness of breath, palpitations, 

lightheadedness or dizziness.  She is scheduled for CABG this afternoon and 

patient states that she is ready to move forward.  No new concerns from her 

nurse.  Patient has been afebrile, heart rate 80, blood pressure 129/78 and 

pulse ox 96% on room air.  CBC is unremarkable.  Chloride 110, CO2 20, blood 

sugar 112, creatinine 0.68.





REVIEW OF SYSTEMS


Constitutional: No fever, no chills, no night sweats.  No weight change.  No 

weakness, fatigue or lethargy.  No daytime sleepiness.


EENT: No headache.  No blurred vision or double vision, no loss of vision.  No 

loss of Hearing, no ringing in the ears, no dizziness.  No nasal drainage or 

congestion.  No epistaxis.  No sore throat.


Lungs: No shortness of breath, cough, no sputum production.  No wheezing.


Cardiovascular: No chest pain, no lower extremity edema.  No palpitations.  No 

paroxysmal nocturnal dyspnea.  No orthopnea.  No lightheadedness or dizziness.  

No syncopal episodes.


Abdominal: No abdominal pain.  No nausea, vomiting.  No diarrhea.  No 

constipation.  No bloody or tarry stools.  No loss of appetite.


Genitourinary: No dysuria, increased frequency, urgency.  No urinary retention.


Musculoskeletal: No myalgias.  No muscle weakness, no gait dysfunction, no 

frequent falls.  No back pain.  No neck pain.


Integumentary: No wounds, no lesions.  No rash or pruritus.  No unusual 

bruising.  No change in hair or nails.


Neurologic: No aphasia. No facial droop. No change in mentation. No head injury.

No headache. No paralysis. No paresthesia.


Psychiatric: No depression.  No anxiety.  No mood swings.


Endocrine: No abnormal blood sugars.  No weight change.  No excessive sweating 

or thirst.  No cold intolerance.  








PHYSICAL EXAMINATION


Gen: This is a 76-year-old overweight  female.  She is resting in bed 

and appears to be comfortable and in no acute distress.


HEENT: Head is atraumatic, normocephalic. Pupils equal, round. Sclerae is 

anicteric. 


NECK: Supple. No JVD. No lymphadenopathy. No thyromegaly. 


LUNGS: Clear to auscultation. No wheezes or rhonchi.  No intercostal retr

actions.


HEART: Regular rate and rhythm.  Systolic murmur. 


ABDOMEN: Soft. Bowel sounds are present. No masses.  No tenderness.


EXTREMITIES: No pedal edema.  No calf tenderness.


NEUROLOGICAL: Patient is awake, alert and oriented x3. Cranial nerves 2 through 

12 are grossly intact. 





ASSESSMENT AND PLAN


1.  Severe coronary artery disease.  Patient is scheduled for CABG today.  

Continue current plan per cardiothoracic team.





2.  Hypertension.





3.  Hyperlipidemia.





4.  Carotid artery disease.





5.  GI prophylaxis.





6.  DVT prophylaxis.














DISCHARGE PLAN


Most likely return home.





Impression and plan of care have been directed as dictated by the signing 

physician.  Princess Nava nurse practitioner acting as scribe for signing 

physician.





Objective





- Vital Signs


Vital signs: 


                                   Vital Signs











Temp  98.0 F   06/27/22 08:42


 


Pulse  75   06/27/22 08:42


 


Resp  18   06/27/22 08:44


 


BP  137/98   06/27/22 08:42


 


Pulse Ox  97   06/27/22 08:42


 


FiO2      








                                 Intake & Output











 06/26/22 06/27/22 06/27/22





 18:59 06:59 18:59


 


Intake Total 80 110 


 


Balance 80 110 


 


Weight  103 kg 


 


Intake:   


 


  IV 5  


 


    Invasive Line 3 5  


 


  Intake, IV Titration 75 110 





  Amount   


 


    Heparin Sodium,Porcine 5,  110 





    000 unit In Sodium   





    Chloride 0.9% 500 ml 500   





    ml @ As Directed IV ONCE   





    ONE Rx#:[id number]   


 


    Sodium Chloride 0.9% 1, 75  





    000 ml @ 75 mls/hr IV .   





    [id number] [facility] Rx#:[id number]   


 


Other:   


 


  # Voids  2 














- Labs


CBC & Chem 7: 


                                 06/27/22 06:05





                                 06/27/22 06:05


Labs: 


                  Abnormal Lab Results - Last 24 Hours (Table)











  06/26/22 06/26/22 06/27/22 Range/Units





  05:19 09:57 06:05 


 


APTT    55.0 H  (22.0-30.0)  sec


 


Chloride  111 H    ()  mmol/L


 


Carbon Dioxide     (22-30)  mmol/L


 


Glucose  101 H    (74-99)  mg/dL


 


Total Protein  6.2 L    (6.3-8.2)  g/dL


 


Crossmatch   See Detail   














  06/27/22 Range/Units





  06:05 


 


APTT   (22.0-30.0)  sec


 


Chloride  110 H  ()  mmol/L


 


Carbon Dioxide  20 L  (22-30)  mmol/L


 


Glucose  112 H  (74-99)  mg/dL


 


Total Protein   (6.3-8.2)  g/dL


 


Crossmatch

## 2022-06-27 NOTE — P.PN
Subjective


Progress Note Date: 06/27/22





PROGRESS NOTE


The patient is a 76-year-old female with history of carotid vascular disease who

presented with symptoms of progressive chest discomfort and an abnormal MPI.  

Underwent cardiac catheterization on the 24th and was found to have severe 

distal left main disease with mild-to-moderate disease in the RCA.  She is 

scheduled to undergo CABG today.  Her echo showed an ejection fraction of 50% 

with mild lateral wall hypokinesis and mild-to-moderate mitral regurgitation.  

She's feeling well this morning, denies any chest discomfort or dizziness.  She 

is in sinus mechanism.  Scheduled to undergo CABG this afternoon.  She continues

to be on aspirin, Lipitor 80 mg daily, isosorbide mononitrate 15 mg daily, 

lisinopril 30 mg daily, metoprolol succinate 25 mg daily


PHYSICAL EXAMINATION:


Blood pressure 129/70 heart rate 80


LUNGS: Clear to auscultation


HEART: Regular rate and rhythm, S1, S2. No S3.  systolic murmur at the base


ABDOMEN: Soft, nontender, no organomegaly


EXTREMETIES: No edema





LAB:


Potassium 3.6, BUN 12, creatinine 0.68


IMPRESSION:


1.  Severe CAD, scheduled for CABG today


2.  History of hypertension


3.  History of hyperlipidemia


4.  History of carotid disease





PLAN:


1.  Proceed with CABG as scheduled


2.  Depending on her postop further recommendations will be made.





 








Objective





- Vital Signs


Vital signs: 


                                   Vital Signs











Temp  97.6 F   06/27/22 03:22


 


Pulse  80   06/27/22 03:22


 


Resp  16   06/27/22 03:22


 


BP  129/78   06/27/22 03:22


 


Pulse Ox  96   06/27/22 03:22


 


FiO2      








                                 Intake & Output











 06/26/22 06/27/22 06/27/22





 18:59 06:59 18:59


 


Intake Total 80 110 


 


Balance 80 110 


 


Weight  103 kg 


 


Intake:   


 


  IV 5  


 


    Invasive Line 3 5  


 


  Intake, IV Titration 75 110 





  Amount   


 


    Heparin Sodium,Porcine 5,  110 





    000 unit In Sodium   





    Chloride 0.9% 500 ml 500   





    ml @ As Directed IV ONCE   





    ONE Rx#:[id number]   


 


    Sodium Chloride 0.9% 1, 75  





    000 ml @ 75 mls/hr IV .   





    [id number] [facility] Rx#:[id number]   


 


Other:   


 


  # Voids  2 














- Labs


CBC & Chem 7: 


                                 06/27/22 06:05





                                 06/27/22 06:05


Labs: 


                  Abnormal Lab Results - Last 24 Hours (Table)











  06/26/22 06/26/22 06/27/22 Range/Units





  05:19 09:57 06:05 


 


APTT    55.0 H  (22.0-30.0)  sec


 


Chloride  111 H    ()  mmol/L


 


Carbon Dioxide     (22-30)  mmol/L


 


Glucose  101 H    (74-99)  mg/dL


 


Total Protein  6.2 L    (6.3-8.2)  g/dL


 


Crossmatch   See Detail   














  06/27/22 Range/Units





  06:05 


 


APTT   (22.0-30.0)  sec


 


Chloride  110 H  ()  mmol/L


 


Carbon Dioxide  20 L  (22-30)  mmol/L


 


Glucose  112 H  (74-99)  mg/dL


 


Total Protein   (6.3-8.2)  g/dL


 


Crossmatch

## 2022-06-27 NOTE — P.OP
Date of Procedure: 06/27/22


Preoperative Diagnosis: 


Left main coronary artery disease, unstable angina, atrial arrhythmias


Postoperative Diagnosis: 


Same


Procedure(s) Performed: 


Off-pump CABG 2 with LIMA to LAD, SVG to OM, ligate left atrial appendage with 

40 mm AtriCure clip, endovascular vein harvest, placement aortic balloon pump.


Implants: 


40 mm AtriCure clip


Anesthesia: ALEKSANDER


Surgeon: Alvaro Austin


Assistant #1: Tyson Ponce


Assistant #2: Jc Radford


Estimated Blood Loss (ml): 400


IV fluids (ml): 3,000


Urine output (ml): 500


Pathology: none sent


Condition: stable


Disposition: ICU


Indications for Procedure: 


76-year-old obese woman with worsening anginal symptomatology.  She underwent 

cardiac catheterization on Friday was found to have distal left main disease 

with severe left main equivalent, 95% ostial LAD, 95% ostial circumflex.  

Regular function was well preserved.  She was having episodes of unstable angina

and was admitted to the hospital for urgent coronary bypass revascularization.


Operative Findings: 


Left ventricular function was good.  There was mild to moderate regurgitation 

which worsened as the patient became ischemic with lifting the heart.  This 

stabilized with placement of intra-aortic balloon pump.  Conduits and targets 

were good.  Good grafts were achieved.  Patient had some atrial arrhythmias and 

underwent 2 cardioversions.  She left the room in sinus rhythm.  She had a large

left atrial appendage.  We were successful at placing a clip at the base of the 

appendage.


Description of Procedure: 


The patient underwent arterial, central line and swan cecile catheter placement in

the pre-operative suite by anesthesia. She was brought back to the operating 

room and placed in the supine position. General endotracheal anesthesia was 

induced and she was prepped and draped in the usual sterile fashion. A time-out 

was performed and antibiotics were given. Intra-op RUBINA showed EF 50-55% with 

mild mitral regurgitation. 





We made a midline incision on the chest and performed a median sternotomy. 

Hemostasis on the bone was achieved with electrocautery. The left pleura was 

incised and the left internal thoracic artery was harvested in a pedicle 

fashion. Simultaneously an assistant endoscopically harvested the right greater 

saphenous vein. The patient was systemically heparinzed and the TAMIE was 

transected. A left sided 32F chest tube was placed. The pericardium was opened 

and a pericardial cradle was created. Stay sutures were placed and the left 

anterior descending artery was brought into the field and stabilized using the 

octopus stabilizer. An arteriotomy was made and a 1.5mm shunt was inserted into 

the LAD, which was a good target. An end to side anastomosis was performed with 

the TAMIE to the LAD using a running 8-0 prolene. Next a deep pericardial suture 

was placed and the left atrial appendage was exposed. A 40mm AtriClip was placed

on the appendage. When we lifted the heart to expose the lateral wall, the 

patient became hypotensive with SBP in 40-50's. She also had some worsening 

mitral regurgitation with that maneuver. At this point we placed an intra aortic

balloon pump through the right common femoral artery. Once IABP was initiated, 

the patients hemodynamics improved dramatically. We then exposed the lateral 

wall and exposed and stabilized the obtuse marginal artery. An arteriorotomy was

performed and a 1.5mm shunt was inserted into the vessel, which was a good 

target. An end to side anastomosis was performed with reverse greater saphenous 

vein using a running 7-0 prolene. The vein was fastened to the ascending aorta 

using the heartstring with a running 5-0 prolene in an end to side fashion. 

Protamine was given and hemostasis was secured. A 36F chest tube was placed in 

the mediastinum and the sternum was closed with steel wires. All incisions were 

closed in layers and glue was applied.





She was brought to the ICU in stable condition requiring a small amount of 

norepinephrine. Post-op echo revealed mild mitral regurgitation and good EF at 

the end of the procedure. The IABP was kept at 1:1.

## 2022-06-27 NOTE — P.PN
Subjective


Progress Note Date: 06/27/22











This is a patient who had a recent heart catheterization, showing a critical and

complex lesion involving the distal left main coronary artery, also, the ostial 

left circumflex, and ostial LAD.  The lesions appear to be in the range of 80-

90%, and the patient was also noted to have elevated left ventricular end-

diastolic filling pressures.  The patient is currently being evaluated by 

cardiothoracic surgery for possible bypass grafting.  We will reassess to see 

the patient preoperatively, and evaluate her lung function.  The patient is a 

lifelong nonsmoker.  She has no history of lung disease.  Based on her FEV1, and

her MVV, she was in the low operative risk range.  Outpatient medications includ

ed amlodipine, aspirin, metoprolol, lisinopril, zinc, vitamin D3, ascorbic acid,

lactulose, and indoor.  Her only major medical problem is hypertension.  She 

does have a history of previous coronavirus infection and was seen by my partner

in December 2021.  CBC is completely normal.  PTT is 39.7.  Sodium 141, 

potassium 3.7, chlorides 111, CO2 24, BUN 12, creatinine 0.63.  Cholesterol was 

182.  Urine was negative.  Testing for coronavirus was negative.  Chest x-ray 

shows mild interstitial edema.  Currently she is on room air.





Progress note dated 06/26/2022.





76-year-old female we saw yesterday in consultation.  The patient has 

significant coronary disease, involving the left main coronary artery.  The 

patient is apparently going to have open heart surgery one day this week.  The 

exact day has not been decided yet.  Clinically, she's.  Stable.  She is on room

air.  She is a lifelong nonsmoker.  Lung function would suggest that she's at a 

very low increased operative risk based on her FEV1 and MVV.  Labs today show 

white count of 6.1, with a normal hemoglobin, hematocrit, and platelet count.  

The patient's PTT is 60.2.  Sodium 141, potassium 3.6, chlorides 111, CO2 24, 

BUN 12, creatinine 0.72.








06/27/2022, the patient is awaiting bypass surgery.  The patient is calm and 

comfortable.  No respiratory difficulties whatsoever.  She is using incentive 

spirometer.  Her preop FEV1 is order of 72% of predicted.  No angina.  No 

palpitations.  No chest pain.  She is hemodynamically stable at this point in 

time.  She remains on IV heparin.  I did introduce myself and I will take care o

f this patient postop, managed to ventilator and the necessity pulmonary care 

following her thoracotomy bypass surgery.no other active issues for now.  The 

patient was sent comes at 6.2 with a hemoglobin 4.5.  She is on IV heparin with 

a PTT of 55.  BUN is at 12 with a creatinine 0.6 and the sodium level is at 140.





Objective





- Vital Signs


Vital signs: 


                                   Vital Signs











Temp  98.0 F   06/27/22 08:42


 


Pulse  75   06/27/22 08:42


 


Resp  18   06/27/22 08:44


 


BP  137/98   06/27/22 08:42


 


Pulse Ox  97   06/27/22 08:42


 


FiO2      








                                 Intake & Output











 06/26/22 06/27/22 06/27/22





 18:59 06:59 18:59


 


Intake Total 80 110 


 


Balance 80 110 


 


Weight  103 kg 


 


Intake:   


 


  IV 5  


 


    Invasive Line 3 5  


 


  Intake, IV Titration 75 110 





  Amount   


 


    Heparin Sodium,Porcine 5,  110 





    000 unit In Sodium   





    Chloride 0.9% 500 ml 500   





    ml @ As Directed IV ONCE   





    ONE Rx#:[id number]   


 


    Sodium Chloride 0.9% 1, 75  





    000 ml @ 75 mls/hr IV .   





    [id number] [facility] Rx#:[id number]   


 


Other:   


 


  # Voids  2 














- Exam








No acute distress, oriented 3.  Room air saturation 97 %.





HEENT examination is grossly unremarkable.  





Neck supple.  Full range of motion.  No adenopathy thyromegaly or neck vein 

distention.





Cardiovascular examination reveals regular rhythm rate.  S1-S2 normal.  No S3 or

S4.  No discernible murmur noted.  Heart rate 71 bpm.





Lungs reveal clear breath sounds.  Breath sounds are equal bilaterally.  No 

adventitious lung sounds including wheezes rhonchi or crackles.





Abdomen soft bowel sounds are heard.  No masses or tenderness.





Extremities are intact.  No cyanosis clubbing or edema.





Skin is without rash or lesion.





Neurologic examination is brief but nonfocal.





- Labs


CBC & Chem 7: 


                                 06/27/22 06:05





                                 06/27/22 06:05


Labs: 


                  Abnormal Lab Results - Last 24 Hours (Table)











  06/26/22 06/26/22 06/27/22 Range/Units





  05:19 09:57 06:05 


 


APTT    55.0 H  (22.0-30.0)  sec


 


Chloride  111 H    ()  mmol/L


 


Carbon Dioxide     (22-30)  mmol/L


 


Glucose  101 H    (74-99)  mg/dL


 


Total Protein  6.2 L    (6.3-8.2)  g/dL


 


Crossmatch   See Detail   














  06/27/22 Range/Units





  06:05 


 


APTT   (22.0-30.0)  sec


 


Chloride  110 H  ()  mmol/L


 


Carbon Dioxide  20 L  (22-30)  mmol/L


 


Glucose  112 H  (74-99)  mg/dL


 


Total Protein   (6.3-8.2)  g/dL


 


Crossmatch   














Assessment and Plan


Plan: 








Symptomatic coronary disease, with anticipated bypass grafting, and the surgery 

to be done today.  Overall pulmonary status is stable.the patient is free of any

chest pain for now.  The patient is hemodynamically stable.  No cardiac 

arrhythmias.





History of hypertension.





No history of any lung disease, and patient at low increased operative risk 

based on lung function.





Prior history of coronavirus infection, December 2021.





plan





Keep the patient IV heparin


Continue using incentive spirometer


Proceed with coronary artery bypass surgery this afternoon


Will be involved in her care, management of ventilator in the necessary 

pulmonary care postop.

## 2022-06-28 LAB
ALBUMIN SERPL-MCNC: 3.4 G/DL (ref 3.5–5)
ALP SERPL-CCNC: 60 U/L (ref 38–126)
ALT SERPL-CCNC: 41 U/L (ref 4–34)
ANION GAP SERPL CALC-SCNC: 6 MMOL/L
AST SERPL-CCNC: 85 U/L (ref 14–36)
BASOPHILS # BLD AUTO: 0 K/UL (ref 0–0.2)
BASOPHILS # BLD AUTO: 0 K/UL (ref 0–0.2)
BASOPHILS NFR BLD AUTO: 0 %
BASOPHILS NFR BLD AUTO: 0 %
BUN SERPL-SCNC: 14 MG/DL (ref 7–17)
CALCIUM SPEC-MCNC: 8.5 MG/DL (ref 8.4–10.2)
CHLORIDE SERPL-SCNC: 107 MMOL/L (ref 98–107)
CO2 BLDA-SCNC: 26 MMOL/L (ref 19–24)
CO2 SERPL-SCNC: 26 MMOL/L (ref 22–30)
EOSINOPHIL # BLD AUTO: 0 K/UL (ref 0–0.7)
EOSINOPHIL # BLD AUTO: 0 K/UL (ref 0–0.7)
EOSINOPHIL NFR BLD AUTO: 0 %
EOSINOPHIL NFR BLD AUTO: 0 %
ERYTHROCYTE [DISTWIDTH] IN BLOOD BY AUTOMATED COUNT: 3.14 M/UL (ref 3.8–5.4)
ERYTHROCYTE [DISTWIDTH] IN BLOOD BY AUTOMATED COUNT: 3.18 M/UL (ref 3.8–5.4)
ERYTHROCYTE [DISTWIDTH] IN BLOOD: 14.1 % (ref 11.5–15.5)
ERYTHROCYTE [DISTWIDTH] IN BLOOD: 14.3 % (ref 11.5–15.5)
GLUCOSE BLD-MCNC: 109 MG/DL (ref 70–110)
GLUCOSE BLD-MCNC: 110 MG/DL (ref 70–110)
GLUCOSE BLD-MCNC: 112 MG/DL (ref 70–110)
GLUCOSE BLD-MCNC: 114 MG/DL (ref 70–110)
GLUCOSE BLD-MCNC: 116 MG/DL (ref 70–110)
GLUCOSE BLD-MCNC: 117 MG/DL (ref 70–110)
GLUCOSE BLD-MCNC: 119 MG/DL (ref 70–110)
GLUCOSE BLD-MCNC: 119 MG/DL (ref 70–110)
GLUCOSE BLD-MCNC: 120 MG/DL (ref 70–110)
GLUCOSE BLD-MCNC: 120 MG/DL (ref 70–110)
GLUCOSE BLD-MCNC: 123 MG/DL (ref 70–110)
GLUCOSE BLD-MCNC: 125 MG/DL (ref 70–110)
GLUCOSE BLD-MCNC: 125 MG/DL (ref 70–110)
GLUCOSE BLD-MCNC: 126 MG/DL (ref 70–110)
GLUCOSE BLD-MCNC: 127 MG/DL (ref 70–110)
GLUCOSE BLD-MCNC: 130 MG/DL (ref 70–110)
GLUCOSE BLD-MCNC: 130 MG/DL (ref 70–110)
GLUCOSE BLD-MCNC: 132 MG/DL (ref 70–110)
GLUCOSE BLD-MCNC: 132 MG/DL (ref 70–110)
GLUCOSE BLD-MCNC: 134 MG/DL (ref 70–110)
GLUCOSE BLD-MCNC: 135 MG/DL (ref 70–110)
GLUCOSE BLD-MCNC: 137 MG/DL (ref 70–110)
GLUCOSE BLD-MCNC: 142 MG/DL (ref 70–110)
GLUCOSE BLD-MCNC: 142 MG/DL (ref 70–110)
GLUCOSE BLD-MCNC: 145 MG/DL (ref 70–110)
GLUCOSE SERPL-MCNC: 118 MG/DL (ref 74–99)
HCO3 BLDA-SCNC: 25 MMOL/L (ref 21–25)
HCT VFR BLD AUTO: 28.3 % (ref 34–46)
HCT VFR BLD AUTO: 28.7 % (ref 34–46)
HGB BLD-MCNC: 9.3 GM/DL (ref 11.4–16)
HGB BLD-MCNC: 9.3 GM/DL (ref 11.4–16)
LYMPHOCYTES # SPEC AUTO: 0.4 K/UL (ref 1–4.8)
LYMPHOCYTES # SPEC AUTO: 0.4 K/UL (ref 1–4.8)
LYMPHOCYTES NFR SPEC AUTO: 3 %
LYMPHOCYTES NFR SPEC AUTO: 4 %
MAGNESIUM SPEC-SCNC: 2.1 MG/DL (ref 1.6–2.3)
MCH RBC QN AUTO: 29.3 PG (ref 25–35)
MCH RBC QN AUTO: 29.7 PG (ref 25–35)
MCHC RBC AUTO-ENTMCNC: 32.5 G/DL (ref 31–37)
MCHC RBC AUTO-ENTMCNC: 33 G/DL (ref 31–37)
MCV RBC AUTO: 90.2 FL (ref 80–100)
MCV RBC AUTO: 90.3 FL (ref 80–100)
MONOCYTES # BLD AUTO: 0.5 K/UL (ref 0–1)
MONOCYTES # BLD AUTO: 0.6 K/UL (ref 0–1)
MONOCYTES NFR BLD AUTO: 4 %
MONOCYTES NFR BLD AUTO: 6 %
NEUTROPHILS # BLD AUTO: 10 K/UL (ref 1.3–7.7)
NEUTROPHILS # BLD AUTO: 8.6 K/UL (ref 1.3–7.7)
NEUTROPHILS NFR BLD AUTO: 89 %
NEUTROPHILS NFR BLD AUTO: 92 %
PCO2 BLDA: 39 MMHG (ref 35–45)
PH BLDA: 7.42 [PH] (ref 7.35–7.45)
PLATELET # BLD AUTO: 195 K/UL (ref 150–450)
PLATELET # BLD AUTO: 214 K/UL (ref 150–450)
PO2 BLDA: 70 MMHG (ref 83–108)
POTASSIUM SERPL-SCNC: 4 MMOL/L (ref 3.5–5.1)
PROT SERPL-MCNC: 5.1 G/DL (ref 6.3–8.2)
SODIUM SERPL-SCNC: 139 MMOL/L (ref 137–145)
WBC # BLD AUTO: 10.9 K/UL (ref 3.8–10.6)
WBC # BLD AUTO: 9.7 K/UL (ref 3.8–10.6)

## 2022-06-28 RX ADMIN — AMIODARONE HYDROCHLORIDE SCH MG: 200 TABLET ORAL at 22:19

## 2022-06-28 RX ADMIN — ASPIRIN 325 MG ORAL TABLET SCH MG: 325 PILL ORAL at 08:53

## 2022-06-28 RX ADMIN — IPRATROPIUM BROMIDE AND ALBUTEROL SULFATE SCH ML: .5; 3 SOLUTION RESPIRATORY (INHALATION) at 07:46

## 2022-06-28 RX ADMIN — IPRATROPIUM BROMIDE AND ALBUTEROL SULFATE SCH ML: .5; 3 SOLUTION RESPIRATORY (INHALATION) at 21:02

## 2022-06-28 RX ADMIN — CLOPIDOGREL BISULFATE SCH MG: 75 TABLET ORAL at 08:53

## 2022-06-28 RX ADMIN — SODIUM CHLORIDE, PRESERVATIVE FREE SCH ML: 5 INJECTION INTRAVENOUS at 08:55

## 2022-06-28 RX ADMIN — ATORVASTATIN CALCIUM SCH MG: 80 TABLET, FILM COATED ORAL at 08:53

## 2022-06-28 RX ADMIN — AMIODARONE HYDROCHLORIDE SCH MG: 200 TABLET ORAL at 10:22

## 2022-06-28 RX ADMIN — AMIODARONE HYDROCHLORIDE SCH MLS/HR: 50 INJECTION, SOLUTION INTRAVENOUS at 01:17

## 2022-06-28 RX ADMIN — HEPARIN SODIUM SCH UNIT: 5000 INJECTION INTRAVENOUS; SUBCUTANEOUS at 16:42

## 2022-06-28 RX ADMIN — IPRATROPIUM BROMIDE AND ALBUTEROL SULFATE SCH ML: .5; 3 SOLUTION RESPIRATORY (INHALATION) at 16:04

## 2022-06-28 RX ADMIN — DOCUSATE SODIUM AND SENNOSIDES SCH EACH: 50; 8.6 TABLET ORAL at 22:19

## 2022-06-28 RX ADMIN — METOPROLOL TARTRATE SCH MG: 25 TABLET, FILM COATED ORAL at 08:53

## 2022-06-28 RX ADMIN — METOPROLOL TARTRATE SCH MG: 25 TABLET, FILM COATED ORAL at 23:04

## 2022-06-28 RX ADMIN — IPRATROPIUM BROMIDE AND ALBUTEROL SULFATE SCH ML: .5; 3 SOLUTION RESPIRATORY (INHALATION) at 12:43

## 2022-06-28 RX ADMIN — SODIUM CHLORIDE, PRESERVATIVE FREE SCH ML: 5 INJECTION INTRAVENOUS at 22:20

## 2022-06-28 RX ADMIN — ALBUMIN (HUMAN) PRN MLS/HR: 2.5 SOLUTION INTRAVENOUS at 15:07

## 2022-06-28 RX ADMIN — POTASSIUM CHLORIDE SCH MLS/HR: 14.9 INJECTION, SOLUTION INTRAVENOUS at 17:43

## 2022-06-28 RX ADMIN — MUPIROCIN SCH APPLIC: 20 OINTMENT TOPICAL at 08:54

## 2022-06-28 RX ADMIN — HEPARIN SODIUM SCH UNIT: 5000 INJECTION INTRAVENOUS; SUBCUTANEOUS at 08:53

## 2022-06-28 RX ADMIN — NOREPINEPHRINE BITARTRATE SCH: 1 INJECTION, SOLUTION, CONCENTRATE INTRAVENOUS at 08:49

## 2022-06-28 RX ADMIN — MUPIROCIN SCH APPLIC: 20 OINTMENT TOPICAL at 22:19

## 2022-06-28 NOTE — P.PCN
Date of Procedure: 06/28/22


Preoperative Diagnosis: 


Coronary artery disease


Postoperative Diagnosis: 


Same


Procedure(s) Performed: 


Removal of intra-aortic balloon pump


Anesthesia: none


Assistant #1: Mel De La Fuente


Estimated Blood Loss (ml): 5


Condition: stable


Indications for Procedure: 


This is a 76-year-old female who was found to have triple vessel coronary artery

disease.  An intra-aortic balloon pump was placed in the operating room by Dr. Austin during the course of coronary artery bypass surgery yesterday.  She has 

done well overnight.  This morning she is hemodynamically stable and is no 

longer in need of intra-aortic balloon pump assistance.  Removal of the device 

was recommended.  The risks, benefits, alternatives to this procedure were discu

ssed with the patient.  All questions were answered.  Consent was obtained.


Description of Procedure: 


The right groin was examined.  There was no evidence of hematoma.  The balloon 

pump was turned off.  The pre-existing sheath and balloon were removed en néstor 

and artery was allowed to bleed both anterograde and retrograde for several 

beats.  Direct pressure was held over the site for 30 minutes.  There was no 

residual bleeding or hematoma noted.  The groin itself was soft.  The right 

lower extremity appeared warm and well perfused.  There were no immediate 

complications.  She remained hemodynamically stable with a good follow-up 

cardiac index.

## 2022-06-28 NOTE — XR
EXAMINATION TYPE: XR chest 1V portable

 

DATE OF EXAM: 6/28/2022

 

COMPARISON: Today

 

HISTORY: Hypoxemia

 

TECHNIQUE:

 

FINDINGS: There is poor inspiration. There is some atelectasis and pleural reaction at the lung bases
. There is right jugular catheter with tip in the main pulmonary artery. There are sternal wires. The
re is a drain over the left chest.

 

 

IMPRESSION:

There is some pleural reaction and atelectasis at the lung bases increased comparing to exam this mor
yumiko. No heart failure seen.

## 2022-06-28 NOTE — P.PN
Subjective


Progress Note Date: 06/28/22





PROGRESS NOTE


The patient is a 76-year-old female with history of carotid vascular disease who

presented with symptoms of progressive chest discomfort and an abnormal MPI.  

Underwent cardiac catheterization on the 24th and was found to have severe 

distal left main disease with mild-to-moderate disease in the RCA.  She is 

scheduled to undergo CABG today.  Her echo showed an ejection fraction of 50% 

with mild lateral wall hypokinesis and mild-to-moderate mitral regurgitation.  

She's feeling well this morning, denies any chest discomfort or dizziness.  She 

is in sinus mechanism.  Scheduled to undergo CABG this afternoon.  She continues

to be on aspirin, Lipitor 80 mg daily, isosorbide mononitrate 15 mg daily, 

lisinopril 30 mg daily, metoprolol succinate 25 mg daily





June 28:


The patient underwent off-pump CABG yesterday with LIMA to the LAD and SVG to 

the OM with ligation of the left atrial appendage and placement of intra-aortic 

balloon pump because of worsening ischemia at the start of surgery.  She had 

atrial arrhythmia requiring cardioversion earlier.  She is extubated, in sinus 

mechanism, intra-aortic balloon pump at 1:2 with good blood pressure and urinary

output.  She is on no vasopressor.  She is awake, alert and following commands. 

At the end of the procedure she had a good ejection fraction with mild mitral 

regurgitation.


She continues to be on aspirin, Lipitor 80 mg daily, Plavix 75 mg daily, 

metoprolol tartrate 12-1/2 mg twice a day.


PHYSICAL EXAMINATION:


Blood pressure 135/50 with a heart rate in the 80s


LUNGS: Clear to auscultation anteriorly


HEART: Regular rate and rhythm, S1, S2. No S3.  systolic murmur at the base


ABDOMEN: Soft, nontender, no organomegaly


EXTREMETIES: No edema, right groin was intra-aortic balloon pump





LAB:


Chest x-ray shows mild congestion, hemoglobin 9.3, BUN 14, creatinine 0.66.  

Potassium 4.0.


IMPRESSION:


1.  Status post CABG, LIMA to the LAD and SVG to obtuse marginal branch with 

known severe left main disease.


2.  Intra-aortic balloon pump


3.  History of hyperlipidemia 


PLAN:


1.  Wean intra-aortic balloon pump and probable removed today


2.  Incentive spirometry


3.  Depending on blood pressure further adjustment of medications. 








Objective





- Vital Signs


Vital signs: 


                                   Vital Signs











Temp  97.4 F L  06/27/22 11:30


 


Pulse  85   06/28/22 07:00


 


Resp  18   06/28/22 07:00


 


BP  152/77   06/27/22 11:30


 


Pulse Ox  92 L  06/28/22 07:00


 


FiO2  50   06/27/22 21:29








                                 Intake & Output











 06/27/22 06/28/22 06/28/22





 18:59 06:59 18:59


 


Intake Total 844.568 0526.448 90.5


 


Output Total 1100 809 15


 


Balance -262.998 616.448 75.5


 


Weight  113.6 kg 


 


Intake:   


 


  .5 1236.0 90.5


 


    0.9 Pressure bag 9 108 9


 


    ACETAMINOPHEN IV (For NPO 0 100 





    ) 1,000 mg In Empty Bag 1   





    bag @ 400 mls/hr IVPB   





    Q6HR [facility] Rx#:[id number]   


 


    Albumin Human 5% 250 ml 250  





    In Empty Bag 1 bag @ 250   





    mls/hr IVPB Q1HR PRN Rx#:   





    [id number]   


 


    CO/CI 20 310 30


 


    Lactated Ringers 1,000 ml 50 600 50





    @ 50 mls/hr IV .Q20H [facility]   





    Rx#:[id number]   


 


    Nitroglycerin-D5w Pmx 50 1.5 18.0 1.5





    mg In Dextrose/Water 1   





    250ml.bag @ 5 MCG/MIN 1.5   





    mls/hr IV .Q24H [facility] Rx#:   





    [id number]   


 


    ceFAZolin 2 gm In Sodium 0 100 





    Chloride 0.9% 50 ml @ 100   





    mls/hr IVPB Q8H [facility] Rx#:   





    [id number]   


 


  Intake, IV Titration 253.502 89.448 





  Amount   


 


    Heparin Sod,Pork in 0.45% 250  





    NaCl 25,000 unit In 0.45   





    % NaCl 1 250ml.bag @ 9.76   





    UNITS/KG/HR 10.004 mls/   





    hr IV .Q24H [facility] Rx#:   





    [id number]   


 


    Insulin Regular 100 unit  48.220 





    In Sodium Chloride 0.9%   





    100 ml @ Per Protocol IV   





    .Q0M [facility] Rx#:[id number]   


 


    Norepinephrine 4 mg In  26.293 





    Sodium Chloride 0.9% 250   





    ml @ 0.05 MCG/KG/MIN 19.   





    622 mls/hr IV .[id number]   





    [facility] Rx#:[id number]   


 


    propofoL 1,000 mg In 3.502 14.935 





    Empty Bag 1 bag @ Titrate   





    IV .Q0M [facility] Rx#:   





    [id number]   


 


  Oral  100 


 


Output:   


 


  Chest Tube Drainage  446 


 


    Chest Tube Left Left  245 





    Pleural/Mediastinal   


 


    Chest Tube Mediastinal  201 


 


  Urine 300 363 15


 


  Estimated Blood Loss 800  


 


Other:   


 


  Voiding Method  Indwelling Catheter 








                       ABP, PAP, CO, CI - Last Documented











Arterial Blood Pressure        135/52


 


Pulmonary Artery Pressure      38/24


 


Cardiac Output                 4.6


 


Cardiac Index                  2.2

















- Labs


CBC & Chem 7: 


                                 06/28/22 04:00





                                 06/28/22 04:00


Labs: 


                  Abnormal Lab Results - Last 24 Hours (Table)











  06/26/22 06/27/22 06/27/22 Range/Units





  09:57 06:05 06:05 


 


WBC     (3.8-10.6)  k/uL


 


RBC     (3.80-5.40)  m/uL


 


Hgb     (11.4-16.0)  gm/dL


 


Hct     (34.0-46.0)  %


 


Neutrophils #     (1.3-7.7)  k/uL


 


Lymphocytes #     (1.0-4.8)  k/uL


 


PT     (9.0-12.0)  sec


 


INR     (<1.2)  


 


APTT   55.0 H   (22.0-30.0)  sec


 


ABG pH     (7.35-7.45)  


 


ABG pCO2     (35-45)  mmHg


 


ABG pO2     ()  mmHg


 


ABG HCO3     (21-25)  mmol/L


 


ABG Total CO2     (19-24)  mmol/L


 


ABG O2 Saturation     (94-97)  %


 


ABG Hematocrit     (34.0-46.0)  %


 


ABG Glucose     (75-99)  mg/dL


 


ABG Lactic Acid     (0.5-1.6)  mmol/L


 


Hemoglobin     (11.4-16.0)  gm/dL


 


Chloride    110 H  ()  mmol/L


 


Carbon Dioxide    20 L  (22-30)  mmol/L


 


Glucose    112 H  (74-99)  mg/dL


 


POC Glucose (mg/dL)     ()  mg/dL


 


Calcium     (8.4-10.2)  mg/dL


 


AST     (14-36)  U/L


 


ALT     (4-34)  U/L


 


Total Protein     (6.3-8.2)  g/dL


 


Albumin     (3.5-5.0)  g/dL


 


Arterial Blood Glucose     (75-99)  mg/dL


 


Crossmatch  See Detail    














  06/27/22 06/27/22 06/27/22 Range/Units





  13:45 15:33 16:31 


 


WBC     (3.8-10.6)  k/uL


 


RBC     (3.80-5.40)  m/uL


 


Hgb     (11.4-16.0)  gm/dL


 


Hct     (34.0-46.0)  %


 


Neutrophils #     (1.3-7.7)  k/uL


 


Lymphocytes #     (1.0-4.8)  k/uL


 


PT     (9.0-12.0)  sec


 


INR     (<1.2)  


 


APTT     (22.0-30.0)  sec


 


ABG pH    7.34 L  (7.35-7.45)  


 


ABG pCO2     (35-45)  mmHg


 


ABG pO2  168 H  82 L  187 H  ()  mmHg


 


ABG HCO3    20 L  (21-25)  mmol/L


 


ABG Total CO2     (19-24)  mmol/L


 


ABG O2 Saturation  99.3 H   99.3 H  (94-97)  %


 


ABG Hematocrit  33 L  33 L  30 L  (34.0-46.0)  %


 


ABG Glucose   139 H  227 H  (75-99)  mg/dL


 


ABG Lactic Acid    2.7 H*  (0.5-1.6)  mmol/L


 


Hemoglobin  10.9 L  10.6 L  9.8 L  (11.4-16.0)  gm/dL


 


Chloride     ()  mmol/L


 


Carbon Dioxide     (22-30)  mmol/L


 


Glucose     (74-99)  mg/dL


 


POC Glucose (mg/dL)     ()  mg/dL


 


Calcium     (8.4-10.2)  mg/dL


 


AST     (14-36)  U/L


 


ALT     (4-34)  U/L


 


Total Protein     (6.3-8.2)  g/dL


 


Albumin     (3.5-5.0)  g/dL


 


Arterial Blood Glucose   139 H  227 H  (75-99)  mg/dL


 


Crossmatch     














  06/27/22 06/27/22 06/27/22 Range/Units





  17:07 18:30 18:32 


 


WBC    11.6 H  (3.8-10.6)  k/uL


 


RBC    3.19 L  (3.80-5.40)  m/uL


 


Hgb    9.4 L D  (11.4-16.0)  gm/dL


 


Hct    29.0 L  (34.0-46.0)  %


 


Neutrophils #    10.4 H  (1.3-7.7)  k/uL


 


Lymphocytes #    0.7 L  (1.0-4.8)  k/uL


 


PT     (9.0-12.0)  sec


 


INR     (<1.2)  


 


APTT     (22.0-30.0)  sec


 


ABG pH  7.34 L    (7.35-7.45)  


 


ABG pCO2     (35-45)  mmHg


 


ABG pO2     ()  mmHg


 


ABG HCO3     (21-25)  mmol/L


 


ABG Total CO2     (19-24)  mmol/L


 


ABG O2 Saturation  97.3 H    (94-97)  %


 


ABG Hematocrit  30 L    (34.0-46.0)  %


 


ABG Glucose  179 H    (75-99)  mg/dL


 


ABG Lactic Acid  2.8 H*    (0.5-1.6)  mmol/L


 


Hemoglobin  9.6 L    (11.4-16.0)  gm/dL


 


Chloride     ()  mmol/L


 


Carbon Dioxide     (22-30)  mmol/L


 


Glucose     (74-99)  mg/dL


 


POC Glucose (mg/dL)   165 H   ()  mg/dL


 


Calcium     (8.4-10.2)  mg/dL


 


AST     (14-36)  U/L


 


ALT     (4-34)  U/L


 


Total Protein     (6.3-8.2)  g/dL


 


Albumin     (3.5-5.0)  g/dL


 


Arterial Blood Glucose  179 H    (75-99)  mg/dL


 


Crossmatch     














  06/27/22 06/27/22 06/27/22 Range/Units





  18:32 18:32 19:03 


 


WBC     (3.8-10.6)  k/uL


 


RBC     (3.80-5.40)  m/uL


 


Hgb     (11.4-16.0)  gm/dL


 


Hct     (34.0-46.0)  %


 


Neutrophils #     (1.3-7.7)  k/uL


 


Lymphocytes #     (1.0-4.8)  k/uL


 


PT  12.8 H    (9.0-12.0)  sec


 


INR  1.2 H    (<1.2)  


 


APTT     (22.0-30.0)  sec


 


ABG pH     (7.35-7.45)  


 


ABG pCO2     (35-45)  mmHg


 


ABG pO2     ()  mmHg


 


ABG HCO3     (21-25)  mmol/L


 


ABG Total CO2     (19-24)  mmol/L


 


ABG O2 Saturation     (94-97)  %


 


ABG Hematocrit     (34.0-46.0)  %


 


ABG Glucose     (75-99)  mg/dL


 


ABG Lactic Acid     (0.5-1.6)  mmol/L


 


Hemoglobin     (11.4-16.0)  gm/dL


 


Chloride   110 H   ()  mmol/L


 


Carbon Dioxide     (22-30)  mmol/L


 


Glucose   150 H   (74-99)  mg/dL


 


POC Glucose (mg/dL)    193 H  ()  mg/dL


 


Calcium   7.9 L   (8.4-10.2)  mg/dL


 


AST   59 H   (14-36)  U/L


 


ALT     (4-34)  U/L


 


Total Protein   4.7 L   (6.3-8.2)  g/dL


 


Albumin   3.0 L   (3.5-5.0)  g/dL


 


Arterial Blood Glucose     (75-99)  mg/dL


 


Crossmatch     














  06/27/22 06/27/22 06/27/22 Range/Units





  19:08 20:06 21:03 


 


WBC     (3.8-10.6)  k/uL


 


RBC     (3.80-5.40)  m/uL


 


Hgb     (11.4-16.0)  gm/dL


 


Hct     (34.0-46.0)  %


 


Neutrophils #     (1.3-7.7)  k/uL


 


Lymphocytes #     (1.0-4.8)  k/uL


 


PT     (9.0-12.0)  sec


 


INR     (<1.2)  


 


APTT     (22.0-30.0)  sec


 


ABG pH  7.32 L    (7.35-7.45)  


 


ABG pCO2  50 H    (35-45)  mmHg


 


ABG pO2  203 H    ()  mmHg


 


ABG HCO3  26 H    (21-25)  mmol/L


 


ABG Total CO2  27 H    (19-24)  mmol/L


 


ABG O2 Saturation  100.0 H    (94-97)  %


 


ABG Hematocrit     (34.0-46.0)  %


 


ABG Glucose     (75-99)  mg/dL


 


ABG Lactic Acid     (0.5-1.6)  mmol/L


 


Hemoglobin     (11.4-16.0)  gm/dL


 


Chloride     ()  mmol/L


 


Carbon Dioxide     (22-30)  mmol/L


 


Glucose     (74-99)  mg/dL


 


POC Glucose (mg/dL)   179 H  162 H  ()  mg/dL


 


Calcium     (8.4-10.2)  mg/dL


 


AST     (14-36)  U/L


 


ALT     (4-34)  U/L


 


Total Protein     (6.3-8.2)  g/dL


 


Albumin     (3.5-5.0)  g/dL


 


Arterial Blood Glucose     (75-99)  mg/dL


 


Crossmatch     














  06/27/22 06/27/22 06/27/22 Range/Units





  21:35 21:56 22:11 


 


WBC  11.8 H    (3.8-10.6)  k/uL


 


RBC  3.10 L    (3.80-5.40)  m/uL


 


Hgb  9.2 L    (11.4-16.0)  gm/dL


 


Hct  27.9 L    (34.0-46.0)  %


 


Neutrophils #  10.6 H    (1.3-7.7)  k/uL


 


Lymphocytes #  0.5 L    (1.0-4.8)  k/uL


 


PT     (9.0-12.0)  sec


 


INR     (<1.2)  


 


APTT     (22.0-30.0)  sec


 


ABG pH     (7.35-7.45)  


 


ABG pCO2     (35-45)  mmHg


 


ABG pO2    80 L  ()  mmHg


 


ABG HCO3     (21-25)  mmol/L


 


ABG Total CO2    26 H  (19-24)  mmol/L


 


ABG O2 Saturation    97.3 H  (94-97)  %


 


ABG Hematocrit     (34.0-46.0)  %


 


ABG Glucose     (75-99)  mg/dL


 


ABG Lactic Acid     (0.5-1.6)  mmol/L


 


Hemoglobin     (11.4-16.0)  gm/dL


 


Chloride     ()  mmol/L


 


Carbon Dioxide     (22-30)  mmol/L


 


Glucose     (74-99)  mg/dL


 


POC Glucose (mg/dL)   161 H   ()  mg/dL


 


Calcium     (8.4-10.2)  mg/dL


 


AST     (14-36)  U/L


 


ALT     (4-34)  U/L


 


Total Protein     (6.3-8.2)  g/dL


 


Albumin     (3.5-5.0)  g/dL


 


Arterial Blood Glucose     (75-99)  mg/dL


 


Crossmatch     














  06/27/22 06/28/22 06/28/22 Range/Units





  23:03 00:04 00:06 


 


WBC    10.9 H  (3.8-10.6)  k/uL


 


RBC    3.18 L  (3.80-5.40)  m/uL


 


Hgb    9.3 L  (11.4-16.0)  gm/dL


 


Hct    28.7 L  (34.0-46.0)  %


 


Neutrophils #    10.0 H  (1.3-7.7)  k/uL


 


Lymphocytes #    0.4 L  (1.0-4.8)  k/uL


 


PT     (9.0-12.0)  sec


 


INR     (<1.2)  


 


APTT     (22.0-30.0)  sec


 


ABG pH     (7.35-7.45)  


 


ABG pCO2     (35-45)  mmHg


 


ABG pO2     ()  mmHg


 


ABG HCO3     (21-25)  mmol/L


 


ABG Total CO2     (19-24)  mmol/L


 


ABG O2 Saturation     (94-97)  %


 


ABG Hematocrit     (34.0-46.0)  %


 


ABG Glucose     (75-99)  mg/dL


 


ABG Lactic Acid     (0.5-1.6)  mmol/L


 


Hemoglobin     (11.4-16.0)  gm/dL


 


Chloride     ()  mmol/L


 


Carbon Dioxide     (22-30)  mmol/L


 


Glucose     (74-99)  mg/dL


 


POC Glucose (mg/dL)  163 H  145 H   ()  mg/dL


 


Calcium     (8.4-10.2)  mg/dL


 


AST     (14-36)  U/L


 


ALT     (4-34)  U/L


 


Total Protein     (6.3-8.2)  g/dL


 


Albumin     (3.5-5.0)  g/dL


 


Arterial Blood Glucose     (75-99)  mg/dL


 


Crossmatch     














  06/28/22 06/28/22 06/28/22 Range/Units





  00:54 01:57 03:58 


 


WBC     (3.8-10.6)  k/uL


 


RBC     (3.80-5.40)  m/uL


 


Hgb     (11.4-16.0)  gm/dL


 


Hct     (34.0-46.0)  %


 


Neutrophils #     (1.3-7.7)  k/uL


 


Lymphocytes #     (1.0-4.8)  k/uL


 


PT     (9.0-12.0)  sec


 


INR     (<1.2)  


 


APTT     (22.0-30.0)  sec


 


ABG pH     (7.35-7.45)  


 


ABG pCO2     (35-45)  mmHg


 


ABG pO2     ()  mmHg


 


ABG HCO3     (21-25)  mmol/L


 


ABG Total CO2     (19-24)  mmol/L


 


ABG O2 Saturation     (94-97)  %


 


ABG Hematocrit     (34.0-46.0)  %


 


ABG Glucose     (75-99)  mg/dL


 


ABG Lactic Acid     (0.5-1.6)  mmol/L


 


Hemoglobin     (11.4-16.0)  gm/dL


 


Chloride     ()  mmol/L


 


Carbon Dioxide     (22-30)  mmol/L


 


Glucose     (74-99)  mg/dL


 


POC Glucose (mg/dL)  130 H  119 H  125 H  ()  mg/dL


 


Calcium     (8.4-10.2)  mg/dL


 


AST     (14-36)  U/L


 


ALT     (4-34)  U/L


 


Total Protein     (6.3-8.2)  g/dL


 


Albumin     (3.5-5.0)  g/dL


 


Arterial Blood Glucose     (75-99)  mg/dL


 


Crossmatch     














  06/28/22 06/28/22 06/28/22 Range/Units





  04:00 04:00 04:59 


 


WBC     (3.8-10.6)  k/uL


 


RBC  3.14 L    (3.80-5.40)  m/uL


 


Hgb  9.3 L    (11.4-16.0)  gm/dL


 


Hct  28.3 L    (34.0-46.0)  %


 


Neutrophils #  8.6 H    (1.3-7.7)  k/uL


 


Lymphocytes #  0.4 L    (1.0-4.8)  k/uL


 


PT     (9.0-12.0)  sec


 


INR     (<1.2)  


 


APTT     (22.0-30.0)  sec


 


ABG pH     (7.35-7.45)  


 


ABG pCO2     (35-45)  mmHg


 


ABG pO2     ()  mmHg


 


ABG HCO3     (21-25)  mmol/L


 


ABG Total CO2     (19-24)  mmol/L


 


ABG O2 Saturation     (94-97)  %


 


ABG Hematocrit     (34.0-46.0)  %


 


ABG Glucose     (75-99)  mg/dL


 


ABG Lactic Acid     (0.5-1.6)  mmol/L


 


Hemoglobin     (11.4-16.0)  gm/dL


 


Chloride     ()  mmol/L


 


Carbon Dioxide     (22-30)  mmol/L


 


Glucose   118 H   (74-99)  mg/dL


 


POC Glucose (mg/dL)    126 H  ()  mg/dL


 


Calcium     (8.4-10.2)  mg/dL


 


AST   85 H   (14-36)  U/L


 


ALT   41 H   (4-34)  U/L


 


Total Protein   5.1 L   (6.3-8.2)  g/dL


 


Albumin   3.4 L   (3.5-5.0)  g/dL


 


Arterial Blood Glucose     (75-99)  mg/dL


 


Crossmatch     














  06/28/22 Range/Units





  06:05 


 


WBC   (3.8-10.6)  k/uL


 


RBC   (3.80-5.40)  m/uL


 


Hgb   (11.4-16.0)  gm/dL


 


Hct   (34.0-46.0)  %


 


Neutrophils #   (1.3-7.7)  k/uL


 


Lymphocytes #   (1.0-4.8)  k/uL


 


PT   (9.0-12.0)  sec


 


INR   (<1.2)  


 


APTT   (22.0-30.0)  sec


 


ABG pH   (7.35-7.45)  


 


ABG pCO2   (35-45)  mmHg


 


ABG pO2   ()  mmHg


 


ABG HCO3   (21-25)  mmol/L


 


ABG Total CO2   (19-24)  mmol/L


 


ABG O2 Saturation   (94-97)  %


 


ABG Hematocrit   (34.0-46.0)  %


 


ABG Glucose   (75-99)  mg/dL


 


ABG Lactic Acid   (0.5-1.6)  mmol/L


 


Hemoglobin   (11.4-16.0)  gm/dL


 


Chloride   ()  mmol/L


 


Carbon Dioxide   (22-30)  mmol/L


 


Glucose   (74-99)  mg/dL


 


POC Glucose (mg/dL)  120 H  ()  mg/dL


 


Calcium   (8.4-10.2)  mg/dL


 


AST   (14-36)  U/L


 


ALT   (4-34)  U/L


 


Total Protein   (6.3-8.2)  g/dL


 


Albumin   (3.5-5.0)  g/dL


 


Arterial Blood Glucose   (75-99)  mg/dL


 


Crossmatch

## 2022-06-28 NOTE — XR
EXAMINATION TYPE: XR chest 1V portable

 

DATE OF EXAM: 6/28/2022

 

Comparison: 6/27/2022

 

Clinical History: 76-year-old male Post Operative Cardiac Surgery

 

Findings:

Median sternotomy wires are present with postoperative clips. Mediastinal drain. An additional radiop
aque marker projects over the left side of the heart at the lower third of the chest, slightly more i
nferior in position compared to prior exam. Clinical correlation recommended. Right IJ [place] cath
eter tip in the right main pulmonary artery. Interval extubation and removal of NG tube. Left-sided c
hest tube remains in place. Heart is enlarged. Interstitial density is slightly increased. Patchy ret
rocardiac opacity persists.

 

 

Impression:

 

1. Interval extubation with ongoing cardiomegaly but slight worsening aeration, possible mild pulmona
ry vascular congestion.

2. Radiopaque marker projects at the left side of the heart, slightly more inferior in position kirk
red to prior exam. Clinically correlate.

## 2022-06-28 NOTE — P.PN
Subjective


Progress Note Date: 06/28/22











This is a patient who had a recent heart catheterization, showing a critical and

complex lesion involving the distal left main coronary artery, also, the ostial 

left circumflex, and ostial LAD.  The lesions appear to be in the range of 80-

90%, and the patient was also noted to have elevated left ventricular end-

diastolic filling pressures.  The patient is currently being evaluated by 

cardiothoracic surgery for possible bypass grafting.  We will reassess to see 

the patient preoperatively, and evaluate her lung function.  The patient is a 

lifelong nonsmoker.  She has no history of lung disease.  Based on her FEV1, and

her MVV, she was in the low operative risk range.  Outpatient medications includ

ed amlodipine, aspirin, metoprolol, lisinopril, zinc, vitamin D3, ascorbic acid,

lactulose, and indoor.  Her only major medical problem is hypertension.  She 

does have a history of previous coronavirus infection and was seen by my partner

in December 2021.  CBC is completely normal.  PTT is 39.7.  Sodium 141, 

potassium 3.7, chlorides 111, CO2 24, BUN 12, creatinine 0.63.  Cholesterol was 

182.  Urine was negative.  Testing for coronavirus was negative.  Chest x-ray 

shows mild interstitial edema.  Currently she is on room air.





Progress note dated 06/26/2022.





76-year-old female we saw yesterday in consultation.  The patient has 

significant coronary disease, involving the left main coronary artery.  The 

patient is apparently going to have open heart surgery one day this week.  The 

exact day has not been decided yet.  Clinically, she's.  Stable.  She is on room

air.  She is a lifelong nonsmoker.  Lung function would suggest that she's at a 

very low increased operative risk based on her FEV1 and MVV.  Labs today show 

white count of 6.1, with a normal hemoglobin, hematocrit, and platelet count.  

The patient's PTT is 60.2.  Sodium 141, potassium 3.6, chlorides 111, CO2 24, 

BUN 12, creatinine 0.72.








06/27/2022, the patient is awaiting bypass surgery.  The patient is calm and 

comfortable.  No respiratory difficulties whatsoever.  She is using incentive 

spirometer.  Her preop FEV1 is order of 72% of predicted.  No angina.  No 

palpitations.  No chest pain.  She is hemodynamically stable at this point in 

time.  She remains on IV heparin.  I did introduce myself and I will take care o

f this patient postop, managed to ventilator and the necessity pulmonary care 

following her thoracotomy bypass surgery.no other active issues for now.  The 

patient was sent comes at 6.2 with a hemoglobin 4.5.  She is on IV heparin with 

a PTT of 55.  BUN is at 12 with a creatinine 0.6 and the sodium level is at 140.





06/28/2022, I'm seeing the patient for a follow-up.  The patient was taken to 

the operating room yesterday and the patient underwent an off-pump coronary 

artery bypass surgery with LIMA to LAD and saphenous vein graft to obtuse 

marginal.  The patient also had a left facial appendage clipping.  Estimated 

blood loss was 400 mL.  The patient received a total of 3 L of intraoperative IV

fluids.  Note that the patient was having issues with hypotension and there was 

some interval worsening of the mitral regurgitation intraoperatively.  At that 

point, it was decided to insert an intra-aortic balloon pump and this was done 

through the right common femoral artery.  Once the intra-aortic balloon pump was

initiated, the patient's hemodynamics improved dramatically.  The patient was 

given a postop echo cardiac exam that showed a mild MR and good ejection 

fraction.  The intra-aortic balloon pump was placed on a one-to-one augmentation

and following that the patient was brought into the intensive care unit.  The 

patient subsequently was weaned off the sedation and the patient was extubated 

without any major difficulties within a few hours.  The patient was extubated 

and the fourth hours after arriving to the ICU.  The patient had with good 

weaning parameters.  The patient had a blood gas that showed adequate 

oxygenation and ventilation.  Based on that, the patient was extubated.  This 

morning, the patient remains on oxygen at 6 L per minute nasal cannula.  The 

chest x-ray showing cardiomegaly.  The patient is a mediastinal and left pleural

chest tube.  Output from the chest tubes have been 250 mL from the mediastinum 

and 280 from the left pleural since surgery.  No evidence of any air leak.  The 

chest x-ray shows no evidence of any pneumothorax.  Hemodynamically, the patient

is currently receiving intra-aortic balloon pump augmentation of 1-2.  The 

patient has adequate cardiac output of 4.3 and an index of 2.  She is on no 

pressors for now.  The intra-aortic balloon pump will be removed for now.  The 

patient is stable to systemic adequate blood pressure was the patient being off 

the balloon pump.  Urine output is in order of 20 mL an hour.  The patient is a

febrile.  The patient is awake and following commands thoracic questions 

appropriately.  Pulmonary artery pressures are 38 over 18 mmHg.  Lower blood 

work from work today is showing a sodium of 139, potassium of 4, bicarb of 26, 

BUN of 14 with a creatinine of 0.6.  The white cell count is at 9.7 with a 

hemoglobin of 9.3 and a platelet count of 195.  AST is 85 with an ALT of 60 and 

alkaline phosphatase of 41.  Magnesium level is at 2.1.  Note that the patient 

had a run of atrial fibrillation intraoperatively.  The patient was loaded with 

amiodarone and currently the patient is on 0.5 mg per minute of amiodarone 

infusion.  The patient is in a normal sinus rhythm.  The patient is also on an 

insulin drip running at 2.5 units an hour with adequate blood sugar control.  

The patient's of lactated Ringer at the rate of 50 mL an hour.  As mentioned, 

awake and alert and the sternum is dry clean and intact.  No other issues 

otherwise for now.





Objective





- Vital Signs


Vital signs: 


                                   Vital Signs











Temp  98.8 F   06/28/22 08:00


 


Pulse  85   06/28/22 08:00


 


Resp  17   06/28/22 08:00


 


BP  152/77   06/27/22 11:30


 


Pulse Ox  97   06/28/22 08:00


 


FiO2  50   06/27/22 21:29








                                 Intake & Output











 06/27/22 06/28/22 06/28/22





 18:59 06:59 18:59


 


Intake Total 730.891 4947.448 339.5


 


Output Total 1100 809 82


 


Balance -262.998 616.448 257.5


 


Weight  113.6 kg 


 


Intake:   


 


  .5 1236.0 189.5


 


    0.9 Pressure bag 9 108 18


 


    ACETAMINOPHEN IV (For NPO 0 100 





    ) 1,000 mg In Empty Bag 1   





    bag @ 400 mls/hr IVPB   





    Q6HR [facility] Rx#:[id number]   


 


    Albumin Human 5% 250 ml 250  





    In Empty Bag 1 bag @ 250   





    mls/hr IVPB Q1HR PRN Rx#:   





    [id number]   


 


    CO/CI 20 310 70


 


    Lactated Ringers 1,000 ml 50 600 100





    @ 50 mls/hr IV .Q20H [facility]   





    Rx#:[id number]   


 


    Nitroglycerin-D5w Pmx 50 1.5 18.0 1.5





    mg In Dextrose/Water 1   





    250ml.bag @ 5 MCG/MIN 1.5   





    mls/hr IV .Q24H [facility] Rx#:   





    [id number]   


 


    ceFAZolin 2 gm In Sodium 0 100 





    Chloride 0.9% 50 ml @ 100   





    mls/hr IVPB Q8H [facility] Rx#:   





    [id number]   


 


  Intake, IV Titration 253.502 89.448 0





  Amount   


 


    Heparin Sod,Pork in 0.45% 250  





    NaCl 25,000 unit In 0.45   





    % NaCl 1 250ml.bag @ 9.76   





    UNITS/KG/HR 10.004 mls/   





    hr IV .Q24H [facility] Rx#:   





    [id number]   


 


    Insulin Regular 100 unit  48.220 0





    In Sodium Chloride 0.9%   





    100 ml @ Per Protocol IV   





    .Q0M [facility] Rx#:[id number]   


 


    Norepinephrine 4 mg In  26.293 





    Sodium Chloride 0.9% 250   





    ml @ 0.05 MCG/KG/MIN 19.   





    622 mls/hr IV .[id number]   





    LYEL Rx#:[id number]   


 


    propofoL 1,000 mg In 3.502 14.935 





    Empty Bag 1 bag @ Titrate   





    IV .Q0M [facility] Rx#:   





    [id number]   


 


  Oral  100 150


 


Output:   


 


  Chest Tube Drainage  446 50


 


    Chest Tube Left Left  245 30





    Pleural/Mediastinal   


 


    Chest Tube Mediastinal  201 20


 


  Urine 300 363 32


 


  Estimated Blood Loss 800  


 


Other:   


 


  Voiding Method  Indwelling Catheter Indwelling Catheter








                       ABP, PAP, CO, CI - Last Documented











Arterial Blood Pressure        119/48


 


Pulmonary Artery Pressure      31/17


 


Cardiac Output                 4.3


 


Cardiac Index                  2

















- Exam








No acute distress, oriented 3.  The patient is currently on 6 L of oxygen by 

nasal cannula and the patient is laying down comfortably in bed.  No signs of 

any significant respiratory distress at this point in time.  The patient is calm

and comfortable.  She has a right IJ [place] catheter in place.  





Head exam was generally normal. There was no scleral icterus or corneal arcus. 

Mucous membranes were moist.





HEENT examination is grossly u in her leftnremarkable.  





Neck supple.  Full range of motion.  No adenopathy thyromegaly or neck vein 

distention.





Cardiovascular examination reveals regular rhythm rate.  S1-S2 normal.  No S3 or

S4.  No discernible murmur noted.    The patient has a mediastinal chest tube in

the left pleural chest tube.  Sternum stable clean and intact and the surgical 

wound site is dry.  .





Lungs reveal clear breath sounds.  Breath sounds are equal bilaterally.  No 

adventitious lung sounds including wheezes rhonchi or crackles.





Abdomen soft bowel sounds are heard.  No masses or tenderness.





Extremities are intact.  No cyanosis clubbing or edema.





Skin is without rash or lesion.The patient has an intra-aortic balloon pump 

inserted through the right groin.  Exit site is dry clean and intact.





Neurologic examination is brief but nonfocal.





- Labs


CBC & Chem 7: 


                                 06/28/22 04:00





                                 06/28/22 04:00


Labs: 


                  Abnormal Lab Results - Last 24 Hours (Table)











  06/26/22 06/27/22 06/27/22 Range/Units





  09:57 13:45 15:33 


 


WBC     (3.8-10.6)  k/uL


 


RBC     (3.80-5.40)  m/uL


 


Hgb     (11.4-16.0)  gm/dL


 


Hct     (34.0-46.0)  %


 


Neutrophils #     (1.3-7.7)  k/uL


 


Lymphocytes #     (1.0-4.8)  k/uL


 


PT     (9.0-12.0)  sec


 


INR     (<1.2)  


 


ABG pH     (7.35-7.45)  


 


ABG pCO2     (35-45)  mmHg


 


ABG pO2   168 H  82 L  ()  mmHg


 


ABG HCO3     (21-25)  mmol/L


 


ABG Total CO2     (19-24)  mmol/L


 


ABG O2 Saturation   99.3 H   (94-97)  %


 


ABG Hematocrit   33 L  33 L  (34.0-46.0)  %


 


ABG Glucose    139 H  (75-99)  mg/dL


 


ABG Lactic Acid     (0.5-1.6)  mmol/L


 


Hemoglobin   10.9 L  10.6 L  (11.4-16.0)  gm/dL


 


Chloride     ()  mmol/L


 


Glucose     (74-99)  mg/dL


 


POC Glucose (mg/dL)     ()  mg/dL


 


Calcium     (8.4-10.2)  mg/dL


 


AST     (14-36)  U/L


 


ALT     (4-34)  U/L


 


Total Protein     (6.3-8.2)  g/dL


 


Albumin     (3.5-5.0)  g/dL


 


Arterial Blood Glucose    139 H  (75-99)  mg/dL


 


Crossmatch  See Detail    














  06/27/22 06/27/22 06/27/22 Range/Units





  16:31 17:07 18:30 


 


WBC     (3.8-10.6)  k/uL


 


RBC     (3.80-5.40)  m/uL


 


Hgb     (11.4-16.0)  gm/dL


 


Hct     (34.0-46.0)  %


 


Neutrophils #     (1.3-7.7)  k/uL


 


Lymphocytes #     (1.0-4.8)  k/uL


 


PT     (9.0-12.0)  sec


 


INR     (<1.2)  


 


ABG pH  7.34 L  7.34 L   (7.35-7.45)  


 


ABG pCO2     (35-45)  mmHg


 


ABG pO2  187 H    ()  mmHg


 


ABG HCO3  20 L    (21-25)  mmol/L


 


ABG Total CO2     (19-24)  mmol/L


 


ABG O2 Saturation  99.3 H  97.3 H   (94-97)  %


 


ABG Hematocrit  30 L  30 L   (34.0-46.0)  %


 


ABG Glucose  227 H  179 H   (75-99)  mg/dL


 


ABG Lactic Acid  2.7 H*  2.8 H*   (0.5-1.6)  mmol/L


 


Hemoglobin  9.8 L  9.6 L   (11.4-16.0)  gm/dL


 


Chloride     ()  mmol/L


 


Glucose     (74-99)  mg/dL


 


POC Glucose (mg/dL)    165 H  ()  mg/dL


 


Calcium     (8.4-10.2)  mg/dL


 


AST     (14-36)  U/L


 


ALT     (4-34)  U/L


 


Total Protein     (6.3-8.2)  g/dL


 


Albumin     (3.5-5.0)  g/dL


 


Arterial Blood Glucose  227 H  179 H   (75-99)  mg/dL


 


Crossmatch     














  06/27/22 06/27/22 06/27/22 Range/Units





  18:32 18:32 18:32 


 


WBC  11.6 H    (3.8-10.6)  k/uL


 


RBC  3.19 L    (3.80-5.40)  m/uL


 


Hgb  9.4 L D    (11.4-16.0)  gm/dL


 


Hct  29.0 L    (34.0-46.0)  %


 


Neutrophils #  10.4 H    (1.3-7.7)  k/uL


 


Lymphocytes #  0.7 L    (1.0-4.8)  k/uL


 


PT   12.8 H   (9.0-12.0)  sec


 


INR   1.2 H   (<1.2)  


 


ABG pH     (7.35-7.45)  


 


ABG pCO2     (35-45)  mmHg


 


ABG pO2     ()  mmHg


 


ABG HCO3     (21-25)  mmol/L


 


ABG Total CO2     (19-24)  mmol/L


 


ABG O2 Saturation     (94-97)  %


 


ABG Hematocrit     (34.0-46.0)  %


 


ABG Glucose     (75-99)  mg/dL


 


ABG Lactic Acid     (0.5-1.6)  mmol/L


 


Hemoglobin     (11.4-16.0)  gm/dL


 


Chloride    110 H  ()  mmol/L


 


Glucose    150 H  (74-99)  mg/dL


 


POC Glucose (mg/dL)     ()  mg/dL


 


Calcium    7.9 L  (8.4-10.2)  mg/dL


 


AST    59 H  (14-36)  U/L


 


ALT     (4-34)  U/L


 


Total Protein    4.7 L  (6.3-8.2)  g/dL


 


Albumin    3.0 L  (3.5-5.0)  g/dL


 


Arterial Blood Glucose     (75-99)  mg/dL


 


Crossmatch     














  06/27/22 06/27/22 06/27/22 Range/Units





  19:03 19:08 20:06 


 


WBC     (3.8-10.6)  k/uL


 


RBC     (3.80-5.40)  m/uL


 


Hgb     (11.4-16.0)  gm/dL


 


Hct     (34.0-46.0)  %


 


Neutrophils #     (1.3-7.7)  k/uL


 


Lymphocytes #     (1.0-4.8)  k/uL


 


PT     (9.0-12.0)  sec


 


INR     (<1.2)  


 


ABG pH   7.32 L   (7.35-7.45)  


 


ABG pCO2   50 H   (35-45)  mmHg


 


ABG pO2   203 H   ()  mmHg


 


ABG HCO3   26 H   (21-25)  mmol/L


 


ABG Total CO2   27 H   (19-24)  mmol/L


 


ABG O2 Saturation   100.0 H   (94-97)  %


 


ABG Hematocrit     (34.0-46.0)  %


 


ABG Glucose     (75-99)  mg/dL


 


ABG Lactic Acid     (0.5-1.6)  mmol/L


 


Hemoglobin     (11.4-16.0)  gm/dL


 


Chloride     ()  mmol/L


 


Glucose     (74-99)  mg/dL


 


POC Glucose (mg/dL)  193 H   179 H  ()  mg/dL


 


Calcium     (8.4-10.2)  mg/dL


 


AST     (14-36)  U/L


 


ALT     (4-34)  U/L


 


Total Protein     (6.3-8.2)  g/dL


 


Albumin     (3.5-5.0)  g/dL


 


Arterial Blood Glucose     (75-99)  mg/dL


 


Crossmatch     














  06/27/22 06/27/22 06/27/22 Range/Units





  21:03 21:35 21:56 


 


WBC   11.8 H   (3.8-10.6)  k/uL


 


RBC   3.10 L   (3.80-5.40)  m/uL


 


Hgb   9.2 L   (11.4-16.0)  gm/dL


 


Hct   27.9 L   (34.0-46.0)  %


 


Neutrophils #   10.6 H   (1.3-7.7)  k/uL


 


Lymphocytes #   0.5 L   (1.0-4.8)  k/uL


 


PT     (9.0-12.0)  sec


 


INR     (<1.2)  


 


ABG pH     (7.35-7.45)  


 


ABG pCO2     (35-45)  mmHg


 


ABG pO2     ()  mmHg


 


ABG HCO3     (21-25)  mmol/L


 


ABG Total CO2     (19-24)  mmol/L


 


ABG O2 Saturation     (94-97)  %


 


ABG Hematocrit     (34.0-46.0)  %


 


ABG Glucose     (75-99)  mg/dL


 


ABG Lactic Acid     (0.5-1.6)  mmol/L


 


Hemoglobin     (11.4-16.0)  gm/dL


 


Chloride     ()  mmol/L


 


Glucose     (74-99)  mg/dL


 


POC Glucose (mg/dL)  162 H   161 H  ()  mg/dL


 


Calcium     (8.4-10.2)  mg/dL


 


AST     (14-36)  U/L


 


ALT     (4-34)  U/L


 


Total Protein     (6.3-8.2)  g/dL


 


Albumin     (3.5-5.0)  g/dL


 


Arterial Blood Glucose     (75-99)  mg/dL


 


Crossmatch     














  06/27/22 06/27/22 06/28/22 Range/Units





  22:11 23:03 00:04 


 


WBC     (3.8-10.6)  k/uL


 


RBC     (3.80-5.40)  m/uL


 


Hgb     (11.4-16.0)  gm/dL


 


Hct     (34.0-46.0)  %


 


Neutrophils #     (1.3-7.7)  k/uL


 


Lymphocytes #     (1.0-4.8)  k/uL


 


PT     (9.0-12.0)  sec


 


INR     (<1.2)  


 


ABG pH     (7.35-7.45)  


 


ABG pCO2     (35-45)  mmHg


 


ABG pO2  80 L    ()  mmHg


 


ABG HCO3     (21-25)  mmol/L


 


ABG Total CO2  26 H    (19-24)  mmol/L


 


ABG O2 Saturation  97.3 H    (94-97)  %


 


ABG Hematocrit     (34.0-46.0)  %


 


ABG Glucose     (75-99)  mg/dL


 


ABG Lactic Acid     (0.5-1.6)  mmol/L


 


Hemoglobin     (11.4-16.0)  gm/dL


 


Chloride     ()  mmol/L


 


Glucose     (74-99)  mg/dL


 


POC Glucose (mg/dL)   163 H  145 H  ()  mg/dL


 


Calcium     (8.4-10.2)  mg/dL


 


AST     (14-36)  U/L


 


ALT     (4-34)  U/L


 


Total Protein     (6.3-8.2)  g/dL


 


Albumin     (3.5-5.0)  g/dL


 


Arterial Blood Glucose     (75-99)  mg/dL


 


Crossmatch     














  06/28/22 06/28/22 06/28/22 Range/Units





  00:06 00:54 01:57 


 


WBC  10.9 H    (3.8-10.6)  k/uL


 


RBC  3.18 L    (3.80-5.40)  m/uL


 


Hgb  9.3 L    (11.4-16.0)  gm/dL


 


Hct  28.7 L    (34.0-46.0)  %


 


Neutrophils #  10.0 H    (1.3-7.7)  k/uL


 


Lymphocytes #  0.4 L    (1.0-4.8)  k/uL


 


PT     (9.0-12.0)  sec


 


INR     (<1.2)  


 


ABG pH     (7.35-7.45)  


 


ABG pCO2     (35-45)  mmHg


 


ABG pO2     ()  mmHg


 


ABG HCO3     (21-25)  mmol/L


 


ABG Total CO2     (19-24)  mmol/L


 


ABG O2 Saturation     (94-97)  %


 


ABG Hematocrit     (34.0-46.0)  %


 


ABG Glucose     (75-99)  mg/dL


 


ABG Lactic Acid     (0.5-1.6)  mmol/L


 


Hemoglobin     (11.4-16.0)  gm/dL


 


Chloride     ()  mmol/L


 


Glucose     (74-99)  mg/dL


 


POC Glucose (mg/dL)   130 H  119 H  ()  mg/dL


 


Calcium     (8.4-10.2)  mg/dL


 


AST     (14-36)  U/L


 


ALT     (4-34)  U/L


 


Total Protein     (6.3-8.2)  g/dL


 


Albumin     (3.5-5.0)  g/dL


 


Arterial Blood Glucose     (75-99)  mg/dL


 


Crossmatch     














  06/28/22 06/28/22 06/28/22 Range/Units





  03:58 04:00 04:00 


 


WBC     (3.8-10.6)  k/uL


 


RBC   3.14 L   (3.80-5.40)  m/uL


 


Hgb   9.3 L   (11.4-16.0)  gm/dL


 


Hct   28.3 L   (34.0-46.0)  %


 


Neutrophils #   8.6 H   (1.3-7.7)  k/uL


 


Lymphocytes #   0.4 L   (1.0-4.8)  k/uL


 


PT     (9.0-12.0)  sec


 


INR     (<1.2)  


 


ABG pH     (7.35-7.45)  


 


ABG pCO2     (35-45)  mmHg


 


ABG pO2     ()  mmHg


 


ABG HCO3     (21-25)  mmol/L


 


ABG Total CO2     (19-24)  mmol/L


 


ABG O2 Saturation     (94-97)  %


 


ABG Hematocrit     (34.0-46.0)  %


 


ABG Glucose     (75-99)  mg/dL


 


ABG Lactic Acid     (0.5-1.6)  mmol/L


 


Hemoglobin     (11.4-16.0)  gm/dL


 


Chloride     ()  mmol/L


 


Glucose    118 H  (74-99)  mg/dL


 


POC Glucose (mg/dL)  125 H    ()  mg/dL


 


Calcium     (8.4-10.2)  mg/dL


 


AST    85 H  (14-36)  U/L


 


ALT    41 H  (4-34)  U/L


 


Total Protein    5.1 L  (6.3-8.2)  g/dL


 


Albumin    3.4 L  (3.5-5.0)  g/dL


 


Arterial Blood Glucose     (75-99)  mg/dL


 


Crossmatch     














  06/28/22 06/28/22 06/28/22 Range/Units





  04:59 06:05 07:58 


 


WBC     (3.8-10.6)  k/uL


 


RBC     (3.80-5.40)  m/uL


 


Hgb     (11.4-16.0)  gm/dL


 


Hct     (34.0-46.0)  %


 


Neutrophils #     (1.3-7.7)  k/uL


 


Lymphocytes #     (1.0-4.8)  k/uL


 


PT     (9.0-12.0)  sec


 


INR     (<1.2)  


 


ABG pH     (7.35-7.45)  


 


ABG pCO2     (35-45)  mmHg


 


ABG pO2     ()  mmHg


 


ABG HCO3     (21-25)  mmol/L


 


ABG Total CO2     (19-24)  mmol/L


 


ABG O2 Saturation     (94-97)  %


 


ABG Hematocrit     (34.0-46.0)  %


 


ABG Glucose     (75-99)  mg/dL


 


ABG Lactic Acid     (0.5-1.6)  mmol/L


 


Hemoglobin     (11.4-16.0)  gm/dL


 


Chloride     ()  mmol/L


 


Glucose     (74-99)  mg/dL


 


POC Glucose (mg/dL)  126 H  120 H  120 H  ()  mg/dL


 


Calcium     (8.4-10.2)  mg/dL


 


AST     (14-36)  U/L


 


ALT     (4-34)  U/L


 


Total Protein     (6.3-8.2)  g/dL


 


Albumin     (3.5-5.0)  g/dL


 


Arterial Blood Glucose     (75-99)  mg/dL


 


Crossmatch     














Assessment and Plan


Plan: 








Symptomatic coronary disease, post coronary artery bypass surgery 2, off-pump 

and the patient is postop day #1.  The patient is post intra-aortic balloon pump

insertion for hemodynamic support inserted at a time of surgery with subsequent 

improvement in patient's hemodynamics.  The patient is currently on no pressors.

 The patient is on a 1-2 augmentation.  Adequate blood pressure.  Adequate 

cardiac output.  Intra-aortic balloon pump will be removed.  The patient was 

extubated yesterday without any major difficulties





Post thoracotomy, chest tubes are in place and output is minimal at this point 

in time.  The chest x-ray showing cardiomegaly.  The patient was extubated 

yesterday and the patient is currently on 5 L of oxygen by nasal cannula.  

Stable hemodynamics





Paroxysmal atrial fibrillation, expected outcome of surgery currently on 

amiodarone drip at 0.5 mg an hour





Hyperglycemia, postop, the patient is currently on insulin drip at 2.5 units an 

hour.  This is another expected outcome of surgery





History of hypertension.





No history of any lung disease, and patient at low increased operative risk 

based on lung function.





Prior history of coronavirus infection, December 2021.





Postoperative anemia, expected outcome of surgery, hemoglobin is stable for now





plan





Keep the patient on 6 L of O2 nasal cannula, gradually wean it down to make a 

saturation above 90%


Provide patient incentive spirometer


Keep the chest tube in place for now


Monitor hemodynamics


Intra-aortic balloon pump to be removed today


The patient will need to stay flat for a few hours post removal of the balloon 

pump


The patient is currently on amiodarone infusion and this will be continued per 

protocol


Subcu heparin for DVT prophylaxis


Routine postoperative cardiac medications


Continue insulin drip for today


We'll continue to follow make further recommendations based on her progress.  

Case was discussed with the cardiothoracic team.  Is a critically care 

evaluation.  Evaluation was done and more than 30 minutes.

## 2022-06-28 NOTE — P.PN
Subjective


Progress Note Date: 06/28/22





 HISTORY OF PRESENT ILLNESS


This is a pleasant 76 years old female with past medical history of Fibromyalg

ia, Hyperlipidemia, Hypertension, Osteoarthritis , Supraventricular Tachycardia 

,urinary incontinence-wears pad, severe left internal carotid artery stenosis 

followed by Dr. Adam, frequent constipation, she was admitted under 

cardiology service for right arm pain, chest pain and back pain for the last 3 

weeks, where she underwent cardiac cath and 60/24 showing critical and complex 

lesion involving the distal left main coronary arteries and also involving the 

ostial left circumflex and the distal LAD with the stenotic range is 80-90% with

increase in LVEDP  , Patient will need bypass procedure to open his coronary 

arteries.


Currently she denies chest pain or dyspnea.  No incontinence of urine or bowel. 

No fever


Patient is hemodynamically stable


Labs including CBC, INR, BMP and liver enzymes are unremarkable.  TSH is 1.6.


Urine analysis showing trace blood.


coronavirus not detected.   Hepatitis panel is negative


Chest x-ray: Minimal interstitial edema noted


Carotid duplex: No significant stenosis


Echocardiogram showing ejection fraction of 50-55% with mild-to-moderate mitral 

regurgitation and mild tricuspid regurgitation


Patient currently on heparin drip, aspirin 81 mg twice a day and normal saline 

at 75 mL/h as well as Lipitor and metoprolol


However patient this morning she is asymptomatic she denies chest pain or 

dyspnea or abdominal pain.  No diarrhea or vomiting or dysuria.  No headache or 

weakness or numbness.





6/27: Patient is denying any chest pain, shortness of breath, palpitations, 

lightheadedness or dizziness.  She is scheduled for CABG this afternoon and 

patient states that she is ready to move forward.  No new concerns from her 

nurse.  Patient has been afebrile, heart rate 80, blood pressure 129/78 and 

pulse ox 96% on room air.  CBC is unremarkable.  Chloride 110, CO2 20, blood 

sugar 112, creatinine 0.68.





6/28: Patient is status post CABG 2 with left internal mammary artery to the 

left anterior descending artery, reverse saphenous vein graft to the obtuse 

marginal artery, ligation of the left atrial appendage with a 40 mm AtriCure 

clip, endovascular vein harvest of the right greater saphenous vein, placement 

of intra-aortic balloon pump.  Right groin intra-aortic balloon pump was 

discontinued this morning.  Right internal jugular [place]/Cordis, right radial 

arterial line, mediastinal/left pleural chest tubes remain in place.  Patient is

stating that she is feeling well.  She has been afebrile, heart rate 75, 121/54,

pulse ox 93% on 2 L.  Capillary blood glucose running between 110 and 120.  WBC 

9.7, hemoglobin 9.3 and platelet count 195.  Electrolytes and renal function 

normal.  AST 85 and ALT 41.








REVIEW OF SYSTEMS


Constitutional: No fever, no chills, no night sweats.  No weight change.  No 

weakness, fatigue or lethargy.  No daytime sleepiness.


EENT: No headache.  No blurred vision or double vision, no loss of vision.  No 

loss of Hearing, no ringing in the ears, no dizziness.  No nasal drainage or 

congestion.  No epistaxis.  No sore throat.


Lungs: No shortness of breath, cough, no sputum production.  No wheezing.


Cardiovascular: Reports chest ache, no lower extremity edema.  No palpitations. 

No paroxysmal nocturnal dyspnea.  No orthopnea.  No lightheadedness or 

dizziness.  No syncopal episodes.


Abdominal: No abdominal pain.  No nausea, vomiting.  No diarrhea.  No 

constipation.  No bloody or tarry stools.  No loss of appetite.


Genitourinary: No dysuria, increased frequency, urgency.  No urinary retention.


Musculoskeletal: No myalgias.  No muscle weakness, no gait dysfunction, no 

frequent falls.  No back pain.  No neck pain.


Integumentary: No wounds, no lesions.  No rash or pruritus.  No unusual 

bruising.  No change in hair or nails.


Neurologic: No aphasia. No facial droop. No change in mentation. No head injury.

No headache. No paralysis. No paresthesia.


Psychiatric: No depression.  No anxiety.  No mood swings.


Endocrine: No abnormal blood sugars.  No weight change.  No excessive sweating 

or thirst.  








PHYSICAL EXAMINATION


Gen: This is a 76-year-old overweight  female.  She is resting in bed 

and appears to be comfortable and in no acute distress.


HEENT: Head is atraumatic, normocephalic. Pupils equal, round. Sclerae is 

anicteric.  


NECK: Supple. No JVD. No lymphadenopathy. No thyromegaly. Right Cordis in place.


LUNGS: Clear to auscultation. No wheezes or rhonchi.  No intercostal 

retractions.  Chest tubes in place.


HEART: Regular rate and rhythm.  Systolic murmur. 


ABDOMEN: Soft. Bowel sounds are present. No masses.  No tenderness.


EXTREMITIES: No pedal edema.  No calf tenderness.


NEUROLOGICAL: Patient is awake, alert and oriented x3. Cranial nerves 2 through 

12 are grossly intact. 





ASSESSMENT AND PLAN


1.  Severe coronary artery disease status post 2 vessel CABG 6/27.  Continue 

current plan per cardiothoracic team.  Continue aspirin 325 mg daily, Lipitor 80

mg daily, Plavix 75 mg daily, continue Norco as needed for pain, insulin drip 

per protocol, DuoNeb treatments 4 times daily and as needed, Lopressor 12.5 mg 

twice daily.  Inpatient rehab





2.  Hypertension.





3.  Hyperlipidemia.





4.  Carotid artery disease.





5.  GI prophylaxis.





6.  DVT prophylaxis.














DISCHARGE PLAN


Most likely return home.





Impression and plan of care have been directed as dictated by the signing 

physician.  Princess Nava nurse practitioner acting as scribe for signing 

physician.





Objective





- Vital Signs


Vital signs: 


                                   Vital Signs











Temp  98.8 F   06/28/22 08:00


 


Pulse  85   06/28/22 09:00


 


Resp  16   06/28/22 09:00


 


BP  152/77   06/27/22 11:30


 


Pulse Ox  94 L  06/28/22 09:00


 


FiO2  50   06/27/22 21:29








                                 Intake & Output











 06/27/22 06/28/22 06/28/22





 18:59 06:59 18:59


 


Intake Total 213.286 5396.448 339.5


 


Output Total 1100 809 82


 


Balance -262.998 616.448 257.5


 


Weight  113.6 kg 


 


Intake:   


 


  .5 1236.0 189.5


 


    0.9 Pressure bag 9 108 18


 


    ACETAMINOPHEN IV (For NPO 0 100 





    ) 1,000 mg In Empty Bag 1   





    bag @ 400 mls/hr IVPB   





    Q6HR [facility] Rx#:[id number]   


 


    Albumin Human 5% 250 ml 250  





    In Empty Bag 1 bag @ 250   





    mls/hr IVPB Q1HR PRN Rx#:   





    [id number]   


 


    CO/CI 20 310 70


 


    Lactated Ringers 1,000 ml 50 600 100





    @ 50 mls/hr IV .Q20H [facility]   





    Rx#:[id number]   


 


    Nitroglycerin-D5w Pmx 50 1.5 18.0 1.5





    mg In Dextrose/Water 1   





    250ml.bag @ 5 MCG/MIN 1.5   





    mls/hr IV .Q24H [facility] Rx#:   





    [id number]   


 


    ceFAZolin 2 gm In Sodium 0 100 





    Chloride 0.9% 50 ml @ 100   





    mls/hr IVPB Q8H [facility] Rx#:   





    [id number]   


 


  Intake, IV Titration 253.502 89.448 0





  Amount   


 


    Heparin Sod,Pork in 0.45% 250  





    NaCl 25,000 unit In 0.45   





    % NaCl 1 250ml.bag @ 9.76   





    UNITS/KG/HR 10.004 mls/   





    hr IV .Q24H [facility] Rx#:   





    [id number]   


 


    Insulin Regular 100 unit  48.220 0





    In Sodium Chloride 0.9%   





    100 ml @ Per Protocol IV   





    .Q0M [facility] Rx#:[id number]   


 


    Norepinephrine 4 mg In  26.293 





    Sodium Chloride 0.9% 250   





    ml @ 0.05 MCG/KG/MIN 19.   





    622 mls/hr IV .[id number]   





    [facility] Rx#:[id number]   


 


    propofoL 1,000 mg In 3.502 14.935 





    Empty Bag 1 bag @ Titrate   





    IV .Q0M [facility] Rx#:   





    [id number]   


 


  Oral  100 150


 


Output:   


 


  Chest Tube Drainage  446 50


 


    Chest Tube Left Left  245 30





    Pleural/Mediastinal   


 


    Chest Tube Mediastinal  201 20


 


  Urine 300 363 32


 


  Estimated Blood Loss 800  


 


Other:   


 


  Voiding Method  Indwelling Catheter Indwelling Catheter








                       ABP, PAP, CO, CI - Last Documented











Arterial Blood Pressure        141/58


 


Pulmonary Artery Pressure      37/19


 


Cardiac Output                 4.3


 


Cardiac Index                  2

















- Labs


CBC & Chem 7: 


                                 06/28/22 04:00





                                 06/28/22 04:00


Labs: 


                  Abnormal Lab Results - Last 24 Hours (Table)











  06/26/22 06/27/22 06/27/22 Range/Units





  09:57 13:45 15:33 


 


WBC     (3.8-10.6)  k/uL


 


RBC     (3.80-5.40)  m/uL


 


Hgb     (11.4-16.0)  gm/dL


 


Hct     (34.0-46.0)  %


 


Neutrophils #     (1.3-7.7)  k/uL


 


Lymphocytes #     (1.0-4.8)  k/uL


 


PT     (9.0-12.0)  sec


 


INR     (<1.2)  


 


ABG pH     (7.35-7.45)  


 


ABG pCO2     (35-45)  mmHg


 


ABG pO2   168 H  82 L  ()  mmHg


 


ABG HCO3     (21-25)  mmol/L


 


ABG Total CO2     (19-24)  mmol/L


 


ABG O2 Saturation   99.3 H   (94-97)  %


 


ABG Hematocrit   33 L  33 L  (34.0-46.0)  %


 


ABG Glucose    139 H  (75-99)  mg/dL


 


ABG Lactic Acid     (0.5-1.6)  mmol/L


 


Hemoglobin   10.9 L  10.6 L  (11.4-16.0)  gm/dL


 


Chloride     ()  mmol/L


 


Glucose     (74-99)  mg/dL


 


POC Glucose (mg/dL)     ()  mg/dL


 


Calcium     (8.4-10.2)  mg/dL


 


AST     (14-36)  U/L


 


ALT     (4-34)  U/L


 


Total Protein     (6.3-8.2)  g/dL


 


Albumin     (3.5-5.0)  g/dL


 


Arterial Blood Glucose    139 H  (75-99)  mg/dL


 


Crossmatch  See Detail    














  06/27/22 06/27/22 06/27/22 Range/Units





  16:31 17:07 18:30 


 


WBC     (3.8-10.6)  k/uL


 


RBC     (3.80-5.40)  m/uL


 


Hgb     (11.4-16.0)  gm/dL


 


Hct     (34.0-46.0)  %


 


Neutrophils #     (1.3-7.7)  k/uL


 


Lymphocytes #     (1.0-4.8)  k/uL


 


PT     (9.0-12.0)  sec


 


INR     (<1.2)  


 


ABG pH  7.34 L  7.34 L   (7.35-7.45)  


 


ABG pCO2     (35-45)  mmHg


 


ABG pO2  187 H    ()  mmHg


 


ABG HCO3  20 L    (21-25)  mmol/L


 


ABG Total CO2     (19-24)  mmol/L


 


ABG O2 Saturation  99.3 H  97.3 H   (94-97)  %


 


ABG Hematocrit  30 L  30 L   (34.0-46.0)  %


 


ABG Glucose  227 H  179 H   (75-99)  mg/dL


 


ABG Lactic Acid  2.7 H*  2.8 H*   (0.5-1.6)  mmol/L


 


Hemoglobin  9.8 L  9.6 L   (11.4-16.0)  gm/dL


 


Chloride     ()  mmol/L


 


Glucose     (74-99)  mg/dL


 


POC Glucose (mg/dL)    165 H  ()  mg/dL


 


Calcium     (8.4-10.2)  mg/dL


 


AST     (14-36)  U/L


 


ALT     (4-34)  U/L


 


Total Protein     (6.3-8.2)  g/dL


 


Albumin     (3.5-5.0)  g/dL


 


Arterial Blood Glucose  227 H  179 H   (75-99)  mg/dL


 


Crossmatch     














  06/27/22 06/27/22 06/27/22 Range/Units





  18:32 18:32 18:32 


 


WBC  11.6 H    (3.8-10.6)  k/uL


 


RBC  3.19 L    (3.80-5.40)  m/uL


 


Hgb  9.4 L D    (11.4-16.0)  gm/dL


 


Hct  29.0 L    (34.0-46.0)  %


 


Neutrophils #  10.4 H    (1.3-7.7)  k/uL


 


Lymphocytes #  0.7 L    (1.0-4.8)  k/uL


 


PT   12.8 H   (9.0-12.0)  sec


 


INR   1.2 H   (<1.2)  


 


ABG pH     (7.35-7.45)  


 


ABG pCO2     (35-45)  mmHg


 


ABG pO2     ()  mmHg


 


ABG HCO3     (21-25)  mmol/L


 


ABG Total CO2     (19-24)  mmol/L


 


ABG O2 Saturation     (94-97)  %


 


ABG Hematocrit     (34.0-46.0)  %


 


ABG Glucose     (75-99)  mg/dL


 


ABG Lactic Acid     (0.5-1.6)  mmol/L


 


Hemoglobin     (11.4-16.0)  gm/dL


 


Chloride    110 H  ()  mmol/L


 


Glucose    150 H  (74-99)  mg/dL


 


POC Glucose (mg/dL)     ()  mg/dL


 


Calcium    7.9 L  (8.4-10.2)  mg/dL


 


AST    59 H  (14-36)  U/L


 


ALT     (4-34)  U/L


 


Total Protein    4.7 L  (6.3-8.2)  g/dL


 


Albumin    3.0 L  (3.5-5.0)  g/dL


 


Arterial Blood Glucose     (75-99)  mg/dL


 


Crossmatch     














  06/27/22 06/27/22 06/27/22 Range/Units





  19:03 19:08 20:06 


 


WBC     (3.8-10.6)  k/uL


 


RBC     (3.80-5.40)  m/uL


 


Hgb     (11.4-16.0)  gm/dL


 


Hct     (34.0-46.0)  %


 


Neutrophils #     (1.3-7.7)  k/uL


 


Lymphocytes #     (1.0-4.8)  k/uL


 


PT     (9.0-12.0)  sec


 


INR     (<1.2)  


 


ABG pH   7.32 L   (7.35-7.45)  


 


ABG pCO2   50 H   (35-45)  mmHg


 


ABG pO2   203 H   ()  mmHg


 


ABG HCO3   26 H   (21-25)  mmol/L


 


ABG Total CO2   27 H   (19-24)  mmol/L


 


ABG O2 Saturation   100.0 H   (94-97)  %


 


ABG Hematocrit     (34.0-46.0)  %


 


ABG Glucose     (75-99)  mg/dL


 


ABG Lactic Acid     (0.5-1.6)  mmol/L


 


Hemoglobin     (11.4-16.0)  gm/dL


 


Chloride     ()  mmol/L


 


Glucose     (74-99)  mg/dL


 


POC Glucose (mg/dL)  193 H   179 H  ()  mg/dL


 


Calcium     (8.4-10.2)  mg/dL


 


AST     (14-36)  U/L


 


ALT     (4-34)  U/L


 


Total Protein     (6.3-8.2)  g/dL


 


Albumin     (3.5-5.0)  g/dL


 


Arterial Blood Glucose     (75-99)  mg/dL


 


Crossmatch     














  06/27/22 06/27/22 06/27/22 Range/Units





  21:03 21:35 21:56 


 


WBC   11.8 H   (3.8-10.6)  k/uL


 


RBC   3.10 L   (3.80-5.40)  m/uL


 


Hgb   9.2 L   (11.4-16.0)  gm/dL


 


Hct   27.9 L   (34.0-46.0)  %


 


Neutrophils #   10.6 H   (1.3-7.7)  k/uL


 


Lymphocytes #   0.5 L   (1.0-4.8)  k/uL


 


PT     (9.0-12.0)  sec


 


INR     (<1.2)  


 


ABG pH     (7.35-7.45)  


 


ABG pCO2     (35-45)  mmHg


 


ABG pO2     ()  mmHg


 


ABG HCO3     (21-25)  mmol/L


 


ABG Total CO2     (19-24)  mmol/L


 


ABG O2 Saturation     (94-97)  %


 


ABG Hematocrit     (34.0-46.0)  %


 


ABG Glucose     (75-99)  mg/dL


 


ABG Lactic Acid     (0.5-1.6)  mmol/L


 


Hemoglobin     (11.4-16.0)  gm/dL


 


Chloride     ()  mmol/L


 


Glucose     (74-99)  mg/dL


 


POC Glucose (mg/dL)  162 H   161 H  ()  mg/dL


 


Calcium     (8.4-10.2)  mg/dL


 


AST     (14-36)  U/L


 


ALT     (4-34)  U/L


 


Total Protein     (6.3-8.2)  g/dL


 


Albumin     (3.5-5.0)  g/dL


 


Arterial Blood Glucose     (75-99)  mg/dL


 


Crossmatch     














  06/27/22 06/27/22 06/28/22 Range/Units





  22:11 23:03 00:04 


 


WBC     (3.8-10.6)  k/uL


 


RBC     (3.80-5.40)  m/uL


 


Hgb     (11.4-16.0)  gm/dL


 


Hct     (34.0-46.0)  %


 


Neutrophils #     (1.3-7.7)  k/uL


 


Lymphocytes #     (1.0-4.8)  k/uL


 


PT     (9.0-12.0)  sec


 


INR     (<1.2)  


 


ABG pH     (7.35-7.45)  


 


ABG pCO2     (35-45)  mmHg


 


ABG pO2  80 L    ()  mmHg


 


ABG HCO3     (21-25)  mmol/L


 


ABG Total CO2  26 H    (19-24)  mmol/L


 


ABG O2 Saturation  97.3 H    (94-97)  %


 


ABG Hematocrit     (34.0-46.0)  %


 


ABG Glucose     (75-99)  mg/dL


 


ABG Lactic Acid     (0.5-1.6)  mmol/L


 


Hemoglobin     (11.4-16.0)  gm/dL


 


Chloride     ()  mmol/L


 


Glucose     (74-99)  mg/dL


 


POC Glucose (mg/dL)   163 H  145 H  ()  mg/dL


 


Calcium     (8.4-10.2)  mg/dL


 


AST     (14-36)  U/L


 


ALT     (4-34)  U/L


 


Total Protein     (6.3-8.2)  g/dL


 


Albumin     (3.5-5.0)  g/dL


 


Arterial Blood Glucose     (75-99)  mg/dL


 


Crossmatch     














  06/28/22 06/28/22 06/28/22 Range/Units





  00:06 00:54 01:57 


 


WBC  10.9 H    (3.8-10.6)  k/uL


 


RBC  3.18 L    (3.80-5.40)  m/uL


 


Hgb  9.3 L    (11.4-16.0)  gm/dL


 


Hct  28.7 L    (34.0-46.0)  %


 


Neutrophils #  10.0 H    (1.3-7.7)  k/uL


 


Lymphocytes #  0.4 L    (1.0-4.8)  k/uL


 


PT     (9.0-12.0)  sec


 


INR     (<1.2)  


 


ABG pH     (7.35-7.45)  


 


ABG pCO2     (35-45)  mmHg


 


ABG pO2     ()  mmHg


 


ABG HCO3     (21-25)  mmol/L


 


ABG Total CO2     (19-24)  mmol/L


 


ABG O2 Saturation     (94-97)  %


 


ABG Hematocrit     (34.0-46.0)  %


 


ABG Glucose     (75-99)  mg/dL


 


ABG Lactic Acid     (0.5-1.6)  mmol/L


 


Hemoglobin     (11.4-16.0)  gm/dL


 


Chloride     ()  mmol/L


 


Glucose     (74-99)  mg/dL


 


POC Glucose (mg/dL)   130 H  119 H  ()  mg/dL


 


Calcium     (8.4-10.2)  mg/dL


 


AST     (14-36)  U/L


 


ALT     (4-34)  U/L


 


Total Protein     (6.3-8.2)  g/dL


 


Albumin     (3.5-5.0)  g/dL


 


Arterial Blood Glucose     (75-99)  mg/dL


 


Crossmatch     














  06/28/22 06/28/22 06/28/22 Range/Units





  03:58 04:00 04:00 


 


WBC     (3.8-10.6)  k/uL


 


RBC   3.14 L   (3.80-5.40)  m/uL


 


Hgb   9.3 L   (11.4-16.0)  gm/dL


 


Hct   28.3 L   (34.0-46.0)  %


 


Neutrophils #   8.6 H   (1.3-7.7)  k/uL


 


Lymphocytes #   0.4 L   (1.0-4.8)  k/uL


 


PT     (9.0-12.0)  sec


 


INR     (<1.2)  


 


ABG pH     (7.35-7.45)  


 


ABG pCO2     (35-45)  mmHg


 


ABG pO2     ()  mmHg


 


ABG HCO3     (21-25)  mmol/L


 


ABG Total CO2     (19-24)  mmol/L


 


ABG O2 Saturation     (94-97)  %


 


ABG Hematocrit     (34.0-46.0)  %


 


ABG Glucose     (75-99)  mg/dL


 


ABG Lactic Acid     (0.5-1.6)  mmol/L


 


Hemoglobin     (11.4-16.0)  gm/dL


 


Chloride     ()  mmol/L


 


Glucose    118 H  (74-99)  mg/dL


 


POC Glucose (mg/dL)  125 H    ()  mg/dL


 


Calcium     (8.4-10.2)  mg/dL


 


AST    85 H  (14-36)  U/L


 


ALT    41 H  (4-34)  U/L


 


Total Protein    5.1 L  (6.3-8.2)  g/dL


 


Albumin    3.4 L  (3.5-5.0)  g/dL


 


Arterial Blood Glucose     (75-99)  mg/dL


 


Crossmatch     














  06/28/22 06/28/22 06/28/22 Range/Units





  04:59 06:05 07:58 


 


WBC     (3.8-10.6)  k/uL


 


RBC     (3.80-5.40)  m/uL


 


Hgb     (11.4-16.0)  gm/dL


 


Hct     (34.0-46.0)  %


 


Neutrophils #     (1.3-7.7)  k/uL


 


Lymphocytes #     (1.0-4.8)  k/uL


 


PT     (9.0-12.0)  sec


 


INR     (<1.2)  


 


ABG pH     (7.35-7.45)  


 


ABG pCO2     (35-45)  mmHg


 


ABG pO2     ()  mmHg


 


ABG HCO3     (21-25)  mmol/L


 


ABG Total CO2     (19-24)  mmol/L


 


ABG O2 Saturation     (94-97)  %


 


ABG Hematocrit     (34.0-46.0)  %


 


ABG Glucose     (75-99)  mg/dL


 


ABG Lactic Acid     (0.5-1.6)  mmol/L


 


Hemoglobin     (11.4-16.0)  gm/dL


 


Chloride     ()  mmol/L


 


Glucose     (74-99)  mg/dL


 


POC Glucose (mg/dL)  126 H  120 H  120 H  ()  mg/dL


 


Calcium     (8.4-10.2)  mg/dL


 


AST     (14-36)  U/L


 


ALT     (4-34)  U/L


 


Total Protein     (6.3-8.2)  g/dL


 


Albumin     (3.5-5.0)  g/dL


 


Arterial Blood Glucose     (75-99)  mg/dL


 


Crossmatch

## 2022-06-28 NOTE — P.PN
Subjective


Progress Note Date: 06/28/22


Principal diagnosis: 





Coronary artery disease with left main disease, unstable angina, atrial 

arrhythmias.  Previous medical history of hypertension, hyperlipidemia, SVT, 

left internal carotid artery stenosis, obesity, never smoker, remains 

unvaccinated against Covid, family history of coronary artery disease





POD #1 off-pump coronary artery bypass grafting 2 with left internal mammary 

artery to the left anterior descending artery, reverse saphenous vein graft to 

the obtuse marginal artery, ligation of the left atrial appendage with a 40 mm 

AtriCure clip, endovascular vein harvest of the right greater saphenous vein, 

placement of intra-aortic balloon pump





Postoperative acute blood loss anemia, expected given hemodilution





The patient was seen and examined this morning laying in bed in the intensive 

care unit in no acute distress.  She was successfully extubated last night at 

22:25.  Remains in sinus rhythm, hemodynamically stable on no inotropes or 

pressors.  She remains on IV amiodarone for atrial fibrillation prophylaxis.  

Right groin intra-aortic balloon pump was present this morning and just recently

discontinued without incident.  Patient denies any significant pain, denies 

shortness of breath.  She states she is looking forward to being able to sit up 

and get out of bed, otherwise has no new concerns.  Right internal jugular 

[place]/Cordis, right radial arterial line, mediastinal/left pleural chest tubes 

all remaining.  No other new concerns.





Objective





- Vital Signs


Vital signs: 


                                   Vital Signs











Temp  98.8 F   06/28/22 08:00


 


Pulse  85   06/28/22 10:00


 


Resp  12   06/28/22 10:00


 


BP  152/77   06/27/22 11:30


 


Pulse Ox  94 L  06/28/22 10:00


 


FiO2  50   06/27/22 21:29








                                 Intake & Output











 06/27/22 06/28/22 06/28/22





 18:59 06:59 18:59


 


Intake Total 933.194 9163.448 667.5


 


Output Total 1100 809 232


 


Balance -262.998 616.448 435.5


 


Weight  113.6 kg 


 


Intake:   


 


  .5 1236.0 317.5


 


    0.9 Pressure bag 9 108 36


 


    ACETAMINOPHEN IV (For NPO 0 100 





    ) 1,000 mg In Empty Bag 1   





    bag @ 400 mls/hr IVPB   





    Q6HR [facility] Rx#:[id number]   


 


    Albumin Human 5% 250 ml 250  





    In Empty Bag 1 bag @ 250   





    mls/hr IVPB Q1HR PRN Rx#:   





    [id number]   


 


    CO/CI 20 310 90


 


    Lactated Ringers 1,000 ml 50 600 190





    @ 20 mls/hr IV .Q24H [facility]   





    Rx#:[id number]   


 


    Nitroglycerin-D5w Pmx 50 1.5 18.0 1.5





    mg In Dextrose/Water 1   





    250ml.bag @ 5 MCG/MIN 1.5   





    mls/hr IV .Q24H [facility] Rx#:   





    [id number]   


 


    ceFAZolin 2 gm In Sodium 0 100 





    Chloride 0.9% 50 ml @ 100   





    mls/hr IVPB Q8H [facility] Rx#:   





    [id number]   


 


  Intake, IV Titration 253.502 89.448 0





  Amount   


 


    Heparin Sod,Pork in 0.45% 250  





    NaCl 25,000 unit In 0.45   





    % NaCl 1 250ml.bag @ 9.76   





    UNITS/KG/HR 10.004 mls/   





    hr IV .Q24H [facility] Rx#:   





    [id number]   


 


    Insulin Regular 100 unit  48.220 0





    In Sodium Chloride 0.9%   





    100 ml @ Per Protocol IV   





    .Q0M [facility] Rx#:[id number]   


 


    Norepinephrine 4 mg In  26.293 





    Sodium Chloride 0.9% 250   





    ml @ 0.05 MCG/KG/MIN 19.   





    622 mls/hr IV .[id number]   





    [facility] Rx#:[id number]   


 


    propofoL 1,000 mg In 3.502 14.935 





    Empty Bag 1 bag @ Titrate   





    IV .Q0M [facility] Rx#:   





    [id number]   


 


  Oral  100 350


 


Output:   


 


  Chest Tube Drainage  446 160


 


    Chest Tube Left Left  245 100





    Pleural/Mediastinal   


 


    Chest Tube Mediastinal  201 60


 


  Urine 300 363 72


 


  Estimated Blood Loss 800  


 


Other:   


 


  Voiding Method  Indwelling Catheter Indwelling Catheter








                       ABP, PAP, CO, CI - Last Documented











Arterial Blood Pressure        129/59


 


Pulmonary Artery Pressure      39/23


 


Cardiac Output                 4.7


 


Cardiac Index                  2.2

















- Exam





CONSTITUTIONAL: Appears comfortable, cooperative, no acute distress


RESPIRATORY:  Lungs sounds diminished bilaterally.  Respirations even, 

nonlabored.  Currently on 6 L high flow nasal cannula with oxygen saturation 

94%.  Able to achieve 750 mL on incentive spirometry.  Strong cough.  


CARDIOVASCULAR:  S1, S2 present.  Regular rate and rhythm, sinus rhythm on 

telemetry.  Sternum stable.   Palpable peripheral pulses bilaterally.  No edema 

present.  No calf pain or tenderness noted.  Heart hugger in place with patient 

demonstrating appropriate use.  Antiembolism stockings, SCDs present.


GASTROINTESTINAL:  Abdomen soft, nontender, nondistended.  Hypoactive bowel 

sounds present 4 quadrants.  Tolerating liquids.  Denies flatus


GENITOURINARY:  Torres present draining clear, yellow urine.  Output overnight 

20-35 mL per hour


INTEGUMENTARY:  Skin is warm and dry with evidence of good perfusion.  Anterior 

chest incision well approximated and covered with dry intact dressing.  Right 

lower extremity EVH site well approximated without redness or drainage.


NEUROLOGIC:  Cranial nerves II through XII intact


MUSKULOSKELETAL:  Able to move all extremities, strength equal bilaterally


PSYCHIATRIC:  Alert and oriented to person place and time, appropriate affect, 

intact judgment and insight


INVASIVE LINES AND TUBES:  Mediastinal/left pleural chest tubes present and 

connected to wall suction, no air leaks present.  Mediastinal tube with 128 mL 

serosanguineous drainage overnight, 250 mL since surgery.  Left pleural chest 

tube with 152 mL serosanguineous drainage overnight, 300 mL since surgery.  

Right femoral artery intra-aortic balloon pump, 7.5-[place] with 40 mL balloon, 

recently discontinued.  Right internal jugular [place]/Cordis, right radial 

arterial line present.  Last CO/CI 4.7/2.2, PA 39/23, CVP 12.





- Allied health notes


Allied health notes reviewed: nursing





- Labs


CBC & Chem 7: 


                                 06/28/22 04:00





                                 06/28/22 04:00


Labs: 


                  Abnormal Lab Results - Last 24 Hours (Table)











  06/26/22 06/27/22 06/27/22 Range/Units





  09:57 13:45 15:33 


 


WBC     (3.8-10.6)  k/uL


 


RBC     (3.80-5.40)  m/uL


 


Hgb     (11.4-16.0)  gm/dL


 


Hct     (34.0-46.0)  %


 


Neutrophils #     (1.3-7.7)  k/uL


 


Lymphocytes #     (1.0-4.8)  k/uL


 


PT     (9.0-12.0)  sec


 


INR     (<1.2)  


 


ABG pH     (7.35-7.45)  


 


ABG pCO2     (35-45)  mmHg


 


ABG pO2   168 H  82 L  ()  mmHg


 


ABG HCO3     (21-25)  mmol/L


 


ABG Total CO2     (19-24)  mmol/L


 


ABG O2 Saturation   99.3 H   (94-97)  %


 


ABG Hematocrit   33 L  33 L  (34.0-46.0)  %


 


ABG Glucose    139 H  (75-99)  mg/dL


 


ABG Lactic Acid     (0.5-1.6)  mmol/L


 


Hemoglobin   10.9 L  10.6 L  (11.4-16.0)  gm/dL


 


Chloride     ()  mmol/L


 


Glucose     (74-99)  mg/dL


 


POC Glucose (mg/dL)     ()  mg/dL


 


Calcium     (8.4-10.2)  mg/dL


 


AST     (14-36)  U/L


 


ALT     (4-34)  U/L


 


Total Protein     (6.3-8.2)  g/dL


 


Albumin     (3.5-5.0)  g/dL


 


Arterial Blood Glucose    139 H  (75-99)  mg/dL


 


Crossmatch  See Detail    














  06/27/22 06/27/22 06/27/22 Range/Units





  16:31 17:07 18:30 


 


WBC     (3.8-10.6)  k/uL


 


RBC     (3.80-5.40)  m/uL


 


Hgb     (11.4-16.0)  gm/dL


 


Hct     (34.0-46.0)  %


 


Neutrophils #     (1.3-7.7)  k/uL


 


Lymphocytes #     (1.0-4.8)  k/uL


 


PT     (9.0-12.0)  sec


 


INR     (<1.2)  


 


ABG pH  7.34 L  7.34 L   (7.35-7.45)  


 


ABG pCO2     (35-45)  mmHg


 


ABG pO2  187 H    ()  mmHg


 


ABG HCO3  20 L    (21-25)  mmol/L


 


ABG Total CO2     (19-24)  mmol/L


 


ABG O2 Saturation  99.3 H  97.3 H   (94-97)  %


 


ABG Hematocrit  30 L  30 L   (34.0-46.0)  %


 


ABG Glucose  227 H  179 H   (75-99)  mg/dL


 


ABG Lactic Acid  2.7 H*  2.8 H*   (0.5-1.6)  mmol/L


 


Hemoglobin  9.8 L  9.6 L   (11.4-16.0)  gm/dL


 


Chloride     ()  mmol/L


 


Glucose     (74-99)  mg/dL


 


POC Glucose (mg/dL)    165 H  ()  mg/dL


 


Calcium     (8.4-10.2)  mg/dL


 


AST     (14-36)  U/L


 


ALT     (4-34)  U/L


 


Total Protein     (6.3-8.2)  g/dL


 


Albumin     (3.5-5.0)  g/dL


 


Arterial Blood Glucose  227 H  179 H   (75-99)  mg/dL


 


Crossmatch     














  06/27/22 06/27/22 06/27/22 Range/Units





  18:32 18:32 18:32 


 


WBC  11.6 H    (3.8-10.6)  k/uL


 


RBC  3.19 L    (3.80-5.40)  m/uL


 


Hgb  9.4 L D    (11.4-16.0)  gm/dL


 


Hct  29.0 L    (34.0-46.0)  %


 


Neutrophils #  10.4 H    (1.3-7.7)  k/uL


 


Lymphocytes #  0.7 L    (1.0-4.8)  k/uL


 


PT   12.8 H   (9.0-12.0)  sec


 


INR   1.2 H   (<1.2)  


 


ABG pH     (7.35-7.45)  


 


ABG pCO2     (35-45)  mmHg


 


ABG pO2     ()  mmHg


 


ABG HCO3     (21-25)  mmol/L


 


ABG Total CO2     (19-24)  mmol/L


 


ABG O2 Saturation     (94-97)  %


 


ABG Hematocrit     (34.0-46.0)  %


 


ABG Glucose     (75-99)  mg/dL


 


ABG Lactic Acid     (0.5-1.6)  mmol/L


 


Hemoglobin     (11.4-16.0)  gm/dL


 


Chloride    110 H  ()  mmol/L


 


Glucose    150 H  (74-99)  mg/dL


 


POC Glucose (mg/dL)     ()  mg/dL


 


Calcium    7.9 L  (8.4-10.2)  mg/dL


 


AST    59 H  (14-36)  U/L


 


ALT     (4-34)  U/L


 


Total Protein    4.7 L  (6.3-8.2)  g/dL


 


Albumin    3.0 L  (3.5-5.0)  g/dL


 


Arterial Blood Glucose     (75-99)  mg/dL


 


Crossmatch     














  06/27/22 06/27/22 06/27/22 Range/Units





  19:03 19:08 20:06 


 


WBC     (3.8-10.6)  k/uL


 


RBC     (3.80-5.40)  m/uL


 


Hgb     (11.4-16.0)  gm/dL


 


Hct     (34.0-46.0)  %


 


Neutrophils #     (1.3-7.7)  k/uL


 


Lymphocytes #     (1.0-4.8)  k/uL


 


PT     (9.0-12.0)  sec


 


INR     (<1.2)  


 


ABG pH   7.32 L   (7.35-7.45)  


 


ABG pCO2   50 H   (35-45)  mmHg


 


ABG pO2   203 H   ()  mmHg


 


ABG HCO3   26 H   (21-25)  mmol/L


 


ABG Total CO2   27 H   (19-24)  mmol/L


 


ABG O2 Saturation   100.0 H   (94-97)  %


 


ABG Hematocrit     (34.0-46.0)  %


 


ABG Glucose     (75-99)  mg/dL


 


ABG Lactic Acid     (0.5-1.6)  mmol/L


 


Hemoglobin     (11.4-16.0)  gm/dL


 


Chloride     ()  mmol/L


 


Glucose     (74-99)  mg/dL


 


POC Glucose (mg/dL)  193 H   179 H  ()  mg/dL


 


Calcium     (8.4-10.2)  mg/dL


 


AST     (14-36)  U/L


 


ALT     (4-34)  U/L


 


Total Protein     (6.3-8.2)  g/dL


 


Albumin     (3.5-5.0)  g/dL


 


Arterial Blood Glucose     (75-99)  mg/dL


 


Crossmatch     














  06/27/22 06/27/22 06/27/22 Range/Units





  21:03 21:35 21:56 


 


WBC   11.8 H   (3.8-10.6)  k/uL


 


RBC   3.10 L   (3.80-5.40)  m/uL


 


Hgb   9.2 L   (11.4-16.0)  gm/dL


 


Hct   27.9 L   (34.0-46.0)  %


 


Neutrophils #   10.6 H   (1.3-7.7)  k/uL


 


Lymphocytes #   0.5 L   (1.0-4.8)  k/uL


 


PT     (9.0-12.0)  sec


 


INR     (<1.2)  


 


ABG pH     (7.35-7.45)  


 


ABG pCO2     (35-45)  mmHg


 


ABG pO2     ()  mmHg


 


ABG HCO3     (21-25)  mmol/L


 


ABG Total CO2     (19-24)  mmol/L


 


ABG O2 Saturation     (94-97)  %


 


ABG Hematocrit     (34.0-46.0)  %


 


ABG Glucose     (75-99)  mg/dL


 


ABG Lactic Acid     (0.5-1.6)  mmol/L


 


Hemoglobin     (11.4-16.0)  gm/dL


 


Chloride     ()  mmol/L


 


Glucose     (74-99)  mg/dL


 


POC Glucose (mg/dL)  162 H   161 H  ()  mg/dL


 


Calcium     (8.4-10.2)  mg/dL


 


AST     (14-36)  U/L


 


ALT     (4-34)  U/L


 


Total Protein     (6.3-8.2)  g/dL


 


Albumin     (3.5-5.0)  g/dL


 


Arterial Blood Glucose     (75-99)  mg/dL


 


Crossmatch     














  06/27/22 06/27/22 06/28/22 Range/Units





  22:11 23:03 00:04 


 


WBC     (3.8-10.6)  k/uL


 


RBC     (3.80-5.40)  m/uL


 


Hgb     (11.4-16.0)  gm/dL


 


Hct     (34.0-46.0)  %


 


Neutrophils #     (1.3-7.7)  k/uL


 


Lymphocytes #     (1.0-4.8)  k/uL


 


PT     (9.0-12.0)  sec


 


INR     (<1.2)  


 


ABG pH     (7.35-7.45)  


 


ABG pCO2     (35-45)  mmHg


 


ABG pO2  80 L    ()  mmHg


 


ABG HCO3     (21-25)  mmol/L


 


ABG Total CO2  26 H    (19-24)  mmol/L


 


ABG O2 Saturation  97.3 H    (94-97)  %


 


ABG Hematocrit     (34.0-46.0)  %


 


ABG Glucose     (75-99)  mg/dL


 


ABG Lactic Acid     (0.5-1.6)  mmol/L


 


Hemoglobin     (11.4-16.0)  gm/dL


 


Chloride     ()  mmol/L


 


Glucose     (74-99)  mg/dL


 


POC Glucose (mg/dL)   163 H  145 H  ()  mg/dL


 


Calcium     (8.4-10.2)  mg/dL


 


AST     (14-36)  U/L


 


ALT     (4-34)  U/L


 


Total Protein     (6.3-8.2)  g/dL


 


Albumin     (3.5-5.0)  g/dL


 


Arterial Blood Glucose     (75-99)  mg/dL


 


Crossmatch     














  06/28/22 06/28/22 06/28/22 Range/Units





  00:06 00:54 01:57 


 


WBC  10.9 H    (3.8-10.6)  k/uL


 


RBC  3.18 L    (3.80-5.40)  m/uL


 


Hgb  9.3 L    (11.4-16.0)  gm/dL


 


Hct  28.7 L    (34.0-46.0)  %


 


Neutrophils #  10.0 H    (1.3-7.7)  k/uL


 


Lymphocytes #  0.4 L    (1.0-4.8)  k/uL


 


PT     (9.0-12.0)  sec


 


INR     (<1.2)  


 


ABG pH     (7.35-7.45)  


 


ABG pCO2     (35-45)  mmHg


 


ABG pO2     ()  mmHg


 


ABG HCO3     (21-25)  mmol/L


 


ABG Total CO2     (19-24)  mmol/L


 


ABG O2 Saturation     (94-97)  %


 


ABG Hematocrit     (34.0-46.0)  %


 


ABG Glucose     (75-99)  mg/dL


 


ABG Lactic Acid     (0.5-1.6)  mmol/L


 


Hemoglobin     (11.4-16.0)  gm/dL


 


Chloride     ()  mmol/L


 


Glucose     (74-99)  mg/dL


 


POC Glucose (mg/dL)   130 H  119 H  ()  mg/dL


 


Calcium     (8.4-10.2)  mg/dL


 


AST     (14-36)  U/L


 


ALT     (4-34)  U/L


 


Total Protein     (6.3-8.2)  g/dL


 


Albumin     (3.5-5.0)  g/dL


 


Arterial Blood Glucose     (75-99)  mg/dL


 


Crossmatch     














  06/28/22 06/28/22 06/28/22 Range/Units





  03:58 04:00 04:00 


 


WBC     (3.8-10.6)  k/uL


 


RBC   3.14 L   (3.80-5.40)  m/uL


 


Hgb   9.3 L   (11.4-16.0)  gm/dL


 


Hct   28.3 L   (34.0-46.0)  %


 


Neutrophils #   8.6 H   (1.3-7.7)  k/uL


 


Lymphocytes #   0.4 L   (1.0-4.8)  k/uL


 


PT     (9.0-12.0)  sec


 


INR     (<1.2)  


 


ABG pH     (7.35-7.45)  


 


ABG pCO2     (35-45)  mmHg


 


ABG pO2     ()  mmHg


 


ABG HCO3     (21-25)  mmol/L


 


ABG Total CO2     (19-24)  mmol/L


 


ABG O2 Saturation     (94-97)  %


 


ABG Hematocrit     (34.0-46.0)  %


 


ABG Glucose     (75-99)  mg/dL


 


ABG Lactic Acid     (0.5-1.6)  mmol/L


 


Hemoglobin     (11.4-16.0)  gm/dL


 


Chloride     ()  mmol/L


 


Glucose    118 H  (74-99)  mg/dL


 


POC Glucose (mg/dL)  125 H    ()  mg/dL


 


Calcium     (8.4-10.2)  mg/dL


 


AST    85 H  (14-36)  U/L


 


ALT    41 H  (4-34)  U/L


 


Total Protein    5.1 L  (6.3-8.2)  g/dL


 


Albumin    3.4 L  (3.5-5.0)  g/dL


 


Arterial Blood Glucose     (75-99)  mg/dL


 


Crossmatch     














  06/28/22 06/28/22 06/28/22 Range/Units





  04:59 06:05 07:58 


 


WBC     (3.8-10.6)  k/uL


 


RBC     (3.80-5.40)  m/uL


 


Hgb     (11.4-16.0)  gm/dL


 


Hct     (34.0-46.0)  %


 


Neutrophils #     (1.3-7.7)  k/uL


 


Lymphocytes #     (1.0-4.8)  k/uL


 


PT     (9.0-12.0)  sec


 


INR     (<1.2)  


 


ABG pH     (7.35-7.45)  


 


ABG pCO2     (35-45)  mmHg


 


ABG pO2     ()  mmHg


 


ABG HCO3     (21-25)  mmol/L


 


ABG Total CO2     (19-24)  mmol/L


 


ABG O2 Saturation     (94-97)  %


 


ABG Hematocrit     (34.0-46.0)  %


 


ABG Glucose     (75-99)  mg/dL


 


ABG Lactic Acid     (0.5-1.6)  mmol/L


 


Hemoglobin     (11.4-16.0)  gm/dL


 


Chloride     ()  mmol/L


 


Glucose     (74-99)  mg/dL


 


POC Glucose (mg/dL)  126 H  120 H  120 H  ()  mg/dL


 


Calcium     (8.4-10.2)  mg/dL


 


AST     (14-36)  U/L


 


ALT     (4-34)  U/L


 


Total Protein     (6.3-8.2)  g/dL


 


Albumin     (3.5-5.0)  g/dL


 


Arterial Blood Glucose     (75-99)  mg/dL


 


Crossmatch     














  06/28/22 Range/Units





  09:19 


 


WBC   (3.8-10.6)  k/uL


 


RBC   (3.80-5.40)  m/uL


 


Hgb   (11.4-16.0)  gm/dL


 


Hct   (34.0-46.0)  %


 


Neutrophils #   (1.3-7.7)  k/uL


 


Lymphocytes #   (1.0-4.8)  k/uL


 


PT   (9.0-12.0)  sec


 


INR   (<1.2)  


 


ABG pH   (7.35-7.45)  


 


ABG pCO2   (35-45)  mmHg


 


ABG pO2   ()  mmHg


 


ABG HCO3   (21-25)  mmol/L


 


ABG Total CO2   (19-24)  mmol/L


 


ABG O2 Saturation   (94-97)  %


 


ABG Hematocrit   (34.0-46.0)  %


 


ABG Glucose   (75-99)  mg/dL


 


ABG Lactic Acid   (0.5-1.6)  mmol/L


 


Hemoglobin   (11.4-16.0)  gm/dL


 


Chloride   ()  mmol/L


 


Glucose   (74-99)  mg/dL


 


POC Glucose (mg/dL)  117 H  ()  mg/dL


 


Calcium   (8.4-10.2)  mg/dL


 


AST   (14-36)  U/L


 


ALT   (4-34)  U/L


 


Total Protein   (6.3-8.2)  g/dL


 


Albumin   (3.5-5.0)  g/dL


 


Arterial Blood Glucose   (75-99)  mg/dL


 


Crossmatch   














- Imaging and Cardiology


Chest x-ray: report reviewed, image reviewed





Assessment and Plan


Assessment: 





1.  Coronary artery disease with left main disease, status post 2 vessel CABG


2.  Hypertension


3.  Hyperlipidemia, cholesterol 182, 


4.  SVT, intraoperative atrial arrhythmias with cardioversion, status post left 

atrial appendage ligation


5.  Left internal carotid artery stenosis


6.  Obesity


7.  Never smoker, preoperative FEV1 72% of predicted


8.  History of covid infection in December 2021, remains unvaccinated against 

Covid


9.  Family history of coronary artery disease


10.  Nasal swab positive for MSSA, treated with mupirocin


11.  Preserved LV function with mild to moderate mitral regurgitation on 

transthoracic echocardiogram


12.  Acute blood loss anemia


Plan: 





1.  Continue to maximize medical management with aspirin, Plavix, statin, beta 

blocker.  Will increase beta blocker therapy as tolerated


2.  Continue amiodarone for A. fib prophylaxis.  Will transition to oral.  No 

anticoagulation necessary at this point


3.  Wean O2 as tolerated.  Encourage incentive spirometry 10 times every hour 

while awake.  Bronchodilators per pulmonology


4.  Head of bed up 30 in one hour after balloon pump removal, bed rest for a 

total 4 hours then up in chair/ambulate as tolerated.  PT/OT/cardiac rehab 

following


5.  GI/DVT prophylaxis


6.  Pain control with current medication regimen


7.  Insulin management per primary care service.  Patient is not diabetic, 

preoperative hemoglobin A1c 5.6%


8.  Balloon pump discontinued without incident.  Discontinue [place] at noon.  

Connect Cordis to continue CVP monitoring


9.  We'll continue mediastinal/left pleural chest tubes for another 24 hours


10.  Continue Torres for another 24 hours for strict accurate intake and output. 

Daily weights


11.  More recommendations to follow

## 2022-06-29 LAB
ALBUMIN SERPL-MCNC: 3.5 G/DL (ref 3.5–5)
ALP SERPL-CCNC: 59 U/L (ref 38–126)
ALT SERPL-CCNC: 41 U/L (ref 4–34)
ANION GAP SERPL CALC-SCNC: 4 MMOL/L
AST SERPL-CCNC: 79 U/L (ref 14–36)
BASOPHILS # BLD AUTO: 0 K/UL (ref 0–0.2)
BASOPHILS NFR BLD AUTO: 0 %
BUN SERPL-SCNC: 18 MG/DL (ref 7–17)
CALCIUM SPEC-MCNC: 8.5 MG/DL (ref 8.4–10.2)
CHLORIDE SERPL-SCNC: 106 MMOL/L (ref 98–107)
CO2 BLDA-SCNC: 28 MMOL/L (ref 19–24)
CO2 SERPL-SCNC: 26 MMOL/L (ref 22–30)
EOSINOPHIL # BLD AUTO: 0 K/UL (ref 0–0.7)
EOSINOPHIL NFR BLD AUTO: 0 %
ERYTHROCYTE [DISTWIDTH] IN BLOOD BY AUTOMATED COUNT: 3.26 M/UL (ref 3.8–5.4)
ERYTHROCYTE [DISTWIDTH] IN BLOOD: 14.9 % (ref 11.5–15.5)
GLUCOSE BLD-MCNC: 107 MG/DL (ref 70–110)
GLUCOSE BLD-MCNC: 116 MG/DL (ref 70–110)
GLUCOSE BLD-MCNC: 117 MG/DL (ref 70–110)
GLUCOSE BLD-MCNC: 118 MG/DL (ref 70–110)
GLUCOSE BLD-MCNC: 121 MG/DL (ref 70–110)
GLUCOSE BLD-MCNC: 121 MG/DL (ref 70–110)
GLUCOSE BLD-MCNC: 123 MG/DL (ref 70–110)
GLUCOSE BLD-MCNC: 123 MG/DL (ref 70–110)
GLUCOSE BLD-MCNC: 124 MG/DL (ref 70–110)
GLUCOSE BLD-MCNC: 134 MG/DL (ref 70–110)
GLUCOSE BLD-MCNC: 145 MG/DL (ref 70–110)
GLUCOSE BLD-MCNC: 152 MG/DL (ref 70–110)
GLUCOSE SERPL-MCNC: 108 MG/DL (ref 74–99)
HCO3 BLDA-SCNC: 26 MMOL/L (ref 21–25)
HCT VFR BLD AUTO: 29.9 % (ref 34–46)
HGB BLD-MCNC: 9.8 GM/DL (ref 11.4–16)
LYMPHOCYTES # SPEC AUTO: 0.7 K/UL (ref 1–4.8)
LYMPHOCYTES NFR SPEC AUTO: 5 %
MCH RBC QN AUTO: 30.1 PG (ref 25–35)
MCHC RBC AUTO-ENTMCNC: 32.9 G/DL (ref 31–37)
MCV RBC AUTO: 91.5 FL (ref 80–100)
MONOCYTES # BLD AUTO: 0.8 K/UL (ref 0–1)
MONOCYTES NFR BLD AUTO: 7 %
NEUTROPHILS # BLD AUTO: 10.9 K/UL (ref 1.3–7.7)
NEUTROPHILS NFR BLD AUTO: 87 %
PCO2 BLDA: 35 MMHG (ref 35–45)
PH BLDA: 7.49 [PH] (ref 7.35–7.45)
PH UR: 5.5 [PH] (ref 5–8)
PLATELET # BLD AUTO: 192 K/UL (ref 150–450)
PO2 BLDA: 55 MMHG (ref 83–108)
POTASSIUM SERPL-SCNC: 3.7 MMOL/L (ref 3.5–5.1)
PROT SERPL-MCNC: 5.3 G/DL (ref 6.3–8.2)
SODIUM SERPL-SCNC: 136 MMOL/L (ref 137–145)
SP GR UR: 1.02 (ref 1–1.03)
UROBILINOGEN UR QL STRIP: 2 MG/DL (ref ?–2)
WBC # BLD AUTO: 12.5 K/UL (ref 3.8–10.6)

## 2022-06-29 RX ADMIN — SODIUM CHLORIDE, PRESERVATIVE FREE SCH ML: 5 INJECTION INTRAVENOUS at 08:34

## 2022-06-29 RX ADMIN — INSULIN ASPART SCH UNIT: 100 INJECTION, SOLUTION INTRAVENOUS; SUBCUTANEOUS at 20:21

## 2022-06-29 RX ADMIN — POTASSIUM CHLORIDE SCH MLS/HR: 14.9 INJECTION, SOLUTION INTRAVENOUS at 20:27

## 2022-06-29 RX ADMIN — POTASSIUM CHLORIDE SCH MLS/HR: 7.46 INJECTION, SOLUTION INTRAVENOUS at 07:25

## 2022-06-29 RX ADMIN — IPRATROPIUM BROMIDE AND ALBUTEROL SULFATE SCH ML: .5; 3 SOLUTION RESPIRATORY (INHALATION) at 07:05

## 2022-06-29 RX ADMIN — PANTOPRAZOLE SODIUM SCH MG: 40 TABLET, DELAYED RELEASE ORAL at 06:34

## 2022-06-29 RX ADMIN — METOPROLOL TARTRATE SCH MG: 25 TABLET, FILM COATED ORAL at 08:33

## 2022-06-29 RX ADMIN — HEPARIN SODIUM SCH UNIT: 5000 INJECTION INTRAVENOUS; SUBCUTANEOUS at 08:33

## 2022-06-29 RX ADMIN — HEPARIN SODIUM SCH UNIT: 5000 INJECTION INTRAVENOUS; SUBCUTANEOUS at 00:04

## 2022-06-29 RX ADMIN — POTASSIUM CHLORIDE SCH MLS/HR: 7.46 INJECTION, SOLUTION INTRAVENOUS at 06:20

## 2022-06-29 RX ADMIN — MUPIROCIN SCH APPLIC: 20 OINTMENT TOPICAL at 20:22

## 2022-06-29 RX ADMIN — AMIODARONE HYDROCHLORIDE SCH MG: 200 TABLET ORAL at 08:33

## 2022-06-29 RX ADMIN — IPRATROPIUM BROMIDE AND ALBUTEROL SULFATE SCH ML: .5; 3 SOLUTION RESPIRATORY (INHALATION) at 19:30

## 2022-06-29 RX ADMIN — IPRATROPIUM BROMIDE AND ALBUTEROL SULFATE SCH ML: .5; 3 SOLUTION RESPIRATORY (INHALATION) at 10:52

## 2022-06-29 RX ADMIN — ASPIRIN 325 MG ORAL TABLET SCH MG: 325 PILL ORAL at 08:33

## 2022-06-29 RX ADMIN — IPRATROPIUM BROMIDE AND ALBUTEROL SULFATE SCH ML: .5; 3 SOLUTION RESPIRATORY (INHALATION) at 15:36

## 2022-06-29 RX ADMIN — INSULIN HUMAN SCH MLS/HR: 100 INJECTION, SOLUTION PARENTERAL at 05:14

## 2022-06-29 RX ADMIN — INSULIN ASPART SCH UNIT: 100 INJECTION, SOLUTION INTRAVENOUS; SUBCUTANEOUS at 16:12

## 2022-06-29 RX ADMIN — HEPARIN SODIUM SCH UNIT: 5000 INJECTION INTRAVENOUS; SUBCUTANEOUS at 16:12

## 2022-06-29 RX ADMIN — CLOPIDOGREL BISULFATE SCH MG: 75 TABLET ORAL at 08:33

## 2022-06-29 RX ADMIN — INSULIN ASPART SCH UNIT: 100 INJECTION, SOLUTION INTRAVENOUS; SUBCUTANEOUS at 23:44

## 2022-06-29 RX ADMIN — AMIODARONE HYDROCHLORIDE SCH MG: 200 TABLET ORAL at 20:21

## 2022-06-29 RX ADMIN — HEPARIN SODIUM SCH UNIT: 5000 INJECTION INTRAVENOUS; SUBCUTANEOUS at 23:44

## 2022-06-29 RX ADMIN — ATORVASTATIN CALCIUM SCH MG: 80 TABLET, FILM COATED ORAL at 08:33

## 2022-06-29 RX ADMIN — DOCUSATE SODIUM AND SENNOSIDES SCH EACH: 50; 8.6 TABLET ORAL at 20:21

## 2022-06-29 RX ADMIN — INSULIN ASPART SCH: 100 INJECTION, SOLUTION INTRAVENOUS; SUBCUTANEOUS at 12:13

## 2022-06-29 RX ADMIN — MUPIROCIN SCH APPLIC: 20 OINTMENT TOPICAL at 08:34

## 2022-06-29 RX ADMIN — SODIUM CHLORIDE, PRESERVATIVE FREE SCH ML: 5 INJECTION INTRAVENOUS at 20:22

## 2022-06-29 RX ADMIN — METOPROLOL TARTRATE SCH MG: 25 TABLET, FILM COATED ORAL at 20:21

## 2022-06-29 NOTE — XR
EXAMINATION TYPE: XR chest 1V portable

 

DATE OF EXAM: 6/29/2022

 

COMPARISON: 6/28/2022

 

INDICATION: Postop cardiac surgery

 

TECHNIQUE: Single frontal view of the chest is obtained.

 

FINDINGS:  

The heart size is mildly prominent.  

The pulmonary vasculature is normal.  

There is a infiltrate at the right lung base. Minimal left perihilar infiltrate is present. Small rig
ht pleural effusion is present.

 

[place]-Brunilda catheter is present with the tip in the main pulmonary artery. Left-sided chest tube is pre
sent. Mediastinal tube is present.  

 

 

IMPRESSION:  

1. Small right pleural effusion with right lower lobe developing infiltrate. Correlate for atelectasi
s and pneumonia.

2. Lines and catheters discussed above.

## 2022-06-29 NOTE — P.PN
Subjective


Progress Note Date: 06/29/22





PROGRESS NOTE


The patient is a 76-year-old female with history of carotid vascular disease who

presented with symptoms of progressive chest discomfort and an abnormal MPI.  

Underwent cardiac catheterization on the 24th and was found to have severe 

distal left main disease with mild-to-moderate disease in the RCA.  She is 

scheduled to undergo CABG today.  Her echo showed an ejection fraction of 50% 

with mild lateral wall hypokinesis and mild-to-moderate mitral regurgitation.  

She's feeling well this morning, denies any chest discomfort or dizziness.  She 

is in sinus mechanism.  Scheduled to undergo CABG this afternoon.  She continues

to be on aspirin, Lipitor 80 mg daily, isosorbide mononitrate 15 mg daily, 

lisinopril 30 mg daily, metoprolol succinate 25 mg daily





June 28:


The patient underwent off-pump CABG yesterday with LIMA to the LAD and SVG to 

the OM with ligation of the left atrial appendage and placement of intra-aortic 

balloon pump because of worsening ischemia at the start of surgery.  She had 

atrial arrhythmia requiring cardioversion earlier.  She is extubated, in sinus 

mechanism, intra-aortic balloon pump at 1:2 with good blood pressure and urinary

output.  She is on no vasopressor.  She is awake, alert and following commands. 

At the end of the procedure she had a good ejection fraction with mild mitral 

regurgitation.


She continues to be on aspirin, Lipitor 80 mg daily, Plavix 75 mg daily, 

metoprolol tartrate 12-1/2 mg twice a day.





June 29:


The patient intra-aortic balloon pump was removed yesterday.  She was hypoxemic 

during the night requiring BiPAP.  She denies any chest discomfort.  She 

continues to be in sinus mechanism.  She denies any chest discomfort, dizziness 

or palpitations.  She received diuretics yesterday with good output.  She 

continues to be on aspirin, Plavix, low-dose dopamine, metoprolol 12-1/2 mg 

twice a day, Lipitor 80 mg daily.  Her chest x-ray shows bilateral pleural 

effusion





PHYSICAL EXAMINATION:


Blood pressure 148/80 with a heart rate in the 80s


LUNGS: Decreased breath sounds at the bases


HEART: Regular rate and rhythm, S1, S2. No S3.  systolic murmur at the base


ABDOMEN: Soft, nontender, no organomegaly


EXTREMETIES: No edema, right groin no hematoma 





LAB:


Hemoglobin 9.8, BUN 18, creatinine 0.65, potassium 3.7


IMPRESSION:


1.  Status post CABG, LIMA to the LAD and SVG to obtuse marginal branch with 

known severe left main disease.


2.  Post removal of Intra-aortic balloon pump


3.  History of hyperlipidemia 


4.  Hypoxemia with lung congestion and evidence of fluid overload








PLAN:


1.  IV diuretics as needed


2.  Adjust dose of beta blocker depending on the blood pressure after weaning IV

dopamine


3.  Depending on the trend of her blood pressure further recommendations will be

made.


4.  If further episodes of atrial fibrillation, may require anticoagulation.





Objective





- Vital Signs


Vital signs: 


                                   Vital Signs











Temp  100.4 F H  06/29/22 04:00


 


Pulse  82   06/29/22 07:23


 


Resp  26 H  06/29/22 07:00


 


BP  148/82   06/29/22 07:00


 


Pulse Ox  96   06/29/22 07:00


 


FiO2  100   06/29/22 07:05








                                 Intake & Output











 06/28/22 06/29/22 06/29/22





 18:59 06:59 18:59


 


Intake Total 2037.504 565.251 39


 


Output Total 782 1345 30


 


Balance 1255.504 -779.749 9


 


Weight 113.6 kg 111 kg 


 


Intake:   


 


  .5 468 39


 


    0.9 Pressure bag 108 108 9


 


    CO/  


 


    Lactated Ringers 1,000 ml 470 360 30





    @ 20 mls/hr IV .Q24H LYLE   





    Rx#:[id number]   


 


    Nitroglycerin-D5w Pmx 50 1.5  





    mg In Dextrose/Water 1   





    250ml.bag @ 5 MCG/MIN 1.5   





    mls/hr IV .Q24H [facility]YLE Rx#:   





    [id number]   


 


  Intake, IV Titration 268.004 37.251 





  Amount   


 


    Amiodarone 450 mg In 250  





    Dextrose 5% in Water 250   





    ml @ 0.5 MG/MIN 16.667   





    mls/hr IV .Q15H LYLE Rx#:   





    [id number]   


 


    Insulin Regular 100 unit 18.004 37.251 





    In Sodium Chloride 0.9%   





    100 ml @ Per Protocol IV   





    .Q0M LYLE Rx#:[id number]   


 


  Oral 850 60 


 


  Tube Feeding 100  


 


Output:   


 


  Chest Tube Drainage 590 250 0


 


    Chest Tube Left Left 320 130 0





    Pleural/Mediastinal   


 


    Chest Tube Mediastinal 270 120 0


 


  Urine 192 1095 30


 


Other:   


 


  Voiding Method Indwelling Catheter Indwelling Catheter 








                       ABP, PAP, CO, CI - Last Documented











Arterial Blood Pressure        223/223


 


Pulmonary Artery Pressure      40/20


 


Cardiac Output                 4.3


 


Cardiac Index                  2

















- Labs


CBC & Chem 7: 


                                 06/29/22 04:20





                                 06/29/22 04:20


Labs: 


                  Abnormal Lab Results - Last 24 Hours (Table)











  06/28/22 06/28/22 06/28/22 Range/Units





  09:19 10:16 11:16 


 


WBC     (3.8-10.6)  k/uL


 


RBC     (3.80-5.40)  m/uL


 


Hgb     (11.4-16.0)  gm/dL


 


Hct     (34.0-46.0)  %


 


Neutrophils #     (1.3-7.7)  k/uL


 


Lymphocytes #     (1.0-4.8)  k/uL


 


ABG pH     (7.35-7.45)  


 


ABG pO2     ()  mmHg


 


ABG HCO3     (21-25)  mmol/L


 


ABG Total CO2     (19-24)  mmol/L


 


ABG O2 Saturation     (94-97)  %


 


Sodium     (137-145)  mmol/L


 


BUN     (7-17)  mg/dL


 


Glucose     (74-99)  mg/dL


 


POC Glucose (mg/dL)  117 H  114 H  116 H  ()  mg/dL


 


AST     (14-36)  U/L


 


ALT     (4-34)  U/L


 


Total Protein     (6.3-8.2)  g/dL


 


Urine Appearance     (Clear)  


 


Urine Protein     (Negative)  


 


Urine Blood     (Negative)  


 


Amorphous Sediment     (None)  /hpf


 


Urine Mucus     (None)  /hpf














  06/28/22 06/28/22 06/28/22 Range/Units





  12:29 13:01 13:59 


 


WBC     (3.8-10.6)  k/uL


 


RBC     (3.80-5.40)  m/uL


 


Hgb     (11.4-16.0)  gm/dL


 


Hct     (34.0-46.0)  %


 


Neutrophils #     (1.3-7.7)  k/uL


 


Lymphocytes #     (1.0-4.8)  k/uL


 


ABG pH     (7.35-7.45)  


 


ABG pO2     ()  mmHg


 


ABG HCO3     (21-25)  mmol/L


 


ABG Total CO2     (19-24)  mmol/L


 


ABG O2 Saturation     (94-97)  %


 


Sodium     (137-145)  mmol/L


 


BUN     (7-17)  mg/dL


 


Glucose     (74-99)  mg/dL


 


POC Glucose (mg/dL)  123 H  125 H  112 H  ()  mg/dL


 


AST     (14-36)  U/L


 


ALT     (4-34)  U/L


 


Total Protein     (6.3-8.2)  g/dL


 


Urine Appearance     (Clear)  


 


Urine Protein     (Negative)  


 


Urine Blood     (Negative)  


 


Amorphous Sediment     (None)  /hpf


 


Urine Mucus     (None)  /hpf














  06/28/22 06/28/22 06/28/22 Range/Units





  15:18 16:33 17:32 


 


WBC     (3.8-10.6)  k/uL


 


RBC     (3.80-5.40)  m/uL


 


Hgb     (11.4-16.0)  gm/dL


 


Hct     (34.0-46.0)  %


 


Neutrophils #     (1.3-7.7)  k/uL


 


Lymphocytes #     (1.0-4.8)  k/uL


 


ABG pH     (7.35-7.45)  


 


ABG pO2     ()  mmHg


 


ABG HCO3     (21-25)  mmol/L


 


ABG Total CO2     (19-24)  mmol/L


 


ABG O2 Saturation     (94-97)  %


 


Sodium     (137-145)  mmol/L


 


BUN     (7-17)  mg/dL


 


Glucose     (74-99)  mg/dL


 


POC Glucose (mg/dL)  135 H  132 H  132 H  ()  mg/dL


 


AST     (14-36)  U/L


 


ALT     (4-34)  U/L


 


Total Protein     (6.3-8.2)  g/dL


 


Urine Appearance     (Clear)  


 


Urine Protein     (Negative)  


 


Urine Blood     (Negative)  


 


Amorphous Sediment     (None)  /hpf


 


Urine Mucus     (None)  /hpf














  06/28/22 06/28/22 06/28/22 Range/Units





  18:27 19:10 20:12 


 


WBC     (3.8-10.6)  k/uL


 


RBC     (3.80-5.40)  m/uL


 


Hgb     (11.4-16.0)  gm/dL


 


Hct     (34.0-46.0)  %


 


Neutrophils #     (1.3-7.7)  k/uL


 


Lymphocytes #     (1.0-4.8)  k/uL


 


ABG pH     (7.35-7.45)  


 


ABG pO2     ()  mmHg


 


ABG HCO3     (21-25)  mmol/L


 


ABG Total CO2     (19-24)  mmol/L


 


ABG O2 Saturation     (94-97)  %


 


Sodium     (137-145)  mmol/L


 


BUN     (7-17)  mg/dL


 


Glucose     (74-99)  mg/dL


 


POC Glucose (mg/dL)  142 H  142 H  137 H  ()  mg/dL


 


AST     (14-36)  U/L


 


ALT     (4-34)  U/L


 


Total Protein     (6.3-8.2)  g/dL


 


Urine Appearance     (Clear)  


 


Urine Protein     (Negative)  


 


Urine Blood     (Negative)  


 


Amorphous Sediment     (None)  /hpf


 


Urine Mucus     (None)  /hpf














  06/28/22 06/28/22 06/28/22 Range/Units





  21:04 21:06 22:07 


 


WBC     (3.8-10.6)  k/uL


 


RBC     (3.80-5.40)  m/uL


 


Hgb     (11.4-16.0)  gm/dL


 


Hct     (34.0-46.0)  %


 


Neutrophils #     (1.3-7.7)  k/uL


 


Lymphocytes #     (1.0-4.8)  k/uL


 


ABG pH     (7.35-7.45)  


 


ABG pO2   70 L   ()  mmHg


 


ABG HCO3     (21-25)  mmol/L


 


ABG Total CO2   26 H   (19-24)  mmol/L


 


ABG O2 Saturation     (94-97)  %


 


Sodium     (137-145)  mmol/L


 


BUN     (7-17)  mg/dL


 


Glucose     (74-99)  mg/dL


 


POC Glucose (mg/dL)  127 H   119 H  ()  mg/dL


 


AST     (14-36)  U/L


 


ALT     (4-34)  U/L


 


Total Protein     (6.3-8.2)  g/dL


 


Urine Appearance     (Clear)  


 


Urine Protein     (Negative)  


 


Urine Blood     (Negative)  


 


Amorphous Sediment     (None)  /hpf


 


Urine Mucus     (None)  /hpf














  06/28/22 06/28/22 06/29/22 Range/Units





  23:08 23:56 01:02 


 


WBC     (3.8-10.6)  k/uL


 


RBC     (3.80-5.40)  m/uL


 


Hgb     (11.4-16.0)  gm/dL


 


Hct     (34.0-46.0)  %


 


Neutrophils #     (1.3-7.7)  k/uL


 


Lymphocytes #     (1.0-4.8)  k/uL


 


ABG pH     (7.35-7.45)  


 


ABG pO2     ()  mmHg


 


ABG HCO3     (21-25)  mmol/L


 


ABG Total CO2     (19-24)  mmol/L


 


ABG O2 Saturation     (94-97)  %


 


Sodium     (137-145)  mmol/L


 


BUN     (7-17)  mg/dL


 


Glucose     (74-99)  mg/dL


 


POC Glucose (mg/dL)  130 H  134 H  121 H  ()  mg/dL


 


AST     (14-36)  U/L


 


ALT     (4-34)  U/L


 


Total Protein     (6.3-8.2)  g/dL


 


Urine Appearance     (Clear)  


 


Urine Protein     (Negative)  


 


Urine Blood     (Negative)  


 


Amorphous Sediment     (None)  /hpf


 


Urine Mucus     (None)  /hpf














  06/29/22 06/29/22 06/29/22 Range/Units





  02:06 03:05 04:06 


 


WBC     (3.8-10.6)  k/uL


 


RBC     (3.80-5.40)  m/uL


 


Hgb     (11.4-16.0)  gm/dL


 


Hct     (34.0-46.0)  %


 


Neutrophils #     (1.3-7.7)  k/uL


 


Lymphocytes #     (1.0-4.8)  k/uL


 


ABG pH     (7.35-7.45)  


 


ABG pO2     ()  mmHg


 


ABG HCO3     (21-25)  mmol/L


 


ABG Total CO2     (19-24)  mmol/L


 


ABG O2 Saturation     (94-97)  %


 


Sodium     (137-145)  mmol/L


 


BUN     (7-17)  mg/dL


 


Glucose     (74-99)  mg/dL


 


POC Glucose (mg/dL)  123 H  117 H  118 H  ()  mg/dL


 


AST     (14-36)  U/L


 


ALT     (4-34)  U/L


 


Total Protein     (6.3-8.2)  g/dL


 


Urine Appearance     (Clear)  


 


Urine Protein     (Negative)  


 


Urine Blood     (Negative)  


 


Amorphous Sediment     (None)  /hpf


 


Urine Mucus     (None)  /hpf














  06/29/22 06/29/22 06/29/22 Range/Units





  04:20 04:20 05:10 


 


WBC  12.5 H    (3.8-10.6)  k/uL


 


RBC  3.26 L    (3.80-5.40)  m/uL


 


Hgb  9.8 L    (11.4-16.0)  gm/dL


 


Hct  29.9 L    (34.0-46.0)  %


 


Neutrophils #  10.9 H    (1.3-7.7)  k/uL


 


Lymphocytes #  0.7 L    (1.0-4.8)  k/uL


 


ABG pH     (7.35-7.45)  


 


ABG pO2     ()  mmHg


 


ABG HCO3     (21-25)  mmol/L


 


ABG Total CO2     (19-24)  mmol/L


 


ABG O2 Saturation     (94-97)  %


 


Sodium   136 L   (137-145)  mmol/L


 


BUN   18 H   (7-17)  mg/dL


 


Glucose   108 H   (74-99)  mg/dL


 


POC Glucose (mg/dL)    116 H  ()  mg/dL


 


AST   79 H   (14-36)  U/L


 


ALT   41 H   (4-34)  U/L


 


Total Protein   5.3 L   (6.3-8.2)  g/dL


 


Urine Appearance     (Clear)  


 


Urine Protein     (Negative)  


 


Urine Blood     (Negative)  


 


Amorphous Sediment     (None)  /hpf


 


Urine Mucus     (None)  /hpf














  06/29/22 06/29/22 06/29/22 Range/Units





  05:43 06:53 07:02 


 


WBC     (3.8-10.6)  k/uL


 


RBC     (3.80-5.40)  m/uL


 


Hgb     (11.4-16.0)  gm/dL


 


Hct     (34.0-46.0)  %


 


Neutrophils #     (1.3-7.7)  k/uL


 


Lymphocytes #     (1.0-4.8)  k/uL


 


ABG pH  7.49 H    (7.35-7.45)  


 


ABG pO2  55 L*    ()  mmHg


 


ABG HCO3  26 H    (21-25)  mmol/L


 


ABG Total CO2  28 H    (19-24)  mmol/L


 


ABG O2 Saturation  92.3 L    (94-97)  %


 


Sodium     (137-145)  mmol/L


 


BUN     (7-17)  mg/dL


 


Glucose     (74-99)  mg/dL


 


POC Glucose (mg/dL)    123 H  ()  mg/dL


 


AST     (14-36)  U/L


 


ALT     (4-34)  U/L


 


Total Protein     (6.3-8.2)  g/dL


 


Urine Appearance   Turbid H   (Clear)  


 


Urine Protein   Trace H   (Negative)  


 


Urine Blood   Small H   (Negative)  


 


Amorphous Sediment   Moderate H   (None)  /hpf


 


Urine Mucus   Moderate H   (None)  /hpf














  06/29/22 Range/Units





  08:25 


 


WBC   (3.8-10.6)  k/uL


 


RBC   (3.80-5.40)  m/uL


 


Hgb   (11.4-16.0)  gm/dL


 


Hct   (34.0-46.0)  %


 


Neutrophils #   (1.3-7.7)  k/uL


 


Lymphocytes #   (1.0-4.8)  k/uL


 


ABG pH   (7.35-7.45)  


 


ABG pO2   ()  mmHg


 


ABG HCO3   (21-25)  mmol/L


 


ABG Total CO2   (19-24)  mmol/L


 


ABG O2 Saturation   (94-97)  %


 


Sodium   (137-145)  mmol/L


 


BUN   (7-17)  mg/dL


 


Glucose   (74-99)  mg/dL


 


POC Glucose (mg/dL)  124 H  ()  mg/dL


 


AST   (14-36)  U/L


 


ALT   (4-34)  U/L


 


Total Protein   (6.3-8.2)  g/dL


 


Urine Appearance   (Clear)  


 


Urine Protein   (Negative)  


 


Urine Blood   (Negative)  


 


Amorphous Sediment   (None)  /hpf


 


Urine Mucus   (None)  /hpf

## 2022-06-29 NOTE — P.PN
Subjective


Progress Note Date: 06/29/22


Principal diagnosis: 





Coronary artery disease with left main disease, unstable angina, atrial 

arrhythmias.  Previous medical history of hypertension, hyperlipidemia, SVT, 

left internal carotid artery stenosis, obesity, never smoker, remains 

unvaccinated against Covid, family history of coronary artery disease





POD #2 off-pump coronary artery bypass grafting 2 with left internal mammary 

artery to the left anterior descending artery, reverse saphenous vein graft to 

the obtuse marginal artery, ligation of the left atrial appendage with a 40 mm 

AtriCure clip, endovascular vein harvest of the right greater saphenous vein, 

placement of intra-aortic balloon pump





Postoperative acute blood loss anemia, expected given hemodilution





Acute hypoxic respiratory failure requiring bipap





The patient was seen and examined this morning sitting up in bed in the 

intensive care unit in no acute distress.  She was successfully extubated 

yesterday, balloon pump was discontinued and patient was eventually allowed to 

get up in the recliner.  Her blood pressure started to dwindle down, CO/CI/CVP 

started to drop.  Patient was given IV fluids which demonstrated some 

improvement in BP/CO/CI/CVP but respiratory status started to struggle.  She was

given IV lasix with good diuresis but still continued to struggle with her 

oxygenation, she was placed on bipap.  This morning she remains on bipap, FiO2 

100%, 12/6 with oxygen sat 96%.  Was placed on low dose dopamine last night, BP 

stable this am with improvement in urine output.  Remains in sinus rhythm.  WBC 

12.5 this am, was 9.7 yesterday.  Tmax 100.4F in the last 24 hours.  Urine looks

cloudy, will send for culture.  She denies any pain, doesn't like the bipap 

because it's making her mouth dry, otherwise she has no new complaints. Right 

internal jugular Mission/Cordis, right radial arterial line, mediastinal/left 

pleural chest tubes all remaining.  





Objective





- Vital Signs


Vital signs: 


                                   Vital Signs











Temp  100.4 F H  06/29/22 04:00


 


Pulse  82   06/29/22 07:23


 


Resp  26 H  06/29/22 07:00


 


BP  148/82   06/29/22 07:00


 


Pulse Ox  96   06/29/22 07:00


 


FiO2  100   06/29/22 07:05








                                 Intake & Output











 06/28/22 06/29/22 06/29/22





 18:59 06:59 18:59


 


Intake Total 2037.504 565.251 39


 


Output Total 782 1345 30


 


Balance 1255.504 -966.749 9


 


Weight 113.6 kg 111 kg 


 


Intake:   


 


  .5 468 39


 


    0.9 Pressure bag 108 108 9


 


    CO/  


 


    Lactated Ringers 1,000 ml 470 360 30





    @ 20 mls/hr IV .Q24H LYLE   





    Rx#:[id number]   


 


    Nitroglycerin-D5w Pmx 50 1.5  





    mg In Dextrose/Water 1   





    250ml.bag @ 5 MCG/MIN 1.5   





    mls/hr IV .Q24H LYLE Rx#:   





    [id number]   


 


  Intake, IV Titration 268.004 37.251 





  Amount   


 


    Amiodarone 450 mg In 250  





    Dextrose 5% in Water 250   





    ml @ 0.5 MG/MIN 16.667   





    mls/hr IV .Q15H LYLE Rx#:   





    [id number]   


 


    Insulin Regular 100 unit 18.004 37.251 





    In Sodium Chloride 0.9%   





    100 ml @ Per Protocol IV   





    .Q0M LYLE Rx#:[id number]   


 


  Oral 850 60 


 


  Tube Feeding 100  


 


Output:   


 


  Chest Tube Drainage 590 250 0


 


    Chest Tube Left Left 320 130 0





    Pleural/Mediastinal   


 


    Chest Tube Mediastinal 270 120 0


 


  Urine 192 1095 30


 


Other:   


 


  Voiding Method Indwelling Catheter Indwelling Catheter 








                       ABP, PAP, CO, CI - Last Documented











Arterial Blood Pressure        223/223


 


Pulmonary Artery Pressure      40/20


 


Cardiac Output                 4.3


 


Cardiac Index                  2

















- Exam





CONSTITUTIONAL: Appears comfortable, cooperative, no acute distress


RESPIRATORY:  Lungs sounds diminished bilaterally, right greater than left.  

Respirations even, nonlabored.  Currently on bipap, FiO2 100%, 12/6 with oxygen 

saturation 96%.  Strong cough.  


CARDIOVASCULAR:  S1, S2 present.  Regular rate and rhythm, sinus rhythm on 

telemetry.  Sternum stable.   Palpable peripheral pulses bilaterally.  No edema 

present.  No calf pain or tenderness noted.  Heart hugger in place with patient 

demonstrating appropriate use.  Antiembolism stockings, SCDs present.


GASTROINTESTINAL:  Abdomen soft, nontender, nondistended.  Hypoactive bowel 

sounds present 4 quadrants, tympanic to percussion.  Tolerating liquids.  Mike

es flatus


GENITOURINARY:  Torres present draining cloudy, yellow urine.  Output overnight 

 mL per hour, 1287 mL in the last 24 hours


INTEGUMENTARY:  Skin is warm and dry with evidence of good perfusion.  Anterior 

chest incision well approximated and covered with dry intact dressing.  Right 

lower extremity EVH site well approximated without redness or drainage.


NEUROLOGIC:  Cranial nerves II through XII intact


MUSKULOSKELETAL:  Able to move all extremities, strength equal bilaterally


PSYCHIATRIC:  Alert and oriented to person place and time, appropriate affect, 

intact judgment and insight


INVASIVE LINES AND TUBES:  Mediastinal/left pleural chest tubes present and 

connected to wall suction, no air leaks present.  Mediastinal tube with 80 mL 

serosanguineous drainage overnight, 400 mL in the last 24 hours.  Left pleural 

chest tube with 60 mL serosanguineous drainage overnight, 400 mL in the last 24 

hours.  Right internal jugular Mission/Cordis, right radial arterial line present. 

Last CO/CI 4.3/2.0, PA 39/20, CVP 15.





- Allied health notes


Allied health notes reviewed: nursing





- Labs


CBC & Chem 7: 


                                 06/29/22 04:20





                                 06/29/22 04:20


Labs: 


                  Abnormal Lab Results - Last 24 Hours (Table)











  06/28/22 06/28/22 06/28/22 Range/Units





  07:58 09:19 10:16 


 


WBC     (3.8-10.6)  k/uL


 


RBC     (3.80-5.40)  m/uL


 


Hgb     (11.4-16.0)  gm/dL


 


Hct     (34.0-46.0)  %


 


Neutrophils #     (1.3-7.7)  k/uL


 


Lymphocytes #     (1.0-4.8)  k/uL


 


ABG pH     (7.35-7.45)  


 


ABG pO2     ()  mmHg


 


ABG HCO3     (21-25)  mmol/L


 


ABG Total CO2     (19-24)  mmol/L


 


ABG O2 Saturation     (94-97)  %


 


Sodium     (137-145)  mmol/L


 


BUN     (7-17)  mg/dL


 


Glucose     (74-99)  mg/dL


 


POC Glucose (mg/dL)  120 H  117 H  114 H  ()  mg/dL


 


AST     (14-36)  U/L


 


ALT     (4-34)  U/L


 


Total Protein     (6.3-8.2)  g/dL














  06/28/22 06/28/22 06/28/22 Range/Units





  11:16 12:29 13:01 


 


WBC     (3.8-10.6)  k/uL


 


RBC     (3.80-5.40)  m/uL


 


Hgb     (11.4-16.0)  gm/dL


 


Hct     (34.0-46.0)  %


 


Neutrophils #     (1.3-7.7)  k/uL


 


Lymphocytes #     (1.0-4.8)  k/uL


 


ABG pH     (7.35-7.45)  


 


ABG pO2     ()  mmHg


 


ABG HCO3     (21-25)  mmol/L


 


ABG Total CO2     (19-24)  mmol/L


 


ABG O2 Saturation     (94-97)  %


 


Sodium     (137-145)  mmol/L


 


BUN     (7-17)  mg/dL


 


Glucose     (74-99)  mg/dL


 


POC Glucose (mg/dL)  116 H  123 H  125 H  ()  mg/dL


 


AST     (14-36)  U/L


 


ALT     (4-34)  U/L


 


Total Protein     (6.3-8.2)  g/dL














  06/28/22 06/28/22 06/28/22 Range/Units





  13:59 15:18 16:33 


 


WBC     (3.8-10.6)  k/uL


 


RBC     (3.80-5.40)  m/uL


 


Hgb     (11.4-16.0)  gm/dL


 


Hct     (34.0-46.0)  %


 


Neutrophils #     (1.3-7.7)  k/uL


 


Lymphocytes #     (1.0-4.8)  k/uL


 


ABG pH     (7.35-7.45)  


 


ABG pO2     ()  mmHg


 


ABG HCO3     (21-25)  mmol/L


 


ABG Total CO2     (19-24)  mmol/L


 


ABG O2 Saturation     (94-97)  %


 


Sodium     (137-145)  mmol/L


 


BUN     (7-17)  mg/dL


 


Glucose     (74-99)  mg/dL


 


POC Glucose (mg/dL)  112 H  135 H  132 H  ()  mg/dL


 


AST     (14-36)  U/L


 


ALT     (4-34)  U/L


 


Total Protein     (6.3-8.2)  g/dL














  06/28/22 06/28/22 06/28/22 Range/Units





  17:32 18:27 19:10 


 


WBC     (3.8-10.6)  k/uL


 


RBC     (3.80-5.40)  m/uL


 


Hgb     (11.4-16.0)  gm/dL


 


Hct     (34.0-46.0)  %


 


Neutrophils #     (1.3-7.7)  k/uL


 


Lymphocytes #     (1.0-4.8)  k/uL


 


ABG pH     (7.35-7.45)  


 


ABG pO2     ()  mmHg


 


ABG HCO3     (21-25)  mmol/L


 


ABG Total CO2     (19-24)  mmol/L


 


ABG O2 Saturation     (94-97)  %


 


Sodium     (137-145)  mmol/L


 


BUN     (7-17)  mg/dL


 


Glucose     (74-99)  mg/dL


 


POC Glucose (mg/dL)  132 H  142 H  142 H  ()  mg/dL


 


AST     (14-36)  U/L


 


ALT     (4-34)  U/L


 


Total Protein     (6.3-8.2)  g/dL














  06/28/22 06/28/22 06/28/22 Range/Units





  20:12 21:04 21:06 


 


WBC     (3.8-10.6)  k/uL


 


RBC     (3.80-5.40)  m/uL


 


Hgb     (11.4-16.0)  gm/dL


 


Hct     (34.0-46.0)  %


 


Neutrophils #     (1.3-7.7)  k/uL


 


Lymphocytes #     (1.0-4.8)  k/uL


 


ABG pH     (7.35-7.45)  


 


ABG pO2    70 L  ()  mmHg


 


ABG HCO3     (21-25)  mmol/L


 


ABG Total CO2    26 H  (19-24)  mmol/L


 


ABG O2 Saturation     (94-97)  %


 


Sodium     (137-145)  mmol/L


 


BUN     (7-17)  mg/dL


 


Glucose     (74-99)  mg/dL


 


POC Glucose (mg/dL)  137 H  127 H   ()  mg/dL


 


AST     (14-36)  U/L


 


ALT     (4-34)  U/L


 


Total Protein     (6.3-8.2)  g/dL














  06/28/22 06/28/22 06/28/22 Range/Units





  22:07 23:08 23:56 


 


WBC     (3.8-10.6)  k/uL


 


RBC     (3.80-5.40)  m/uL


 


Hgb     (11.4-16.0)  gm/dL


 


Hct     (34.0-46.0)  %


 


Neutrophils #     (1.3-7.7)  k/uL


 


Lymphocytes #     (1.0-4.8)  k/uL


 


ABG pH     (7.35-7.45)  


 


ABG pO2     ()  mmHg


 


ABG HCO3     (21-25)  mmol/L


 


ABG Total CO2     (19-24)  mmol/L


 


ABG O2 Saturation     (94-97)  %


 


Sodium     (137-145)  mmol/L


 


BUN     (7-17)  mg/dL


 


Glucose     (74-99)  mg/dL


 


POC Glucose (mg/dL)  119 H  130 H  134 H  ()  mg/dL


 


AST     (14-36)  U/L


 


ALT     (4-34)  U/L


 


Total Protein     (6.3-8.2)  g/dL














  06/29/22 06/29/22 06/29/22 Range/Units





  01:02 02:06 03:05 


 


WBC     (3.8-10.6)  k/uL


 


RBC     (3.80-5.40)  m/uL


 


Hgb     (11.4-16.0)  gm/dL


 


Hct     (34.0-46.0)  %


 


Neutrophils #     (1.3-7.7)  k/uL


 


Lymphocytes #     (1.0-4.8)  k/uL


 


ABG pH     (7.35-7.45)  


 


ABG pO2     ()  mmHg


 


ABG HCO3     (21-25)  mmol/L


 


ABG Total CO2     (19-24)  mmol/L


 


ABG O2 Saturation     (94-97)  %


 


Sodium     (137-145)  mmol/L


 


BUN     (7-17)  mg/dL


 


Glucose     (74-99)  mg/dL


 


POC Glucose (mg/dL)  121 H  123 H  117 H  ()  mg/dL


 


AST     (14-36)  U/L


 


ALT     (4-34)  U/L


 


Total Protein     (6.3-8.2)  g/dL














  06/29/22 06/29/22 06/29/22 Range/Units





  04:06 04:20 04:20 


 


WBC   12.5 H   (3.8-10.6)  k/uL


 


RBC   3.26 L   (3.80-5.40)  m/uL


 


Hgb   9.8 L   (11.4-16.0)  gm/dL


 


Hct   29.9 L   (34.0-46.0)  %


 


Neutrophils #   10.9 H   (1.3-7.7)  k/uL


 


Lymphocytes #   0.7 L   (1.0-4.8)  k/uL


 


ABG pH     (7.35-7.45)  


 


ABG pO2     ()  mmHg


 


ABG HCO3     (21-25)  mmol/L


 


ABG Total CO2     (19-24)  mmol/L


 


ABG O2 Saturation     (94-97)  %


 


Sodium    136 L  (137-145)  mmol/L


 


BUN    18 H  (7-17)  mg/dL


 


Glucose    108 H  (74-99)  mg/dL


 


POC Glucose (mg/dL)  118 H    ()  mg/dL


 


AST    79 H  (14-36)  U/L


 


ALT    41 H  (4-34)  U/L


 


Total Protein    5.3 L  (6.3-8.2)  g/dL














  06/29/22 06/29/22 06/29/22 Range/Units





  05:10 05:43 07:02 


 


WBC     (3.8-10.6)  k/uL


 


RBC     (3.80-5.40)  m/uL


 


Hgb     (11.4-16.0)  gm/dL


 


Hct     (34.0-46.0)  %


 


Neutrophils #     (1.3-7.7)  k/uL


 


Lymphocytes #     (1.0-4.8)  k/uL


 


ABG pH   7.49 H   (7.35-7.45)  


 


ABG pO2   55 L*   ()  mmHg


 


ABG HCO3   26 H   (21-25)  mmol/L


 


ABG Total CO2   28 H   (19-24)  mmol/L


 


ABG O2 Saturation   92.3 L   (94-97)  %


 


Sodium     (137-145)  mmol/L


 


BUN     (7-17)  mg/dL


 


Glucose     (74-99)  mg/dL


 


POC Glucose (mg/dL)  116 H   123 H  ()  mg/dL


 


AST     (14-36)  U/L


 


ALT     (4-34)  U/L


 


Total Protein     (6.3-8.2)  g/dL














- Imaging and Cardiology


Chest x-ray: image reviewed





Assessment and Plan


Assessment: 





1.  Coronary artery disease with left main disease, status post 2 vessel CABG


2.  Hypertension, currently hypotensive on low dose dopamine


3.  Hyperlipidemia, cholesterol 182, 


4.  SVT, intraoperative atrial arrhythmias with cardioversion, status post left 

atrial appendage ligation


5.  Left internal carotid artery stenosis


6.  Obesity


7.  Never smoker, preoperative FEV1 72% of predicted


8.  History of covid infection in December 2021, remains unvaccinated against 

Covid


9.  Family history of coronary artery disease


10.  Nasal swab positive for MSSA, treated with mupirocin


11.  Preserved LV function with mild to moderate mitral regurgitation on 

transthoracic echocardiogram


12.  Acute blood loss anemia


13.  Acute hypoxic respiratory failure requiring bipap


14.  Leukocytosis with low grade fever, urine culture sent


Plan: 





1.  Continue to maximize medical management with aspirin, Plavix, statin, beta 

blocker.  Will increase beta blocker therapy as tolerated.  Will discontinue 

dopamine


2.  Continue amiodarone for A. fib prophylaxis.  No anticoagulation necessary at

this point


3.  Wean O2 as tolerated.  Encourage incentive spirometry 10 times every hour 

while awake once off bipap.  Bronchodilators per pulmonology


4.  Increase activity as tolerated.  PT/OT/cardiac rehab following


5.  GI/DVT prophylaxis


6.  Will monitor daily labs and CXRs.  Electrolyte replacement per protocol. 

Urine culture sent.  Will give IV lasix


7.  Pain control with current medication regimen


8.  Insulin management per primary care service.  Patient is not diabetic, 

preoperative hemoglobin A1c 5.6%


9.  Will discontinue mediastinal/left pleural chest tubes 


10.  Continue Torres for another 24 hours for strict accurate intake and output. 

Daily weights


11.  More recommendations to follow

## 2022-06-29 NOTE — P.PN
Subjective


Progress Note Date: 06/29/22











This is a patient who had a recent heart catheterization, showing a critical and

complex lesion involving the distal left main coronary artery, also, the ostial 

left circumflex, and ostial LAD.  The lesions appear to be in the range of 80-

90%, and the patient was also noted to have elevated left ventricular end-

diastolic filling pressures.  The patient is currently being evaluated by 

cardiothoracic surgery for possible bypass grafting.  We will reassess to see 

the patient preoperatively, and evaluate her lung function.  The patient is a 

lifelong nonsmoker.  She has no history of lung disease.  Based on her FEV1, and

her MVV, she was in the low operative risk range.  Outpatient medications includ

ed amlodipine, aspirin, metoprolol, lisinopril, zinc, vitamin D3, ascorbic acid,

lactulose, and indoor.  Her only major medical problem is hypertension.  She 

does have a history of previous coronavirus infection and was seen by my partner

in December 2021.  CBC is completely normal.  PTT is 39.7.  Sodium 141, 

potassium 3.7, chlorides 111, CO2 24, BUN 12, creatinine 0.63.  Cholesterol was 

182.  Urine was negative.  Testing for coronavirus was negative.  Chest x-ray 

shows mild interstitial edema.  Currently she is on room air.





Progress note dated 06/26/2022.





76-year-old female we saw yesterday in consultation.  The patient has 

significant coronary disease, involving the left main coronary artery.  The 

patient is apparently going to have open heart surgery one day this week.  The 

exact day has not been decided yet.  Clinically, she's.  Stable.  She is on room

air.  She is a lifelong nonsmoker.  Lung function would suggest that she's at a 

very low increased operative risk based on her FEV1 and MVV.  Labs today show 

white count of 6.1, with a normal hemoglobin, hematocrit, and platelet count.  

The patient's PTT is 60.2.  Sodium 141, potassium 3.6, chlorides 111, CO2 24, 

BUN 12, creatinine 0.72.








06/27/2022, the patient is awaiting bypass surgery.  The patient is calm and 

comfortable.  No respiratory difficulties whatsoever.  She is using incentive 

spirometer.  Her preop FEV1 is order of 72% of predicted.  No angina.  No 

palpitations.  No chest pain.  She is hemodynamically stable at this point in 

time.  She remains on IV heparin.  I did introduce myself and I will take care o

f this patient postop, managed to ventilator and the necessity pulmonary care 

following her thoracotomy bypass surgery.no other active issues for now.  The 

patient was sent comes at 6.2 with a hemoglobin 4.5.  She is on IV heparin with 

a PTT of 55.  BUN is at 12 with a creatinine 0.6 and the sodium level is at 140.





06/28/2022, I'm seeing the patient for a follow-up.  The patient was taken to 

the operating room yesterday and the patient underwent an off-pump coronary 

artery bypass surgery with LIMA to LAD and saphenous vein graft to obtuse 

marginal.  The patient also had a left facial appendage clipping.  Estimated 

blood loss was 400 mL.  The patient received a total of 3 L of intraoperative IV

fluids.  Note that the patient was having issues with hypotension and there was 

some interval worsening of the mitral regurgitation intraoperatively.  At that 

point, it was decided to insert an intra-aortic balloon pump and this was done 

through the right common femoral artery.  Once the intra-aortic balloon pump was

initiated, the patient's hemodynamics improved dramatically.  The patient was 

given a postop echo cardiac exam that showed a mild MR and good ejection 

fraction.  The intra-aortic balloon pump was placed on a one-to-one augmentation

and following that the patient was brought into the intensive care unit.  The 

patient subsequently was weaned off the sedation and the patient was extubated 

without any major difficulties within a few hours.  The patient was extubated 

and the fourth hours after arriving to the ICU.  The patient had with good 

weaning parameters.  The patient had a blood gas that showed adequate 

oxygenation and ventilation.  Based on that, the patient was extubated.  This 

morning, the patient remains on oxygen at 6 L per minute nasal cannula.  The 

chest x-ray showing cardiomegaly.  The patient is a mediastinal and left pleural

chest tube.  Output from the chest tubes have been 250 mL from the mediastinum 

and 280 from the left pleural since surgery.  No evidence of any air leak.  The 

chest x-ray shows no evidence of any pneumothorax.  Hemodynamically, the patient

is currently receiving intra-aortic balloon pump augmentation of 1-2.  The 

patient has adequate cardiac output of 4.3 and an index of 2.  She is on no 

pressors for now.  The intra-aortic balloon pump will be removed for now.  The 

patient is stable to systemic adequate blood pressure was the patient being off 

the balloon pump.  Urine output is in order of 20 mL an hour.  The patient is a

febrile.  The patient is awake and following commands thoracic questions 

appropriately.  Pulmonary artery pressures are 38 over 18 mmHg.  Lower blood 

work from work today is showing a sodium of 139, potassium of 4, bicarb of 26, 

BUN of 14 with a creatinine of 0.6.  The white cell count is at 9.7 with a 

hemoglobin of 9.3 and a platelet count of 195.  AST is 85 with an ALT of 60 and 

alkaline phosphatase of 41.  Magnesium level is at 2.1.  Note that the patient 

had a run of atrial fibrillation intraoperatively.  The patient was loaded with 

amiodarone and currently the patient is on 0.5 mg per minute of amiodarone 

infusion.  The patient is in a normal sinus rhythm.  The patient is also on an 

insulin drip running at 2.5 units an hour with adequate blood sugar control.  

The patient's of lactated Ringer at the rate of 50 mL an hour.  As mentioned, 

awake and alert and the sternum is dry clean and intact.  No other issues 

otherwise for now.  Breaks





06/29/2022, I'm seeing the patient for a follow-up.  The patient is postop day 

#2.  Note that this patient was extubated successfully without any issues.  

Subsequently, as of yesterday afternoon, the patient became progressively more 

hypoxic.  Note that during the day, the patient was given IV albumin a total of 

750 mL to improve her urine output.  This did help and ultimately dopamine was 

added at renal dose to improve her urine output.  Subsequently, she became more 

hypoxic and the patient became more short of breath and initially she went up to

15 L high flow and later on the patient was placed on a BiPAP.  She was kept on 

BiPAP throughout the night and the patient is currently on a BiPAP at a pressure

of 12/5 cm of water and FiO2 of on the percent.  She is able to generate tidal 

volumes above 500.  Respiratory rate is in the mid 20s.  Her breathing is 

slightly labored even on the BiPAP, yet she is able to tolerate the machine w

ithout any major difficulties and she is awake and alert and she is following 

commands and answering questions and she is neurologically intact.  At the same 

time, the patient had a blood.  This morning that showed a pH of 7.97.49 with a 

pCO2 of 35 and a pO2 of 55 and this was on FiO2 of 85% and based on that the 

FiO2 was brought up to 100%.  The chest x-ray showing cardiomegaly.  There is 

some infiltration of the right lung and some atelectatic changes in lung bases. 

There is concern for an evolving right lung pneumonia although this is quite 

early in the postoperative course.  In any rate, the patient is being diuresed 

for now and the patient is producing adequate amount of urine output.  She did 

have a spike of temperature 100.4 yesterday and currently she is afebrile.  The 

white cell count today is at 12.5 which is comparable to yesterday with a 

hemoglobin of 9.8 and a platelet count of 192.  In terms of the chest tubes, the

patient is a mediastinal and left pleural chest tube, output has been noted and 

it's in the order of overnight 80 mL overnight and 400 mL over the past 24 hours

in the mediastinal chest tube, 6 disease overnight and 400 mL over the past 24 

hours in the left pleural chest tube.  [place]-Brunilda catheter still in place.  The 

cardiac output is currently at 4.3 with an index of 2.0.  The PA diastolic is in

the order of extreme millimeters of mercury.  The patient otherwise has normal 

renal function.  Creatinine is at 0.6.  Sodium is at 136.  She is obviously n

othing by mouth at this point in time as the patient is BiPAP dependent.  

Cardiac rhythm is still sinus and the patient remains on amiodarone.  She 

remains on aspirin and Plavix.  Routine postoperative care is being implemented 

this point in time.  Insulin drip is off and the patient is currently on slice K

coverage every 4 hours.





Objective





- Vital Signs


Vital signs: 


                                   Vital Signs











Temp  100.2 F H  06/29/22 08:00


 


Pulse  86   06/29/22 08:30


 


Resp  30 H  06/29/22 08:30


 


BP  154/75   06/29/22 08:30


 


Pulse Ox  92 L  06/29/22 08:30


 


FiO2  100   06/29/22 08:00








                                 Intake & Output











 06/28/22 06/29/22 06/29/22





 18:59 06:59 18:59


 


Intake Total 2037.504 565.251 335.4


 


Output Total 782 1345 120


 


Balance 1255.504 -779.749 215.4


 


Weight 113.6 kg 111 kg 


 


Intake:   


 


  .5 468 127


 


    0.9 Pressure bag 108 108 27


 


    CO/  30


 


    Lactated Ringers 1,000 ml 470 360 70





    @ 20 mls/hr IV .Q24H LYLE   





    Rx#:[id number]   


 


    Nitroglycerin-D5w Pmx 50 1.5  





    mg In Dextrose/Water 1   





    250ml.bag @ 5 MCG/MIN 1.5   





    mls/hr IV .Q24H LYLE Rx#:   





    [id number]   


 


  Intake, IV Titration 268.004 37.251 108.4





  Amount   


 


    Amiodarone 450 mg In 250  





    Dextrose 5% in Water 250   





    ml @ 0.5 MG/MIN 16.667   





    mls/hr IV .Q15H LYLE Rx#:   





    [id number]   


 


    Dextrose/Water 1 250ml.   8.4





    bag @ 2 MCG/KG/MIN 4.26   





    mls/hr IV .Q24H LYLE with   





    DOPamine DRIP 800 mg Rx#:   





    [id number]   


 


    Insulin Regular 100 unit 18.004 37.251 





    In Sodium Chloride 0.9%   





    100 ml @ Per Protocol IV   





    .Q0M LYLE Rx#:[id number]   


 


    Potassium Chloride 10 meq   100





    In Water For Injection 1   





    100ml.bag @ 100 mls/hr   





    IVPB Q1H LYLE Rx#:   





    [id number]   


 


  Oral 850 60 100


 


  Tube Feeding 100  


 


Output:   


 


  Chest Tube Drainage 590 250 30


 


    Chest Tube Left Left 320 130 20





    Pleural/Mediastinal   


 


    Chest Tube Mediastinal 270 120 10


 


  Urine 192 1095 90


 


Other:   


 


  Voiding Method Indwelling Catheter Indwelling Catheter 








                       ABP, PAP, CO, CI - Last Documented











Arterial Blood Pressure        191/79


 


Pulmonary Artery Pressure      46/26


 


Cardiac Output                 5.1


 


Cardiac Index                  2.4

















- Exam








No acute distress, oriented 3.  The patient  is currently on a BiPAP at a 

pressure of 12/5 cm of water, 100%.  She has a right IJ [place]-Brunilda catheter in 

place.  





Head exam was generally normal. There was no scleral icterus or corneal arcus. 

Mucous membranes were moist.





HEENT examination is grossly u in her leftnremarkable.  





Neck supple.  Full range of motion.  No adenopathy thyromegaly or neck vein 

distention.





Cardiovascular examination reveals regular rhythm rate.  S1-S2 normal.  No S3 or

S4.  No discernible murmur noted.    The patient has a mediastinal chest tube in

the left pleural chest tube.  Sternum stable clean and intact and the surgical 

wound site is dry.  .





Lungs  Breath sounds are equal bilaterally.  No adventitious lung sounds 

including wheezes rhonchi or crackles.  The patient has quite diminished breath 

on the right lung base





Abdomen soft bowel sounds are heard.  No masses or tenderness.





Extremities are intact.  No cyanosis clubbing or edema.





Skin is without rash or lesion.





Neurologic examination is brief but nonfocal.





- Labs


CBC & Chem 7: 


                                 06/29/22 04:20





                                 06/29/22 04:20


Labs: 


                  Abnormal Lab Results - Last 24 Hours (Table)











  06/28/22 06/28/22 06/28/22 Range/Units





  10:16 11:16 12:29 


 


WBC     (3.8-10.6)  k/uL


 


RBC     (3.80-5.40)  m/uL


 


Hgb     (11.4-16.0)  gm/dL


 


Hct     (34.0-46.0)  %


 


Neutrophils #     (1.3-7.7)  k/uL


 


Lymphocytes #     (1.0-4.8)  k/uL


 


ABG pH     (7.35-7.45)  


 


ABG pO2     ()  mmHg


 


ABG HCO3     (21-25)  mmol/L


 


ABG Total CO2     (19-24)  mmol/L


 


ABG O2 Saturation     (94-97)  %


 


Sodium     (137-145)  mmol/L


 


BUN     (7-17)  mg/dL


 


Glucose     (74-99)  mg/dL


 


POC Glucose (mg/dL)  114 H  116 H  123 H  ()  mg/dL


 


AST     (14-36)  U/L


 


ALT     (4-34)  U/L


 


Total Protein     (6.3-8.2)  g/dL


 


Urine Appearance     (Clear)  


 


Urine Protein     (Negative)  


 


Urine Blood     (Negative)  


 


Amorphous Sediment     (None)  /hpf


 


Urine Mucus     (None)  /hpf














  06/28/22 06/28/22 06/28/22 Range/Units





  13:01 13:59 15:18 


 


WBC     (3.8-10.6)  k/uL


 


RBC     (3.80-5.40)  m/uL


 


Hgb     (11.4-16.0)  gm/dL


 


Hct     (34.0-46.0)  %


 


Neutrophils #     (1.3-7.7)  k/uL


 


Lymphocytes #     (1.0-4.8)  k/uL


 


ABG pH     (7.35-7.45)  


 


ABG pO2     ()  mmHg


 


ABG HCO3     (21-25)  mmol/L


 


ABG Total CO2     (19-24)  mmol/L


 


ABG O2 Saturation     (94-97)  %


 


Sodium     (137-145)  mmol/L


 


BUN     (7-17)  mg/dL


 


Glucose     (74-99)  mg/dL


 


POC Glucose (mg/dL)  125 H  112 H  135 H  ()  mg/dL


 


AST     (14-36)  U/L


 


ALT     (4-34)  U/L


 


Total Protein     (6.3-8.2)  g/dL


 


Urine Appearance     (Clear)  


 


Urine Protein     (Negative)  


 


Urine Blood     (Negative)  


 


Amorphous Sediment     (None)  /hpf


 


Urine Mucus     (None)  /hpf














  06/28/22 06/28/22 06/28/22 Range/Units





  16:33 17:32 18:27 


 


WBC     (3.8-10.6)  k/uL


 


RBC     (3.80-5.40)  m/uL


 


Hgb     (11.4-16.0)  gm/dL


 


Hct     (34.0-46.0)  %


 


Neutrophils #     (1.3-7.7)  k/uL


 


Lymphocytes #     (1.0-4.8)  k/uL


 


ABG pH     (7.35-7.45)  


 


ABG pO2     ()  mmHg


 


ABG HCO3     (21-25)  mmol/L


 


ABG Total CO2     (19-24)  mmol/L


 


ABG O2 Saturation     (94-97)  %


 


Sodium     (137-145)  mmol/L


 


BUN     (7-17)  mg/dL


 


Glucose     (74-99)  mg/dL


 


POC Glucose (mg/dL)  132 H  132 H  142 H  ()  mg/dL


 


AST     (14-36)  U/L


 


ALT     (4-34)  U/L


 


Total Protein     (6.3-8.2)  g/dL


 


Urine Appearance     (Clear)  


 


Urine Protein     (Negative)  


 


Urine Blood     (Negative)  


 


Amorphous Sediment     (None)  /hpf


 


Urine Mucus     (None)  /hpf














  06/28/22 06/28/22 06/28/22 Range/Units





  19:10 20:12 21:04 


 


WBC     (3.8-10.6)  k/uL


 


RBC     (3.80-5.40)  m/uL


 


Hgb     (11.4-16.0)  gm/dL


 


Hct     (34.0-46.0)  %


 


Neutrophils #     (1.3-7.7)  k/uL


 


Lymphocytes #     (1.0-4.8)  k/uL


 


ABG pH     (7.35-7.45)  


 


ABG pO2     ()  mmHg


 


ABG HCO3     (21-25)  mmol/L


 


ABG Total CO2     (19-24)  mmol/L


 


ABG O2 Saturation     (94-97)  %


 


Sodium     (137-145)  mmol/L


 


BUN     (7-17)  mg/dL


 


Glucose     (74-99)  mg/dL


 


POC Glucose (mg/dL)  142 H  137 H  127 H  ()  mg/dL


 


AST     (14-36)  U/L


 


ALT     (4-34)  U/L


 


Total Protein     (6.3-8.2)  g/dL


 


Urine Appearance     (Clear)  


 


Urine Protein     (Negative)  


 


Urine Blood     (Negative)  


 


Amorphous Sediment     (None)  /hpf


 


Urine Mucus     (None)  /hpf














  06/28/22 06/28/22 06/28/22 Range/Units





  21:06 22:07 23:08 


 


WBC     (3.8-10.6)  k/uL


 


RBC     (3.80-5.40)  m/uL


 


Hgb     (11.4-16.0)  gm/dL


 


Hct     (34.0-46.0)  %


 


Neutrophils #     (1.3-7.7)  k/uL


 


Lymphocytes #     (1.0-4.8)  k/uL


 


ABG pH     (7.35-7.45)  


 


ABG pO2  70 L    ()  mmHg


 


ABG HCO3     (21-25)  mmol/L


 


ABG Total CO2  26 H    (19-24)  mmol/L


 


ABG O2 Saturation     (94-97)  %


 


Sodium     (137-145)  mmol/L


 


BUN     (7-17)  mg/dL


 


Glucose     (74-99)  mg/dL


 


POC Glucose (mg/dL)   119 H  130 H  ()  mg/dL


 


AST     (14-36)  U/L


 


ALT     (4-34)  U/L


 


Total Protein     (6.3-8.2)  g/dL


 


Urine Appearance     (Clear)  


 


Urine Protein     (Negative)  


 


Urine Blood     (Negative)  


 


Amorphous Sediment     (None)  /hpf


 


Urine Mucus     (None)  /hpf














  06/28/22 06/29/22 06/29/22 Range/Units





  23:56 01:02 02:06 


 


WBC     (3.8-10.6)  k/uL


 


RBC     (3.80-5.40)  m/uL


 


Hgb     (11.4-16.0)  gm/dL


 


Hct     (34.0-46.0)  %


 


Neutrophils #     (1.3-7.7)  k/uL


 


Lymphocytes #     (1.0-4.8)  k/uL


 


ABG pH     (7.35-7.45)  


 


ABG pO2     ()  mmHg


 


ABG HCO3     (21-25)  mmol/L


 


ABG Total CO2     (19-24)  mmol/L


 


ABG O2 Saturation     (94-97)  %


 


Sodium     (137-145)  mmol/L


 


BUN     (7-17)  mg/dL


 


Glucose     (74-99)  mg/dL


 


POC Glucose (mg/dL)  134 H  121 H  123 H  ()  mg/dL


 


AST     (14-36)  U/L


 


ALT     (4-34)  U/L


 


Total Protein     (6.3-8.2)  g/dL


 


Urine Appearance     (Clear)  


 


Urine Protein     (Negative)  


 


Urine Blood     (Negative)  


 


Amorphous Sediment     (None)  /hpf


 


Urine Mucus     (None)  /hpf














  06/29/22 06/29/22 06/29/22 Range/Units





  03:05 04:06 04:20 


 


WBC    12.5 H  (3.8-10.6)  k/uL


 


RBC    3.26 L  (3.80-5.40)  m/uL


 


Hgb    9.8 L  (11.4-16.0)  gm/dL


 


Hct    29.9 L  (34.0-46.0)  %


 


Neutrophils #    10.9 H  (1.3-7.7)  k/uL


 


Lymphocytes #    0.7 L  (1.0-4.8)  k/uL


 


ABG pH     (7.35-7.45)  


 


ABG pO2     ()  mmHg


 


ABG HCO3     (21-25)  mmol/L


 


ABG Total CO2     (19-24)  mmol/L


 


ABG O2 Saturation     (94-97)  %


 


Sodium     (137-145)  mmol/L


 


BUN     (7-17)  mg/dL


 


Glucose     (74-99)  mg/dL


 


POC Glucose (mg/dL)  117 H  118 H   ()  mg/dL


 


AST     (14-36)  U/L


 


ALT     (4-34)  U/L


 


Total Protein     (6.3-8.2)  g/dL


 


Urine Appearance     (Clear)  


 


Urine Protein     (Negative)  


 


Urine Blood     (Negative)  


 


Amorphous Sediment     (None)  /hpf


 


Urine Mucus     (None)  /hpf














  06/29/22 06/29/22 06/29/22 Range/Units





  04:20 05:10 05:43 


 


WBC     (3.8-10.6)  k/uL


 


RBC     (3.80-5.40)  m/uL


 


Hgb     (11.4-16.0)  gm/dL


 


Hct     (34.0-46.0)  %


 


Neutrophils #     (1.3-7.7)  k/uL


 


Lymphocytes #     (1.0-4.8)  k/uL


 


ABG pH    7.49 H  (7.35-7.45)  


 


ABG pO2    55 L*  ()  mmHg


 


ABG HCO3    26 H  (21-25)  mmol/L


 


ABG Total CO2    28 H  (19-24)  mmol/L


 


ABG O2 Saturation    92.3 L  (94-97)  %


 


Sodium  136 L    (137-145)  mmol/L


 


BUN  18 H    (7-17)  mg/dL


 


Glucose  108 H    (74-99)  mg/dL


 


POC Glucose (mg/dL)   116 H   ()  mg/dL


 


AST  79 H    (14-36)  U/L


 


ALT  41 H    (4-34)  U/L


 


Total Protein  5.3 L    (6.3-8.2)  g/dL


 


Urine Appearance     (Clear)  


 


Urine Protein     (Negative)  


 


Urine Blood     (Negative)  


 


Amorphous Sediment     (None)  /hpf


 


Urine Mucus     (None)  /hpf














  06/29/22 06/29/22 06/29/22 Range/Units





  06:53 07:02 08:25 


 


WBC     (3.8-10.6)  k/uL


 


RBC     (3.80-5.40)  m/uL


 


Hgb     (11.4-16.0)  gm/dL


 


Hct     (34.0-46.0)  %


 


Neutrophils #     (1.3-7.7)  k/uL


 


Lymphocytes #     (1.0-4.8)  k/uL


 


ABG pH     (7.35-7.45)  


 


ABG pO2     ()  mmHg


 


ABG HCO3     (21-25)  mmol/L


 


ABG Total CO2     (19-24)  mmol/L


 


ABG O2 Saturation     (94-97)  %


 


Sodium     (137-145)  mmol/L


 


BUN     (7-17)  mg/dL


 


Glucose     (74-99)  mg/dL


 


POC Glucose (mg/dL)   123 H  124 H  ()  mg/dL


 


AST     (14-36)  U/L


 


ALT     (4-34)  U/L


 


Total Protein     (6.3-8.2)  g/dL


 


Urine Appearance  Turbid H    (Clear)  


 


Urine Protein  Trace H    (Negative)  


 


Urine Blood  Small H    (Negative)  


 


Amorphous Sediment  Moderate H    (None)  /hpf


 


Urine Mucus  Moderate H    (None)  /hpf














Assessment and Plan


Plan: 








Symptomatic coronary disease, post coronary artery bypass surgery 2, off-pump 

and the patient is postop day #2.  The patient is post intra-aortic balloon pump

insertion for hemodynamic support inserted at a time of surgery was subsequently

removed and the patient remains hemodynamically stable.  Note that the patient 

was extubated without any major difficulties and over the past 12 hours, there 

has been some interval S to decompensation which we believe it's related to 

increased atelectasis of the lung bases more so on the right lung base.  The pa

tient is currently BiPAP dependent.  Hemodynamically stable.  Cardiac rhythm is 

sinus.





Post thoracotomy, chest tubes are in place and the patient was transitioned to a

BiPAP at a pressure of 12/5 cm of water and FiO2 of 100%.  Chest x-ray was 

noted.





Lower urine output, improved with IV albumin and dopamine, currently being 

diuresed.





Paroxysmal atrial fibrillation, expected outcome of surgery currently in sinus 

rhythm and the patient is currently on oral amiodarone





Hyperglycemia, postop, the patient is off IV insulin and the patient is 

currently on insulin scale coverage





History of hypertension.





No history of any lung disease, and patient at low increased operative risk 

based on lung function.





Prior history of coronavirus infection, December 2021.





Postoperative anemia, expected outcome of surgery, hemoglobin is stable for now





plan





Keep the patient on BiPAP


Repeat chest x-ray at noontime


Check pro calcitonin level


Keep the patient same BiPAP settings


Keep dopamine renal dose and this may be turned off later on during the day


Monitor hemodynamics


Intra-aortic balloon pump  removed yesterday


Subcu heparin for DVT prophylaxis


Routine postoperative cardiac medications


We'll continue to follow make further recommendations based on her progress.  

Case was discussed with the cardiothoracic team.  Is a critically care 

evaluation.  Evaluation was done and more than 30 minutes. 


Time with Patient: Greater than 30

## 2022-06-29 NOTE — P.PN
Subjective


Progress Note Date: 06/29/22





 HISTORY OF PRESENT ILLNESS


This is a pleasant 76 years old female with past medical history of Fibromyalg

ia, Hyperlipidemia, Hypertension, Osteoarthritis , Supraventricular Tachycardia 

,urinary incontinence-wears pad, severe left internal carotid artery stenosis 

followed by Dr. Adam, frequent constipation, she was admitted under 

cardiology service for right arm pain, chest pain and back pain for the last 3 

weeks, where she underwent cardiac cath and 60/24 showing critical and complex 

lesion involving the distal left main coronary arteries and also involving the 

ostial left circumflex and the distal LAD with the stenotic range is 80-90% with

increase in LVEDP  , Patient will need bypass procedure to open his coronary 

arteries.


Currently she denies chest pain or dyspnea.  No incontinence of urine or bowel. 

No fever


Patient is hemodynamically stable


Labs including CBC, INR, BMP and liver enzymes are unremarkable.  TSH is 1.6.


Urine analysis showing trace blood.


coronavirus not detected.   Hepatitis panel is negative


Chest x-ray: Minimal interstitial edema noted


Carotid duplex: No significant stenosis


Echocardiogram showing ejection fraction of 50-55% with mild-to-moderate mitral 

regurgitation and mild tricuspid regurgitation


Patient currently on heparin drip, aspirin 81 mg twice a day and normal saline 

at 75 mL/h as well as Lipitor and metoprolol


However patient this morning she is asymptomatic she denies chest pain or 

dyspnea or abdominal pain.  No diarrhea or vomiting or dysuria.  No headache or 

weakness or numbness.





6/27: Patient is denying any chest pain, shortness of breath, palpitations, 

lightheadedness or dizziness.  She is scheduled for CABG this afternoon and 

patient states that she is ready to move forward.  No new concerns from her 

nurse.  Patient has been afebrile, heart rate 80, blood pressure 129/78 and 

pulse ox 96% on room air.  CBC is unremarkable.  Chloride 110, CO2 20, blood 

sugar 112, creatinine 0.68.





6/28: Patient is status post CABG 2 with left internal mammary artery to the 

left anterior descending artery, reverse saphenous vein graft to the obtuse 

marginal artery, ligation of the left atrial appendage with a 40 mm AtriCure 

clip, endovascular vein harvest of the right greater saphenous vein, placement 

of intra-aortic balloon pump.  Right groin intra-aortic balloon pump was 

discontinued this morning.  Right internal jugular [place]/Cordis, right radial 

arterial line, mediastinal/left pleural chest tubes remain in place.  Patient is

stating that she is feeling well.  She has been afebrile, heart rate 75, 121/54,

pulse ox 93% on 2 L.  Capillary blood glucose running between 110 and 120.  WBC 

9.7, hemoglobin 9.3 and platelet count 195.  Electrolytes and renal function 

normal.  AST 85 and ALT 41.





6/29: Patient remains in the intensive care unit.  Yesterday pulse ox continued 

to drop through the day with increased oxygen demands to the point where she is 

now on BiPAP.  She states she feels a little short of breath.  She denies Chest 

pain.  Temperature max 100.4, heart rate 76, respiratory rate 32, blood pressure

130/73.  Cardiac monitor sinus rhythm.  Patient is status post IV Lasix 

yesterday with good urine output.  Blood glucose running between 107 and 124 and

insulin drip will be discontinued, transition to NovoLog scale every 4 hours.  

Urinalysis was turbid, blood small.











REVIEW OF SYSTEMS


Constitutional: No fever, no chills, no night sweats.  No weight change.  No 

weakness, fatigue or lethargy.  No daytime sleepiness.


EENT: No headache.  No blurred vision or double vision, no loss of vision.  No 

loss of Hearing, no ringing in the ears, no dizziness.  No nasal drainage or 

congestion.  No epistaxis.  No sore throat.


Lungs: No shortness of breath, cough, no sputum production.  No wheezing.


Cardiovascular: Reports chest ache, no lower extremity edema.  No palpitations. 

No paroxysmal nocturnal dyspnea.  No orthopnea.  No lightheadedness or 

dizziness.  No syncopal episodes.


Abdominal: No abdominal pain.  No nausea, vomiting.  No diarrhea.  No 

constipation.  No bloody or tarry stools.  No loss of appetite.


Genitourinary: No dysuria, increased frequency, urgency.  No urinary retention.


Musculoskeletal: No myalgias.  No muscle weakness, no gait dysfunction, no 

frequent falls.  No back pain.  No neck pain.


Integumentary: No wounds, no lesions.  No rash or pruritus.  No unusual bruisi

ng.  No change in hair or nails.


Neurologic: No aphasia. No facial droop. No change in mentation. No head injury.

No headache. No paralysis. No paresthesia.


Psychiatric: No depression.  No anxiety.  No mood swings.


Endocrine: No abnormal blood sugars.  No weight change.  No excessive sweating 

or thirst.  








PHYSICAL EXAMINATION


Gen: This is a 76-year-old overweight  female.  She is resting in bed 

and appears to be comfortable and in no acute distress.


HEENT: Head is atraumatic, normocephalic. Pupils equal, round. Sclerae is 

anicteric.  


NECK: Supple. No JVD. No lymphadenopathy. No thyromegaly. Right Cordis in place.


LUNGS: Clear to auscultation. No wheezes or rhonchi.  No intercostal 

retractions.  Chest tubes in place.


HEART: Regular rate and rhythm.  Systolic murmur. 


ABDOMEN: Soft. Bowel sounds are present. No masses.  No tenderness.


EXTREMITIES: No pedal edema.  No calf tenderness.


NEUROLOGICAL: Patient is awake, alert and oriented x3. Cranial nerves 2 through 

12 are grossly intact. 





ASSESSMENT AND PLAN


1.  Severe coronary artery disease status post 2 vessel CABG 6/27.  Continue 

current plan per cardiothoracic team.  Continue aspirin 325 mg daily, Lipitor 80

mg daily, Plavix 75 mg daily, continue Norco as needed for pain, insulin drip 

per protocol, DuoNeb treatments 4 times daily and as needed, Lopressor 12.5 mg 

twice daily.  Inpatient rehab





2.  Hypertension.





3.  Hyperlipidemia.





4.  Carotid artery disease.





5.  Hyperglycemia.  Insulin drip transitioned to NovoLog scale every 4 hours.





6.  GI prophylaxis.





7.  DVT prophylaxis.














DISCHARGE PLAN


Most likely a good candidate for inpatient rehab.





Impression and plan of care have been directed as dictated by the signing 

physician.  Princess Nava nurse practitioner acting as scribe for signing 

physician.





Objective





- Vital Signs


Vital signs: 


                                   Vital Signs











Temp  100.2 F H  06/29/22 08:00


 


Pulse  86   06/29/22 08:30


 


Resp  30 H  06/29/22 08:30


 


BP  154/75   06/29/22 08:30


 


Pulse Ox  92 L  06/29/22 08:30


 


FiO2  100   06/29/22 08:00








                                 Intake & Output











 06/28/22 06/29/22 06/29/22





 18:59 06:59 18:59


 


Intake Total 2037.504 565.251 202.2


 


Output Total 782 1345 85


 


Balance 1255.504 -779.749 117.2


 


Weight 113.6 kg 111 kg 


 


Intake:   


 


  .5 468 98


 


    0.9 Pressure bag 108 108 18


 


    CO/  30


 


    Lactated Ringers 1,000 ml 470 360 50





    @ 20 mls/hr IV .Q24H LYLE   





    Rx#:[id number]   


 


    Nitroglycerin-D5w Pmx 50 1.5  





    mg In Dextrose/Water 1   





    250ml.bag @ 5 MCG/MIN 1.5   





    mls/hr IV .Q24H LYLE Rx#:   





    [id number]   


 


  Intake, IV Titration 268.004 37.251 104.2





  Amount   


 


    Amiodarone 450 mg In 250  





    Dextrose 5% in Water 250   





    ml @ 0.5 MG/MIN 16.667   





    mls/hr IV .Q15H LYLE Rx#:   





    [id number]   


 


    Dextrose/Water 1 250ml.   4.2





    bag @ 2 MCG/KG/MIN 4.26   





    mls/hr IV .Q24H LYLE with   





    DOPamine DRIP 800 mg Rx#:   





    [id number]   


 


    Insulin Regular 100 unit 18.004 37.251 





    In Sodium Chloride 0.9%   





    100 ml @ Per Protocol IV   





    .Q0M LYLE Rx#:[id number]   


 


    Potassium Chloride 10 meq   100





    In Water For Injection 1   





    100ml.bag @ 100 mls/hr   





    IVPB Q1H LYLE Rx#:   





    [id number]   


 


  Oral 850 60 


 


  Tube Feeding 100  


 


Output:   


 


  Chest Tube Drainage 590 250 30


 


    Chest Tube Left Left 320 130 20





    Pleural/Mediastinal   


 


    Chest Tube Mediastinal 270 120 10


 


  Urine 192 1095 55


 


Other:   


 


  Voiding Method Indwelling Catheter Indwelling Catheter 








                       ABP, PAP, CO, CI - Last Documented











Arterial Blood Pressure        191/79


 


Pulmonary Artery Pressure      46/26


 


Cardiac Output                 5.1


 


Cardiac Index                  2.4

















- Labs


CBC & Chem 7: 


                                 06/29/22 04:20





                                 06/29/22 04:20


Labs: 


                  Abnormal Lab Results - Last 24 Hours (Table)











  06/28/22 06/28/22 06/28/22 Range/Units





  09:19 10:16 11:16 


 


WBC     (3.8-10.6)  k/uL


 


RBC     (3.80-5.40)  m/uL


 


Hgb     (11.4-16.0)  gm/dL


 


Hct     (34.0-46.0)  %


 


Neutrophils #     (1.3-7.7)  k/uL


 


Lymphocytes #     (1.0-4.8)  k/uL


 


ABG pH     (7.35-7.45)  


 


ABG pO2     ()  mmHg


 


ABG HCO3     (21-25)  mmol/L


 


ABG Total CO2     (19-24)  mmol/L


 


ABG O2 Saturation     (94-97)  %


 


Sodium     (137-145)  mmol/L


 


BUN     (7-17)  mg/dL


 


Glucose     (74-99)  mg/dL


 


POC Glucose (mg/dL)  117 H  114 H  116 H  ()  mg/dL


 


AST     (14-36)  U/L


 


ALT     (4-34)  U/L


 


Total Protein     (6.3-8.2)  g/dL


 


Urine Appearance     (Clear)  


 


Urine Protein     (Negative)  


 


Urine Blood     (Negative)  


 


Amorphous Sediment     (None)  /hpf


 


Urine Mucus     (None)  /hpf














  06/28/22 06/28/22 06/28/22 Range/Units





  12:29 13:01 13:59 


 


WBC     (3.8-10.6)  k/uL


 


RBC     (3.80-5.40)  m/uL


 


Hgb     (11.4-16.0)  gm/dL


 


Hct     (34.0-46.0)  %


 


Neutrophils #     (1.3-7.7)  k/uL


 


Lymphocytes #     (1.0-4.8)  k/uL


 


ABG pH     (7.35-7.45)  


 


ABG pO2     ()  mmHg


 


ABG HCO3     (21-25)  mmol/L


 


ABG Total CO2     (19-24)  mmol/L


 


ABG O2 Saturation     (94-97)  %


 


Sodium     (137-145)  mmol/L


 


BUN     (7-17)  mg/dL


 


Glucose     (74-99)  mg/dL


 


POC Glucose (mg/dL)  123 H  125 H  112 H  ()  mg/dL


 


AST     (14-36)  U/L


 


ALT     (4-34)  U/L


 


Total Protein     (6.3-8.2)  g/dL


 


Urine Appearance     (Clear)  


 


Urine Protein     (Negative)  


 


Urine Blood     (Negative)  


 


Amorphous Sediment     (None)  /hpf


 


Urine Mucus     (None)  /hpf














  06/28/22 06/28/22 06/28/22 Range/Units





  15:18 16:33 17:32 


 


WBC     (3.8-10.6)  k/uL


 


RBC     (3.80-5.40)  m/uL


 


Hgb     (11.4-16.0)  gm/dL


 


Hct     (34.0-46.0)  %


 


Neutrophils #     (1.3-7.7)  k/uL


 


Lymphocytes #     (1.0-4.8)  k/uL


 


ABG pH     (7.35-7.45)  


 


ABG pO2     ()  mmHg


 


ABG HCO3     (21-25)  mmol/L


 


ABG Total CO2     (19-24)  mmol/L


 


ABG O2 Saturation     (94-97)  %


 


Sodium     (137-145)  mmol/L


 


BUN     (7-17)  mg/dL


 


Glucose     (74-99)  mg/dL


 


POC Glucose (mg/dL)  135 H  132 H  132 H  ()  mg/dL


 


AST     (14-36)  U/L


 


ALT     (4-34)  U/L


 


Total Protein     (6.3-8.2)  g/dL


 


Urine Appearance     (Clear)  


 


Urine Protein     (Negative)  


 


Urine Blood     (Negative)  


 


Amorphous Sediment     (None)  /hpf


 


Urine Mucus     (None)  /hpf














  06/28/22 06/28/22 06/28/22 Range/Units





  18:27 19:10 20:12 


 


WBC     (3.8-10.6)  k/uL


 


RBC     (3.80-5.40)  m/uL


 


Hgb     (11.4-16.0)  gm/dL


 


Hct     (34.0-46.0)  %


 


Neutrophils #     (1.3-7.7)  k/uL


 


Lymphocytes #     (1.0-4.8)  k/uL


 


ABG pH     (7.35-7.45)  


 


ABG pO2     ()  mmHg


 


ABG HCO3     (21-25)  mmol/L


 


ABG Total CO2     (19-24)  mmol/L


 


ABG O2 Saturation     (94-97)  %


 


Sodium     (137-145)  mmol/L


 


BUN     (7-17)  mg/dL


 


Glucose     (74-99)  mg/dL


 


POC Glucose (mg/dL)  142 H  142 H  137 H  ()  mg/dL


 


AST     (14-36)  U/L


 


ALT     (4-34)  U/L


 


Total Protein     (6.3-8.2)  g/dL


 


Urine Appearance     (Clear)  


 


Urine Protein     (Negative)  


 


Urine Blood     (Negative)  


 


Amorphous Sediment     (None)  /hpf


 


Urine Mucus     (None)  /hpf














  06/28/22 06/28/22 06/28/22 Range/Units





  21:04 21:06 22:07 


 


WBC     (3.8-10.6)  k/uL


 


RBC     (3.80-5.40)  m/uL


 


Hgb     (11.4-16.0)  gm/dL


 


Hct     (34.0-46.0)  %


 


Neutrophils #     (1.3-7.7)  k/uL


 


Lymphocytes #     (1.0-4.8)  k/uL


 


ABG pH     (7.35-7.45)  


 


ABG pO2   70 L   ()  mmHg


 


ABG HCO3     (21-25)  mmol/L


 


ABG Total CO2   26 H   (19-24)  mmol/L


 


ABG O2 Saturation     (94-97)  %


 


Sodium     (137-145)  mmol/L


 


BUN     (7-17)  mg/dL


 


Glucose     (74-99)  mg/dL


 


POC Glucose (mg/dL)  127 H   119 H  ()  mg/dL


 


AST     (14-36)  U/L


 


ALT     (4-34)  U/L


 


Total Protein     (6.3-8.2)  g/dL


 


Urine Appearance     (Clear)  


 


Urine Protein     (Negative)  


 


Urine Blood     (Negative)  


 


Amorphous Sediment     (None)  /hpf


 


Urine Mucus     (None)  /hpf














  06/28/22 06/28/22 06/29/22 Range/Units





  23:08 23:56 01:02 


 


WBC     (3.8-10.6)  k/uL


 


RBC     (3.80-5.40)  m/uL


 


Hgb     (11.4-16.0)  gm/dL


 


Hct     (34.0-46.0)  %


 


Neutrophils #     (1.3-7.7)  k/uL


 


Lymphocytes #     (1.0-4.8)  k/uL


 


ABG pH     (7.35-7.45)  


 


ABG pO2     ()  mmHg


 


ABG HCO3     (21-25)  mmol/L


 


ABG Total CO2     (19-24)  mmol/L


 


ABG O2 Saturation     (94-97)  %


 


Sodium     (137-145)  mmol/L


 


BUN     (7-17)  mg/dL


 


Glucose     (74-99)  mg/dL


 


POC Glucose (mg/dL)  130 H  134 H  121 H  ()  mg/dL


 


AST     (14-36)  U/L


 


ALT     (4-34)  U/L


 


Total Protein     (6.3-8.2)  g/dL


 


Urine Appearance     (Clear)  


 


Urine Protein     (Negative)  


 


Urine Blood     (Negative)  


 


Amorphous Sediment     (None)  /hpf


 


Urine Mucus     (None)  /hpf














  06/29/22 06/29/22 06/29/22 Range/Units





  02:06 03:05 04:06 


 


WBC     (3.8-10.6)  k/uL


 


RBC     (3.80-5.40)  m/uL


 


Hgb     (11.4-16.0)  gm/dL


 


Hct     (34.0-46.0)  %


 


Neutrophils #     (1.3-7.7)  k/uL


 


Lymphocytes #     (1.0-4.8)  k/uL


 


ABG pH     (7.35-7.45)  


 


ABG pO2     ()  mmHg


 


ABG HCO3     (21-25)  mmol/L


 


ABG Total CO2     (19-24)  mmol/L


 


ABG O2 Saturation     (94-97)  %


 


Sodium     (137-145)  mmol/L


 


BUN     (7-17)  mg/dL


 


Glucose     (74-99)  mg/dL


 


POC Glucose (mg/dL)  123 H  117 H  118 H  ()  mg/dL


 


AST     (14-36)  U/L


 


ALT     (4-34)  U/L


 


Total Protein     (6.3-8.2)  g/dL


 


Urine Appearance     (Clear)  


 


Urine Protein     (Negative)  


 


Urine Blood     (Negative)  


 


Amorphous Sediment     (None)  /hpf


 


Urine Mucus     (None)  /hpf














  06/29/22 06/29/22 06/29/22 Range/Units





  04:20 04:20 05:10 


 


WBC  12.5 H    (3.8-10.6)  k/uL


 


RBC  3.26 L    (3.80-5.40)  m/uL


 


Hgb  9.8 L    (11.4-16.0)  gm/dL


 


Hct  29.9 L    (34.0-46.0)  %


 


Neutrophils #  10.9 H    (1.3-7.7)  k/uL


 


Lymphocytes #  0.7 L    (1.0-4.8)  k/uL


 


ABG pH     (7.35-7.45)  


 


ABG pO2     ()  mmHg


 


ABG HCO3     (21-25)  mmol/L


 


ABG Total CO2     (19-24)  mmol/L


 


ABG O2 Saturation     (94-97)  %


 


Sodium   136 L   (137-145)  mmol/L


 


BUN   18 H   (7-17)  mg/dL


 


Glucose   108 H   (74-99)  mg/dL


 


POC Glucose (mg/dL)    116 H  ()  mg/dL


 


AST   79 H   (14-36)  U/L


 


ALT   41 H   (4-34)  U/L


 


Total Protein   5.3 L   (6.3-8.2)  g/dL


 


Urine Appearance     (Clear)  


 


Urine Protein     (Negative)  


 


Urine Blood     (Negative)  


 


Amorphous Sediment     (None)  /hpf


 


Urine Mucus     (None)  /hpf














  06/29/22 06/29/22 06/29/22 Range/Units





  05:43 06:53 07:02 


 


WBC     (3.8-10.6)  k/uL


 


RBC     (3.80-5.40)  m/uL


 


Hgb     (11.4-16.0)  gm/dL


 


Hct     (34.0-46.0)  %


 


Neutrophils #     (1.3-7.7)  k/uL


 


Lymphocytes #     (1.0-4.8)  k/uL


 


ABG pH  7.49 H    (7.35-7.45)  


 


ABG pO2  55 L*    ()  mmHg


 


ABG HCO3  26 H    (21-25)  mmol/L


 


ABG Total CO2  28 H    (19-24)  mmol/L


 


ABG O2 Saturation  92.3 L    (94-97)  %


 


Sodium     (137-145)  mmol/L


 


BUN     (7-17)  mg/dL


 


Glucose     (74-99)  mg/dL


 


POC Glucose (mg/dL)    123 H  ()  mg/dL


 


AST     (14-36)  U/L


 


ALT     (4-34)  U/L


 


Total Protein     (6.3-8.2)  g/dL


 


Urine Appearance   Turbid H   (Clear)  


 


Urine Protein   Trace H   (Negative)  


 


Urine Blood   Small H   (Negative)  


 


Amorphous Sediment   Moderate H   (None)  /hpf


 


Urine Mucus   Moderate H   (None)  /hpf














  06/29/22 Range/Units





  08:25 


 


WBC   (3.8-10.6)  k/uL


 


RBC   (3.80-5.40)  m/uL


 


Hgb   (11.4-16.0)  gm/dL


 


Hct   (34.0-46.0)  %


 


Neutrophils #   (1.3-7.7)  k/uL


 


Lymphocytes #   (1.0-4.8)  k/uL


 


ABG pH   (7.35-7.45)  


 


ABG pO2   ()  mmHg


 


ABG HCO3   (21-25)  mmol/L


 


ABG Total CO2   (19-24)  mmol/L


 


ABG O2 Saturation   (94-97)  %


 


Sodium   (137-145)  mmol/L


 


BUN   (7-17)  mg/dL


 


Glucose   (74-99)  mg/dL


 


POC Glucose (mg/dL)  124 H  ()  mg/dL


 


AST   (14-36)  U/L


 


ALT   (4-34)  U/L


 


Total Protein   (6.3-8.2)  g/dL


 


Urine Appearance   (Clear)  


 


Urine Protein   (Negative)  


 


Urine Blood   (Negative)  


 


Amorphous Sediment   (None)  /hpf


 


Urine Mucus   (None)  /hpf

## 2022-06-30 LAB
ALBUMIN SERPL-MCNC: 3.4 G/DL (ref 3.5–5)
ALP SERPL-CCNC: 76 U/L (ref 38–126)
ALT SERPL-CCNC: 60 U/L (ref 4–34)
ANION GAP SERPL CALC-SCNC: 7 MMOL/L
AST SERPL-CCNC: 83 U/L (ref 14–36)
BASOPHILS # BLD AUTO: 0.1 K/UL (ref 0–0.2)
BASOPHILS NFR BLD AUTO: 0 %
BUN SERPL-SCNC: 30 MG/DL (ref 7–17)
CALCIUM SPEC-MCNC: 8.4 MG/DL (ref 8.4–10.2)
CHLORIDE SERPL-SCNC: 103 MMOL/L (ref 98–107)
CO2 BLDA-SCNC: 26 MMOL/L (ref 19–24)
CO2 SERPL-SCNC: 25 MMOL/L (ref 22–30)
EOSINOPHIL # BLD AUTO: 0.1 K/UL (ref 0–0.7)
EOSINOPHIL NFR BLD AUTO: 0 %
ERYTHROCYTE [DISTWIDTH] IN BLOOD BY AUTOMATED COUNT: 3.45 M/UL (ref 3.8–5.4)
ERYTHROCYTE [DISTWIDTH] IN BLOOD: 14.6 % (ref 11.5–15.5)
GLUCOSE BLD-MCNC: 111 MG/DL (ref 70–110)
GLUCOSE BLD-MCNC: 120 MG/DL (ref 70–110)
GLUCOSE BLD-MCNC: 123 MG/DL (ref 70–110)
GLUCOSE BLD-MCNC: 160 MG/DL (ref 70–110)
GLUCOSE BLD-MCNC: 169 MG/DL (ref 70–110)
GLUCOSE BLD-MCNC: 170 MG/DL (ref 70–110)
GLUCOSE SERPL-MCNC: 140 MG/DL (ref 74–99)
HCO3 BLDA-SCNC: 25 MMOL/L (ref 21–25)
HCT VFR BLD AUTO: 31 % (ref 34–46)
HGB BLD-MCNC: 10.2 GM/DL (ref 11.4–16)
LYMPHOCYTES # SPEC AUTO: 1.1 K/UL (ref 1–4.8)
LYMPHOCYTES NFR SPEC AUTO: 6 %
MAGNESIUM SPEC-SCNC: 2.1 MG/DL (ref 1.6–2.3)
MCH RBC QN AUTO: 29.7 PG (ref 25–35)
MCHC RBC AUTO-ENTMCNC: 33 G/DL (ref 31–37)
MCV RBC AUTO: 90 FL (ref 80–100)
MONOCYTES # BLD AUTO: 1.2 K/UL (ref 0–1)
MONOCYTES NFR BLD AUTO: 6 %
NEUTROPHILS # BLD AUTO: 17.3 K/UL (ref 1.3–7.7)
NEUTROPHILS NFR BLD AUTO: 87 %
PCO2 BLDA: 33 MMHG (ref 35–45)
PH BLDA: 7.49 [PH] (ref 7.35–7.45)
PLATELET # BLD AUTO: 266 K/UL (ref 150–450)
PO2 BLDA: 60 MMHG (ref 83–108)
POTASSIUM SERPL-SCNC: 4 MMOL/L (ref 3.5–5.1)
PROT SERPL-MCNC: 5.4 G/DL (ref 6.3–8.2)
SODIUM SERPL-SCNC: 135 MMOL/L (ref 137–145)
WBC # BLD AUTO: 19.9 K/UL (ref 3.8–10.6)

## 2022-06-30 PROCEDURE — 5A09357 ASSISTANCE WITH RESPIRATORY VENTILATION, LESS THAN 24 CONSECUTIVE HOURS, CONTINUOUS POSITIVE AIRWAY PRESSURE: ICD-10-PCS

## 2022-06-30 RX ADMIN — INSULIN ASPART SCH: 100 INJECTION, SOLUTION INTRAVENOUS; SUBCUTANEOUS at 19:39

## 2022-06-30 RX ADMIN — AMIODARONE HYDROCHLORIDE PRN MLS/HR: 50 INJECTION, SOLUTION INTRAVENOUS at 22:03

## 2022-06-30 RX ADMIN — METOPROLOL TARTRATE SCH MG: 25 TABLET, FILM COATED ORAL at 16:27

## 2022-06-30 RX ADMIN — IPRATROPIUM BROMIDE AND ALBUTEROL SULFATE SCH ML: .5; 3 SOLUTION RESPIRATORY (INHALATION) at 16:00

## 2022-06-30 RX ADMIN — METOPROLOL TARTRATE SCH MG: 25 TABLET, FILM COATED ORAL at 07:58

## 2022-06-30 RX ADMIN — INSULIN ASPART SCH: 100 INJECTION, SOLUTION INTRAVENOUS; SUBCUTANEOUS at 11:54

## 2022-06-30 RX ADMIN — PANTOPRAZOLE SODIUM SCH MG: 40 TABLET, DELAYED RELEASE ORAL at 06:42

## 2022-06-30 RX ADMIN — DOCUSATE SODIUM AND SENNOSIDES SCH EACH: 50; 8.6 TABLET ORAL at 20:23

## 2022-06-30 RX ADMIN — ASPIRIN 325 MG ORAL TABLET SCH MG: 325 PILL ORAL at 07:58

## 2022-06-30 RX ADMIN — CLOPIDOGREL BISULFATE SCH MG: 75 TABLET ORAL at 07:58

## 2022-06-30 RX ADMIN — ATORVASTATIN CALCIUM SCH MG: 80 TABLET, FILM COATED ORAL at 07:58

## 2022-06-30 RX ADMIN — HEPARIN SODIUM SCH UNIT: 5000 INJECTION INTRAVENOUS; SUBCUTANEOUS at 16:26

## 2022-06-30 RX ADMIN — INSULIN ASPART SCH UNIT: 100 INJECTION, SOLUTION INTRAVENOUS; SUBCUTANEOUS at 04:09

## 2022-06-30 RX ADMIN — INSULIN ASPART SCH UNIT: 100 INJECTION, SOLUTION INTRAVENOUS; SUBCUTANEOUS at 16:27

## 2022-06-30 RX ADMIN — SODIUM CHLORIDE, PRESERVATIVE FREE SCH ML: 5 INJECTION INTRAVENOUS at 07:59

## 2022-06-30 RX ADMIN — AMIODARONE HYDROCHLORIDE SCH MG: 200 TABLET ORAL at 07:58

## 2022-06-30 RX ADMIN — SODIUM CHLORIDE, PRESERVATIVE FREE SCH ML: 5 INJECTION INTRAVENOUS at 22:02

## 2022-06-30 RX ADMIN — IPRATROPIUM BROMIDE AND ALBUTEROL SULFATE SCH ML: .5; 3 SOLUTION RESPIRATORY (INHALATION) at 07:49

## 2022-06-30 RX ADMIN — FUROSEMIDE SCH MG: 10 INJECTION, SOLUTION INTRAMUSCULAR; INTRAVENOUS at 08:43

## 2022-06-30 RX ADMIN — HEPARIN SODIUM SCH UNIT: 5000 INJECTION INTRAVENOUS; SUBCUTANEOUS at 07:58

## 2022-06-30 RX ADMIN — HYDRALAZINE HYDROCHLORIDE PRN MG: 20 INJECTION INTRAMUSCULAR; INTRAVENOUS at 05:50

## 2022-06-30 RX ADMIN — INSULIN ASPART SCH: 100 INJECTION, SOLUTION INTRAVENOUS; SUBCUTANEOUS at 08:02

## 2022-06-30 RX ADMIN — IPRATROPIUM BROMIDE AND ALBUTEROL SULFATE SCH ML: .5; 3 SOLUTION RESPIRATORY (INHALATION) at 20:18

## 2022-06-30 RX ADMIN — MUPIROCIN SCH APPLIC: 20 OINTMENT TOPICAL at 20:27

## 2022-06-30 RX ADMIN — AMIODARONE HYDROCHLORIDE SCH MG: 200 TABLET ORAL at 20:23

## 2022-06-30 RX ADMIN — MUPIROCIN SCH APPLIC: 20 OINTMENT TOPICAL at 07:59

## 2022-06-30 RX ADMIN — POTASSIUM CHLORIDE SCH: 14.9 INJECTION, SOLUTION INTRAVENOUS at 22:08

## 2022-06-30 RX ADMIN — FUROSEMIDE SCH MG: 10 INJECTION, SOLUTION INTRAMUSCULAR; INTRAVENOUS at 20:23

## 2022-06-30 RX ADMIN — IPRATROPIUM BROMIDE AND ALBUTEROL SULFATE SCH ML: .5; 3 SOLUTION RESPIRATORY (INHALATION) at 11:05

## 2022-06-30 NOTE — P.PN
Subjective


Progress Note Date: 06/30/22











This is a patient who had a recent heart catheterization, showing a critical and

complex lesion involving the distal left main coronary artery, also, the ostial 

left circumflex, and ostial LAD.  The lesions appear to be in the range of 80-

90%, and the patient was also noted to have elevated left ventricular end-

diastolic filling pressures.  The patient is currently being evaluated by 

cardiothoracic surgery for possible bypass grafting.  We will reassess to see 

the patient preoperatively, and evaluate her lung function.  The patient is a 

lifelong nonsmoker.  She has no history of lung disease.  Based on her FEV1, and

her MVV, she was in the low operative risk range.  Outpatient medications includ

ed amlodipine, aspirin, metoprolol, lisinopril, zinc, vitamin D3, ascorbic acid,

lactulose, and indoor.  Her only major medical problem is hypertension.  She 

does have a history of previous coronavirus infection and was seen by my partner

in December 2021.  CBC is completely normal.  PTT is 39.7.  Sodium 141, 

potassium 3.7, chlorides 111, CO2 24, BUN 12, creatinine 0.63.  Cholesterol was 

182.  Urine was negative.  Testing for coronavirus was negative.  Chest x-ray 

shows mild interstitial edema.  Currently she is on room air.





Progress note dated 06/26/2022.





76-year-old female we saw yesterday in consultation.  The patient has 

significant coronary disease, involving the left main coronary artery.  The 

patient is apparently going to have open heart surgery one day this week.  The 

exact day has not been decided yet.  Clinically, she's.  Stable.  She is on room

air.  She is a lifelong nonsmoker.  Lung function would suggest that she's at a 

very low increased operative risk based on her FEV1 and MVV.  Labs today show 

white count of 6.1, with a normal hemoglobin, hematocrit, and platelet count.  

The patient's PTT is 60.2.  Sodium 141, potassium 3.6, chlorides 111, CO2 24, 

BUN 12, creatinine 0.72.








06/27/2022, the patient is awaiting bypass surgery.  The patient is calm and 

comfortable.  No respiratory difficulties whatsoever.  She is using incentive 

spirometer.  Her preop FEV1 is order of 72% of predicted.  No angina.  No 

palpitations.  No chest pain.  She is hemodynamically stable at this point in 

time.  She remains on IV heparin.  I did introduce myself and I will take care o

f this patient postop, managed to ventilator and the necessity pulmonary care 

following her thoracotomy bypass surgery.no other active issues for now.  The 

patient was sent comes at 6.2 with a hemoglobin 4.5.  She is on IV heparin with 

a PTT of 55.  BUN is at 12 with a creatinine 0.6 and the sodium level is at 140.





06/28/2022, I'm seeing the patient for a follow-up.  The patient was taken to 

the operating room yesterday and the patient underwent an off-pump coronary 

artery bypass surgery with LIMA to LAD and saphenous vein graft to obtuse 

marginal.  The patient also had a left facial appendage clipping.  Estimated 

blood loss was 400 mL.  The patient received a total of 3 L of intraoperative IV

fluids.  Note that the patient was having issues with hypotension and there was 

some interval worsening of the mitral regurgitation intraoperatively.  At that 

point, it was decided to insert an intra-aortic balloon pump and this was done 

through the right common femoral artery.  Once the intra-aortic balloon pump was

initiated, the patient's hemodynamics improved dramatically.  The patient was 

given a postop echo cardiac exam that showed a mild MR and good ejection 

fraction.  The intra-aortic balloon pump was placed on a one-to-one augmentation

and following that the patient was brought into the intensive care unit.  The 

patient subsequently was weaned off the sedation and the patient was extubated 

without any major difficulties within a few hours.  The patient was extubated 

and the fourth hours after arriving to the ICU.  The patient had with good 

weaning parameters.  The patient had a blood gas that showed adequate 

oxygenation and ventilation.  Based on that, the patient was extubated.  This 

morning, the patient remains on oxygen at 6 L per minute nasal cannula.  The 

chest x-ray showing cardiomegaly.  The patient is a mediastinal and left pleural

chest tube.  Output from the chest tubes have been 250 mL from the mediastinum 

and 280 from the left pleural since surgery.  No evidence of any air leak.  The 

chest x-ray shows no evidence of any pneumothorax.  Hemodynamically, the patient

is currently receiving intra-aortic balloon pump augmentation of 1-2.  The 

patient has adequate cardiac output of 4.3 and an index of 2.  She is on no 

pressors for now.  The intra-aortic balloon pump will be removed for now.  The 

patient is stable to systemic adequate blood pressure was the patient being off 

the balloon pump.  Urine output is in order of 20 mL an hour.  The patient is a

febrile.  The patient is awake and following commands thoracic questions 

appropriately.  Pulmonary artery pressures are 38 over 18 mmHg.  Lower blood 

work from work today is showing a sodium of 139, potassium of 4, bicarb of 26, 

BUN of 14 with a creatinine of 0.6.  The white cell count is at 9.7 with a 

hemoglobin of 9.3 and a platelet count of 195.  AST is 85 with an ALT of 60 and 

alkaline phosphatase of 41.  Magnesium level is at 2.1.  Note that the patient 

had a run of atrial fibrillation intraoperatively.  The patient was loaded with 

amiodarone and currently the patient is on 0.5 mg per minute of amiodarone 

infusion.  The patient is in a normal sinus rhythm.  The patient is also on an 

insulin drip running at 2.5 units an hour with adequate blood sugar control.  

The patient's of lactated Ringer at the rate of 50 mL an hour.  As mentioned, 

awake and alert and the sternum is dry clean and intact.  No other issues 

otherwise for now.  Breaks





06/29/2022, I'm seeing the patient for a follow-up.  The patient is postop day 

#2.  Note that this patient was extubated successfully without any issues.  

Subsequently, as of yesterday afternoon, the patient became progressively more 

hypoxic.  Note that during the day, the patient was given IV albumin a total of 

750 mL to improve her urine output.  This did help and ultimately dopamine was 

added at renal dose to improve her urine output.  Subsequently, she became more 

hypoxic and the patient became more short of breath and initially she went up to

15 L high flow and later on the patient was placed on a BiPAP.  She was kept on 

BiPAP throughout the night and the patient is currently on a BiPAP at a pressure

of 12/5 cm of water and FiO2 of on the percent.  She is able to generate tidal 

volumes above 500.  Respiratory rate is in the mid 20s.  Her breathing is 

slightly labored even on the BiPAP, yet she is able to tolerate the machine w

ithout any major difficulties and she is awake and alert and she is following 

commands and answering questions and she is neurologically intact.  At the same 

time, the patient had a blood.  This morning that showed a pH of 7.97.49 with a 

pCO2 of 35 and a pO2 of 55 and this was on FiO2 of 85% and based on that the 

FiO2 was brought up to 100%.  The chest x-ray showing cardiomegaly.  There is 

some infiltration of the right lung and some atelectatic changes in lung bases. 

There is concern for an evolving right lung pneumonia although this is quite 

early in the postoperative course.  In any rate, the patient is being diuresed 

for now and the patient is producing adequate amount of urine output.  She did 

have a spike of temperature 100.4 yesterday and currently she is afebrile.  The 

white cell count today is at 12.5 which is comparable to yesterday with a 

hemoglobin of 9.8 and a platelet count of 192.  In terms of the chest tubes, the

patient is a mediastinal and left pleural chest tube, output has been noted and 

it's in the order of overnight 80 mL overnight and 400 mL over the past 24 hours

in the mediastinal chest tube, 6 disease overnight and 400 mL over the past 24 

hours in the left pleural chest tube.  Langdon-Brunilda catheter still in place.  The 

cardiac output is currently at 4.3 with an index of 2.0.  The PA diastolic is in

the order of extreme millimeters of mercury.  The patient otherwise has normal 

renal function.  Creatinine is at 0.6.  Sodium is at 136.  She is obviously n

othing by mouth at this point in time as the patient is BiPAP dependent.  

Cardiac rhythm is still sinus and the patient remains on amiodarone.  She 

remains on aspirin and Plavix.  Routine postoperative care is being implemented 

this point in time.  Insulin drip is off and the patient is currently on slice K

coverage every 4 hours.





06/30/2022, I'm seeing the patient for a follow-up.  The patient is postop day 

#3.  Note that after a successful extubation, the patient was placed on a BiPAP 

because of ongoing hypoxic respiratory failure.  The patient stayed on BiPAP 

throughout the day yesterday and this morning the patient remains on a BiPAP.  

Current BiPAP settings of 12/5 with an FiO2 of 80%.  While in the BiPAP, the 

patient is urinating a tidal volume of about 450 with a respiratory rate in the 

mid 20s and a minute ventilation of 13 L per minute.  The chest x-ray showing 

some further investigation right lung base.  Based on that, an ultrasound of the

chest was done and there was minimal amount of pleural effusion the right lung 

base and I think the predominant findings are significant atelectasis in the 

lung bases right more than left.  Note that the chest tubes were all removed ye

sterday and the patient the left pleural and mediastinal chest tube both of them

removed.  Morning blood gases showed a pH of 7.49 with a pCO2 of 33 and pO2 of 

60.  The patient's FiO2 is currently at 80% on the BiPAP and she is pulse oxing 

95%.  I dropped her down to 60%.  Meanwhile, the pro-calcitonin level obtained 

yesterday was low at 0.18.  The patient was being diuresis with IV Lasix and 

input output balance has been negative for on 22 mL over the past 24 hours and 

the patient was taken off the dopamine.  The the white cell count is slightly 

higher compared to yesterday.  He 0.9 with a hemoglobin of 10.2 and a platelet 

count of 266.  Sodium is at 135 with a BUN of 30 and creatinine 0.6.  The 

patient is awake.  The patient is alert.  She is following commands and moving 

extremities.  She is currently on examination aspirin and Plavix.  She is also 

on metoprolol 25 mg by mouth 3 times a day.  Cardiac rhythm was sinus and later 

on she did have a epidural of atrial fibrillation.  There is being managed by 

the cardiothoracic team.  The patient will be started back on amiodarone bolus 

and maintenance.  No pressors for now.





Objective





- Vital Signs


Vital signs: 


                                   Vital Signs











Temp  97.6 F   06/30/22 08:00


 


Pulse  73   06/30/22 11:17


 


Resp  17   06/30/22 09:00


 


BP  134/68   06/30/22 09:00


 


Pulse Ox  97   06/30/22 09:00


 


FiO2  60   06/30/22 11:17








                                 Intake & Output











 06/29/22 06/30/22 06/30/22





 18:59 06:59 18:59


 


Intake Total 796.4 476 92


 


Output Total 1385 310 425


 


Balance -588.6 166 -333


 


Weight  110.1 kg 


 


Intake:   


 


   276 92


 


    0.9 Pressure bag 78 36 12


 


    CO/CI 60  


 


    Lactated Ringers 1,000 ml 250 240 80





    @ 20 mls/hr IV .Q24H [facility]   





    Rx#:[id number]   


 


  Intake, IV Titration 108.4  





  Amount   


 


    Dextrose/Water 1 250ml. 8.4  





    bag @ 2 MCG/KG/MIN 4.26   





    mls/hr IV .Q24H [facility] with   





    DOPamine DRIP 800 mg Rx#:   





    [id number]   


 


    Potassium Chloride 10 meq 100  





    In Water For Injection 1   





    100ml.bag @ 100 mls/hr   





    IVPB Q1H [facility] Rx#:   





    [id number]   


 


  Oral 300 200 


 


Output:   


 


  Chest Tube Drainage 160  


 


    Chest Tube Left Left 90  





    Pleural/Mediastinal   


 


    Chest Tube Mediastinal 70  


 


  Urine 1225 310 425


 


Other:   


 


  Voiding Method Indwelling Catheter Indwelling Catheter Indwelling Catheter








                       ABP, PAP, CO, CI - Last Documented











Arterial Blood Pressure        143/65


 


Pulmonary Artery Pressure      43/24


 


Cardiac Output                 4


 


Cardiac Index                  1.9

















- Exam











CONSTITUTIONAL: Appears comfortable, cooperative, no acute distress


RESPIRATORY:  Lungs sounds diminished bilaterally, right greater than left.  

Respirations even, nonlabored.  Currently on bipap, FiO2 100%, 12/6 with oxygen 

saturation 99%.  Strong nonproductive cough.  


CARDIOVASCULAR:  S1, S2 present.  Regular rate and rhythm, sinus rhythm on 

telemetry.  Sternum stable.   Palpable peripheral pulses bilaterally.  Trace 

generalized edema present.  No calf pain or tenderness noted.  Heart hugger in 

place with patient demonstrating appropriate use.  Antiembolism stockings, SCDs 

present.


GASTROINTESTINAL:  Abdomen soft, nontender, nondistended.  Active bowel sounds 

present 4 quadrants, tympanic to percussion.  Tolerating liquids for 

medications.  Denies flatus


GENITOURINARY:  Torres present draining cloudy, yellow urine.  Output overnight 

20 mL per hour, 1535 mL in the last 24 hours


INTEGUMENTARY:  Skin is warm and dry with evidence of good perfusion.  Anterior 

chest incision well approximated and covered with dry intact dressing.  Right 

lower extremity EVH site well approximated without redness or drainage.


NEUROLOGIC:  Cranial nerves II through XII intact


MUSKULOSKELETAL:  Able to move all extremities, strength equal bilaterally


PSYCHIATRIC:  Alert and oriented to person place and time, appropriate affect, 

intact judgment and insight


INVASIVE LINES AND TUBES:  Right internal jugular Cordis, right radial arterial 

line present. 





- Labs


CBC & Chem 7: 


                                 06/30/22 05:09





                                 06/30/22 05:09


Labs: 


                  Abnormal Lab Results - Last 24 Hours (Table)











  06/29/22 06/29/22 06/29/22 Range/Units





  04:20 12:02 16:09 


 


WBC     (3.8-10.6)  k/uL


 


RBC     (3.80-5.40)  m/uL


 


Hgb     (11.4-16.0)  gm/dL


 


Hct     (34.0-46.0)  %


 


Neutrophils #     (1.3-7.7)  k/uL


 


Monocytes #     (0-1.0)  k/uL


 


ABG pH     (7.35-7.45)  


 


ABG pCO2     (35-45)  mmHg


 


ABG pO2     ()  mmHg


 


ABG Total CO2     (19-24)  mmol/L


 


ABG O2 Saturation     (94-97)  %


 


Sodium     (137-145)  mmol/L


 


BUN     (7-17)  mg/dL


 


Glucose     (74-99)  mg/dL


 


POC Glucose (mg/dL)   121 H  134 H  ()  mg/dL


 


AST     (14-36)  U/L


 


ALT     (4-34)  U/L


 


Total Protein     (6.3-8.2)  g/dL


 


Albumin     (3.5-5.0)  g/dL


 


Procalcitonin  0.18 H    (0.02-0.09)  ng/mL














  06/29/22 06/29/22 06/30/22 Range/Units





  20:12 23:42 04:04 


 


WBC     (3.8-10.6)  k/uL


 


RBC     (3.80-5.40)  m/uL


 


Hgb     (11.4-16.0)  gm/dL


 


Hct     (34.0-46.0)  %


 


Neutrophils #     (1.3-7.7)  k/uL


 


Monocytes #     (0-1.0)  k/uL


 


ABG pH     (7.35-7.45)  


 


ABG pCO2     (35-45)  mmHg


 


ABG pO2     ()  mmHg


 


ABG Total CO2     (19-24)  mmol/L


 


ABG O2 Saturation     (94-97)  %


 


Sodium     (137-145)  mmol/L


 


BUN     (7-17)  mg/dL


 


Glucose     (74-99)  mg/dL


 


POC Glucose (mg/dL)  152 H  145 H  160 H  ()  mg/dL


 


AST     (14-36)  U/L


 


ALT     (4-34)  U/L


 


Total Protein     (6.3-8.2)  g/dL


 


Albumin     (3.5-5.0)  g/dL


 


Procalcitonin     (0.02-0.09)  ng/mL














  06/30/22 06/30/22 06/30/22 Range/Units





  04:45 05:09 05:09 


 


WBC   19.9 H   (3.8-10.6)  k/uL


 


RBC   3.45 L   (3.80-5.40)  m/uL


 


Hgb   10.2 L   (11.4-16.0)  gm/dL


 


Hct   31.0 L   (34.0-46.0)  %


 


Neutrophils #   17.3 H   (1.3-7.7)  k/uL


 


Monocytes #   1.2 H   (0-1.0)  k/uL


 


ABG pH  7.49 H    (7.35-7.45)  


 


ABG pCO2  33 L    (35-45)  mmHg


 


ABG pO2  60 L    ()  mmHg


 


ABG Total CO2  26 H    (19-24)  mmol/L


 


ABG O2 Saturation  92.7 L    (94-97)  %


 


Sodium    135 L  (137-145)  mmol/L


 


BUN    30 H  (7-17)  mg/dL


 


Glucose    140 H  (74-99)  mg/dL


 


POC Glucose (mg/dL)     ()  mg/dL


 


AST    83 H  (14-36)  U/L


 


ALT    60 H  (4-34)  U/L


 


Total Protein    5.4 L  (6.3-8.2)  g/dL


 


Albumin    3.4 L  (3.5-5.0)  g/dL


 


Procalcitonin     (0.02-0.09)  ng/mL














  06/30/22 Range/Units





  08:01 


 


WBC   (3.8-10.6)  k/uL


 


RBC   (3.80-5.40)  m/uL


 


Hgb   (11.4-16.0)  gm/dL


 


Hct   (34.0-46.0)  %


 


Neutrophils #   (1.3-7.7)  k/uL


 


Monocytes #   (0-1.0)  k/uL


 


ABG pH   (7.35-7.45)  


 


ABG pCO2   (35-45)  mmHg


 


ABG pO2   ()  mmHg


 


ABG Total CO2   (19-24)  mmol/L


 


ABG O2 Saturation   (94-97)  %


 


Sodium   (137-145)  mmol/L


 


BUN   (7-17)  mg/dL


 


Glucose   (74-99)  mg/dL


 


POC Glucose (mg/dL)  123 H  ()  mg/dL


 


AST   (14-36)  U/L


 


ALT   (4-34)  U/L


 


Total Protein   (6.3-8.2)  g/dL


 


Albumin   (3.5-5.0)  g/dL


 


Procalcitonin   (0.02-0.09)  ng/mL














Assessment and Plan


Plan: 








Symptomatic coronary disease, post coronary artery bypass surgery 2, off-pump 

and the patient is postop day #3.  The patient is post intra-aortic balloon pump

insertion for hemodynamic support inserted at a time of surgery was subsequently

removed and the patient remains hemodynamically stable.  Note that the patient w

as extubated without any major difficulties and over the past 12 hours, there 

has been some interval  decompensation which we believe it's related to 

increased atelectasis of the lung bases more so on the right lung base.  The 

patient is currently BiPAP dependent.  Hemodynamically stable.  





Post thoracotomy, chest tubes are removed





Acute hypoxic history failure, essential related to extensive atelectatic 

changes in the lung bases.  There is increased opacification of right lung base.

 Pneumonia is doubtful.  Ultrasound the chest revealed minimal amount of fluid 

the right lung base.





Lower urine output, improved with IV albumin and dopamine, currently being 

diuresed.  The patient is currently off dopamine





Paroxysmal atrial fibrillation, expected outcome of surgery currently in sinus 

rhythm and the patient is currently on IV amiodarone





Hyperglycemia, postop, the patient is off IV insulin and the patient is 

currently on insulin scale coverage





History of hypertension.





No history of any lung disease, and patient at low increased operative risk 

based on lung function.





Prior history of coronavirus infection, December 2021.





Postoperative anemia, expected outcome of surgery, hemoglobin is stable for now





plan





Keep the patient on BiPAP, no changes in the BiPAP setting, without FiO2 down to

60% only.


Repeated blood gases in a.m.


Keep the patient BiPAP for today


Repeat chest x-ray in the morning


LEVEL IS LOW


Pleural fluid is small and is not amenable for thoracentesis


We will watch for any signs of infection/pneumonia.  No clear indication for an 

infection for now


Treatment of atrial fibrillation with IV amiodarone and the patient is also on 

beta blockers


Continue Lasix





We'll continue to follow make further recommendations based on her progress.  

Case was discussed with the cardiothoracic team.  Is a critically care 

evaluation.  Evaluation was done and more than 30 minutes. 


Time with Patient: Greater than 30

## 2022-06-30 NOTE — P.PN
Subjective


Progress Note Date: 06/30/22





PROGRESS NOTE


The patient is a 76-year-old female with history of carotid vascular disease who

presented with symptoms of progressive chest discomfort and an abnormal MPI.  

Underwent cardiac catheterization on the 24th and was found to have severe 

distal left main disease with mild-to-moderate disease in the RCA.  She is 

scheduled to undergo CABG today.  Her echo showed an ejection fraction of 50% 

with mild lateral wall hypokinesis and mild-to-moderate mitral regurgitation.  

She's feeling well this morning, denies any chest discomfort or dizziness.  She 

is in sinus mechanism.  Scheduled to undergo CABG this afternoon.  She continues

to be on aspirin, Lipitor 80 mg daily, isosorbide mononitrate 15 mg daily, 

lisinopril 30 mg daily, metoprolol succinate 25 mg daily





June 28:


The patient underwent off-pump CABG yesterday with LIMA to the LAD and SVG to 

the OM with ligation of the left atrial appendage and placement of intra-aortic 

balloon pump because of worsening ischemia at the start of surgery.  She had 

atrial arrhythmia requiring cardioversion earlier.  She is extubated, in sinus 

mechanism, intra-aortic balloon pump at 1:2 with good blood pressure and urinary

output.  She is on no vasopressor.  She is awake, alert and following commands. 

At the end of the procedure she had a good ejection fraction with mild mitral 

regurgitation.


She continues to be on aspirin, Lipitor 80 mg daily, Plavix 75 mg daily, 

metoprolol tartrate 12-1/2 mg twice a day.





June 29:


The patient intra-aortic balloon pump was removed yesterday.  She was hypoxemic 

during the night requiring BiPAP.  She denies any chest discomfort.  She 

continues to be in sinus mechanism.  She denies any chest discomfort, dizziness 

or palpitations.  She received diuretics yesterday with good output.  She 

continues to be on aspirin, Plavix, low-dose dopamine, metoprolol 12-1/2 mg 

twice a day, Lipitor 80 mg daily.  Her chest x-ray shows bilateral pleural 

effusion





June 30:


The patient is awake and alert, continues to be in sinus mechanism, she 

continues to be on the BiPAP but feels better.  Her breathing is stable.  She is

denying any chest discomfort, dizziness or palpitations.  She has no nausea.  

She has good urinary output.  She continues to be on aspirin once a day, 

amiodarone 400 mg twice a day, Lipitor 80 mg daily, Plavix 75 mg daily, insulin,

metoprolol tartrate 25 mg twice a day.  Her chest x-ray shows bilateral pleural 

effusion with mild congestion, she diuresed well yesterday the IV Lasix





PHYSICAL EXAMINATION:


Blood pressure 147/70 with a heart rate in the 80s


LUNGS: Decreased breath sounds at the bases


HEART: Regular rate and rhythm, S1, S2. No S3.  systolic murmur at the base


ABDOMEN: Soft, nontender, no organomegaly


EXTREMETIES: No edema, 


LAB:


Hemoglobin 10.2, BUN 30, creatinine 0.67, potassium 4.0


IMPRESSION:


1.  Status post CABG, LIMA to the LAD and SVG to obtuse marginal branch with 

known severe left main disease.


2.  Post removal of Intra-aortic balloon pump


3.  History of hyperlipidemia 


4.  Hypoxemia with lung congestion and evidence of fluid overload, improving


5.  Hypertension








PLAN:


1.  IV diuretics as needed


2.  Follow blood pressure and if it remains elevated add ACE inhibitor


3.  Wean BiPAP as tolerated to nasal cannula


4.  Continue incentive spirometry


5.  Increase activity as tolerated.








Objective





- Vital Signs


Vital signs: 


                                   Vital Signs











Temp  97.8 F   06/30/22 04:00


 


Pulse  82   06/30/22 07:00


 


Resp  33 H  06/30/22 07:00


 


BP  147/78   06/30/22 07:00


 


Pulse Ox  98   06/30/22 07:00


 


FiO2  100   06/30/22 07:00








                                 Intake & Output











 06/29/22 06/30/22 06/30/22





 18:59 06:59 18:59


 


Intake Total 796.4 476 23


 


Output Total 1385 310 35


 


Balance -588.6 166 -12


 


Weight  110.1 kg 


 


Intake:   


 


   276 23


 


    0.9 Pressure bag 78 36 3


 


    CO/CI 60  


 


    Lactated Ringers 1,000 ml 250 240 20





    @ 20 mls/hr IV .Q24H LYLE   





    Rx#:[id number]   


 


  Intake, IV Titration 108.4  





  Amount   


 


    Dextrose/Water 1 250ml. 8.4  





    bag @ 2 MCG/KG/MIN 4.26   





    mls/hr IV .Q24H LYLE with   





    DOPamine DRIP 800 mg Rx#:   





    [id number]   


 


    Potassium Chloride 10 meq 100  





    In Water For Injection 1   





    100ml.bag @ 100 mls/hr   





    IVPB Q1H LYLE Rx#:   





    [id number]   


 


  Oral 300 200 


 


Output:   


 


  Chest Tube Drainage 160  


 


    Chest Tube Left Left 90  





    Pleural/Mediastinal   


 


    Chest Tube Mediastinal 70  


 


  Urine 1225 310 35


 


Other:   


 


  Voiding Method Indwelling Catheter Indwelling Catheter 








                       ABP, PAP, CO, CI - Last Documented











Arterial Blood Pressure        143/65


 


Pulmonary Artery Pressure      43/24


 


Cardiac Output                 4


 


Cardiac Index                  1.9

















- Labs


CBC & Chem 7: 


                                 06/30/22 05:09





                                 06/30/22 05:09


Labs: 


                  Abnormal Lab Results - Last 24 Hours (Table)











  06/29/22 06/29/22 06/29/22 Range/Units





  04:20 06:53 08:25 


 


WBC     (3.8-10.6)  k/uL


 


RBC     (3.80-5.40)  m/uL


 


Hgb     (11.4-16.0)  gm/dL


 


Hct     (34.0-46.0)  %


 


Neutrophils #     (1.3-7.7)  k/uL


 


Monocytes #     (0-1.0)  k/uL


 


ABG pH     (7.35-7.45)  


 


ABG pCO2     (35-45)  mmHg


 


ABG pO2     ()  mmHg


 


ABG Total CO2     (19-24)  mmol/L


 


ABG O2 Saturation     (94-97)  %


 


Sodium     (137-145)  mmol/L


 


BUN     (7-17)  mg/dL


 


Glucose     (74-99)  mg/dL


 


POC Glucose (mg/dL)    124 H  ()  mg/dL


 


AST     (14-36)  U/L


 


ALT     (4-34)  U/L


 


Total Protein     (6.3-8.2)  g/dL


 


Albumin     (3.5-5.0)  g/dL


 


Procalcitonin  0.18 H    (0.02-0.09)  ng/mL


 


Urine Appearance   Turbid H   (Clear)  


 


Urine Protein   Trace H   (Negative)  


 


Urine Blood   Small H   (Negative)  


 


Amorphous Sediment   Moderate H   (None)  /hpf


 


Urine Mucus   Moderate H   (None)  /hpf














  06/29/22 06/29/22 06/29/22 Range/Units





  12:02 16:09 20:12 


 


WBC     (3.8-10.6)  k/uL


 


RBC     (3.80-5.40)  m/uL


 


Hgb     (11.4-16.0)  gm/dL


 


Hct     (34.0-46.0)  %


 


Neutrophils #     (1.3-7.7)  k/uL


 


Monocytes #     (0-1.0)  k/uL


 


ABG pH     (7.35-7.45)  


 


ABG pCO2     (35-45)  mmHg


 


ABG pO2     ()  mmHg


 


ABG Total CO2     (19-24)  mmol/L


 


ABG O2 Saturation     (94-97)  %


 


Sodium     (137-145)  mmol/L


 


BUN     (7-17)  mg/dL


 


Glucose     (74-99)  mg/dL


 


POC Glucose (mg/dL)  121 H  134 H  152 H  ()  mg/dL


 


AST     (14-36)  U/L


 


ALT     (4-34)  U/L


 


Total Protein     (6.3-8.2)  g/dL


 


Albumin     (3.5-5.0)  g/dL


 


Procalcitonin     (0.02-0.09)  ng/mL


 


Urine Appearance     (Clear)  


 


Urine Protein     (Negative)  


 


Urine Blood     (Negative)  


 


Amorphous Sediment     (None)  /hpf


 


Urine Mucus     (None)  /hpf














  06/29/22 06/30/22 06/30/22 Range/Units





  23:42 04:04 04:45 


 


WBC     (3.8-10.6)  k/uL


 


RBC     (3.80-5.40)  m/uL


 


Hgb     (11.4-16.0)  gm/dL


 


Hct     (34.0-46.0)  %


 


Neutrophils #     (1.3-7.7)  k/uL


 


Monocytes #     (0-1.0)  k/uL


 


ABG pH    7.49 H  (7.35-7.45)  


 


ABG pCO2    33 L  (35-45)  mmHg


 


ABG pO2    60 L  ()  mmHg


 


ABG Total CO2    26 H  (19-24)  mmol/L


 


ABG O2 Saturation    92.7 L  (94-97)  %


 


Sodium     (137-145)  mmol/L


 


BUN     (7-17)  mg/dL


 


Glucose     (74-99)  mg/dL


 


POC Glucose (mg/dL)  145 H  160 H   ()  mg/dL


 


AST     (14-36)  U/L


 


ALT     (4-34)  U/L


 


Total Protein     (6.3-8.2)  g/dL


 


Albumin     (3.5-5.0)  g/dL


 


Procalcitonin     (0.02-0.09)  ng/mL


 


Urine Appearance     (Clear)  


 


Urine Protein     (Negative)  


 


Urine Blood     (Negative)  


 


Amorphous Sediment     (None)  /hpf


 


Urine Mucus     (None)  /hpf














  06/30/22 06/30/22 Range/Units





  05:09 05:09 


 


WBC  19.9 H   (3.8-10.6)  k/uL


 


RBC  3.45 L   (3.80-5.40)  m/uL


 


Hgb  10.2 L   (11.4-16.0)  gm/dL


 


Hct  31.0 L   (34.0-46.0)  %


 


Neutrophils #  17.3 H   (1.3-7.7)  k/uL


 


Monocytes #  1.2 H   (0-1.0)  k/uL


 


ABG pH    (7.35-7.45)  


 


ABG pCO2    (35-45)  mmHg


 


ABG pO2    ()  mmHg


 


ABG Total CO2    (19-24)  mmol/L


 


ABG O2 Saturation    (94-97)  %


 


Sodium   135 L  (137-145)  mmol/L


 


BUN   30 H  (7-17)  mg/dL


 


Glucose   140 H  (74-99)  mg/dL


 


POC Glucose (mg/dL)    ()  mg/dL


 


AST   83 H  (14-36)  U/L


 


ALT   60 H  (4-34)  U/L


 


Total Protein   5.4 L  (6.3-8.2)  g/dL


 


Albumin   3.4 L  (3.5-5.0)  g/dL


 


Procalcitonin    (0.02-0.09)  ng/mL


 


Urine Appearance    (Clear)  


 


Urine Protein    (Negative)  


 


Urine Blood    (Negative)  


 


Amorphous Sediment    (None)  /hpf


 


Urine Mucus    (None)  /hpf

## 2022-06-30 NOTE — P.PN
Subjective


Progress Note Date: 06/30/22


Principal diagnosis: 





Coronary artery disease with left main disease, unstable angina, atrial 

arrhythmias.  Previous medical history of hypertension, hyperlipidemia, SVT, 

left internal carotid artery stenosis, obesity, never smoker, remains 

unvaccinated against Covid, family history of coronary artery disease





POD #3 off-pump coronary artery bypass grafting 2 with left internal mammary 

artery to the left anterior descending artery, reverse saphenous vein graft to 

the obtuse marginal artery, ligation of the left atrial appendage with a 40 mm 

AtriCure clip, endovascular vein harvest of the right greater saphenous vein, 

placement of intra-aortic balloon pump





Postoperative acute blood loss anemia, expected given hemodilution





Acute hypoxic respiratory failure requiring bipap





The patient was seen and examined this morning sitting up in bed in the 

intensive care unit in no acute distress.  This morning she remains on bipap, 

FiO2 had been weaned down to 70% yesterday, gradually increase back to 100% this

morning, 12/6 with current oxygen sat 99%.  Remains in sinus rhythm, 

hemodynamically stable on no inotropes or pressors.  Dopamine was stopped 

yesterday morning, blood pressure has started to elevate.  Was given IV Lasix 

yesterday with 825 mL initially diuresed, urine output has trended down since.  

WBC 19.9 this am, was 12.5 yesterday, procalcitonin 0.18.  Tmax 100.2F in the 

last 24 hours, 97.8 this morning.  Urine looks cloudy, sent for culture, no 

leukocyte esterase or nitrates noted.  She denies any pain, doesn't like the 

bipap and really wants to get off of it, wants to get out of bed and get moving,

otherwise she has no new complaints. Right internal jugular cordis, right radial

arterial line remain.  





Objective





- Vital Signs


Vital signs: 


                                   Vital Signs











Temp  97.8 F   06/30/22 04:00


 


Pulse  82   06/30/22 07:00


 


Resp  33 H  06/30/22 07:00


 


BP  147/78   06/30/22 07:00


 


Pulse Ox  98   06/30/22 07:00


 


FiO2  100   06/30/22 07:00








                                 Intake & Output











 06/29/22 06/30/22 06/30/22





 18:59 06:59 18:59


 


Intake Total 796.4 476 23


 


Output Total 1385 310 35


 


Balance -588.6 166 -12


 


Weight  110.1 kg 


 


Intake:   


 


   276 23


 


    0.9 Pressure bag 78 36 3


 


    CO/CI 60  


 


    Lactated Ringers 1,000 ml 250 240 20





    @ 20 mls/hr IV .Q24H LYLE   





    Rx#:[id number]   


 


  Intake, IV Titration 108.4  





  Amount   


 


    Dextrose/Water 1 250ml. 8.4  





    bag @ 2 MCG/KG/MIN 4.26   





    mls/hr IV .Q24H LYLE with   





    DOPamine DRIP 800 mg Rx#:   





    [id number]   


 


    Potassium Chloride 10 meq 100  





    In Water For Injection 1   





    100ml.bag @ 100 mls/hr   





    IVPB Q1H LYLE Rx#:   





    [id number]   


 


  Oral 300 200 


 


Output:   


 


  Chest Tube Drainage 160  


 


    Chest Tube Left Left 90  





    Pleural/Mediastinal   


 


    Chest Tube Mediastinal 70  


 


  Urine 1225 310 35


 


Other:   


 


  Voiding Method Indwelling Catheter Indwelling Catheter 








                       ABP, PAP, CO, CI - Last Documented











Arterial Blood Pressure        143/65


 


Pulmonary Artery Pressure      43/24


 


Cardiac Output                 4


 


Cardiac Index                  1.9

















- Exam





CONSTITUTIONAL: Appears comfortable, cooperative, no acute distress


RESPIRATORY:  Lungs sounds diminished bilaterally, right greater than left.  

Respirations even, nonlabored.  Currently on bipap, FiO2 100%, 12/6 with oxygen 

saturation 99%.  Strong nonproductive cough.  


CARDIOVASCULAR:  S1, S2 present.  Regular rate and rhythm, sinus rhythm on 

telemetry.  Sternum stable.   Palpable peripheral pulses bilaterally.  Trace 

generalized edema present.  No calf pain or tenderness noted.  Heart hugger in 

place with patient demonstrating appropriate use.  Antiembolism stockings, SCDs 

present.


GASTROINTESTINAL:  Abdomen soft, nontender, nondistended.  Active bowel sounds 

present 4 quadrants, tympanic to percussion.  Tolerating liquids for 

medications.  Denies flatus


GENITOURINARY:  Torres present draining cloudy, yellow urine.  Output overnight 

20 mL per hour, 1535 mL in the last 24 hours


INTEGUMENTARY:  Skin is warm and dry with evidence of good perfusion.  Anterior 

chest incision well approximated and covered with dry intact dressing.  Right 

lower extremity EVH site well approximated without redness or drainage.


NEUROLOGIC:  Cranial nerves II through XII intact


MUSKULOSKELETAL:  Able to move all extremities, strength equal bilaterally


PSYCHIATRIC:  Alert and oriented to person place and time, appropriate affect, 

intact judgment and insight


INVASIVE LINES AND TUBES:  Right internal jugular Cordis, right radial arterial 

line present. 





- Allied health notes


Allied health notes reviewed: nursing





- Labs


CBC & Chem 7: 


                                 06/30/22 05:09





                                 06/30/22 05:09


Labs: 


                  Abnormal Lab Results - Last 24 Hours (Table)











  06/29/22 06/29/22 06/29/22 Range/Units





  04:20 06:53 08:25 


 


WBC     (3.8-10.6)  k/uL


 


RBC     (3.80-5.40)  m/uL


 


Hgb     (11.4-16.0)  gm/dL


 


Hct     (34.0-46.0)  %


 


Neutrophils #     (1.3-7.7)  k/uL


 


Monocytes #     (0-1.0)  k/uL


 


ABG pH     (7.35-7.45)  


 


ABG pCO2     (35-45)  mmHg


 


ABG pO2     ()  mmHg


 


ABG Total CO2     (19-24)  mmol/L


 


ABG O2 Saturation     (94-97)  %


 


Sodium     (137-145)  mmol/L


 


BUN     (7-17)  mg/dL


 


Glucose     (74-99)  mg/dL


 


POC Glucose (mg/dL)    124 H  ()  mg/dL


 


AST     (14-36)  U/L


 


ALT     (4-34)  U/L


 


Total Protein     (6.3-8.2)  g/dL


 


Albumin     (3.5-5.0)  g/dL


 


Procalcitonin  0.18 H    (0.02-0.09)  ng/mL


 


Urine Appearance   Turbid H   (Clear)  


 


Urine Protein   Trace H   (Negative)  


 


Urine Blood   Small H   (Negative)  


 


Amorphous Sediment   Moderate H   (None)  /hpf


 


Urine Mucus   Moderate H   (None)  /hpf














  06/29/22 06/29/22 06/29/22 Range/Units





  12:02 16:09 20:12 


 


WBC     (3.8-10.6)  k/uL


 


RBC     (3.80-5.40)  m/uL


 


Hgb     (11.4-16.0)  gm/dL


 


Hct     (34.0-46.0)  %


 


Neutrophils #     (1.3-7.7)  k/uL


 


Monocytes #     (0-1.0)  k/uL


 


ABG pH     (7.35-7.45)  


 


ABG pCO2     (35-45)  mmHg


 


ABG pO2     ()  mmHg


 


ABG Total CO2     (19-24)  mmol/L


 


ABG O2 Saturation     (94-97)  %


 


Sodium     (137-145)  mmol/L


 


BUN     (7-17)  mg/dL


 


Glucose     (74-99)  mg/dL


 


POC Glucose (mg/dL)  121 H  134 H  152 H  ()  mg/dL


 


AST     (14-36)  U/L


 


ALT     (4-34)  U/L


 


Total Protein     (6.3-8.2)  g/dL


 


Albumin     (3.5-5.0)  g/dL


 


Procalcitonin     (0.02-0.09)  ng/mL


 


Urine Appearance     (Clear)  


 


Urine Protein     (Negative)  


 


Urine Blood     (Negative)  


 


Amorphous Sediment     (None)  /hpf


 


Urine Mucus     (None)  /hpf














  06/29/22 06/30/22 06/30/22 Range/Units





  23:42 04:04 04:45 


 


WBC     (3.8-10.6)  k/uL


 


RBC     (3.80-5.40)  m/uL


 


Hgb     (11.4-16.0)  gm/dL


 


Hct     (34.0-46.0)  %


 


Neutrophils #     (1.3-7.7)  k/uL


 


Monocytes #     (0-1.0)  k/uL


 


ABG pH    7.49 H  (7.35-7.45)  


 


ABG pCO2    33 L  (35-45)  mmHg


 


ABG pO2    60 L  ()  mmHg


 


ABG Total CO2    26 H  (19-24)  mmol/L


 


ABG O2 Saturation    92.7 L  (94-97)  %


 


Sodium     (137-145)  mmol/L


 


BUN     (7-17)  mg/dL


 


Glucose     (74-99)  mg/dL


 


POC Glucose (mg/dL)  145 H  160 H   ()  mg/dL


 


AST     (14-36)  U/L


 


ALT     (4-34)  U/L


 


Total Protein     (6.3-8.2)  g/dL


 


Albumin     (3.5-5.0)  g/dL


 


Procalcitonin     (0.02-0.09)  ng/mL


 


Urine Appearance     (Clear)  


 


Urine Protein     (Negative)  


 


Urine Blood     (Negative)  


 


Amorphous Sediment     (None)  /hpf


 


Urine Mucus     (None)  /hpf














  06/30/22 06/30/22 Range/Units





  05:09 05:09 


 


WBC  19.9 H   (3.8-10.6)  k/uL


 


RBC  3.45 L   (3.80-5.40)  m/uL


 


Hgb  10.2 L   (11.4-16.0)  gm/dL


 


Hct  31.0 L   (34.0-46.0)  %


 


Neutrophils #  17.3 H   (1.3-7.7)  k/uL


 


Monocytes #  1.2 H   (0-1.0)  k/uL


 


ABG pH    (7.35-7.45)  


 


ABG pCO2    (35-45)  mmHg


 


ABG pO2    ()  mmHg


 


ABG Total CO2    (19-24)  mmol/L


 


ABG O2 Saturation    (94-97)  %


 


Sodium   135 L  (137-145)  mmol/L


 


BUN   30 H  (7-17)  mg/dL


 


Glucose   140 H  (74-99)  mg/dL


 


POC Glucose (mg/dL)    ()  mg/dL


 


AST   83 H  (14-36)  U/L


 


ALT   60 H  (4-34)  U/L


 


Total Protein   5.4 L  (6.3-8.2)  g/dL


 


Albumin   3.4 L  (3.5-5.0)  g/dL


 


Procalcitonin    (0.02-0.09)  ng/mL


 


Urine Appearance    (Clear)  


 


Urine Protein    (Negative)  


 


Urine Blood    (Negative)  


 


Amorphous Sediment    (None)  /hpf


 


Urine Mucus    (None)  /hpf














- Imaging and Cardiology


Chest x-ray: report reviewed, image reviewed





Assessment and Plan


Assessment: 





1.  Coronary artery disease with left main disease, status post 2 vessel CABG


2.  Hypertension


3.  Hyperlipidemia, cholesterol 182, 


4.  SVT, intraoperative atrial arrhythmias with cardioversion, status post left 

atrial appendage ligation


5.  Left internal carotid artery stenosis


6.  Obesity


7.  Never smoker, preoperative FEV1 72% of predicted


8.  History of covid infection in December 2021, remains unvaccinated against 

Covid


9.  Family history of coronary artery disease


10.  Nasal swab positive for MSSA, treated with mupirocin


11.  Preserved LV function with mild to moderate mitral regurgitation on 

transthoracic echocardiogram


12.  Acute blood loss anemia


13.  Acute hypoxic respiratory failure requiring bipap


14.  Leukocytosis with low grade fever, urine culture sent


Plan: 





1.  Continue to maximize medical management with aspirin, Plavix, statin, beta 

blocker.  Will increase beta blocker therapy as tolerated.  


2.  Continue amiodarone for A. fib prophylaxis.  No anticoagulation necessary at

this point


3.  Wean O2 as tolerated.  Encourage incentive spirometry 10 times every hour 

while awake once off bipap.  Bronchodilators per pulmonology


4.  Increase activity as tolerated.  PT/OT/cardiac rehab following


5.  GI/DVT prophylaxis


6.  Will monitor daily labs and CXRs.  Electrolyte replacement per protocol. 

Urine culture sent.  


7.  Pain control with current medication regimen


8.  Insulin management per primary care service.  Patient is not diabetic, 

preoperative hemoglobin A1c 5.6%


9.  Continue Torres for another 24 hours for strict accurate intake and output.  

Daily weights


10.  More recommendations to follow

## 2022-06-30 NOTE — XR
EXAMINATION TYPE: XR chest 1V portable

 

DATE OF EXAM: 6/30/2022

 

COMPARISON: 6/29/2022

 

HISTORY: post cardiac surgery 

 

 

FINDINGS:

 

Right IJ sheath is now in place with removal of Villalba-Brunilda catheter. Left-sided chest tube has been re
moved. Left atrial clip remains in place. No evidence for pneumothorax.

 

Persistent perihilar and basilar patchy densities bilateral small effusions. Continued cardiomegaly a
nd coronary venous congestion.

 

Stable appearance of the mediastinal structures at this time.

 

 

IMPRESSION:

1.  Stable portable chest.  Clinical correlation and follow up until resolution is recommended.

## 2022-06-30 NOTE — P.PN
Subjective


Progress Note Date: 06/30/22





 HISTORY OF PRESENT ILLNESS


This is a pleasant 76 years old female with past medical history of Fibromyalg

ia, Hyperlipidemia, Hypertension, Osteoarthritis , Supraventricular Tachycardia 

,urinary incontinence-wears pad, severe left internal carotid artery stenosis 

followed by Dr. Adam, frequent constipation, she was admitted under 

cardiology service for right arm pain, chest pain and back pain for the last 3 

weeks, where she underwent cardiac cath and 60/24 showing critical and complex 

lesion involving the distal left main coronary arteries and also involving the 

ostial left circumflex and the distal LAD with the stenotic range is 80-90% with

increase in LVEDP  , Patient will need bypass procedure to open his coronary 

arteries.


Currently she denies chest pain or dyspnea.  No incontinence of urine or bowel. 

No fever


Patient is hemodynamically stable


Labs including CBC, INR, BMP and liver enzymes are unremarkable.  TSH is 1.6.


Urine analysis showing trace blood.


coronavirus not detected.   Hepatitis panel is negative


Chest x-ray: Minimal interstitial edema noted


Carotid duplex: No significant stenosis


Echocardiogram showing ejection fraction of 50-55% with mild-to-moderate mitral 

regurgitation and mild tricuspid regurgitation


Patient currently on heparin drip, aspirin 81 mg twice a day and normal saline 

at 75 mL/h as well as Lipitor and metoprolol


However patient this morning she is asymptomatic she denies chest pain or 

dyspnea or abdominal pain.  No diarrhea or vomiting or dysuria.  No headache or 

weakness or numbness.





6/27: Patient is denying any chest pain, shortness of breath, palpitations, 

lightheadedness or dizziness.  She is scheduled for CABG this afternoon and 

patient states that she is ready to move forward.  No new concerns from her 

nurse.  Patient has been afebrile, heart rate 80, blood pressure 129/78 and 

pulse ox 96% on room air.  CBC is unremarkable.  Chloride 110, CO2 20, blood 

sugar 112, creatinine 0.68.





6/28: Patient is status post CABG 2 with left internal mammary artery to the 

left anterior descending artery, reverse saphenous vein graft to the obtuse 

marginal artery, ligation of the left atrial appendage with a 40 mm AtriCure 

clip, endovascular vein harvest of the right greater saphenous vein, placement 

of intra-aortic balloon pump.  Right groin intra-aortic balloon pump was 

discontinued this morning.  Right internal jugular [place]/Cordis, right radial 

arterial line, mediastinal/left pleural chest tubes remain in place.  Patient is

stating that she is feeling well.  She has been afebrile, heart rate 75, 121/54,

pulse ox 93% on 2 L.  Capillary blood glucose running between 110 and 120.  WBC 

9.7, hemoglobin 9.3 and platelet count 195.  Electrolytes and renal function 

normal.  AST 85 and ALT 41.





6/29: Patient remains in the intensive care unit.  Yesterday pulse ox continued 

to drop through the day with increased oxygen demands to the point where she is 

now on BiPAP.  She states she feels a little short of breath.  She denies Chest 

pain.  Temperature max 100.4, heart rate 76, respiratory rate 32, blood pressure

130/73.  Cardiac monitor sinus rhythm.  Patient is status post IV Lasix 

yesterday with good urine output.  Blood glucose running between 107 and 124 and

insulin drip will be discontinued, transition to NovoLog scale every 4 hours.  

Urinalysis was turbid, blood small.





6/30: Patient remains in the intensive care unit on BiPAP.  She has been unable 

to eat only taking a few sips of water.  Chest tubes have been removed, pacer 

wires to be removed today.  Torres catheter is in place, good urine output.  

Patient has been afebrile, heart rate in the 80s, blood pressure 144/76, pulse 

ox 90% on FiO2 80 BiPAP.  Cardiac monitor is sinus rhythm.  Repeat blood work 

reveals WBC 19.9, hemoglobin 10.2, platelet count 266.  Sodium 135, BUN 30 

creatinine 0.67.  AST 83 and ALT 60.  Blood sugars running between 123 and 160.


Chest x-ray reveals stable.  Persistent perihilar and basilar patchy densities 

bilateral small effusions.


Chest ultrasound reveals right pleural effusion 4.9 cm 2.2 cm fluid pocket.





REVIEW OF SYSTEMS


Constitutional: No fever, no chills, no night sweats.  No weight change.  No w

eakness, fatigue or lethargy.  No daytime sleepiness.


EENT: No headache.  No blurred vision or double vision, no loss of vision.  No 

loss of Hearing, no ringing in the ears, no dizziness.  No nasal drainage or 

congestion.  No epistaxis.  No sore throat.


Lungs: Minimal shortness of breath, cough, no sputum production.  No wheezing.


Cardiovascular: Denies chest pain, no lower extremity edema.  No palpitations.  

No paroxysmal nocturnal dyspnea.  No orthopnea.  No lightheadedness or 

dizziness.  No syncopal episodes.


Abdominal: No abdominal pain.  No nausea, vomiting.  No diarrhea.  No 

constipation.  No bloody or tarry stools.  No loss of appetite.


Genitourinary: No dysuria, increased frequency, urgency.  No urinary retention.


Musculoskeletal: No myalgias.  No muscle weakness, no gait dysfunction, no 

frequent falls.  No back pain.  No neck pain.


Integumentary: No wounds, no lesions.  No rash or pruritus.  No unusual 

bruising.  No change in hair or nails.


Neurologic: No aphasia. No facial droop. No change in mentation. No head injury.

No headache. No paralysis. No paresthesia.


Psychiatric: No depression.  No anxiety.  No mood swings.


Endocrine: No abnormal blood sugars.  No weight change.  No excessive sweating 

or thirst.  








PHYSICAL EXAMINATION


Gen: This is a 76-year-old overweight  female.  She is resting in bed 

and appears to be comfortable and in no acute distress.


HEENT: Head is atraumatic, normocephalic. Pupils equal, round. Sclerae is 

anicteric.  


NECK: Supple. No JVD. No lymphadenopathy. No thyromegaly. Right Cordis in place.


LUNGS: Clear to auscultation. No wheezes or rhonchi.  No intercostal 

retractions.  Chest tubes in place.


HEART: Regular rate and rhythm.  Systolic murmur. 


ABDOMEN: Soft. Bowel sounds are present. No masses.  No tenderness.


EXTREMITIES: No pedal edema.  No calf tenderness.


NEUROLOGICAL: Patient is awake, alert and oriented x3. Cranial nerves 2 through 

12 are grossly intact. 





ASSESSMENT AND PLAN


1.  Severe coronary artery disease status post 2 vessel CABG 6/27.  Continue 

current plan per cardiothoracic team.  Continue aspirin 325 mg daily, Lipitor 80

mg daily, Plavix 75 mg daily, continue Norco as needed for pain, insulin drip p

er protocol, DuoNeb treatments 4 times daily and as needed, Lopressor 25 mg 

twice daily.  





2.  Hypertension.





3.  Hyperlipidemia.





4.  Carotid artery disease.





5.  Hyperglycemia.  Continue patient on NovoLog scale every 4 hours.





6.  GI prophylaxis.





7.  DVT prophylaxis.














DISCHARGE PLAN


Most likely a good candidate for inpatient rehab.





Impression and plan of care have been directed as dictated by the signing 

physician.  Princess Nava nurse practitioner acting as scribe for signing 

physician.





Objective





- Vital Signs


Vital signs: 


                                   Vital Signs











Temp  97.6 F   06/30/22 08:00


 


Pulse  81   06/30/22 09:00


 


Resp  17   06/30/22 09:00


 


BP  134/68   06/30/22 09:00


 


Pulse Ox  97   06/30/22 09:00


 


FiO2  80   06/30/22 08:00








                                 Intake & Output











 06/29/22 06/30/22 06/30/22





 18:59 06:59 18:59


 


Intake Total 796.4 476 66


 


Output Total 1385 310 175


 


Balance -588.6 166 -109


 


Weight  110.1 kg 


 


Intake:   


 


   276 66


 


    0.9 Pressure bag 78 36 6


 


    CO/CI 60  


 


    Lactated Ringers 1,000 ml 250 240 60





    @ 20 mls/hr IV .Q24H LYLE   





    Rx#:[id number]   


 


  Intake, IV Titration 108.4  





  Amount   


 


    Dextrose/Water 1 250ml. 8.4  





    bag @ 2 MCG/KG/MIN 4.26   





    mls/hr IV .Q24H LYLE with   





    DOPamine DRIP 800 mg Rx#:   





    [id number]   


 


    Potassium Chloride 10 meq 100  





    In Water For Injection 1   





    100ml.bag @ 100 mls/hr   





    IVPB Q1H LYLE Rx#:   





    [id number]   


 


  Oral 300 200 


 


Output:   


 


  Chest Tube Drainage 160  


 


    Chest Tube Left Left 90  





    Pleural/Mediastinal   


 


    Chest Tube Mediastinal 70  


 


  Urine 1225 310 175


 


Other:   


 


  Voiding Method Indwelling Catheter Indwelling Catheter Indwelling Catheter








                       ABP, PAP, CO, CI - Last Documented











Arterial Blood Pressure        143/65


 


Pulmonary Artery Pressure      43/24


 


Cardiac Output                 4


 


Cardiac Index                  1.9

















- Labs


CBC & Chem 7: 


                                 06/30/22 05:09





                                 06/30/22 05:09


Labs: 


                  Abnormal Lab Results - Last 24 Hours (Table)











  06/29/22 06/29/22 06/29/22 Range/Units





  04:20 12:02 16:09 


 


WBC     (3.8-10.6)  k/uL


 


RBC     (3.80-5.40)  m/uL


 


Hgb     (11.4-16.0)  gm/dL


 


Hct     (34.0-46.0)  %


 


Neutrophils #     (1.3-7.7)  k/uL


 


Monocytes #     (0-1.0)  k/uL


 


ABG pH     (7.35-7.45)  


 


ABG pCO2     (35-45)  mmHg


 


ABG pO2     ()  mmHg


 


ABG Total CO2     (19-24)  mmol/L


 


ABG O2 Saturation     (94-97)  %


 


Sodium     (137-145)  mmol/L


 


BUN     (7-17)  mg/dL


 


Glucose     (74-99)  mg/dL


 


POC Glucose (mg/dL)   121 H  134 H  ()  mg/dL


 


AST     (14-36)  U/L


 


ALT     (4-34)  U/L


 


Total Protein     (6.3-8.2)  g/dL


 


Albumin     (3.5-5.0)  g/dL


 


Procalcitonin  0.18 H    (0.02-0.09)  ng/mL














  06/29/22 06/29/22 06/30/22 Range/Units





  20:12 23:42 04:04 


 


WBC     (3.8-10.6)  k/uL


 


RBC     (3.80-5.40)  m/uL


 


Hgb     (11.4-16.0)  gm/dL


 


Hct     (34.0-46.0)  %


 


Neutrophils #     (1.3-7.7)  k/uL


 


Monocytes #     (0-1.0)  k/uL


 


ABG pH     (7.35-7.45)  


 


ABG pCO2     (35-45)  mmHg


 


ABG pO2     ()  mmHg


 


ABG Total CO2     (19-24)  mmol/L


 


ABG O2 Saturation     (94-97)  %


 


Sodium     (137-145)  mmol/L


 


BUN     (7-17)  mg/dL


 


Glucose     (74-99)  mg/dL


 


POC Glucose (mg/dL)  152 H  145 H  160 H  ()  mg/dL


 


AST     (14-36)  U/L


 


ALT     (4-34)  U/L


 


Total Protein     (6.3-8.2)  g/dL


 


Albumin     (3.5-5.0)  g/dL


 


Procalcitonin     (0.02-0.09)  ng/mL














  06/30/22 06/30/22 06/30/22 Range/Units





  04:45 05:09 05:09 


 


WBC   19.9 H   (3.8-10.6)  k/uL


 


RBC   3.45 L   (3.80-5.40)  m/uL


 


Hgb   10.2 L   (11.4-16.0)  gm/dL


 


Hct   31.0 L   (34.0-46.0)  %


 


Neutrophils #   17.3 H   (1.3-7.7)  k/uL


 


Monocytes #   1.2 H   (0-1.0)  k/uL


 


ABG pH  7.49 H    (7.35-7.45)  


 


ABG pCO2  33 L    (35-45)  mmHg


 


ABG pO2  60 L    ()  mmHg


 


ABG Total CO2  26 H    (19-24)  mmol/L


 


ABG O2 Saturation  92.7 L    (94-97)  %


 


Sodium    135 L  (137-145)  mmol/L


 


BUN    30 H  (7-17)  mg/dL


 


Glucose    140 H  (74-99)  mg/dL


 


POC Glucose (mg/dL)     ()  mg/dL


 


AST    83 H  (14-36)  U/L


 


ALT    60 H  (4-34)  U/L


 


Total Protein    5.4 L  (6.3-8.2)  g/dL


 


Albumin    3.4 L  (3.5-5.0)  g/dL


 


Procalcitonin     (0.02-0.09)  ng/mL














  06/30/22 Range/Units





  08:01 


 


WBC   (3.8-10.6)  k/uL


 


RBC   (3.80-5.40)  m/uL


 


Hgb   (11.4-16.0)  gm/dL


 


Hct   (34.0-46.0)  %


 


Neutrophils #   (1.3-7.7)  k/uL


 


Monocytes #   (0-1.0)  k/uL


 


ABG pH   (7.35-7.45)  


 


ABG pCO2   (35-45)  mmHg


 


ABG pO2   ()  mmHg


 


ABG Total CO2   (19-24)  mmol/L


 


ABG O2 Saturation   (94-97)  %


 


Sodium   (137-145)  mmol/L


 


BUN   (7-17)  mg/dL


 


Glucose   (74-99)  mg/dL


 


POC Glucose (mg/dL)  123 H  ()  mg/dL


 


AST   (14-36)  U/L


 


ALT   (4-34)  U/L


 


Total Protein   (6.3-8.2)  g/dL


 


Albumin   (3.5-5.0)  g/dL


 


Procalcitonin   (0.02-0.09)  ng/mL

## 2022-06-30 NOTE — US
EXAMINATION TYPE: US chest

 

DATE OF EXAM: 6/30/2022

 

COMPARISON: NONE

 

CLINICAL HISTORY: kanu for thoracentesis, both sides. pleural effusion

 

TECHNIQUE:  Targeted ultrasound of the posterior lower bilateral hemithoraces

 

EXAM MEASUREMENTS:

 

Right Pleural Effusion pocket size:  4.9 cm

**  Right skin surface to fluid distance:  2.5 cm lung tissue visualized at 2.2 cm in fluid pocket. 

 

 

Right side marked for possible thoracentesis outside the dept.

 

Pulmonologists are able to review the images in the patient?s EMR.  

 

 

 

 

 

IMPRESSIONS: As above

## 2022-06-30 NOTE — XR
EXAMINATION TYPE: XR chest 1V portable

 

DATE OF EXAM: 6/30/2022 4:46 PM

 

COMPARISON: Chest radiographs from 6/30/2020

 

TECHNIQUE: XR chest 1V portable Frontal view of the chest.

 

CLINICAL INDICATION:Female, 76 years old with history of s/p cabg; 

 

FINDINGS: 

Lungs/Pleura: No evidence of focal consolidation or pneumothorax. Blunting of the costophrenic angles
 is present. More consolidation like changes within the left lung apices the could be partially due t
o patient positioning.

Pulmonary vascularity: Pulmonary vascular congestion.

Heart/mediastinum: Cardiomediastinal silhouette is enlarged and stable. Left atrial appendage occlusi
on device.

Musculoskeletal: No acute osseous pathology. Midline sternotomy wires are noted and stable.

 

IMPRESSION: 

 

1.  Consolidation within the lung left lung apex could represent developing airspace disease attentio
n on follow-up imaging.

2.  Cardiomegaly, bilateral pleural effusions with pulmonary vascular congestion.

## 2022-07-01 LAB
ALBUMIN SERPL-MCNC: 3.7 G/DL (ref 3.5–5)
ALP SERPL-CCNC: 109 U/L (ref 38–126)
ALT SERPL-CCNC: 95 U/L (ref 4–34)
ANION GAP SERPL CALC-SCNC: 12 MMOL/L
AST SERPL-CCNC: 103 U/L (ref 14–36)
BASOPHILS # BLD AUTO: 0 K/UL (ref 0–0.2)
BASOPHILS NFR BLD AUTO: 0 %
BUN SERPL-SCNC: 42 MG/DL (ref 7–17)
CALCIUM SPEC-MCNC: 8.6 MG/DL (ref 8.4–10.2)
CHLORIDE SERPL-SCNC: 102 MMOL/L (ref 98–107)
CO2 SERPL-SCNC: 24 MMOL/L (ref 22–30)
EOSINOPHIL # BLD AUTO: 0.1 K/UL (ref 0–0.7)
EOSINOPHIL NFR BLD AUTO: 0 %
ERYTHROCYTE [DISTWIDTH] IN BLOOD BY AUTOMATED COUNT: 3.59 M/UL (ref 3.8–5.4)
ERYTHROCYTE [DISTWIDTH] IN BLOOD: 15.1 % (ref 11.5–15.5)
GLUCOSE BLD-MCNC: 123 MG/DL (ref 70–110)
GLUCOSE BLD-MCNC: 132 MG/DL (ref 70–110)
GLUCOSE BLD-MCNC: 133 MG/DL (ref 70–110)
GLUCOSE BLD-MCNC: 134 MG/DL (ref 70–110)
GLUCOSE BLD-MCNC: 137 MG/DL (ref 70–110)
GLUCOSE BLD-MCNC: 150 MG/DL (ref 70–110)
GLUCOSE SERPL-MCNC: 124 MG/DL (ref 74–99)
HCT VFR BLD AUTO: 32.6 % (ref 34–46)
HGB BLD-MCNC: 10.8 GM/DL (ref 11.4–16)
LYMPHOCYTES # SPEC AUTO: 0.8 K/UL (ref 1–4.8)
LYMPHOCYTES NFR SPEC AUTO: 4 %
MAGNESIUM SPEC-SCNC: 2.3 MG/DL (ref 1.6–2.3)
MCH RBC QN AUTO: 30.1 PG (ref 25–35)
MCHC RBC AUTO-ENTMCNC: 33.2 G/DL (ref 31–37)
MCV RBC AUTO: 90.7 FL (ref 80–100)
MONOCYTES # BLD AUTO: 0.9 K/UL (ref 0–1)
MONOCYTES NFR BLD AUTO: 5 %
NEUTROPHILS # BLD AUTO: 17.2 K/UL (ref 1.3–7.7)
NEUTROPHILS NFR BLD AUTO: 89 %
PLATELET # BLD AUTO: 375 K/UL (ref 150–450)
POTASSIUM SERPL-SCNC: 4.1 MMOL/L (ref 3.5–5.1)
PROT SERPL-MCNC: 5.9 G/DL (ref 6.3–8.2)
SODIUM SERPL-SCNC: 138 MMOL/L (ref 137–145)
WBC # BLD AUTO: 19.2 K/UL (ref 3.8–10.6)

## 2022-07-01 RX ADMIN — INSULIN ASPART SCH UNIT: 100 INJECTION, SOLUTION INTRAVENOUS; SUBCUTANEOUS at 23:52

## 2022-07-01 RX ADMIN — AMIODARONE HYDROCHLORIDE SCH MG: 200 TABLET ORAL at 08:09

## 2022-07-01 RX ADMIN — METOPROLOL TARTRATE SCH MG: 50 TABLET, FILM COATED ORAL at 08:09

## 2022-07-01 RX ADMIN — INSULIN ASPART SCH: 100 INJECTION, SOLUTION INTRAVENOUS; SUBCUTANEOUS at 16:44

## 2022-07-01 RX ADMIN — ASPIRIN 325 MG ORAL TABLET SCH MG: 325 PILL ORAL at 08:09

## 2022-07-01 RX ADMIN — HYDRALAZINE HYDROCHLORIDE PRN MG: 20 INJECTION INTRAMUSCULAR; INTRAVENOUS at 06:53

## 2022-07-01 RX ADMIN — IPRATROPIUM BROMIDE AND ALBUTEROL SULFATE SCH ML: .5; 3 SOLUTION RESPIRATORY (INHALATION) at 19:54

## 2022-07-01 RX ADMIN — IPRATROPIUM BROMIDE AND ALBUTEROL SULFATE SCH ML: .5; 3 SOLUTION RESPIRATORY (INHALATION) at 15:40

## 2022-07-01 RX ADMIN — HEPARIN SODIUM SCH UNIT: 5000 INJECTION INTRAVENOUS; SUBCUTANEOUS at 00:16

## 2022-07-01 RX ADMIN — AMIODARONE HYDROCHLORIDE PRN MLS/HR: 50 INJECTION, SOLUTION INTRAVENOUS at 09:23

## 2022-07-01 RX ADMIN — ATORVASTATIN CALCIUM SCH MG: 80 TABLET, FILM COATED ORAL at 08:09

## 2022-07-01 RX ADMIN — FUROSEMIDE SCH MG: 10 INJECTION, SOLUTION INTRAMUSCULAR; INTRAVENOUS at 08:09

## 2022-07-01 RX ADMIN — IPRATROPIUM BROMIDE AND ALBUTEROL SULFATE SCH ML: .5; 3 SOLUTION RESPIRATORY (INHALATION) at 11:15

## 2022-07-01 RX ADMIN — POTASSIUM CHLORIDE SCH: 14.9 INJECTION, SOLUTION INTRAVENOUS at 16:45

## 2022-07-01 RX ADMIN — MUPIROCIN SCH APPLIC: 20 OINTMENT TOPICAL at 08:11

## 2022-07-01 RX ADMIN — METOPROLOL TARTRATE SCH MG: 25 TABLET, FILM COATED ORAL at 00:16

## 2022-07-01 RX ADMIN — CEFEPIME HYDROCHLORIDE SCH MLS/HR: 2 INJECTION, POWDER, FOR SOLUTION INTRAVENOUS at 23:53

## 2022-07-01 RX ADMIN — IPRATROPIUM BROMIDE AND ALBUTEROL SULFATE SCH ML: .5; 3 SOLUTION RESPIRATORY (INHALATION) at 07:16

## 2022-07-01 RX ADMIN — SODIUM CHLORIDE, PRESERVATIVE FREE SCH ML: 5 INJECTION INTRAVENOUS at 20:12

## 2022-07-01 RX ADMIN — HEPARIN SODIUM SCH UNIT: 5000 INJECTION INTRAVENOUS; SUBCUTANEOUS at 23:52

## 2022-07-01 RX ADMIN — HEPARIN SODIUM SCH UNIT: 5000 INJECTION INTRAVENOUS; SUBCUTANEOUS at 08:09

## 2022-07-01 RX ADMIN — MUPIROCIN SCH APPLIC: 20 OINTMENT TOPICAL at 20:02

## 2022-07-01 RX ADMIN — SODIUM CHLORIDE SCH MLS/HR: 9 INJECTION, SOLUTION INTRAVENOUS at 22:11

## 2022-07-01 RX ADMIN — INSULIN ASPART SCH UNIT: 100 INJECTION, SOLUTION INTRAVENOUS; SUBCUTANEOUS at 04:59

## 2022-07-01 RX ADMIN — DOCUSATE SODIUM AND SENNOSIDES SCH EACH: 50; 8.6 TABLET ORAL at 20:02

## 2022-07-01 RX ADMIN — INSULIN ASPART SCH: 100 INJECTION, SOLUTION INTRAVENOUS; SUBCUTANEOUS at 08:09

## 2022-07-01 RX ADMIN — CEFEPIME HYDROCHLORIDE SCH MLS/HR: 2 INJECTION, POWDER, FOR SOLUTION INTRAVENOUS at 15:10

## 2022-07-01 RX ADMIN — PANTOPRAZOLE SODIUM SCH MG: 40 TABLET, DELAYED RELEASE ORAL at 06:42

## 2022-07-01 RX ADMIN — INSULIN ASPART SCH UNIT: 100 INJECTION, SOLUTION INTRAVENOUS; SUBCUTANEOUS at 00:27

## 2022-07-01 RX ADMIN — INSULIN ASPART SCH UNIT: 100 INJECTION, SOLUTION INTRAVENOUS; SUBCUTANEOUS at 20:01

## 2022-07-01 RX ADMIN — INSULIN ASPART SCH: 100 INJECTION, SOLUTION INTRAVENOUS; SUBCUTANEOUS at 12:48

## 2022-07-01 RX ADMIN — SODIUM CHLORIDE, PRESERVATIVE FREE SCH ML: 5 INJECTION INTRAVENOUS at 08:10

## 2022-07-01 RX ADMIN — CLOPIDOGREL BISULFATE SCH MG: 75 TABLET ORAL at 08:09

## 2022-07-01 RX ADMIN — HEPARIN SODIUM SCH UNIT: 5000 INJECTION INTRAVENOUS; SUBCUTANEOUS at 15:10

## 2022-07-01 RX ADMIN — METOPROLOL TARTRATE SCH MG: 50 TABLET, FILM COATED ORAL at 20:02

## 2022-07-01 NOTE — P.PN
Subjective


Progress Note Date: 07/01/22











This is a patient who had a recent heart catheterization, showing a critical and

complex lesion involving the distal left main coronary artery, also, the ostial 

left circumflex, and ostial LAD.  The lesions appear to be in the range of 80-

90%, and the patient was also noted to have elevated left ventricular end-

diastolic filling pressures.  The patient is currently being evaluated by 

cardiothoracic surgery for possible bypass grafting.  We will reassess to see 

the patient preoperatively, and evaluate her lung function.  The patient is a 

lifelong nonsmoker.  She has no history of lung disease.  Based on her FEV1, and

her MVV, she was in the low operative risk range.  Outpatient medications includ

ed amlodipine, aspirin, metoprolol, lisinopril, zinc, vitamin D3, ascorbic acid,

lactulose, and indoor.  Her only major medical problem is hypertension.  She 

does have a history of previous coronavirus infection and was seen by my partner

in December 2021.  CBC is completely normal.  PTT is 39.7.  Sodium 141, 

potassium 3.7, chlorides 111, CO2 24, BUN 12, creatinine 0.63.  Cholesterol was 

182.  Urine was negative.  Testing for coronavirus was negative.  Chest x-ray 

shows mild interstitial edema.  Currently she is on room air.





Progress note dated 06/26/2022.





76-year-old female we saw yesterday in consultation.  The patient has 

significant coronary disease, involving the left main coronary artery.  The 

patient is apparently going to have open heart surgery one day this week.  The 

exact day has not been decided yet.  Clinically, she's.  Stable.  She is on room

air.  She is a lifelong nonsmoker.  Lung function would suggest that she's at a 

very low increased operative risk based on her FEV1 and MVV.  Labs today show 

white count of 6.1, with a normal hemoglobin, hematocrit, and platelet count.  

The patient's PTT is 60.2.  Sodium 141, potassium 3.6, chlorides 111, CO2 24, 

BUN 12, creatinine 0.72.








06/27/2022, the patient is awaiting bypass surgery.  The patient is calm and 

comfortable.  No respiratory difficulties whatsoever.  She is using incentive 

spirometer.  Her preop FEV1 is order of 72% of predicted.  No angina.  No 

palpitations.  No chest pain.  She is hemodynamically stable at this point in 

time.  She remains on IV heparin.  I did introduce myself and I will take care o

f this patient postop, managed to ventilator and the necessity pulmonary care 

following her thoracotomy bypass surgery.no other active issues for now.  The 

patient was sent comes at 6.2 with a hemoglobin 4.5.  She is on IV heparin with 

a PTT of 55.  BUN is at 12 with a creatinine 0.6 and the sodium level is at 140.





06/28/2022, I'm seeing the patient for a follow-up.  The patient was taken to 

the operating room yesterday and the patient underwent an off-pump coronary 

artery bypass surgery with LIMA to LAD and saphenous vein graft to obtuse 

marginal.  The patient also had a left facial appendage clipping.  Estimated 

blood loss was 400 mL.  The patient received a total of 3 L of intraoperative IV

fluids.  Note that the patient was having issues with hypotension and there was 

some interval worsening of the mitral regurgitation intraoperatively.  At that 

point, it was decided to insert an intra-aortic balloon pump and this was done 

through the right common femoral artery.  Once the intra-aortic balloon pump was

initiated, the patient's hemodynamics improved dramatically.  The patient was 

given a postop echo cardiac exam that showed a mild MR and good ejection 

fraction.  The intra-aortic balloon pump was placed on a one-to-one augmentation

and following that the patient was brought into the intensive care unit.  The 

patient subsequently was weaned off the sedation and the patient was extubated 

without any major difficulties within a few hours.  The patient was extubated 

and the fourth hours after arriving to the ICU.  The patient had with good 

weaning parameters.  The patient had a blood gas that showed adequate 

oxygenation and ventilation.  Based on that, the patient was extubated.  This 

morning, the patient remains on oxygen at 6 L per minute nasal cannula.  The 

chest x-ray showing cardiomegaly.  The patient is a mediastinal and left pleural

chest tube.  Output from the chest tubes have been 250 mL from the mediastinum 

and 280 from the left pleural since surgery.  No evidence of any air leak.  The 

chest x-ray shows no evidence of any pneumothorax.  Hemodynamically, the patient

is currently receiving intra-aortic balloon pump augmentation of 1-2.  The 

patient has adequate cardiac output of 4.3 and an index of 2.  She is on no 

pressors for now.  The intra-aortic balloon pump will be removed for now.  The 

patient is stable to systemic adequate blood pressure was the patient being off 

the balloon pump.  Urine output is in order of 20 mL an hour.  The patient is a

febrile.  The patient is awake and following commands thoracic questions 

appropriately.  Pulmonary artery pressures are 38 over 18 mmHg.  Lower blood 

work from work today is showing a sodium of 139, potassium of 4, bicarb of 26, 

BUN of 14 with a creatinine of 0.6.  The white cell count is at 9.7 with a 

hemoglobin of 9.3 and a platelet count of 195.  AST is 85 with an ALT of 60 and 

alkaline phosphatase of 41.  Magnesium level is at 2.1.  Note that the patient 

had a run of atrial fibrillation intraoperatively.  The patient was loaded with 

amiodarone and currently the patient is on 0.5 mg per minute of amiodarone 

infusion.  The patient is in a normal sinus rhythm.  The patient is also on an 

insulin drip running at 2.5 units an hour with adequate blood sugar control.  

The patient's of lactated Ringer at the rate of 50 mL an hour.  As mentioned, 

awake and alert and the sternum is dry clean and intact.  No other issues 

otherwise for now.  Breaks





06/29/2022, I'm seeing the patient for a follow-up.  The patient is postop day 

#2.  Note that this patient was extubated successfully without any issues.  

Subsequently, as of yesterday afternoon, the patient became progressively more 

hypoxic.  Note that during the day, the patient was given IV albumin a total of 

750 mL to improve her urine output.  This did help and ultimately dopamine was 

added at renal dose to improve her urine output.  Subsequently, she became more 

hypoxic and the patient became more short of breath and initially she went up to

15 L high flow and later on the patient was placed on a BiPAP.  She was kept on 

BiPAP throughout the night and the patient is currently on a BiPAP at a pressure

of 12/5 cm of water and FiO2 of on the percent.  She is able to generate tidal 

volumes above 500.  Respiratory rate is in the mid 20s.  Her breathing is 

slightly labored even on the BiPAP, yet she is able to tolerate the machine w

ithout any major difficulties and she is awake and alert and she is following 

commands and answering questions and she is neurologically intact.  At the same 

time, the patient had a blood.  This morning that showed a pH of 7.97.49 with a 

pCO2 of 35 and a pO2 of 55 and this was on FiO2 of 85% and based on that the 

FiO2 was brought up to 100%.  The chest x-ray showing cardiomegaly.  There is 

some infiltration of the right lung and some atelectatic changes in lung bases. 

There is concern for an evolving right lung pneumonia although this is quite 

early in the postoperative course.  In any rate, the patient is being diuresed 

for now and the patient is producing adequate amount of urine output.  She did 

have a spike of temperature 100.4 yesterday and currently she is afebrile.  The 

white cell count today is at 12.5 which is comparable to yesterday with a 

hemoglobin of 9.8 and a platelet count of 192.  In terms of the chest tubes, the

patient is a mediastinal and left pleural chest tube, output has been noted and 

it's in the order of overnight 80 mL overnight and 400 mL over the past 24 hours

in the mediastinal chest tube, 6 disease overnight and 400 mL over the past 24 

hours in the left pleural chest tube.  [place] catheter still in place.  The 

cardiac output is currently at 4.3 with an index of 2.0.  The PA diastolic is in

the order of extreme millimeters of mercury.  The patient otherwise has normal 

renal function.  Creatinine is at 0.6.  Sodium is at 136.  She is obviously n

othing by mouth at this point in time as the patient is BiPAP dependent.  

Cardiac rhythm is still sinus and the patient remains on amiodarone.  She 

remains on aspirin and Plavix.  Routine postoperative care is being implemented 

this point in time.  Insulin drip is off and the patient is currently on slice K

coverage every 4 hours.





06/30/2022, I'm seeing the patient for a follow-up.  The patient is postop day 

#3.  Note that after a successful extubation, the patient was placed on a BiPAP 

because of ongoing hypoxic respiratory failure.  The patient stayed on BiPAP 

throughout the day yesterday and this morning the patient remains on a BiPAP.  

Current BiPAP settings of 12/5 with an FiO2 of 80%.  While in the BiPAP, the 

patient is urinating a tidal volume of about 450 with a respiratory rate in the 

mid 20s and a minute ventilation of 13 L per minute.  The chest x-ray showing 

some further investigation right lung base.  Based on that, an ultrasound of the

chest was done and there was minimal amount of pleural effusion the right lung 

base and I think the predominant findings are significant atelectasis in the 

lung bases right more than left.  Note that the chest tubes were all removed ye

sterday and the patient the left pleural and mediastinal chest tube both of them

removed.  Morning blood gases showed a pH of 7.49 with a pCO2 of 33 and pO2 of 

60.  The patient's FiO2 is currently at 80% on the BiPAP and she is pulse oxing 

95%.  I dropped her down to 60%.  Meanwhile, the pro-calcitonin level obtained 

yesterday was low at 0.18.  The patient was being diuresis with IV Lasix and 

input output balance has been negative for on 22 mL over the past 24 hours and 

the patient was taken off the dopamine.  The the white cell count is slightly 

higher compared to yesterday.  He 0.9 with a hemoglobin of 10.2 and a platelet 

count of 266.  Sodium is at 135 with a BUN of 30 and creatinine 0.6.  The 

patient is awake.  The patient is alert.  She is following commands and moving 

extremities.  She is currently on examination aspirin and Plavix.  She is also 

on metoprolol 25 mg by mouth 3 times a day.  Cardiac rhythm was sinus and later 

on she did have a epidural of atrial fibrillation.  There is being managed by 

the cardiothoracic team.  The patient will be started back on amiodarone bolus 

and maintenance.  No pressors for now.





07/01/2022, the patient is postop day #4.  The patient continues to have diffuse

but the pulmonary infiltrates with hypoxic respiratory failure that occurred p

ost extubation.  The patient remains on a BiPAP at a pressure of 10/5 cm of 

water with an FiO2 of 70%.  Current pulse ox is around 96%.  She is a bit 

disturbed by the BiPAP and she wants to give herself a break.  She is urinating 

adequate tidal volumes above 700 mL an minute ventilation remains quite 

elevated.  The chest x-ray shows small lung volumes, atelectatic changes in lung

bases with diffuse breath and pulmonary infiltrates.  She has also cardiomegaly.

 All of the chest tubes are removed.  The patient is afebrile.  White cell count

remains elevated at 19.  The patient is producing adequate amount of urine 

output.  The patient is on IV Lasix and overall fluid balance over the past 24 h

ours shows that she has been in a negative fluid balance in the urine output is 

adequate.  Input output over the past 24 hours has been in the order of -1.2 L. 

Otherwise, the rest of the blood work shows a white cell count of 19.2 with a 

hemoglobin of 10.8 and a platelet count of 375.  BUN is 42 with a creatinine of 

0.69 the sodium level is at 138 with a potassium level of 4.1.  As mentioned, 

the ultrasound of the chest was done yesterday and there was no evidence of any 

significant sizable pleural effusion.  The chest x-ray from today shows a small 

right-sided pleural effusion, cardiomegaly along with diffuse breath and 

pulmonary infiltrates.  The patient also had an episode of atrial fibrillation 

with rapid ventricular response.  She was given amiodarone bolus and currently 

she is on amiodarone at a dose of 400 mg by mouth twice a day.  Her current 

cardiac rhythm is back to sinus.





Objective





- Vital Signs


Vital signs: 


                                   Vital Signs











Temp  97.2 F L  07/01/22 08:00


 


Pulse  83   07/01/22 08:00


 


Resp  35 H  07/01/22 08:00


 


BP  158/103   07/01/22 08:00


 


Pulse Ox  96   07/01/22 08:00


 


FiO2  70   07/01/22 08:00








                                 Intake & Output











 06/30/22 07/01/22 07/01/22





 18:59 06:59 18:59


 


Intake Total 159 100 150


 


Output Total 835 647 74


 


Balance -426 547 76


 


Weight  114 kg 


 


Intake:   


 


   100 


 


    0.9 Pressure bag 39  


 


    Dextrose 5% in Water 100  100 





    ml @ 618 mls/hr IV .Q10M   





    PRN with Amiodarone 150   





    mg Rx#:[id number]   


 


    Lactated Ringers 1,000 ml 120  





    @ 20 mls/hr IV .Q24H [facility]   





    Rx#:[id number]   


 


  Oral   150


 


Output:   


 


  Urine 835 647 74


 


Other:   


 


  Voiding Method Indwelling Catheter Indwelling Catheter 








                       ABP, PAP, CO, CI - Last Documented











Arterial Blood Pressure        281/281


 


Pulmonary Artery Pressure      43/24


 


Cardiac Output                 4


 


Cardiac Index                  1.9

















- Exam











CONSTITUTIONAL: Appears comfortable, cooperative, no acute distress


RESPIRATORY:  Lungs sounds diminished bilaterally, right greater than left.  

Respirations even, nonlabored.  Currently on bipap, FiO2 100%, 10/5 with oxygen 

saturation 99%.  Strong nonproductive cough.  


CARDIOVASCULAR:  S1, S2 present.  Regular rate and rhythm, sinus rhythm on 

telemetry.  Sternum stable.   Palpable peripheral pulses bilaterally.  Trace 

generalized edema present.  No calf pain or tenderness noted.  Heart hugger in 

place with patient demonstrating appropriate use.  Antiembolism stockings, SCDs 

present.


GASTROINTESTINAL:  Abdomen soft, nontender, nondistended.  Active bowel sounds 

present 4 quadrants, tympanic to percussion.  Tolerating liquids for 

medications.  Denies flatus


GENITOURINARY:  Torres present draining cloudy, yellow urine.  Output overnight 

is in order of 30-40 mL an hour


INTEGUMENTARY:  Skin is warm and dry with evidence of good perfusion.  Anterior 

chest incision well approximated and covered with dry intact dressing.  Right 

lower extremity EVH site well approximated without redness or drainage.


NEUROLOGIC:  Cranial nerves II through XII intact


MUSKULOSKELETAL:  Able to move all extremities, strength equal bilaterally


PSYCHIATRIC:  Alert and oriented to person place and time, appropriate affect, 

intact judgment and insight


INVASIVE LINES AND TUBES:   All of the lines have been removed 





- Labs


CBC & Chem 7: 


                                 07/01/22 05:45





                                 07/01/22 05:45


Labs: 


                  Abnormal Lab Results - Last 24 Hours (Table)











  06/30/22 06/30/22 06/30/22 Range/Units





  11:52 16:22 19:37 


 


WBC     (3.8-10.6)  k/uL


 


RBC     (3.80-5.40)  m/uL


 


Hgb     (11.4-16.0)  gm/dL


 


Hct     (34.0-46.0)  %


 


Neutrophils #     (1.3-7.7)  k/uL


 


Lymphocytes #     (1.0-4.8)  k/uL


 


BUN     (7-17)  mg/dL


 


Glucose     (74-99)  mg/dL


 


POC Glucose (mg/dL)  120 H  170 H  111 H  ()  mg/dL


 


AST     (14-36)  U/L


 


ALT     (4-34)  U/L


 


Total Protein     (6.3-8.2)  g/dL














  06/30/22 07/01/22 07/01/22 Range/Units





  23:00 00:22 04:53 


 


WBC     (3.8-10.6)  k/uL


 


RBC     (3.80-5.40)  m/uL


 


Hgb     (11.4-16.0)  gm/dL


 


Hct     (34.0-46.0)  %


 


Neutrophils #     (1.3-7.7)  k/uL


 


Lymphocytes #     (1.0-4.8)  k/uL


 


BUN     (7-17)  mg/dL


 


Glucose     (74-99)  mg/dL


 


POC Glucose (mg/dL)  169 H  137 H  134 H  ()  mg/dL


 


AST     (14-36)  U/L


 


ALT     (4-34)  U/L


 


Total Protein     (6.3-8.2)  g/dL














  07/01/22 07/01/22 Range/Units





  05:45 05:45 


 


WBC  19.2 H   (3.8-10.6)  k/uL


 


RBC  3.59 L   (3.80-5.40)  m/uL


 


Hgb  10.8 L   (11.4-16.0)  gm/dL


 


Hct  32.6 L   (34.0-46.0)  %


 


Neutrophils #  17.2 H   (1.3-7.7)  k/uL


 


Lymphocytes #  0.8 L   (1.0-4.8)  k/uL


 


BUN   42 H  (7-17)  mg/dL


 


Glucose   124 H  (74-99)  mg/dL


 


POC Glucose (mg/dL)    ()  mg/dL


 


AST   103 H  (14-36)  U/L


 


ALT   95 H  (4-34)  U/L


 


Total Protein   5.9 L  (6.3-8.2)  g/dL














Assessment and Plan


Plan: 








Symptomatic coronary disease, post coronary artery bypass surgery 2, off-pump 

and the patient is postop day #4.  The patient is post intra-aortic balloon pump

insertion for hemodynamic support inserted at a time of surgery was subsequently

removed and the patient remains hemodynamically stable.  Note that the patient 

was extubated without any major difficulties and over the past 12 hours,  and 

since then, the patient has developed diffuse but the pulmonary infiltrates with

hypoxic respiratory failure, BiPAP dependent for now.





Post thoracotomy, chest tubes are removed, small right-sided pleural effusion on

ultrasound the chest that was conducted yesterday.  Less on the right lung base 

remains diminished and the patient remains BiPAP dependent.





Acute hypoxic history failure, likely due to postsurgical ARDS in addition to 

extensive atelectatic changes in lung bases more so on the right with a small 

right-sided pleural effusion.  The patient is currently on a BiPAP at a pressure

of 10/5 and FiO2 of 70% with a pulse ox of 96%.  








Paroxysmal atrial fibrillation, expected outcome of surgery currently in sinus 

rhythm and the patient is currently on po amiodarone





Hyperglycemia, postop, the patient is off IV insulin and the patient is 

currently on insulin scale coverage





History of hypertension.





No history of any lung disease, and patient at low increased operative risk 

based on lung function.





Prior history of coronavirus infection, December 2021.





Postoperative anemia, expected outcome of surgery, hemoglobin is stable for now





Leukocytosis





plan





Keep the patient on BiPAP, on and off during the day.  4 today, I think it's 

worthwhile also to give her a trial of Arava would 100% nonrebreather facemask 

and assess the patient's ability to oxygenate with high flow oxygen.


We'll may need also to obtain a blood gas while her being on interval she is 

able to tolerate.


Repeat the pro-calcitonin level.  Of concern is ongoing possibility of an 

infection.  Discussed the case with the cardiothoracic surgeon.  We'll cover 

this patient with IV cefepime and vancomycin for the next 24-48 hours pending 

further evaluation.


Prednisone 40 mg daily for potential ARDS


IV Lasix pleural effusion is low/small on the right side and it was not amicable

for thoracentesis


Monitor the cardiac rhythm and continue oral amiodarone.  The cardiac rhythm is 

sinus and beta blocker was increased to metoprolol 50 mg twice a day


Continue Lasix IV 20 mg every 12 hours


Possible intubation if there is any further decompensation the respiratory 

status.


 





We'll continue to follow make further recommendations based on her progress.  

Case was discussed with the cardiothoracic team.  Is a critically care 

evaluation.  Evaluation was done and more than 30 minutes. 


Time with Patient: Greater than 30

## 2022-07-01 NOTE — US
EXAMINATION TYPE: US vein mapping BILAT

 

DATE OF EXAM: 6/24/2022 2:43 PM

 

COMPARISON: NONE

 

CLINICAL HISTORY: preop cardiac surgery.

 

SIDE PERFORMED: Bilateral  

 

TECHNIQUE:  Lower extremity saphenous vein is examined and measured utilizing real time linear array 
sonography.

 

Patient History:

 

Heart Disease: Yes

 

DUPLEX FINDINGS:

Greater Saphenous:  Color flow seen

 

 

Measurements in mm:

 

Right Greater Saphenous:

Groin: 9.8 x 7.2 mm

High Thigh: 5.7 x 5.4 mm

Mid Thigh: 3.6 x 2.8 mm

Above Knee: 2.9 x 2.8  mm

Knee: 4.1 x 3.2 mm

Below Knee: 2.9 x 2.6 mm

Mid Calf: 2.6 x 2.6 mm

At Ankle: 3.2 x 2.4 mm

 

 

Left Greater Saphenous:

Groin: 6.7 x 6.1 mm

High Thigh: 6.6 x 6.3 mm

Mid Thigh: 6.1 x 4.9 mm

Above Knee: 4.3 x 4.8 mm

Knee: 3.8 x 3.5 mm

Below Knee: 4.1 x 3.8  mm

Mid Calf: 2.8 x 2.9  mm

At Ankle: 3.2 x 3.4 mm

 

 

 

 

IMPRESSION:  1. Bilateral GSV measurements listed above.

2. Performing surgeon to determine viability as conduit.

## 2022-07-01 NOTE — P.PN
Subjective


Progress Note Date: 07/01/22


Principal diagnosis: 





Coronary artery disease with left main disease, unstable angina, atrial 

arrhythmias.  Past medical history significant for hypertension, hyperlipidemia,

SVT, left internal carotid artery stenosis, obesity, lifetime nonsmoker, remains

unvaccinated against Covid, and family history of coronary artery disease.





POD #4 off-pump coronary artery bypass grafting 2 with left internal mammary 

artery to the left anterior descending artery, reverse saphenous vein graft to 

the obtuse marginal artery, ligation of the left atrial appendage with a 40 mm 

AtriCure clip, endovascular vein harvest of the right greater saphenous vein, 

placement of intra-aortic balloon pump.





Postoperative acute blood loss anemia, expected given hemodilution.





Acute hypoxic respiratory failure requiring bipap.





The patient was seen and examined in follow-up today at her bedside in the 

intensive care unit.  Currently she is lying in bed, is awake, alert, oriented 

3 and is in no acute apparent distress.  She denies any complaints of pain at 

this time although is complaining of some shortness of breath despite having the

BiPAP in place.  BiPAP settings are currently 10/5 with FiO2 70% and oxygen 

saturations 96% on current settings.  Unable to tolerate use of her incentive 

spirometry at this time.  Bedside telemetry is showing normal sinus rhythm heart

rate 84 BPM.  No further episodes of atrial fibrillation and she remains on 

amiodarone 400 mg by mouth twice a day.  She remains hemodynamically stable and 

is currently on no inotropic pressor support.  Torres catheter remains in place 

for accurate I's and O's with urine output 360 mL output in the last 8 hours.  

Laboratory results this morning show a WBC count of 19.2, hemoglobin 10.8, 

hematocrit 32.6, platelets 375, sodium 138, potassium 4.1, BUN 42, creatinine 

0.69, glucose 124, calcium 8.6, magnesium 2.3, , and ALT 95.  She has 

been afebrile the last 24 hours.  Calcitonin level from 06/29/2022 0.18.  Chest 

x-ray from this morning shows a small right sided pleural effusion, pulmonary 

infiltrates and moderate cardiomegaly.





Objective





- Vital Signs


Vital signs: 


                                   Vital Signs











Temp  97.2 F L  07/01/22 08:00


 


Pulse  101 H  07/01/22 09:00


 


Resp  28 H  07/01/22 09:00


 


BP  154/81   07/01/22 09:00


 


Pulse Ox  96   07/01/22 09:00


 


FiO2  70   07/01/22 08:00








                                 Intake & Output











 06/30/22 07/01/22 07/01/22





 18:59 06:59 18:59


 


Intake Total 159 100 150


 


Output Total 835 647 74


 


Balance -358 -045 76


 


Weight  114 kg 


 


Intake:   


 


   100 


 


    0.9 Pressure bag 39  


 


    Dextrose 5% in Water 100  100 





    ml @ 618 mls/hr IV .Q10M   





    PRN with Amiodarone 150   





    mg Rx#:[id number]   


 


    Lactated Ringers 1,000 ml 120  





    @ 20 mls/hr IV .Q24H LYLE   





    Rx#:[id number]   


 


  Oral   150


 


Output:   


 


  Urine 835 647 74


 


Other:   


 


  Voiding Method Indwelling Catheter Indwelling Catheter 








                       ABP, PAP, CO, CI - Last Documented











Arterial Blood Pressure        281/281


 


Pulmonary Artery Pressure      43/24


 


Cardiac Output                 4


 


Cardiac Index                  1.9

















- Exam





CONSTITUTIONAL: Currently laying in bed in the intensive care unit. Appears 

comfortable, cooperative, no acute distress


RESPIRATORY:  Lungs sounds diminished bilaterally, right greater than left.  

Respirations are symmetrical and nonlabored.  Currently on bipap, FiO2 100%, 

10/5 with oxygen saturation 96%.  Strong nonproductive cough.  


CARDIOVASCULAR:  S1, S2 present.  Regular rate and rhythm, sinus rhythm on 

telemetry.  Sternum stable.   Palpable peripheral pulses bilaterally.  Trace 

generalized edema present.  No calf pain or tenderness noted.  Heart hugger in 

place with patient demonstrating appropriate use.  Antiembolism stockings, SCDs 

present.


GASTROINTESTINAL:  Abdomen soft, nontender, nondistended.  Active bowel sounds 

present 4 quadrants, tympanic to percussion.  Tolerating liquids for medi

cations.  Passing flatus.


GENITOURINARY:  Torres present draining cloudy, yellow urine.  Urine output 360 

mL in the last 8 hours.


INTEGUMENTARY:  Skin is warm and dry with evidence of good perfusion.  Midline 

sternal incision is well approximated and covered with dry intact dressing.  

Right lower extremity EVH site well approximated without redness or drainage.


NEUROLOGIC:  Cranial nerves II through XII intact.  No focal deficits.


MUSKULOSKELETAL:  Able to move all extremities, strength equal bilaterally.


PSYCHIATRIC:  Alert and oriented to person place and time, appropriate affect, 

intact judgment and insight.








- Labs


CBC & Chem 7: 


                                 07/01/22 05:45





                                 07/01/22 05:45


Labs: 


                  Abnormal Lab Results - Last 24 Hours (Table)











  06/30/22 06/30/22 06/30/22 Range/Units





  11:52 16:22 19:37 


 


WBC     (3.8-10.6)  k/uL


 


RBC     (3.80-5.40)  m/uL


 


Hgb     (11.4-16.0)  gm/dL


 


Hct     (34.0-46.0)  %


 


Neutrophils #     (1.3-7.7)  k/uL


 


Lymphocytes #     (1.0-4.8)  k/uL


 


BUN     (7-17)  mg/dL


 


Glucose     (74-99)  mg/dL


 


POC Glucose (mg/dL)  120 H  170 H  111 H  ()  mg/dL


 


AST     (14-36)  U/L


 


ALT     (4-34)  U/L


 


Total Protein     (6.3-8.2)  g/dL














  06/30/22 07/01/22 07/01/22 Range/Units





  23:00 00:22 04:53 


 


WBC     (3.8-10.6)  k/uL


 


RBC     (3.80-5.40)  m/uL


 


Hgb     (11.4-16.0)  gm/dL


 


Hct     (34.0-46.0)  %


 


Neutrophils #     (1.3-7.7)  k/uL


 


Lymphocytes #     (1.0-4.8)  k/uL


 


BUN     (7-17)  mg/dL


 


Glucose     (74-99)  mg/dL


 


POC Glucose (mg/dL)  169 H  137 H  134 H  ()  mg/dL


 


AST     (14-36)  U/L


 


ALT     (4-34)  U/L


 


Total Protein     (6.3-8.2)  g/dL














  07/01/22 07/01/22 Range/Units





  05:45 05:45 


 


WBC  19.2 H   (3.8-10.6)  k/uL


 


RBC  3.59 L   (3.80-5.40)  m/uL


 


Hgb  10.8 L   (11.4-16.0)  gm/dL


 


Hct  32.6 L   (34.0-46.0)  %


 


Neutrophils #  17.2 H   (1.3-7.7)  k/uL


 


Lymphocytes #  0.8 L   (1.0-4.8)  k/uL


 


BUN   42 H  (7-17)  mg/dL


 


Glucose   124 H  (74-99)  mg/dL


 


POC Glucose (mg/dL)    ()  mg/dL


 


AST   103 H  (14-36)  U/L


 


ALT   95 H  (4-34)  U/L


 


Total Protein   5.9 L  (6.3-8.2)  g/dL














- Imaging and Cardiology


Chest x-ray: report reviewed, image reviewed





Assessment and Plan


Assessment: 





1.  Coronary artery disease with left main disease, status post 2 vessel CABG


2.  Hypertension


3.  Hyperlipidemia, cholesterol 182, 


4.  SVT, intraoperative atrial arrhythmias with cardioversion, status post left 

atrial appendage ligation


5.  Left internal carotid artery stenosis


6.  Obesity


7.  Never smoker, preoperative FEV1 72% of predicted


8.  History of covid infection in December 2021, remains unvaccinated against 

Covid


9.  Family history of coronary artery disease


10.  Nasal swab positive for MSSA, treated with mupirocin


11.  Preserved LV function with mild to moderate mitral regurgitation on 

transthoracic echocardiogram


12.  Acute blood loss anemia


13.  Acute hypoxic respiratory failure requiring bipap


14.  Leukocytosis with low grade fever, urine culture sent


Plan: 





1.  Continue to maximize medical management with aspirin, Plavix, statin, beta 

blocker.  Will increase metoprolol tartrate to 50 mg by mouth twice a day.


2.  Continue amiodarone 400 mg by mouth twice a day for A. fib prophylaxis.  No 

anticoagulation necessary at this point.


3.  Wean O2 as tolerated.  Encourage incentive spirometry 10 times every hour 

while awake once off bipap.  Bronchodilators per pulmonology.


4.  Increase activity as tolerated.  PT/OT/cardiac rehab following.


5.  GI/DVT prophylaxis.


6.  Will monitor daily labs and CXRs.  Electrolyte replacement per protocol. 


7.  Pain control with current medication regimen.


8.  Insulin management per primary care service.  Patient is not diabetic, 

preoperative hemoglobin A1c 5.6%.


9.  Continue Torres for another 24 hours for strict accurate intake and output.  

Daily weights


10.  The patient is being started on antibiotics cefepime 2 g IV piggyback every

8 hours and vancomycin pharmacy to dose per pulmonary/critical care medicine 

recommendations.


11.  Prednisone 40 mg by mouth daily was initiated by pulmonary/critical care 

medicine recommendations.


12.  More recommendations to follow based on patient's clinical course


Time with Patient: Greater than 30

## 2022-07-01 NOTE — P.PN
Subjective


Progress Note Date: 07/01/22





 HISTORY OF PRESENT ILLNESS


This is a pleasant 76 years old female with past medical history of Fibromyalg

ia, Hyperlipidemia, Hypertension, Osteoarthritis , Supraventricular Tachycardia 

,urinary incontinence-wears pad, severe left internal carotid artery stenosis 

followed by Dr. Adam, frequent constipation, she was admitted under 

cardiology service for right arm pain, chest pain and back pain for the last 3 

weeks, where she underwent cardiac cath and 60/24 showing critical and complex 

lesion involving the distal left main coronary arteries and also involving the 

ostial left circumflex and the distal LAD with the stenotic range is 80-90% with

increase in LVEDP  , Patient will need bypass procedure to open his coronary 

arteries.


Currently she denies chest pain or dyspnea.  No incontinence of urine or bowel. 

No fever


Patient is hemodynamically stable


Labs including CBC, INR, BMP and liver enzymes are unremarkable.  TSH is 1.6.


Urine analysis showing trace blood.


coronavirus not detected.   Hepatitis panel is negative


Chest x-ray: Minimal interstitial edema noted


Carotid duplex: No significant stenosis


Echocardiogram showing ejection fraction of 50-55% with mild-to-moderate mitral 

regurgitation and mild tricuspid regurgitation


Patient currently on heparin drip, aspirin 81 mg twice a day and normal saline 

at 75 mL/h as well as Lipitor and metoprolol


However patient this morning she is asymptomatic she denies chest pain or 

dyspnea or abdominal pain.  No diarrhea or vomiting or dysuria.  No headache or 

weakness or numbness.





6/27: Patient is denying any chest pain, shortness of breath, palpitations, 

lightheadedness or dizziness.  She is scheduled for CABG this afternoon and 

patient states that she is ready to move forward.  No new concerns from her 

nurse.  Patient has been afebrile, heart rate 80, blood pressure 129/78 and 

pulse ox 96% on room air.  CBC is unremarkable.  Chloride 110, CO2 20, blood 

sugar 112, creatinine 0.68.





6/28: Patient is status post CABG 2 with left internal mammary artery to the 

left anterior descending artery, reverse saphenous vein graft to the obtuse 

marginal artery, ligation of the left atrial appendage with a 40 mm AtriCure 

clip, endovascular vein harvest of the right greater saphenous vein, placement 

of intra-aortic balloon pump.  Right groin intra-aortic balloon pump was 

discontinued this morning.  Right internal jugular Perris/Cordis, right radial 

arterial line, mediastinal/left pleural chest tubes remain in place.  Patient is

stating that she is feeling well.  She has been afebrile, heart rate 75, 121/54,

pulse ox 93% on 2 L.  Capillary blood glucose running between 110 and 120.  WBC 

9.7, hemoglobin 9.3 and platelet count 195.  Electrolytes and renal function 

normal.  AST 85 and ALT 41.





6/29: Patient remains in the intensive care unit.  Yesterday pulse ox continued 

to drop through the day with increased oxygen demands to the point where she is 

now on BiPAP.  She states she feels a little short of breath.  She denies Chest 

pain.  Temperature max 100.4, heart rate 76, respiratory rate 32, blood pressure

130/73.  Cardiac monitor sinus rhythm.  Patient is status post IV Lasix 

yesterday with good urine output.  Blood glucose running between 107 and 124 and

insulin drip will be discontinued, transition to NovoLog scale every 4 hours.  

Urinalysis was turbid, blood small.





6/30: Patient remains in the intensive care unit on BiPAP.  She has been unable 

to eat only taking a few sips of water.  Chest tubes have been removed, pacer 

wires to be removed today.  Torres catheter is in place, good urine output.  

Patient has been afebrile, heart rate in the 80s, blood pressure 144/76, pulse 

ox 90% on FiO2 80 BiPAP.  Cardiac monitor is sinus rhythm.  Repeat blood work 

reveals WBC 19.9, hemoglobin 10.2, platelet count 266.  Sodium 135, BUN 30 

creatinine 0.67.  AST 83 and ALT 60.  Blood sugars running between 123 and 160.


Chest x-ray reveals stable.  Persistent perihilar and basilar patchy densities 

bilateral small effusions.


Chest ultrasound reveals right pleural effusion 4.9 cm 2.2 cm fluid pocket.





7/1: Patient remains in intensive care unit.  She has just been transitioned to 

AirVo and off BiPAP which she utilized during the night and all day yesterday.  

Patient converted to atrial fibrillation when we walked into the room.  Torres 

remains in place.  We have added in a consult for Dr. Nunez in anticipation the 

patient will be ready for discharge to a week.  She has been afebrile, heart r

ate 101, respiratory rate 28, blood pressure 154/81, pulse ox 96% FiO2 of 90.  

Repeat blood work reveals WBC 19.2, hemoglobin 10.8 and platelet count 375.  

Electrolytes are normal.  Creatinine 0.69.  AST is 103 and ALT 95.  Coronavirus 

PCR not detected.  Patient has been started on cefepime and IV vancomycin 

prophylactically per pulmonary recommendations.  Pro-calcitonin has been ordered

Patient is continued on IV Lasix.








REVIEW OF SYSTEMS


Constitutional: No fever, no chills, no night sweats.  No weight change.  Noted 

weakness, fatigue no lethargy.  No daytime sleepiness.


EENT: No headache.  No blurred vision or double vision, no loss of vision.  No 

loss of Hearing, no ringing in the ears, no dizziness.  No nasal drainage or 

congestion.  No epistaxis.  No sore throat.


Lungs: Minimal shortness of breath, cough, no sputum production.  No wheezing.


Cardiovascular: Denies chest pain, no lower extremity edema.  No palpitations.  

No paroxysmal nocturnal dyspnea.  No orthopnea.  No lightheadedness or 

dizziness.  No syncopal episodes.


Abdominal: No abdominal pain.  No nausea, vomiting.  No diarrhea.  No 

constipation.  No bloody or tarry stools.  No loss of appetite.


Genitourinary: No dysuria, increased frequency, urgency.  No urinary retention.


Musculoskeletal: No myalgias.  No muscle weakness, no gait dysfunction, no 

frequent falls.  No back pain.  No neck pain.


Integumentary: No wounds, no lesions.  No rash or pruritus.  No unusual 

bruising.  No change in hair or nails.


Neurologic: No aphasia. No facial droop. No change in mentation. No head injury.

No headache. No paralysis. No paresthesia.


Psychiatric: No depression.  No anxiety.  No mood swings.


Endocrine: No abnormal blood sugars.  No weight change.  No excessive sweating 

or thirst.  








PHYSICAL EXAMINATION


Gen: This is a 76-year-old overweight  female.  She is resting in bed 

and appears to be comfortable and in no acute distress.


HEENT: Head is atraumatic, normocephalic. Pupils equal, round. Sclerae is anic

teric.  


NECK: Supple. No JVD. No lymphadenopathy. No thyromegaly. 


LUNGS: Clear to auscultation. No wheezes or rhonchi.  No intercostal 

retractions.  


HEART: Regular rate and rhythm.  Systolic murmur. 


ABDOMEN: Soft. Bowel sounds are present. No masses.  No tenderness.  Torres 

catheter draining [place] urine


EXTREMITIES: No pedal edema.  No calf tenderness.


NEUROLOGICAL: Patient is awake, alert and oriented x3. Cranial nerves 2 through 

12 are grossly intact. 





ASSESSMENT AND PLAN


1.  Severe coronary artery disease status post 2 vessel CABG 6/27.  Continue 

current plan per cardiothoracic team.  Continue aspirin 325 mg daily, Lipitor 80

mg daily, Plavix 75 mg daily, continue Norco as needed for pain, DuoNeb 

treatments 4 times daily and as needed, Lopressor increased to 50 mg twice 

daily.  Patient is also been started on cefepime and IV vancomycin, prednisone 

40 mg daily.  Pro-calcitonin pending.





2.  Acute hypoxic respiratory failure.  Patient has been on BiPAP, transition to

 AirVo on 7/1. 





3.  Worsening bilateral lung infiltrates and small right pleural effusion.  

Patient has been started on cefepime and IV vancomycin, pro-calcitonin, Lasix 20

g IV every 12 hours.





4.  Hypertension.  Patient started on lisinopril 5 mg daily, continue Lopressor,

hydralazine as needed, Lasix every 12 hours IV 20 mg





5.  Hyperlipidemia.  Continue atorvastatin.





6.  Carotid artery disease.  Continue aspirin, atorvastatin.





7.  Hyperglycemia.  Continue patient on NovoLog scale every 4 hours.





8.  New onset paroxysmal atrial fibrillation.  Lopressor was increased to 50 mg 

twice daily.





9.  GI prophylaxis.  Protonix 40 mg daily





10.  DVT prophylaxis.  Heparin subcu.














DISCHARGE PLAN


Most likely a good candidate for inpatient rehab.  Consult with Dr. Nunez.





Impression and plan of care have been directed as dictated by the signing 

physician.  Princess Nava nurse practitioner acting as scribe for signing 

physician.





Objective





- Vital Signs


Vital signs: 


                                   Vital Signs











Temp  97.2 F L  07/01/22 08:00


 


Pulse  83   07/01/22 08:00


 


Resp  35 H  07/01/22 08:00


 


BP  158/103   07/01/22 08:00


 


Pulse Ox  96   07/01/22 08:00


 


FiO2  70   07/01/22 08:00








                                 Intake & Output











 06/30/22 07/01/22 07/01/22





 18:59 06:59 18:59


 


Intake Total 159 100 150


 


Output Total 835 647 74


 


Balance -426 -187 76


 


Weight  114 kg 


 


Intake:   


 


   100 


 


    0.9 Pressure bag 39  


 


    Dextrose 5% in Water 100  100 





    ml @ 618 mls/hr IV .Q10M   





    PRN with Amiodarone 150   





    mg Rx#:[id number]   


 


    Lactated Ringers 1,000 ml 120  





    @ 20 mls/hr IV .Q24H LYLE   





    Rx#:[id number]   


 


  Oral   150


 


Output:   


 


  Urine 835 647 74


 


Other:   


 


  Voiding Method Indwelling Catheter Indwelling Catheter 








                       ABP, PAP, CO, CI - Last Documented











Arterial Blood Pressure        281/281


 


Pulmonary Artery Pressure      43/24


 


Cardiac Output                 4


 


Cardiac Index                  1.9

















- Labs


CBC & Chem 7: 


                                 07/01/22 05:45





                                 07/01/22 05:45


Labs: 


                  Abnormal Lab Results - Last 24 Hours (Table)











  06/30/22 06/30/22 06/30/22 Range/Units





  11:52 16:22 19:37 


 


WBC     (3.8-10.6)  k/uL


 


RBC     (3.80-5.40)  m/uL


 


Hgb     (11.4-16.0)  gm/dL


 


Hct     (34.0-46.0)  %


 


Neutrophils #     (1.3-7.7)  k/uL


 


Lymphocytes #     (1.0-4.8)  k/uL


 


BUN     (7-17)  mg/dL


 


Glucose     (74-99)  mg/dL


 


POC Glucose (mg/dL)  120 H  170 H  111 H  ()  mg/dL


 


AST     (14-36)  U/L


 


ALT     (4-34)  U/L


 


Total Protein     (6.3-8.2)  g/dL














  06/30/22 07/01/22 07/01/22 Range/Units





  23:00 00:22 04:53 


 


WBC     (3.8-10.6)  k/uL


 


RBC     (3.80-5.40)  m/uL


 


Hgb     (11.4-16.0)  gm/dL


 


Hct     (34.0-46.0)  %


 


Neutrophils #     (1.3-7.7)  k/uL


 


Lymphocytes #     (1.0-4.8)  k/uL


 


BUN     (7-17)  mg/dL


 


Glucose     (74-99)  mg/dL


 


POC Glucose (mg/dL)  169 H  137 H  134 H  ()  mg/dL


 


AST     (14-36)  U/L


 


ALT     (4-34)  U/L


 


Total Protein     (6.3-8.2)  g/dL














  07/01/22 07/01/22 Range/Units





  05:45 05:45 


 


WBC  19.2 H   (3.8-10.6)  k/uL


 


RBC  3.59 L   (3.80-5.40)  m/uL


 


Hgb  10.8 L   (11.4-16.0)  gm/dL


 


Hct  32.6 L   (34.0-46.0)  %


 


Neutrophils #  17.2 H   (1.3-7.7)  k/uL


 


Lymphocytes #  0.8 L   (1.0-4.8)  k/uL


 


BUN   42 H  (7-17)  mg/dL


 


Glucose   124 H  (74-99)  mg/dL


 


POC Glucose (mg/dL)    ()  mg/dL


 


AST   103 H  (14-36)  U/L


 


ALT   95 H  (4-34)  U/L


 


Total Protein   5.9 L  (6.3-8.2)  g/dL

## 2022-07-01 NOTE — P.PN
Subjective


Progress Note Date: 07/01/22





PROGRESS NOTE


The patient is a 76-year-old female with history of carotid vascular disease who

presented with symptoms of progressive chest discomfort and an abnormal MPI.  

Underwent cardiac catheterization on the 24th and was found to have severe 

distal left main disease with mild-to-moderate disease in the RCA.  She is 

scheduled to undergo CABG today.  Her echo showed an ejection fraction of 50% 

with mild lateral wall hypokinesis and mild-to-moderate mitral regurgitation.  

She's feeling well this morning, denies any chest discomfort or dizziness.  She 

is in sinus mechanism.  Scheduled to undergo CABG this afternoon.  She continues

to be on aspirin, Lipitor 80 mg daily, isosorbide mononitrate 15 mg daily, 

lisinopril 30 mg daily, metoprolol succinate 25 mg daily





June 28:


The patient underwent off-pump CABG yesterday with LIMA to the LAD and SVG to 

the OM with ligation of the left atrial appendage and placement of intra-aortic 

balloon pump because of worsening ischemia at the start of surgery.  She had 

atrial arrhythmia requiring cardioversion earlier.  She is extubated, in sinus 

mechanism, intra-aortic balloon pump at 1:2 with good blood pressure and urinary

output.  She is on no vasopressor.  She is awake, alert and following commands. 

At the end of the procedure she had a good ejection fraction with mild mitral 

regurgitation.


She continues to be on aspirin, Lipitor 80 mg daily, Plavix 75 mg daily, 

metoprolol tartrate 12-1/2 mg twice a day.





June 29:


The patient intra-aortic balloon pump was removed yesterday.  She was hypoxemic 

during the night requiring BiPAP.  She denies any chest discomfort.  She 

continues to be in sinus mechanism.  She denies any chest discomfort, dizziness 

or palpitations.  She received diuretics yesterday with good output.  She 

continues to be on aspirin, Plavix, low-dose dopamine, metoprolol 12-1/2 mg 

twice a day, Lipitor 80 mg daily.  Her chest x-ray shows bilateral pleural 

effusion





June 30:


The patient is awake and alert, continues to be in sinus mechanism, she 

continues to be on the BiPAP but feels better.  Her breathing is stable.  She is

denying any chest discomfort, dizziness or palpitations.  She has no nausea.  

She has good urinary output.  She continues to be on aspirin once a day, 

amiodarone 400 mg twice a day, Lipitor 80 mg daily, Plavix 75 mg daily, insulin,

metoprolol tartrate 25 mg twice a day.  Her chest x-ray shows bilateral pleural 

effusion with mild congestion, she diuresed well yesterday the IV Lasix





July 1:


The patient continues to be on a BiPAP, hypoxic off of it.  She had an episode 

of atrial fibrillation back in sinus mechanism to be on amiodarone.  Her blood 

pressure is stable and has not required a suppressive.  She denies any chest 

discomfort but she is dyspneic.  She has no nausea or vomiting.  Her urinary 

output has been stable.  Her chest x-ray shows worsening infiltrate bilaterally.

 She continues to be on amiodarone 400 mg twice a day, aspirin, Lipitor 80 mg 

daily, Plavix 75 mg daily, Lasix 20 mg IV every 12 hours, metoprolol tartrate 25

mg 3 times a day 





PHYSICAL EXAMINATION:


Blood pressure 154/100 with a heart rate in the 80s


LUNGS: Decreased breath sounds at the bases


HEART: Regular rate and rhythm, S1, S2. No S3.  systolic murmur at the base


ABDOMEN: Soft, nontender, no organomegaly


EXTREMETIES: No edema, 


LAB:


Hemoglobin 10.8, BUN 42, creatinine 0.69, potassium 4.1


IMPRESSION:


1.  Status post CABG, LIMA to the LAD and SVG to obtuse marginal branch with 

known severe left main disease.


2.  Post removal of Intra-aortic balloon pump


3.  History of hyperlipidemia 


4.  Hypoxemia with lung congestion, probable lung injury post CABG


5.  Hypertension








PLAN:


1.  IV diuretics as needed


2.  Follow blood pressure and  add ACE inhibitor


3.  Wean BiPAP as tolerated to nasal cannula


4.  Continue incentive spirometry


5.  Increase activity as tolerated.


6.  Follow renal functions








Objective





- Vital Signs


Vital signs: 


                                   Vital Signs











Temp  96.0 F L  07/01/22 04:00


 


Pulse  84   07/01/22 07:30


 


Resp  30 H  07/01/22 07:00


 


BP  154/107   07/01/22 07:00


 


Pulse Ox  91 L  07/01/22 07:00


 


FiO2  70   07/01/22 07:16








                                 Intake & Output











 06/30/22 07/01/22 07/01/22





 18:59 06:59 18:59


 


Intake Total 159 100 


 


Output Total 835 647 14


 


Balance -676 -547 -14


 


Weight  114 kg 


 


Intake:   


 


   100 


 


    0.9 Pressure bag 39  


 


    Dextrose 5% in Water 100  100 





    ml @ 618 mls/hr IV .Q10M   





    PRN with Amiodarone 150   





    mg Rx#:[id number]   


 


    Lactated Ringers 1,000 ml 120  





    @ 20 mls/hr IV .Q24H LYLE   





    Rx#:[id number]   


 


Output:   


 


  Urine 835 647 14


 


Other:   


 


  Voiding Method Indwelling Catheter Indwelling Catheter 








                       ABP, PAP, CO, CI - Last Documented











Arterial Blood Pressure        281/281


 


Pulmonary Artery Pressure      43/24


 


Cardiac Output                 4


 


Cardiac Index                  1.9

















- Labs


CBC & Chem 7: 


                                 07/01/22 05:45





                                 07/01/22 05:45


Labs: 


                  Abnormal Lab Results - Last 24 Hours (Table)











  06/30/22 06/30/22 06/30/22 Range/Units





  08:01 11:52 16:22 


 


WBC     (3.8-10.6)  k/uL


 


RBC     (3.80-5.40)  m/uL


 


Hgb     (11.4-16.0)  gm/dL


 


Hct     (34.0-46.0)  %


 


Neutrophils #     (1.3-7.7)  k/uL


 


Lymphocytes #     (1.0-4.8)  k/uL


 


BUN     (7-17)  mg/dL


 


Glucose     (74-99)  mg/dL


 


POC Glucose (mg/dL)  123 H  120 H  170 H  ()  mg/dL


 


AST     (14-36)  U/L


 


ALT     (4-34)  U/L


 


Total Protein     (6.3-8.2)  g/dL














  06/30/22 06/30/22 07/01/22 Range/Units





  19:37 23:00 00:22 


 


WBC     (3.8-10.6)  k/uL


 


RBC     (3.80-5.40)  m/uL


 


Hgb     (11.4-16.0)  gm/dL


 


Hct     (34.0-46.0)  %


 


Neutrophils #     (1.3-7.7)  k/uL


 


Lymphocytes #     (1.0-4.8)  k/uL


 


BUN     (7-17)  mg/dL


 


Glucose     (74-99)  mg/dL


 


POC Glucose (mg/dL)  111 H  169 H  137 H  ()  mg/dL


 


AST     (14-36)  U/L


 


ALT     (4-34)  U/L


 


Total Protein     (6.3-8.2)  g/dL














  07/01/22 07/01/22 07/01/22 Range/Units





  04:53 05:45 05:45 


 


WBC   19.2 H   (3.8-10.6)  k/uL


 


RBC   3.59 L   (3.80-5.40)  m/uL


 


Hgb   10.8 L   (11.4-16.0)  gm/dL


 


Hct   32.6 L   (34.0-46.0)  %


 


Neutrophils #   17.2 H   (1.3-7.7)  k/uL


 


Lymphocytes #   0.8 L   (1.0-4.8)  k/uL


 


BUN    42 H  (7-17)  mg/dL


 


Glucose    124 H  (74-99)  mg/dL


 


POC Glucose (mg/dL)  134 H    ()  mg/dL


 


AST    103 H  (14-36)  U/L


 


ALT    95 H  (4-34)  U/L


 


Total Protein    5.9 L  (6.3-8.2)  g/dL

## 2022-07-01 NOTE — XR
EXAMINATION TYPE: XR chest 1V portable

 

DATE OF EXAM: 7/1/2022

 

COMPARISON: 6/30/2022

 

INDICATION: Respiratory failure

 

TECHNIQUE: Single frontal view of the chest is obtained.

 

FINDINGS:  

The heart size is moderately enlarged.  

The pulmonary vasculature is somewhat prominent.  

Bibasilar infiltrates are present. Left upper lobe infiltrate may be present.  Small right pleural ef
fusion is likely present.

 

 

IMPRESSION:  

1. Worsening bilateral lung infiltrates.

2. Small right pleural effusion.

3. Moderate cardiomegaly.

## 2022-07-01 NOTE — P.CONS
History of Present Illness





- Chief Complaint


Cardiac debility





- History of Present Illness





I had the opportunity to see patient for inpatient rehab consultation with 

regard to cardiac debility.  She was admitted to [facility] June 24 for cardiac 

catheterization performed Dr. Santana which demonstrated significant coronary 

artery disease.  Value by Dr. Austin and did undergo CABG two-vessel with left 

atrial appendage June 27.  Seen by Dr. Ledesma and Dr. Gardiner in ICU.  Multiple 

chest x-rays followed and note increasing congestion infusions.  Moderate 

cardiomegaly.  PT and OT prescribed but unable to her to perform yesterday 

perhaps today.





Previous functional history as elicited from patient: 76-year-old right-handed 

white female who is  lives and fifth we'll alone.  Son lives next door 

and grandkids are in and out.  Describes independent with own cooking, laundry, 

driving, standing shower gait without device.  PCP is Dr. Leahy.  Denies tobacco

or alcohol.





Review of Systems





Review of systems:


ENT: Denies sneezes or discharge.


Eyes: Denies discharge or photophobia.


Cardiac: Jc.


Pulmonary: Moderate shortness of breath.


Breast: Denies discharge or lumps.


Gastrointestinal: Denies nausea, emesis, constipation, diarrhea.


Genitourinary: Denies discharge or frequency.


Musculoskeletal: Denies muscle or bone aches.


Neurologic: Denies motor or sensory change.


Endocrine: Denies shakes or sweats.


Oncology: Denies cancers.


Dermatologic: Denies rash, itching, pruritus.


ALLERGY/immunology: Denies sneezes, rashes.








Past Medical History


Past Medical History: Fibromyalgia, Hyperlipidemia, Hypertension, Osteoarthritis

(OA), Supraventricular Tachycardia (SVT)


Additional Past Medical History / Comment(s): urinary incontinence-wears pad, 

past MRI should ?stroke, severe left internal carotid artery stenosis followed 

by Dr. Adam, frequent constipation


History of Any Multi-Drug Resistant Organisms: None Reported


Past Surgical History: Appendectomy, Hysterectomy, Joint Replacement, Orthopedic

Surgery


Additional Past Surgical History / Comment(s): left knee replacement, 

HEMORROIDECTOMY, kory cataracts removed, surg. for glaucoma right eye


Past Anesthesia/Blood Transfusion Reactions: No Reported Reaction


Smoking Status: Never smoker


Past Alcohol Use History: None Reported


Past Drug Use History: None Reported





- Past Family History


  ** Mother


Family Medical History: Coronary Artery Disease (CAD)


Additional Family Medical History / Comment(s): HAD STENTS





  ** Brother(s)


Family Medical History: Myocardial Infarction (MI)


Additional Family Medical History / Comment(s): CABG





  ** Sister(s)


Family Medical History: Cancer


Additional Family Medical History / Comment(s): colon cancer





  ** Father


Family Medical History: Coronary Artery Disease (CAD)


Additional Family Medical History / Comment(s): HAD STENTS





Medications and Allergies


                                Home Medications











 Medication  Instructions  Recorded  Confirmed  Type


 


Aspirin EC [Ecotrin Low Dose] 81 mg PO BID 02/27/21 06/24/22 History


 


Metoprolol Succinate (ER) [Toprol 25 mg PO DAILY 12/03/21 06/24/22 History





XL]    


 


amLODIPine [Norvasc] 5 mg PO DAILY 12/03/21 06/24/22 History


 


lisinopriL 30 mg PO HS 12/03/21 06/24/22 History


 


Ascorbic Acid [Vitamin C] 1,000 mg PO DAILY  tab 12/05/21 06/24/22 Rx


 


Cholecalciferol [Vitamin D3 (25 50 mcg PO DAILY  tablet 12/05/21 06/24/22 Rx





Mcg = 1000 Iu)]    


 


Zinc Sulfate [Orazinc] 220 mg PO DAILY  cap 12/05/21 06/24/22 Rx


 


Isosorbide Mononitrate ER [Imdur] 30 mg PO DAILY 06/22/22 06/22/22 History


 


Lactulose 1 - 2 tbsp PO AS DIRECTED PRN 06/22/22 06/22/22 History








                                    Allergies











Allergy/AdvReac Type Severity Reaction Status Date / Time


 


No Known Allergies Allergy   Verified 06/24/22 10:40














Physical Exam


Vitals: 


                                   Vital Signs











  Temp Pulse Resp BP Pulse Ox FiO2


 


 07/01/22 09:14      96  90


 


 07/01/22 09:00   101 H  28 H  154/81  96 


 


 07/01/22 08:00  97.2 F L  83  35 H  158/103  96  70


 


 07/01/22 07:30   84    


 


 07/01/22 07:16   81     70


 


 07/01/22 07:00   77  30 H  154/107  91 L 


 


 07/01/22 06:00   77  30 H  142/96  93 L 


 


 07/01/22 05:00   78  30 H  156/107  93 L 


 


 07/01/22 04:00  96.0 F L  80  28 H  153/104  92 L  70


 


 07/01/22 03:39       70


 


 07/01/22 03:00   79  28 H  157/88  94 L 


 


 07/01/22 02:00   82  34 H  122/86  93 L 


 


 07/01/22 01:00   89  30 H  155/115  92 L 


 


 07/01/22 00:28       70


 


 07/01/22 00:00  97.8 F  77  27 H  124/85  93 L 


 


 06/30/22 23:41       60


 


 06/30/22 23:02   84  25 H  162/96  90 L 


 


 06/30/22 23:00   85  30 H  163/103  89 L 


 


 06/30/22 22:00   111 H  34 H  139/97  92 L 


 


 06/30/22 21:00  97.8 F  86  33 H  168/106  93 L  60


 


 06/30/22 20:31   83    


 


 06/30/22 20:19   79    


 


 06/30/22 20:17       60


 


 06/30/22 20:00  97.1 F L  85  35 H  148/92  92 L 


 


 06/30/22 19:00   81  44 H  137/76  92 L 


 


 06/30/22 18:00   80  24  115/64  95 


 


 06/30/22 17:00   156 H  29 H  157/96  93 L 


 


 06/30/22 16:12   95    


 


 06/30/22 16:00  97.8 F  85  31 H  157/80  88 L  60


 


 06/30/22 15:00   80  32 H  166/93  92 L 


 


 06/30/22 14:00   79  30 H  106/95  92 L 


 


 06/30/22 13:00   76  19  135/117  96 


 


 06/30/22 12:00  97.9 F  75  11 L  121/79  96  60


 


 06/30/22 11:17   73     60


 


 06/30/22 11:05   72    


 


 06/30/22 11:00   67  31 H  137/113  95 








                                Intake and Output











 06/30/22 07/01/22 07/01/22





 22:59 06:59 14:59


 


Intake Total 115  250


 


Output Total 460 347 374


 


Balance -345 -864 -989


 


Intake:   


 


    100


 


    0.9 Pressure bag 15  


 


    Dextrose 5% in Water 100 100  100





    ml @ 618 mls/hr IV .Q10M   





    PRN with Amiodarone 150   





    mg Rx#:[id number]   


 


  Oral   150


 


Output:   


 


  Urine 460 347 374


 


Other:   


 


  Voiding Method Indwelling Catheter Indwelling Catheter 


 


  Weight  114 kg 114 kg








                         ABP, PAP, CO, CI - Last 8 Hours











Cardiac Output                 4


 


Cardiac Index                  1.9

















Skin: Atrophic, intact.


General: Overweight build and comfortable appearance.  Sitting in Jigna chair.  

Oxygen and lines.


Head: Normocephalic, atraumatic.


Eyes: Symmetric.  Pupils equal round.


Ears: Symmetric.  Hearing within normal limits.


Mouth: Clear.


Neck: Supple.  Carotid without bruit.


Cardiac: Sternal wound clean and dressed.


Lungs: Clear anteriorly and posteriorly.


Abdomen: Soft active nontender.  Overweight.


Extremities: Normal tone.


Neurological: Mental status: Alert, cooperative, pleasant.


Cranial nerves: Symmetric facial tone and trapezius.


Motor: Normal strength and isolation all 4 limbs.


Sensation: Intact throughout.


DTRs: Symmetric and equal throughout.


Mobility: Requires physical assist for bed mobility.





Results


CBC & Chem 7: 


                                 07/01/22 05:45





                                 07/01/22 05:45


Labs: 


                  Abnormal Lab Results - Last 24 Hours (Table)











  06/30/22 06/30/22 06/30/22 Range/Units





  11:52 16:22 19:37 


 


WBC     (3.8-10.6)  k/uL


 


RBC     (3.80-5.40)  m/uL


 


Hgb     (11.4-16.0)  gm/dL


 


Hct     (34.0-46.0)  %


 


Neutrophils #     (1.3-7.7)  k/uL


 


Lymphocytes #     (1.0-4.8)  k/uL


 


BUN     (7-17)  mg/dL


 


Glucose     (74-99)  mg/dL


 


POC Glucose (mg/dL)  120 H  170 H  111 H  ()  mg/dL


 


AST     (14-36)  U/L


 


ALT     (4-34)  U/L


 


Total Protein     (6.3-8.2)  g/dL














  06/30/22 07/01/22 07/01/22 Range/Units





  23:00 00:22 04:53 


 


WBC     (3.8-10.6)  k/uL


 


RBC     (3.80-5.40)  m/uL


 


Hgb     (11.4-16.0)  gm/dL


 


Hct     (34.0-46.0)  %


 


Neutrophils #     (1.3-7.7)  k/uL


 


Lymphocytes #     (1.0-4.8)  k/uL


 


BUN     (7-17)  mg/dL


 


Glucose     (74-99)  mg/dL


 


POC Glucose (mg/dL)  169 H  137 H  134 H  ()  mg/dL


 


AST     (14-36)  U/L


 


ALT     (4-34)  U/L


 


Total Protein     (6.3-8.2)  g/dL














  07/01/22 07/01/22 Range/Units





  05:45 05:45 


 


WBC  19.2 H   (3.8-10.6)  k/uL


 


RBC  3.59 L   (3.80-5.40)  m/uL


 


Hgb  10.8 L   (11.4-16.0)  gm/dL


 


Hct  32.6 L   (34.0-46.0)  %


 


Neutrophils #  17.2 H   (1.3-7.7)  k/uL


 


Lymphocytes #  0.8 L   (1.0-4.8)  k/uL


 


BUN   42 H  (7-17)  mg/dL


 


Glucose   124 H  (74-99)  mg/dL


 


POC Glucose (mg/dL)    ()  mg/dL


 


AST   103 H  (14-36)  U/L


 


ALT   95 H  (4-34)  U/L


 


Total Protein   5.9 L  (6.3-8.2)  g/dL














Assessment and Plan


Plan: 





Impression: Cardiac debility with recent CABG.





Comments and plan: At this time PT and OT are prescribed.  Therapies were unable

 to work with patient yesterday and most likely today as well.  Have discussed 

case with cardiac surgeon.  Plan to review a Tuesday and daily thereafter.  

Follow closely for possible need and benefit of inpatient rehab.

## 2022-07-02 LAB
ALBUMIN SERPL-MCNC: 3.7 G/DL (ref 3.5–5)
ALP SERPL-CCNC: 198 U/L (ref 38–126)
ALT SERPL-CCNC: 95 U/L (ref 4–34)
ANION GAP SERPL CALC-SCNC: 15 MMOL/L
AST SERPL-CCNC: 84 U/L (ref 14–36)
BASOPHILS # BLD AUTO: 0.1 K/UL (ref 0–0.2)
BASOPHILS NFR BLD AUTO: 0 %
BUN SERPL-SCNC: 51 MG/DL (ref 7–17)
CALCIUM SPEC-MCNC: 8.6 MG/DL (ref 8.4–10.2)
CHLORIDE SERPL-SCNC: 106 MMOL/L (ref 98–107)
CO2 SERPL-SCNC: 19 MMOL/L (ref 22–30)
EOSINOPHIL # BLD AUTO: 0 K/UL (ref 0–0.7)
EOSINOPHIL NFR BLD AUTO: 0 %
ERYTHROCYTE [DISTWIDTH] IN BLOOD BY AUTOMATED COUNT: 3.57 M/UL (ref 3.8–5.4)
ERYTHROCYTE [DISTWIDTH] IN BLOOD: 14.9 % (ref 11.5–15.5)
GLUCOSE BLD-MCNC: 118 MG/DL (ref 70–110)
GLUCOSE BLD-MCNC: 123 MG/DL (ref 70–110)
GLUCOSE BLD-MCNC: 126 MG/DL (ref 70–110)
GLUCOSE BLD-MCNC: 141 MG/DL (ref 70–110)
GLUCOSE BLD-MCNC: 143 MG/DL (ref 70–110)
GLUCOSE BLD-MCNC: 215 MG/DL (ref 70–110)
GLUCOSE SERPL-MCNC: 122 MG/DL (ref 74–99)
HCT VFR BLD AUTO: 34 % (ref 34–46)
HGB BLD-MCNC: 10.5 GM/DL (ref 11.4–16)
LYMPHOCYTES # SPEC AUTO: 0.7 K/UL (ref 1–4.8)
LYMPHOCYTES NFR SPEC AUTO: 4 %
MCH RBC QN AUTO: 29.4 PG (ref 25–35)
MCHC RBC AUTO-ENTMCNC: 30.9 G/DL (ref 31–37)
MCV RBC AUTO: 95.2 FL (ref 80–100)
MONOCYTES # BLD AUTO: 1.1 K/UL (ref 0–1)
MONOCYTES NFR BLD AUTO: 7 %
NEUTROPHILS # BLD AUTO: 14.8 K/UL (ref 1.3–7.7)
NEUTROPHILS NFR BLD AUTO: 87 %
PLATELET # BLD AUTO: 354 K/UL (ref 150–450)
POTASSIUM SERPL-SCNC: 4 MMOL/L (ref 3.5–5.1)
PROT SERPL-MCNC: 6.2 G/DL (ref 6.3–8.2)
SODIUM SERPL-SCNC: 140 MMOL/L (ref 137–145)
WBC # BLD AUTO: 17 K/UL (ref 3.8–10.6)

## 2022-07-02 RX ADMIN — IPRATROPIUM BROMIDE AND ALBUTEROL SULFATE SCH ML: .5; 3 SOLUTION RESPIRATORY (INHALATION) at 07:29

## 2022-07-02 RX ADMIN — ASPIRIN 325 MG ORAL TABLET SCH MG: 325 PILL ORAL at 07:57

## 2022-07-02 RX ADMIN — INSULIN ASPART SCH UNIT: 100 INJECTION, SOLUTION INTRAVENOUS; SUBCUTANEOUS at 16:50

## 2022-07-02 RX ADMIN — INSULIN ASPART SCH UNIT: 100 INJECTION, SOLUTION INTRAVENOUS; SUBCUTANEOUS at 11:55

## 2022-07-02 RX ADMIN — INSULIN ASPART SCH: 100 INJECTION, SOLUTION INTRAVENOUS; SUBCUTANEOUS at 04:33

## 2022-07-02 RX ADMIN — INSULIN ASPART SCH: 100 INJECTION, SOLUTION INTRAVENOUS; SUBCUTANEOUS at 08:10

## 2022-07-02 RX ADMIN — HYDRALAZINE HYDROCHLORIDE PRN MG: 20 INJECTION INTRAMUSCULAR; INTRAVENOUS at 04:43

## 2022-07-02 RX ADMIN — MAGNESIUM HYDROXIDE PRN MG: 2400 SUSPENSION ORAL at 07:57

## 2022-07-02 RX ADMIN — SODIUM CHLORIDE SCH MLS/HR: 9 INJECTION, SOLUTION INTRAVENOUS at 21:06

## 2022-07-02 RX ADMIN — SODIUM CHLORIDE SCH MLS/HR: 9 INJECTION, SOLUTION INTRAVENOUS at 10:36

## 2022-07-02 RX ADMIN — IPRATROPIUM BROMIDE AND ALBUTEROL SULFATE SCH ML: .5; 3 SOLUTION RESPIRATORY (INHALATION) at 16:10

## 2022-07-02 RX ADMIN — HYDRALAZINE HYDROCHLORIDE PRN MG: 20 INJECTION INTRAMUSCULAR; INTRAVENOUS at 06:26

## 2022-07-02 RX ADMIN — CLOPIDOGREL BISULFATE SCH MG: 75 TABLET ORAL at 07:58

## 2022-07-02 RX ADMIN — HEPARIN SODIUM SCH UNIT: 5000 INJECTION INTRAVENOUS; SUBCUTANEOUS at 23:55

## 2022-07-02 RX ADMIN — Medication SCH MG: at 20:55

## 2022-07-02 RX ADMIN — IPRATROPIUM BROMIDE AND ALBUTEROL SULFATE SCH ML: .5; 3 SOLUTION RESPIRATORY (INHALATION) at 11:22

## 2022-07-02 RX ADMIN — SODIUM CHLORIDE, PRESERVATIVE FREE SCH: 5 INJECTION INTRAVENOUS at 21:03

## 2022-07-02 RX ADMIN — PANTOPRAZOLE SODIUM SCH MG: 40 TABLET, DELAYED RELEASE ORAL at 06:34

## 2022-07-02 RX ADMIN — HEPARIN SODIUM SCH UNIT: 5000 INJECTION INTRAVENOUS; SUBCUTANEOUS at 07:57

## 2022-07-02 RX ADMIN — DOCUSATE SODIUM AND SENNOSIDES SCH EACH: 50; 8.6 TABLET ORAL at 20:55

## 2022-07-02 RX ADMIN — MUPIROCIN SCH APPLIC: 20 OINTMENT TOPICAL at 20:58

## 2022-07-02 RX ADMIN — CEFEPIME HYDROCHLORIDE SCH MLS/HR: 2 INJECTION, POWDER, FOR SOLUTION INTRAVENOUS at 16:50

## 2022-07-02 RX ADMIN — IPRATROPIUM BROMIDE AND ALBUTEROL SULFATE SCH ML: .5; 3 SOLUTION RESPIRATORY (INHALATION) at 19:37

## 2022-07-02 RX ADMIN — CEFEPIME HYDROCHLORIDE SCH MLS/HR: 2 INJECTION, POWDER, FOR SOLUTION INTRAVENOUS at 07:58

## 2022-07-02 RX ADMIN — ATORVASTATIN CALCIUM SCH MG: 80 TABLET, FILM COATED ORAL at 07:58

## 2022-07-02 RX ADMIN — MUPIROCIN SCH APPLIC: 20 OINTMENT TOPICAL at 07:58

## 2022-07-02 RX ADMIN — INSULIN ASPART SCH: 100 INJECTION, SOLUTION INTRAVENOUS; SUBCUTANEOUS at 20:51

## 2022-07-02 RX ADMIN — SODIUM CHLORIDE, PRESERVATIVE FREE SCH: 5 INJECTION INTRAVENOUS at 10:01

## 2022-07-02 RX ADMIN — METOPROLOL TARTRATE SCH MG: 50 TABLET, FILM COATED ORAL at 07:58

## 2022-07-02 RX ADMIN — METOPROLOL TARTRATE SCH MG: 50 TABLET, FILM COATED ORAL at 20:56

## 2022-07-02 RX ADMIN — HEPARIN SODIUM SCH UNIT: 5000 INJECTION INTRAVENOUS; SUBCUTANEOUS at 16:49

## 2022-07-02 RX ADMIN — INSULIN ASPART SCH: 100 INJECTION, SOLUTION INTRAVENOUS; SUBCUTANEOUS at 23:53

## 2022-07-02 NOTE — P.PN
Subjective


Progress Note Date: 07/02/22





PROGRESS NOTE


The patient is a 76-year-old female with history of carotid vascular disease who

presented with symptoms of progressive chest discomfort and an abnormal MPI.  

Underwent cardiac catheterization on the 24th and was found to have severe 

distal left main disease with mild-to-moderate disease in the RCA.  She is 

scheduled to undergo CABG today.  Her echo showed an ejection fraction of 50% 

with mild lateral wall hypokinesis and mild-to-moderate mitral regurgitation.  

She's feeling well this morning, denies any chest discomfort or dizziness.  She 

is in sinus mechanism.  Scheduled to undergo CABG this afternoon.  She continues

to be on aspirin, Lipitor 80 mg daily, isosorbide mononitrate 15 mg daily, 

lisinopril 30 mg daily, metoprolol succinate 25 mg daily





June 28:


The patient underwent off-pump CABG yesterday with LIMA to the LAD and SVG to 

the OM with ligation of the left atrial appendage and placement of intra-aortic 

balloon pump because of worsening ischemia at the start of surgery.  She had 

atrial arrhythmia requiring cardioversion earlier.  She is extubated, in sinus 

mechanism, intra-aortic balloon pump at 1:2 with good blood pressure and urinary

output.  She is on no vasopressor.  She is awake, alert and following commands. 

At the end of the procedure she had a good ejection fraction with mild mitral 

regurgitation.


She continues to be on aspirin, Lipitor 80 mg daily, Plavix 75 mg daily, 

metoprolol tartrate 12-1/2 mg twice a day.





June 29:


The patient intra-aortic balloon pump was removed yesterday.  She was hypoxemic 

during the night requiring BiPAP.  She denies any chest discomfort.  She 

continues to be in sinus mechanism.  She denies any chest discomfort, dizziness 

or palpitations.  She received diuretics yesterday with good output.  She 

continues to be on aspirin, Plavix, low-dose dopamine, metoprolol 12-1/2 mg 

twice a day, Lipitor 80 mg daily.  Her chest x-ray shows bilateral pleural 

effusion





June 30:


The patient is awake and alert, continues to be in sinus mechanism, she 

continues to be on the BiPAP but feels better.  Her breathing is stable.  She is

denying any chest discomfort, dizziness or palpitations.  She has no nausea.  

She has good urinary output.  She continues to be on aspirin once a day, 

amiodarone 400 mg twice a day, Lipitor 80 mg daily, Plavix 75 mg daily, insulin,

metoprolol tartrate 25 mg twice a day.  Her chest x-ray shows bilateral pleural 

effusion with mild congestion, she diuresed well yesterday the IV Lasix





July 1:


The patient continues to be on a BiPAP, hypoxic off of it.  She had an episode 

of atrial fibrillation back in sinus mechanism to be on amiodarone.  Her blood 

pressure is stable and has not required a suppressive.  She denies any chest 

discomfort but she is dyspneic.  She has no nausea or vomiting.  Her urinary 

output has been stable.  Her chest x-ray shows worsening infiltrate bilaterally.

 She continues to be on amiodarone 400 mg twice a day, aspirin, Lipitor 80 mg 

daily, Plavix 75 mg daily, Lasix 20 mg IV every 12 hours, metoprolol tartrate 25

mg 3 times a day





July 2:


She's feeling better today, sitting up in the chair, she continues to be on high

flow during the day but BiPAP during the night.  Her blood pressure has been on 

the higher side.  She denies any chest discomfort, dizziness or palpitations.  

Hemodynamically she is stable, in sinus mechanism.  She has a good urinary 

output.


She continues to be on aspirin once a day, Lipitor 80 mg daily, Plavix 75 mg 

daily, insulin, Zestril 5 mg daily, metoprolol 50 mg twice a day





PHYSICAL EXAMINATION:


Blood pressure 158/80 with a heart rate in the 80s


LUNGS: Decreased breath sounds at the bases


HEART: Regular rate and rhythm, S1, S2. No S3.  systolic murmur at the base


ABDOMEN: Soft, nontender, no organomegaly


EXTREMETIES: No edema, 


LAB:


Pending


IMPRESSION:


1.  Status post CABG, LIMA to the LAD and SVG to obtuse marginal branch with 

known severe left main disease.


2.  Post removal of Intra-aortic balloon pump


3.  History of hyperlipidemia 


4.  Hypoxemia with lung congestion, probable lung injury post CABG


5.  Hypertension








PLAN:


1.  IV diuretics as needed


2.  Follow blood pressure and increase ACE inhibitor


3.  Wean high flow as tolerated


4.  Continue incentive spirometry


5.  Increase activity as tolerated.


6.  Follow renal functions








Objective





- Vital Signs


Vital signs: 


                                   Vital Signs











Temp  97.6 F   07/02/22 04:00


 


Pulse  80   07/02/22 07:00


 


Resp  28 H  07/02/22 07:00


 


BP  158/82   07/02/22 07:00


 


Pulse Ox  97   07/02/22 07:00


 


FiO2  90   07/02/22 06:00








                                 Intake & Output











 07/01/22 07/02/22 07/02/22





 18:59 06:59 18:59


 


Intake Total 1200 1140 


 


Output Total 919 510 35


 


Balance 281 630 -35


 


Weight 114 kg 108.9 kg 


 


Intake:   


 


   600 


 


    Cefepime 2 gm In Sodium  100 





    Chloride 0.9% 100 ml @   





    200 mls/hr IVPB ONCE STA   





    Rx#:[id number]   


 


    Dextrose 5% in Water 100 100  





    ml @ 618 mls/hr IV .Q10M   





    PRN with Amiodarone 150   





    mg Rx#:[id number]   


 


    Vancomycin 2,000 mg In  500 





    Sodium Chloride 0.9% 500   





    ml 500 ml @ 167 mls/hr   





    IVPB Q12H [facility] Rx#:   





    [id number]   


 


  Intake, IV Titration 700  





  Amount   


 


    Cefepime 2 gm In Sodium 200  





    Chloride 0.9% 100 ml @   





    200 mls/hr IVPB ONCE STA   





    Rx#:[id number]   


 


    Vancomycin 2,500 mg In 500  





    Sodium Chloride 0.9% 500   





    ml 500 ml @ 167 mls/hr   





    IVPB ONCE ONE Rx#:   





    [id number]   


 


  Oral 400 540 


 


Output:   


 


  Urine 919 510 35


 


Other:   


 


  Voiding Method  Indwelling Catheter 








                       ABP, PAP, CO, CI - Last Documented











Arterial Blood Pressure        281/281


 


Pulmonary Artery Pressure      43/24


 


Cardiac Output                 4


 


Cardiac Index                  1.9

















- Labs


CBC & Chem 7: 


                                 07/01/22 05:45





                                 07/01/22 05:45


Labs: 


                  Abnormal Lab Results - Last 24 Hours (Table)











  07/01/22 07/01/22 07/01/22 Range/Units





  05:45 12:46 16:35 


 


POC Glucose (mg/dL)   123 H  132 H  ()  mg/dL


 


Procalcitonin  0.33 H    (0.02-0.09)  ng/mL














  07/01/22 07/01/22 07/02/22 Range/Units





  19:52 23:36 04:27 


 


POC Glucose (mg/dL)  150 H  133 H  126 H  ()  mg/dL


 


Procalcitonin     (0.02-0.09)  ng/mL

## 2022-07-02 NOTE — P.PN
Subjective


Progress Note Date: 07/02/22








 HISTORY OF PRESENT ILLNESS


This is a pleasant 76 years old female with past medical history of Fibromyal

merritt, Hyperlipidemia, Hypertension, Osteoarthritis , Supraventricular Tachycardia

,urinary incontinence-wears pad, severe left internal carotid artery stenosis 

followed by Dr. Adam, frequent constipation, she was admitted under 

cardiology service for right arm pain, chest pain and back pain for the last 3 

weeks, where she underwent cardiac cath and 60/24 showing critical and complex 

lesion involving the distal left main coronary arteries and also involving the 

ostial left circumflex and the distal LAD with the stenotic range is 80-90% with

increase in LVEDP  , Patient will need bypass procedure to open his coronary 

arteries.


Currently she denies chest pain or dyspnea.  No incontinence of urine or bowel. 

No fever


Patient is hemodynamically stable


Labs including CBC, INR, BMP and liver enzymes are unremarkable.  TSH is 1.6.


Urine analysis showing trace blood.


coronavirus not detected.   Hepatitis panel is negative


Chest x-ray: Minimal interstitial edema noted


Carotid duplex: No significant stenosis


Echocardiogram showing ejection fraction of 50-55% with mild-to-moderate mitral 

regurgitation and mild tricuspid regurgitation


Patient currently on heparin drip, aspirin 81 mg twice a day and normal saline 

at 75 mL/h as well as Lipitor and metoprolol


However patient this morning she is asymptomatic she denies chest pain or 

dyspnea or abdominal pain.  No diarrhea or vomiting or dysuria.  No headache or 

weakness or numbness.





6/27: Patient is denying any chest pain, shortness of breath, palpitations, 

lightheadedness or dizziness.  She is scheduled for CABG this afternoon and 

patient states that she is ready to move forward.  No new concerns from her nurs

e.  Patient has been afebrile, heart rate 80, blood pressure 129/78 and pulse ox

96% on room air.  CBC is unremarkable.  Chloride 110, CO2 20, blood sugar 112, 

creatinine 0.68.





6/28: Patient is status post CABG 2 with left internal mammary artery to the 

left anterior descending artery, reverse saphenous vein graft to the obtuse 

marginal artery, ligation of the left atrial appendage with a 40 mm AtriCure 

clip, endovascular vein harvest of the right greater saphenous vein, placement 

of intra-aortic balloon pump.  Right groin intra-aortic balloon pump was 

discontinued this morning.  Right internal jugular [place]ord[place], right radial 

arterial line, mediastinal/left pleural chest tubes remain in place.  Patient is

stating that she is feeling well.  She has been afebrile, heart rate 75, 121/54,

pulse ox 93% on 2 L.  Capillary blood glucose running between 110 and 120.  WBC 

9.7, hemoglobin 9.3 and platelet count 195.  Electrolytes and renal function 

normal.  AST 85 and ALT 41.





6/29: Patient remains in the intensive care unit.  Yesterday pulse ox continued 

to drop through the day with increased oxygen demands to the point where she is 

now on BiPAP.  She states she feels a little short of breath.  She denies Chest 

pain.  Temperature max 100.4, heart rate 76, respiratory rate 32, blood pressure

130/73.  Cardiac monitor sinus rhythm.  Patient is status post IV Lasix 

yesterday with good urine output.  Blood glucose running between 107 and 124 and

insulin drip will be discontinued, transition to NovoLog scale every 4 hours.  

Urinalysis was turbid, blood small.





6/30: Patient remains in the intensive care unit on BiPAP.  She has been unable 

to eat only taking a few sips of water.  Chest tubes have been removed, pacer 

wires to be removed today.  Torres catheter is in place, good urine output.  

Patient has been afebrile, heart rate in the 80s, blood pressure 144/76, pulse 

ox 90% on FiO2 80 BiPAP.  Cardiac monitor is sinus rhythm.  Repeat blood work 

reveals WBC 19.9, hemoglobin 10.2, platelet count 266.  Sodium 135, BUN 30 

creatinine 0.67.  AST 83 and ALT 60.  Blood sugars running between 123 and 160.


Chest x-ray reveals stable.  Persistent perihilar and basilar patchy densities 

bilateral small effusions.


Chest ultrasound reveals right pleural effusion 4.9 cm 2.2 cm fluid pocket.





7/1: Patient remains in intensive care unit.  She has just been transitioned to 

AirVo and off BiPAP which she utilized during the night and all day yesterday.  

Patient converted to atrial fibrillation when we walked into the room.  Torres 

remains in place.  We have added in a consult for Dr. Nunez in anticipation the 

patient will be ready for discharge to a week.  She has been afebrile, heart 

rate 101, respiratory rate 28, blood pressure 154/81, pulse ox 96% FiO2 of 90.  

Repeat blood work reveals WBC 19.2, hemoglobin 10.8 and platelet count 375.  

Electrolytes are normal.  Creatinine 0.69.  AST is 103 and ALT 95.  Coronavirus 

PCR not detected.  Patient has been started on cefepime and IV vancomycin 

prophylactically per pulmonary recommendations.  Pro-calcitonin has been ordered

Patient is continued on IV Lasix.





7/2, patient's remains in ICU, still with hypersomnia, nasal CPAP in place, on 

oral prednisone 40 mg, pulse ox 97%, high flow O2, FiO2 90%, vitals are stable, 

slightly tachypneic, with some conversations dyspnea, patient is sleeping in the

recliner often, has trace edema, melatonin 5 mg started, for sleep restoration, 

indwelling Torres catheter, clear urine, with adequate urine output.  Patient 

receiving IV vancomycin and cefepime, chest x-ray, similar airspace opacities 

and right pleural effusion, similar cardiomegaly, migjht need furosemide, if 

shortness of breath and the mass was..  Patient remains in sinus rhythm, no new 

episodes of atrial fibrillation, since the past 24 hours, creatinine 0.6 sodium 

140 hemoglobin 10.5 incentive spirometry, and 750 mL capacity








REVIEW OF SYSTEMS


Constitutional: No fever, no chills, no night sweats.  No weight change.  Noted 

weakness, fatigue no lethargy.  No daytime sleepiness.


EENT: No headache.  No blurred vision or double vision, no loss of vision.  No 

loss of Hearing, no ringing in the ears, no dizziness.  No nasal drainage or 

congestion.  No epistaxis.  No sore throat.


Lungs: Minimal shortness of breath, cough, no sputum production.  No wheezing.


Cardiovascular: Denies chest pain, no lower extremity edema.  No palpitations.  

No paroxysmal nocturnal dyspnea.  No orthopnea.  No lightheadedness or 

dizziness.  No syncopal episodes.


Abdominal: No abdominal pain.  No nausea, vomiting.  No diarrhea.  No constipati

on.  No bloody or tarry stools.  No loss of appetite.


Genitourinary: No dysuria, increased frequency, urgency.  No urinary retention.


Musculoskeletal: No myalgias.  No muscle weakness, no gait dysfunction, no 

frequent falls.  No back pain.  No neck pain.


Integumentary: No wounds, no lesions.  No rash or pruritus.  No unusual 

bruising.  No change in hair or nails.


Neurologic: No aphasia. No facial droop. No change in mentation. No head injury.

No headache. No paralysis. No paresthesia.


Psychiatric: No depression.  No anxiety.  No mood swings.


Endocrine: No abnormal blood sugars.  No weight change.  No excessive sweating 

or thirst.  








PHYSICAL EXAMINATION


Gen: This is a 76-year-old overweight  female.  She is resting in bed 

and appears to be comfortable and in no acute distress.


HEENT: Head is atraumatic, normocephalic. Pupils equal, round. Sclerae is 

anicteric.  


NECK: Supple. No JVD. No lymphadenopathy. No thyromegaly. 


LUNGS: Clear to auscultation. No wheezes or rhonchi.  No intercostal 

retractions.  


HEART: Regular rate and rhythm.  Systolic murmur. 


ABDOMEN: Soft. Bowel sounds are present. No masses.  No tenderness.  Torres 

catheter draining [place] urine


EXTREMITIES:    No calf tenderness.  Trace edema bilateral ankles 2+1 edema


NEUROLOGICAL: Patient is awake, alert and oriented x3. Cranial nerves 2 through 

12 are grossly intact. 





ASSESSMENT AND PLAN


1.  Severe coronary artery disease status post 2 vessel CABG 6/27.  Continue 

current plan per cardiothoracic team.  Continue aspirin 325 mg daily, Lipitor 80

mg daily, Plavix 75 mg daily, continue Norco as needed for pain, DuoNeb 

treatments 4 times daily and as needed, Lopressor increased to 50 mg twice 

daily.  Patient is also been started on cefepime and IV vancomycin, prednisone 

40 mg daily.  Pro-calcitonin pending.





2.  Acute hypoxic respiratory failure.  Patient has been on BiPAP, transition to

 AirVo on 7/1. 





3.  Worsening bilateral lung infiltrates and small right pleural effusion.  

Patient has been started on cefepime and IV vancomycin, pro-calcitonin, Lasix 20

g IV every 12 hours.





4.  Hypertension.  Patient started on lisinopril 5 mg daily, continue Lopressor,

hydralazine as needed, Lasix every 12 hours IV 20 mg





5.  Hyperlipidemia.  Continue atorvastatin.





6.  Carotid artery disease.  Continue aspirin, atorvastatin.





7.  Hyperglycemia.  Continue patient on NovoLog scale every 4 hours.





8.  New onset paroxysmal atrial fibrillation.  Lopressor was increased to 50 mg 

twice daily.





9.  GI prophylaxis.  Protonix 40 mg daily





10.  DVT prophylaxis.  Heparin subcu.














DISCHARGE PLAN


Most likely a good candidate for inpatient rehab.  Consult with Dr. Nunez.





                       Laboratory Results - Last 24 Hours











  07/01/22 07/01/22 07/01/22





  05:45 16:35 19:52


 


WBC   


 


RBC   


 


Hgb   


 


Hct   


 


MCV   


 


MCH   


 


MCHC   


 


RDW   


 


Plt Count   


 


MPV   


 


Neutrophils %   


 


Lymphocytes %   


 


Monocytes %   


 


Eosinophils %   


 


Basophils %   


 


Neutrophils #   


 


Lymphocytes #   


 


Monocytes #   


 


Eosinophils #   


 


Basophils #   


 


Hypochromasia   


 


Sodium   


 


Potassium   


 


Chloride   


 


Carbon Dioxide   


 


Anion Gap   


 


BUN   


 


Creatinine   


 


Est GFR (CKD-EPI)AfAm   


 


Est GFR (CKD-EPI)NonAf   


 


Glucose   


 


POC Glucose (mg/dL)   132 H  150 H


 


POC Glu Operater ID   Anabelle Diaz Daniel


 


Calcium   


 


Total Bilirubin   


 


AST   


 


ALT   


 


Alkaline Phosphatase   


 


Total Protein   


 


Albumin   


 


Procalcitonin  0.33 H  














  07/01/22 07/02/22 07/02/22





  23:36 04:27 07:03


 


WBC   


 


RBC   


 


Hgb   


 


Hct   


 


MCV   


 


MCH   


 


MCHC   


 


RDW   


 


Plt Count   


 


MPV   


 


Neutrophils %   


 


Lymphocytes %   


 


Monocytes %   


 


Eosinophils %   


 


Basophils %   


 


Neutrophils #   


 


Lymphocytes #   


 


Monocytes #   


 


Eosinophils #   


 


Basophils #   


 


Hypochromasia   


 


Sodium    140


 


Potassium    4.0


 


Chloride    106


 


Carbon Dioxide    19 L


 


Anion Gap    15


 


BUN    51 H


 


Creatinine    0.67


 


Est GFR (CKD-EPI)AfAm    >90


 


Est GFR (CKD-EPI)NonAf    86


 


Glucose    122 H


 


POC Glucose (mg/dL)  133 H  126 H 


 


POC Glu Operater ID  Johnathon Roberts Daniel 


 


Calcium    8.6


 


Total Bilirubin    1.1


 


AST    84 H


 


ALT    95 H


 


Alkaline Phosphatase    198 H


 


Total Protein    6.2 L


 


Albumin    3.7


 


Procalcitonin   














  07/02/22 07/02/22 07/02/22





  07:03 08:09 11:44


 


WBC  17.0 H  


 


RBC  3.57 L  


 


Hgb  10.5 L  


 


Hct  34.0  


 


MCV  95.2  


 


MCH  29.4  


 


MCHC  30.9 L  


 


RDW  14.9  


 


Plt Count  354  


 


MPV  8.1  


 


Neutrophils %  87  


 


Lymphocytes %  4  


 


Monocytes %  7  


 


Eosinophils %  0  


 


Basophils %  0  


 


Neutrophils #  14.8 H  


 


Lymphocytes #  0.7 L  


 


Monocytes #  1.1 H  


 


Eosinophils #  0.0  


 


Basophils #  0.1  


 


Hypochromasia  Moderate  


 


Sodium   


 


Potassium   


 


Chloride   


 


Carbon Dioxide   


 


Anion Gap   


 


BUN   


 


Creatinine   


 


Est GFR (CKD-EPI)AfAm   


 


Est GFR (CKD-EPI)NonAf   


 


Glucose   


 


POC Glucose (mg/dL)   141 H  215 H


 


POC Glu Operater ID   Caty Polanco Angela


 


Calcium   


 


Total Bilirubin   


 


AST   


 


ALT   


 


Alkaline Phosphatase   


 


Total Protein   


 


Albumin   


 


Procalcitonin   





Active Medications





Acetaminophen (Acetaminophen Tab 325 Mg Tab)  650 mg PO Q4HR PRN


   PRN Reason: Fever and/ or Pain


Albuterol/Ipratropium (Ipratropium-Albuterol 3 Ml Neb)  3 ml INHALATION RT-Q2H 

PRN


   PRN Reason: Shortness Of Breath Or Wheezing


   Last Admin: 06/30/22 00:45 Dose:  3 ml


   


Albuterol/Ipratropium (Ipratropium-Albuterol 3 Ml Neb)  3 ml INHALATION RT-QID 

[facility]


   Last Admin: 07/02/22 11:22 Dose:  3 ml


   


Aspirin (Aspirin 325 Mg Tab)  325 mg PO DAILY [facility]


   Last Admin: 07/02/22 07:57 Dose:  325 mg


   


Atorvastatin Calcium (Atorvastatin 80 Mg Tab)  80 mg PO DAILY [facility]


   Stop: 07/25/22 09:01


   Last Admin: 07/02/22 07:58 Dose:  80 mg


   


Benzocaine/Menthol (Benzocaine/Menthol Lozeng 1 Each Lozenge)  1 each MUCOUS MEM

Q2H PRN


   PRN Reason: Sore Throat


Bisacodyl (Bisacodyl 10 Mg Supp)  10 mg RECTAL DAILY PRN


   PRN Reason: Constipation


   Last Admin: 07/02/22 07:57 Dose:  10 mg


   


Clopidogrel Bisulfate (Clopidogrel 75 Mg Tab)  75 mg PO DAILY [facility]


   Last Admin: 07/02/22 07:58 Dose:  75 mg


   


Heparin Sodium (Porcine) (Heparin Sodium,Porcine/Pf 5,000 Unit/0.5 Ml Syringe)  

5,000 unit SQ Q8HR [facility]


   Last Admin: 07/02/22 07:57 Dose:  5,000 unit


   


Hydralazine HCl (Hydralazine Hcl 20 Mg/Ml 1 Ml Vial)  10 mg IVP Q1H PRN


   PRN Reason: Blood Pressure - High


   Last Admin: 07/02/22 06:26 Dose:  10 mg


   


Calcium Gluconate/Sodium (Chloride 2 gm/ IV Solution)  100 mls @ 100 mls/hr IVPB

ONCE PRN


   PRN Reason: Ionized Calcium less than 4.4


   Stop: 07/07/22 17:34


Cefepime HCl 2 gm/ Sodium (Chloride)  100 mls @ 25 mls/hr IVPB Q8HR [facility]


   Last Admin: 07/02/22 07:58 Dose:  25 mls/hr


   


Vancomycin HCl 2,000 mg/ (Sodium Chloride)  500 mls @ 167 mls/hr IVPB Q12H [facility]


   Last Admin: 07/02/22 10:36 Dose:  167 mls/hr


   


Insulin Aspart (Insulin Aspart (Novolog) 100 Unit/Ml Vial)  0 unit SQ Q4H [facility]; 

Protocol


   Last Admin: 07/02/22 11:55 Dose:  4 unit


   


Lisinopril (Lisinopril 10 Mg Tab)  10 mg PO BID [facility]


   Last Admin: 07/02/22 07:58 Dose:  10 mg


   


Magnesium Hydroxide (Magnesium Hydroxide 2,400 Mg/10 Ml Cup)  2,400 mg PO BID 

PRN


   PRN Reason: Constipation


   Last Admin: 07/02/22 07:57 Dose:  2,400 mg


   


Melatonin (Melatonin 5 Mg Tablet)  5 mg PO [facility]


Metoclopramide HCl (Metoclopramide 5 Mg/Ml 2 Ml Vial)  10 mg IVP Q4H PRN


   PRN Reason: Nausea And Vomiting


   Last Admin: 06/29/22 06:57 Dose:  10 mg


   


Metoprolol Tartrate (Metoprolol Tartrate 50 Mg Tab)  50 mg PO BID [facility]


   Last Admin: 07/02/22 07:58 Dose:  50 mg


   


Miscellaneous Information (Potassium Replacement Protocol 1 Each Misc)  1 each 

MISCELLANE DAILY PRN; Protocol


   PRN Reason: Per Protocol


Miscellaneous Information (Magnesium Replacement Protocol 1 Each Misc)  1 each 

MISCELLANE DAILY PRN; Protocol


   PRN Reason: Per Protocol


Miscellaneous Information (Vancomycin Trough Due 1 Each Misc)  0 each MISCELLANE

AS DIRECTED ONE


   Stop: 07/03/22 09:01


Mupirocin (Mupirocin 2% Oint 22 Gm Tube)  1 applic NASAL BID [facility]; Protocol


   Last Admin: 07/02/22 07:58 Dose:  1 applic


   


Ondansetron HCl (Ondansetron 4 Mg/2 Ml Vial)  4 mg IVP Q6HR PRN


   PRN Reason: Nausea And Vomiting


   Last Admin: 06/27/22 22:57 Dose:  4 mg


   


Pantoprazole Sodium (Pantoprazole 40 Mg Tablet)  40 mg PO -BRKFST [facility]


   Last Admin: 07/02/22 06:34 Dose:  40 mg


   


Prednisone (Prednisone 20 Mg Tab)  40 mg PO DAILY [facility]


   Last Admin: 07/02/22 07:57 Dose:  40 mg


   


Senna/Docusate Sodium (Sennosides-Docusate Sodium 1 Each Tab)  2 each PO HS [facility]


   Last Admin: 07/01/22 20:02 Dose:  2 each


   


Sodium Chloride (Sodium Chloride 0.9% Flush 10 Ml Syringe)  10 ml IV BID [facility]


   Last Admin: 07/02/22 10:01 Dose:  Not Given


   

















Objective





- Vital Signs


Vital signs: 


                                   Vital Signs











Temp  97.8 F   07/02/22 08:00


 


Pulse  70   07/02/22 12:00


 


Resp  24   07/02/22 12:00


 


BP  135/88   07/02/22 12:00


 


Pulse Ox  97   07/02/22 12:00


 


FiO2  90   07/02/22 11:22








                                 Intake & Output











 07/01/22 07/02/22 07/02/22





 18:59 06:59 18:59


 


Intake Total 1200 1140 960


 


Output Total 919 510 185


 


Balance 281 630 775


 


Weight 114 kg 108.9 kg 


 


Intake:   


 


   600 500


 


    Cefepime 2 gm In Sodium  100 





    Chloride 0.9% 100 ml @   





    200 mls/hr IVPB ONCE STA   





    Rx#:[id number]   


 


    Dextrose 5% in Water 100 100  





    ml @ 618 mls/hr IV .Q10M   





    PRN with Amiodarone 150   





    mg Rx#:[id number]   


 


    Vancomycin 2,000 mg In  500 500





    Sodium Chloride 0.9% 500   





    ml 500 ml @ 167 mls/hr   





    IVPB Q12H [facility] Rx#:   





    [id number]   


 


  Intake, IV Titration 700  100





  Amount   


 


    Cefepime 2 gm In Sodium 200  





    Chloride 0.9% 100 ml @   





    200 mls/hr IVPB ONCE STA   





    Rx#:[id number]   


 


    Cefepime 2 gm In Sodium   100





    Chloride 0.9% 100 ml @ 25   





    mls/hr IVPB Q8HR [facility] Rx#   





    [id number]   


 


    Vancomycin 2,500 mg In 500  





    Sodium Chloride 0.9% 500   





    ml 500 ml @ 167 mls/hr   





    IVPB ONCE ONE Rx#:   





    [id number]   


 


  Oral 400 540 360


 


Output:   


 


  Urine 919 510 185


 


Other:   


 


  Voiding Method  Indwelling Catheter Indwelling Catheter








                       ABP, PAP, CO, CI - Last Documented











Arterial Blood Pressure        281/281


 


Pulmonary Artery Pressure      43/24


 


Cardiac Output                 4


 


Cardiac Index                  1.9

















- Labs


CBC & Chem 7: 


                                 07/02/22 07:03





                                 07/02/22 07:03


Labs: 


                  Abnormal Lab Results - Last 24 Hours (Table)











  07/01/22 07/01/22 07/01/22 Range/Units





  05:45 16:35 19:52 


 


WBC     (3.8-10.6)  k/uL


 


RBC     (3.80-5.40)  m/uL


 


Hgb     (11.4-16.0)  gm/dL


 


MCHC     (31.0-37.0)  g/dL


 


Neutrophils #     (1.3-7.7)  k/uL


 


Lymphocytes #     (1.0-4.8)  k/uL


 


Monocytes #     (0-1.0)  k/uL


 


Carbon Dioxide     (22-30)  mmol/L


 


BUN     (7-17)  mg/dL


 


Glucose     (74-99)  mg/dL


 


POC Glucose (mg/dL)   132 H  150 H  ()  mg/dL


 


AST     (14-36)  U/L


 


ALT     (4-34)  U/L


 


Alkaline Phosphatase     ()  U/L


 


Total Protein     (6.3-8.2)  g/dL


 


Procalcitonin  0.33 H    (0.02-0.09)  ng/mL














  07/01/22 07/02/22 07/02/22 Range/Units





  23:36 04:27 07:03 


 


WBC     (3.8-10.6)  k/uL


 


RBC     (3.80-5.40)  m/uL


 


Hgb     (11.4-16.0)  gm/dL


 


MCHC     (31.0-37.0)  g/dL


 


Neutrophils #     (1.3-7.7)  k/uL


 


Lymphocytes #     (1.0-4.8)  k/uL


 


Monocytes #     (0-1.0)  k/uL


 


Carbon Dioxide    19 L  (22-30)  mmol/L


 


BUN    51 H  (7-17)  mg/dL


 


Glucose    122 H  (74-99)  mg/dL


 


POC Glucose (mg/dL)  133 H  126 H   ()  mg/dL


 


AST    84 H  (14-36)  U/L


 


ALT    95 H  (4-34)  U/L


 


Alkaline Phosphatase    198 H  ()  U/L


 


Total Protein    6.2 L  (6.3-8.2)  g/dL


 


Procalcitonin     (0.02-0.09)  ng/mL














  07/02/22 07/02/22 07/02/22 Range/Units





  07:03 08:09 11:44 


 


WBC  17.0 H    (3.8-10.6)  k/uL


 


RBC  3.57 L    (3.80-5.40)  m/uL


 


Hgb  10.5 L    (11.4-16.0)  gm/dL


 


MCHC  30.9 L    (31.0-37.0)  g/dL


 


Neutrophils #  14.8 H    (1.3-7.7)  k/uL


 


Lymphocytes #  0.7 L    (1.0-4.8)  k/uL


 


Monocytes #  1.1 H    (0-1.0)  k/uL


 


Carbon Dioxide     (22-30)  mmol/L


 


BUN     (7-17)  mg/dL


 


Glucose     (74-99)  mg/dL


 


POC Glucose (mg/dL)   141 H  215 H  ()  mg/dL


 


AST     (14-36)  U/L


 


ALT     (4-34)  U/L


 


Alkaline Phosphatase     ()  U/L


 


Total Protein     (6.3-8.2)  g/dL


 


Procalcitonin     (0.02-0.09)  ng/mL

## 2022-07-02 NOTE — XR
EXAMINATION TYPE: XR chest 1V portable

 

DATE OF EXAM: 7/2/2022 5:10 AM

 

COMPARISON: Chest radiograph from one day prior.

 

TECHNIQUE: XR chest 1V portable Portable AP radiograph of the chest..

 

CLINICAL INDICATION:Female, 76 years old with history of Postoperative CABG; 

 

FINDINGS: 

Lungs/Pleura: Scattered airspace opacities are no significant change from prior. Blunting of the righ
t costophrenic angle is unchanged. There is no evidence of focal consolidation, or pneumothorax.  

Pulmonary vascularity: Unremarkable.

Heart/mediastinum: Cardiomediastinal silhouette is unremarkable. Left atrial appendage occlusion christopher
daljit present. 

Musculoskeletal: No acute osseous pathology. Midline sternotomy wires are noted and stable.

 

 

IMPRESSION: 

1.  Similar airspace opacities and right pleural effusion.

2.  Similar cardiomegaly.

## 2022-07-02 NOTE — P.PN
Subjective


Progress Note Date: 07/02/22











This is a patient who had a recent heart catheterization, showing a critical and

complex lesion involving the distal left main coronary artery, also, the ostial 

left circumflex, and ostial LAD.  The lesions appear to be in the range of 80-

90%, and the patient was also noted to have elevated left ventricular end-

diastolic filling pressures.  The patient is currently being evaluated by 

cardiothoracic surgery for possible bypass grafting.  We will reassess to see 

the patient preoperatively, and evaluate her lung function.  The patient is a 

lifelong nonsmoker.  She has no history of lung disease.  Based on her FEV1, and

her MVV, she was in the low operative risk range.  Outpatient medications includ

ed amlodipine, aspirin, metoprolol, lisinopril, zinc, vitamin D3, ascorbic acid,

lactulose, and indoor.  Her only major medical problem is hypertension.  She 

does have a history of previous coronavirus infection and was seen by my partner

in December 2021.  CBC is completely normal.  PTT is 39.7.  Sodium 141, 

potassium 3.7, chlorides 111, CO2 24, BUN 12, creatinine 0.63.  Cholesterol was 

182.  Urine was negative.  Testing for coronavirus was negative.  Chest x-ray 

shows mild interstitial edema.  Currently she is on room air.





Progress note dated 06/26/2022.





76-year-old female we saw yesterday in consultation.  The patient has 

significant coronary disease, involving the left main coronary artery.  The 

patient is apparently going to have open heart surgery one day this week.  The 

exact day has not been decided yet.  Clinically, she's.  Stable.  She is on room

air.  She is a lifelong nonsmoker.  Lung function would suggest that she's at a 

very low increased operative risk based on her FEV1 and MVV.  Labs today show 

white count of 6.1, with a normal hemoglobin, hematocrit, and platelet count.  

The patient's PTT is 60.2.  Sodium 141, potassium 3.6, chlorides 111, CO2 24, 

BUN 12, creatinine 0.72.








06/27/2022, the patient is awaiting bypass surgery.  The patient is calm and 

comfortable.  No respiratory difficulties whatsoever.  She is using incentive 

spirometer.  Her preop FEV1 is order of 72% of predicted.  No angina.  No 

palpitations.  No chest pain.  She is hemodynamically stable at this point in 

time.  She remains on IV heparin.  I did introduce myself and I will take care o

f this patient postop, managed to ventilator and the necessity pulmonary care 

following her thoracotomy bypass surgery.no other active issues for now.  The 

patient was sent comes at 6.2 with a hemoglobin 4.5.  She is on IV heparin with 

a PTT of 55.  BUN is at 12 with a creatinine 0.6 and the sodium level is at 140.





06/28/2022, I'm seeing the patient for a follow-up.  The patient was taken to 

the operating room yesterday and the patient underwent an off-pump coronary 

artery bypass surgery with LIMA to LAD and saphenous vein graft to obtuse 

marginal.  The patient also had a left facial appendage clipping.  Estimated 

blood loss was 400 mL.  The patient received a total of 3 L of intraoperative IV

fluids.  Note that the patient was having issues with hypotension and there was 

some interval worsening of the mitral regurgitation intraoperatively.  At that 

point, it was decided to insert an intra-aortic balloon pump and this was done 

through the right common femoral artery.  Once the intra-aortic balloon pump was

initiated, the patient's hemodynamics improved dramatically.  The patient was 

given a postop echo cardiac exam that showed a mild MR and good ejection 

fraction.  The intra-aortic balloon pump was placed on a one-to-one augmentation

and following that the patient was brought into the intensive care unit.  The 

patient subsequently was weaned off the sedation and the patient was extubated 

without any major difficulties within a few hours.  The patient was extubated 

and the fourth hours after arriving to the ICU.  The patient had with good 

weaning parameters.  The patient had a blood gas that showed adequate 

oxygenation and ventilation.  Based on that, the patient was extubated.  This 

morning, the patient remains on oxygen at 6 L per minute nasal cannula.  The 

chest x-ray showing cardiomegaly.  The patient is a mediastinal and left pleural

chest tube.  Output from the chest tubes have been 250 mL from the mediastinum 

and 280 from the left pleural since surgery.  No evidence of any air leak.  The 

chest x-ray shows no evidence of any pneumothorax.  Hemodynamically, the patient

is currently receiving intra-aortic balloon pump augmentation of 1-2.  The 

patient has adequate cardiac output of 4.3 and an index of 2.  She is on no 

pressors for now.  The intra-aortic balloon pump will be removed for now.  The 

patient is stable to systemic adequate blood pressure was the patient being off 

the balloon pump.  Urine output is in order of 20 mL an hour.  The patient is a

febrile.  The patient is awake and following commands thoracic questions 

appropriately.  Pulmonary artery pressures are 38 over 18 mmHg.  Lower blood 

work from work today is showing a sodium of 139, potassium of 4, bicarb of 26, 

BUN of 14 with a creatinine of 0.6.  The white cell count is at 9.7 with a 

hemoglobin of 9.3 and a platelet count of 195.  AST is 85 with an ALT of 60 and 

alkaline phosphatase of 41.  Magnesium level is at 2.1.  Note that the patient 

had a run of atrial fibrillation intraoperatively.  The patient was loaded with 

amiodarone and currently the patient is on 0.5 mg per minute of amiodarone 

infusion.  The patient is in a normal sinus rhythm.  The patient is also on an 

insulin drip running at 2.5 units an hour with adequate blood sugar control.  

The patient's of lactated Ringer at the rate of 50 mL an hour.  As mentioned, 

awake and alert and the sternum is dry clean and intact.  No other issues 

otherwise for now.  Breaks





06/29/2022, I'm seeing the patient for a follow-up.  The patient is postop day 

#2.  Note that this patient was extubated successfully without any issues.  

Subsequently, as of yesterday afternoon, the patient became progressively more 

hypoxic.  Note that during the day, the patient was given IV albumin a total of 

750 mL to improve her urine output.  This did help and ultimately dopamine was 

added at renal dose to improve her urine output.  Subsequently, she became more 

hypoxic and the patient became more short of breath and initially she went up to

15 L high flow and later on the patient was placed on a BiPAP.  She was kept on 

BiPAP throughout the night and the patient is currently on a BiPAP at a pressure

of 12/5 cm of water and FiO2 of on the percent.  She is able to generate tidal 

volumes above 500.  Respiratory rate is in the mid 20s.  Her breathing is 

slightly labored even on the BiPAP, yet she is able to tolerate the machine w

ithout any major difficulties and she is awake and alert and she is following 

commands and answering questions and she is neurologically intact.  At the same 

time, the patient had a blood.  This morning that showed a pH of 7.97.49 with a 

pCO2 of 35 and a pO2 of 55 and this was on FiO2 of 85% and based on that the 

FiO2 was brought up to 100%.  The chest x-ray showing cardiomegaly.  There is 

some infiltration of the right lung and some atelectatic changes in lung bases. 

There is concern for an evolving right lung pneumonia although this is quite 

early in the postoperative course.  In any rate, the patient is being diuresed 

for now and the patient is producing adequate amount of urine output.  She did 

have a spike of temperature 100.4 yesterday and currently she is afebrile.  The 

white cell count today is at 12.5 which is comparable to yesterday with a 

hemoglobin of 9.8 and a platelet count of 192.  In terms of the chest tubes, the

patient is a mediastinal and left pleural chest tube, output has been noted and 

it's in the order of overnight 80 mL overnight and 400 mL over the past 24 hours

in the mediastinal chest tube, 6 disease overnight and 400 mL over the past 24 

hours in the left pleural chest tube.  [place]-Brunilda catheter still in place.  The 

cardiac output is currently at 4.3 with an index of 2.0.  The PA diastolic is in

the order of extreme millimeters of mercury.  The patient otherwise has normal 

renal function.  Creatinine is at 0.6.  Sodium is at 136.  She is obviously n

othing by mouth at this point in time as the patient is BiPAP dependent.  

Cardiac rhythm is still sinus and the patient remains on amiodarone.  She 

remains on aspirin and Plavix.  Routine postoperative care is being implemented 

this point in time.  Insulin drip is off and the patient is currently on slice K

coverage every 4 hours.





06/30/2022, I'm seeing the patient for a follow-up.  The patient is postop day 

#3.  Note that after a successful extubation, the patient was placed on a BiPAP 

because of ongoing hypoxic respiratory failure.  The patient stayed on BiPAP 

throughout the day yesterday and this morning the patient remains on a BiPAP.  

Current BiPAP settings of 12/5 with an FiO2 of 80%.  While in the BiPAP, the 

patient is urinating a tidal volume of about 450 with a respiratory rate in the 

mid 20s and a minute ventilation of 13 L per minute.  The chest x-ray showing 

some further investigation right lung base.  Based on that, an ultrasound of the

chest was done and there was minimal amount of pleural effusion the right lung 

base and I think the predominant findings are significant atelectasis in the 

lung bases right more than left.  Note that the chest tubes were all removed ye

sterday and the patient the left pleural and mediastinal chest tube both of them

removed.  Morning blood gases showed a pH of 7.49 with a pCO2 of 33 and pO2 of 

60.  The patient's FiO2 is currently at 80% on the BiPAP and she is pulse oxing 

95%.  I dropped her down to 60%.  Meanwhile, the pro-calcitonin level obtained 

yesterday was low at 0.18.  The patient was being diuresis with IV Lasix and 

input output balance has been negative for on 22 mL over the past 24 hours and 

the patient was taken off the dopamine.  The the white cell count is slightly 

higher compared to yesterday.  He 0.9 with a hemoglobin of 10.2 and a platelet 

count of 266.  Sodium is at 135 with a BUN of 30 and creatinine 0.6.  The 

patient is awake.  The patient is alert.  She is following commands and moving 

extremities.  She is currently on examination aspirin and Plavix.  She is also 

on metoprolol 25 mg by mouth 3 times a day.  Cardiac rhythm was sinus and later 

on she did have a epidural of atrial fibrillation.  There is being managed by 

the cardiothoracic team.  The patient will be started back on amiodarone bolus 

and maintenance.  No pressors for now.





07/01/2022, the patient is postop day #4.  The patient continues to have diffuse

but the pulmonary infiltrates with hypoxic respiratory failure that occurred p

ost extubation.  The patient remains on a BiPAP at a pressure of 10/5 cm of 

water with an FiO2 of 70%.  Current pulse ox is around 96%.  She is a bit 

disturbed by the BiPAP and she wants to give herself a break.  She is urinating 

adequate tidal volumes above 700 mL an minute ventilation remains quite 

elevated.  The chest x-ray shows small lung volumes, atelectatic changes in lung

bases with diffuse breath and pulmonary infiltrates.  She has also cardiomegaly.

 All of the chest tubes are removed.  The patient is afebrile.  White cell count

remains elevated at 19.  The patient is producing adequate amount of urine 

output.  The patient is on IV Lasix and overall fluid balance over the past 24 h

ours shows that she has been in a negative fluid balance in the urine output is 

adequate.  Input output over the past 24 hours has been in the order of -1.2 L. 

Otherwise, the rest of the blood work shows a white cell count of 19.2 with a 

hemoglobin of 10.8 and a platelet count of 375.  BUN is 42 with a creatinine of 

0.69 the sodium level is at 138 with a potassium level of 4.1.  As mentioned, 

the ultrasound of the chest was done yesterday and there was no evidence of any 

significant sizable pleural effusion.  The chest x-ray from today shows a small 

right-sided pleural effusion, cardiomegaly along with diffuse breath and 

pulmonary infiltrates.  The patient also had an episode of atrial fibrillation 

with rapid ventricular response.  She was given amiodarone bolus and currently 

she is on amiodarone at a dose of 400 mg by mouth twice a day.  Her current 

cardiac rhythm is back to sinus.





2022, the patient is postop day #5.  The patient is sitting up on a chair.  The 

patient is currently unable to 60 L with an FiO2 of 90%.  Chest x-ray 

essentially unchanged.  There is cardiomegaly.  There is extensive atelectatic 

changes with small effusions bilaterally.  The patient is currently off the 

BiPAP.  Denies having any significant respiratory distress.  Using incentive 

spirometer and the patient is pulling approximately 7 50 mL.  Hemodynamically, 

she is on no pressors.  She is stable.  Her cardiac rhythm is sinus and there is

no further episodes of atrial fibrillation since yesterday.  The overall input 

output fluid balance has been -1.2 L in the urine output and 95 to on 35 mL over

the past 8 hours.  The patient has no specific complaints.  Surgical with that 

is dry clean and intact.  The white cell count is slightly lower compared to 

yesterday down to 17 with a hemoglobin of 10.5 and a platelet count of 354.  

Sodium is at 140 with a mean of 51 acute creatinine of 0.6.  Chest x-ray was 

noted.  The patient is currently  on empiric antibiotic coverage with a 

combination of cefepime and vancomycin.  A limited ultrasound that was done at 

the bedside revealed no significant pleural effusion on the right.  There is 

cardiomegaly and there is some consented the patient may have a underlying 

pericardial effusion.





Objective





- Vital Signs


Vital signs: 


                                   Vital Signs











Temp  97.6 F   07/02/22 04:00


 


Pulse  78   07/02/22 07:42


 


Resp  28 H  07/02/22 07:00


 


BP  158/82   07/02/22 07:00


 


Pulse Ox  97   07/02/22 07:29


 


FiO2  94   07/02/22 07:29








                                 Intake & Output











 07/01/22 07/02/22 07/02/22





 18:59 06:59 18:59


 


Intake Total 1200 1140 


 


Output Total 919 510 35


 


Balance 281 630 -35


 


Weight 114 kg 108.9 kg 


 


Intake:   


 


   600 


 


    Cefepime 2 gm In Sodium  100 





    Chloride 0.9% 100 ml @   





    200 mls/hr IVPB ONCE STA   





    Rx#:[id number]   


 


    Dextrose 5% in Water 100 100  





    ml @ 618 mls/hr IV .Q10M   





    PRN with Amiodarone 150   





    mg Rx#:[id number]   


 


    Vancomycin 2,000 mg In  500 





    Sodium Chloride 0.9% 500   





    ml 500 ml @ 167 mls/hr   





    IVPB Q12H [facility] Rx#:   





    [id number]   


 


  Intake, IV Titration 700  





  Amount   


 


    Cefepime 2 gm In Sodium 200  





    Chloride 0.9% 100 ml @   





    200 mls/hr IVPB ONCE STA   





    Rx#:[id number]   


 


    Vancomycin 2,500 mg In 500  





    Sodium Chloride 0.9% 500   





    ml 500 ml @ 167 mls/hr   





    IVPB ONCE ONE Rx#:   





    [id number]   


 


  Oral 400 540 


 


Output:   


 


  Urine 919 510 35


 


Other:   


 


  Voiding Method  Indwelling Catheter 








                       ABP, PAP, CO, CI - Last Documented











Arterial Blood Pressure        281/281


 


Pulmonary Artery Pressure      43/24


 


Cardiac Output                 4


 


Cardiac Index                  1.9

















- Exam











CONSTITUTIONAL: Appears comfortable, cooperative, no acute distress mild 

currently on a Airvo, 60 L with an FiO2 of 90%


Breathing is nonlabored and the patient is comfortable sitting up on a chair.


RESPIRATORY:  Lungs sounds diminished bilaterally, right greater than left.  

Marked diminished breath on the right lung base


CARDIOVASCULAR:  S1, S2 present.  Regular rate and rhythm, sinus rhythm on 

telemetry.  Sternum stable.   Palpable peripheral pulses bilaterally.  Trace 

generalized edema present.  No calf pain or tenderness noted.  Heart hugger in 

place with patient demonstrating appropriate use.  Antiembolism stockings, SCDs 

present.


GASTROINTESTINAL:  Abdomen soft, nontender, nondistended.  Active bowel sounds 

present 4 quadrants, tympanic to percussion.  Tolerating liquids for 

medications.  Denies flatus


GENITOURINARY:  Torres present draining cloudy, yellow urine.  Output overnight 

is in order of 30-40 mL an hour


INTEGUMENTARY:  Skin is warm and dry with evidence of good perfusion.  Anterior 

chest incision well approximated and covered with dry intact dressing.  Right 

lower extremity EVH site well approximated without redness or drainage.


NEUROLOGIC:  Cranial nerves II through XII intact


MUSKULOSKELETAL:  Able to move all extremities, strength equal bilaterally


PSYCHIATRIC:  Alert and oriented to person place and time, appropriate affect, 

intact judgment and insight


INVASIVE LINES AND TUBES:   All of the lines have been removed 





- Labs


CBC & Chem 7: 


                                 07/02/22 07:03





                                 07/02/22 07:03


Labs: 


                  Abnormal Lab Results - Last 24 Hours (Table)











  07/01/22 07/01/22 07/01/22 Range/Units





  05:45 12:46 16:35 


 


WBC     (3.8-10.6)  k/uL


 


RBC     (3.80-5.40)  m/uL


 


Hgb     (11.4-16.0)  gm/dL


 


MCHC     (31.0-37.0)  g/dL


 


Neutrophils #     (1.3-7.7)  k/uL


 


Lymphocytes #     (1.0-4.8)  k/uL


 


Monocytes #     (0-1.0)  k/uL


 


Carbon Dioxide     (22-30)  mmol/L


 


BUN     (7-17)  mg/dL


 


Glucose     (74-99)  mg/dL


 


POC Glucose (mg/dL)   123 H  132 H  ()  mg/dL


 


AST     (14-36)  U/L


 


ALT     (4-34)  U/L


 


Alkaline Phosphatase     ()  U/L


 


Total Protein     (6.3-8.2)  g/dL


 


Procalcitonin  0.33 H    (0.02-0.09)  ng/mL














  07/01/22 07/01/22 07/02/22 Range/Units





  19:52 23:36 04:27 


 


WBC     (3.8-10.6)  k/uL


 


RBC     (3.80-5.40)  m/uL


 


Hgb     (11.4-16.0)  gm/dL


 


MCHC     (31.0-37.0)  g/dL


 


Neutrophils #     (1.3-7.7)  k/uL


 


Lymphocytes #     (1.0-4.8)  k/uL


 


Monocytes #     (0-1.0)  k/uL


 


Carbon Dioxide     (22-30)  mmol/L


 


BUN     (7-17)  mg/dL


 


Glucose     (74-99)  mg/dL


 


POC Glucose (mg/dL)  150 H  133 H  126 H  ()  mg/dL


 


AST     (14-36)  U/L


 


ALT     (4-34)  U/L


 


Alkaline Phosphatase     ()  U/L


 


Total Protein     (6.3-8.2)  g/dL


 


Procalcitonin     (0.02-0.09)  ng/mL














  07/02/22 07/02/22 07/02/22 Range/Units





  07:03 07:03 08:09 


 


WBC   17.0 H   (3.8-10.6)  k/uL


 


RBC   3.57 L   (3.80-5.40)  m/uL


 


Hgb   10.5 L   (11.4-16.0)  gm/dL


 


MCHC   30.9 L   (31.0-37.0)  g/dL


 


Neutrophils #   14.8 H   (1.3-7.7)  k/uL


 


Lymphocytes #   0.7 L   (1.0-4.8)  k/uL


 


Monocytes #   1.1 H   (0-1.0)  k/uL


 


Carbon Dioxide  19 L    (22-30)  mmol/L


 


BUN  51 H    (7-17)  mg/dL


 


Glucose  122 H    (74-99)  mg/dL


 


POC Glucose (mg/dL)    141 H  ()  mg/dL


 


AST  84 H    (14-36)  U/L


 


ALT  95 H    (4-34)  U/L


 


Alkaline Phosphatase  198 H    ()  U/L


 


Total Protein  6.2 L    (6.3-8.2)  g/dL


 


Procalcitonin     (0.02-0.09)  ng/mL














Assessment and Plan


Plan: 








Symptomatic coronary disease, post coronary artery bypass surgery 2, off-pump 

and the patient is postop day #5.  The patient is post intra-aortic balloon pump

insertion for hemodynamic support inserted at a time of surgery was subsequently

removed and the patient remains hemodynamically stable.  Note that the patient 

was extubated without any major difficulties and over the past 12 hours,  and 

since then, the patient has developed diffuse but the pulmonary infiltrates with

hypoxic respiratory failure, and the patient utilizes BiPAP for 48 hours and 

following that the patient was switched to Airvo currently on 6 L with an FiO2 

of 90%.  Suspect post surgical ARDS.  There is also some atelectatic changes in 

lung bases bilaterally contributing to this patient's hypoxic respiratory 

failure.  Bedside ultrasound was done revealing no evidence of any pericardial 

effusion or pleural effusion and sizable amounts.





Post thoracotomy, chest tubes are removed, small right-sided pleural effusion on

ultrasound the chest that was conducted yesterday.  Less on the right lung base 

remains diminished and the patient remains on Airvo 60 L an FiO2 of 90%





Acute hypoxic history failure, likely due to postsurgical ARDS in addition to 

extensive atelectatic changes in lung bases more so on the right with a small 

right-sided pleural effusion.  The patient is currently is off the BiPAP and the

patient is currently on Airvo extremely liters, 90%








Paroxysmal atrial fibrillation, expected outcome of surgery currently in sinus 

rhythm and the patient is currently on po amiodarone





Hyperglycemia, postop, the patient is off IV insulin and the patient is 

currently on insulin scale coverage





History of hypertension.





No history of any lung disease, and patient at low increased operative risk 

based on lung function.





Prior history of coronavirus infection, December 2021.





Postoperative anemia, expected outcome of surgery, hemoglobin is stable for now





Leukocytosis





plan








No evidence of any pleural or pericardial effusion


Wean down the FiO2 slowly, keep the flow at 60 and wean down the FiO2 down to l

ower levels to maintain a saturation above 90%


Continue using incentive spirometer


Keep antibiotics for another 24 hours and potentially stop by tomorrow if things

aren't improving


Continue the prednisone 40 mg by mouth daily


Continue beta blockers with metoprolol  


Diuretics to be stopped today


No amiodarone


Repeat labs and chest x-ray in the morning


We'll continue to follow


 


 





We'll continue to follow make further recommendations based on her progress.  

Case was discussed with the cardiothoracic team.  Is a critically care 

evaluation.  Evaluation was done and more than 30 minutes. 


Time with Patient: Greater than 30

## 2022-07-03 LAB
ALBUMIN SERPL-MCNC: 3.4 G/DL (ref 3.5–5)
ALP SERPL-CCNC: 176 U/L (ref 38–126)
ALT SERPL-CCNC: 78 U/L (ref 4–34)
ANION GAP SERPL CALC-SCNC: 9 MMOL/L
AST SERPL-CCNC: 62 U/L (ref 14–36)
BASOPHILS # BLD AUTO: 0 K/UL (ref 0–0.2)
BASOPHILS NFR BLD AUTO: 0 %
BUN SERPL-SCNC: 50 MG/DL (ref 7–17)
CALCIUM SPEC-MCNC: 8.4 MG/DL (ref 8.4–10.2)
CHLORIDE SERPL-SCNC: 108 MMOL/L (ref 98–107)
CO2 SERPL-SCNC: 23 MMOL/L (ref 22–30)
EOSINOPHIL # BLD AUTO: 0 K/UL (ref 0–0.7)
EOSINOPHIL NFR BLD AUTO: 0 %
ERYTHROCYTE [DISTWIDTH] IN BLOOD BY AUTOMATED COUNT: 3.39 M/UL (ref 3.8–5.4)
ERYTHROCYTE [DISTWIDTH] IN BLOOD: 15.2 % (ref 11.5–15.5)
GLUCOSE BLD-MCNC: 114 MG/DL (ref 70–110)
GLUCOSE BLD-MCNC: 119 MG/DL (ref 70–110)
GLUCOSE BLD-MCNC: 196 MG/DL (ref 70–110)
GLUCOSE BLD-MCNC: 202 MG/DL (ref 70–110)
GLUCOSE SERPL-MCNC: 123 MG/DL (ref 74–99)
HCT VFR BLD AUTO: 31.1 % (ref 34–46)
HGB BLD-MCNC: 10.2 GM/DL (ref 11.4–16)
LYMPHOCYTES # SPEC AUTO: 0.6 K/UL (ref 1–4.8)
LYMPHOCYTES NFR SPEC AUTO: 5 %
MCH RBC QN AUTO: 29.9 PG (ref 25–35)
MCHC RBC AUTO-ENTMCNC: 32.6 G/DL (ref 31–37)
MCV RBC AUTO: 91.7 FL (ref 80–100)
MONOCYTES # BLD AUTO: 0.7 K/UL (ref 0–1)
MONOCYTES NFR BLD AUTO: 6 %
NEUTROPHILS # BLD AUTO: 10.9 K/UL (ref 1.3–7.7)
NEUTROPHILS NFR BLD AUTO: 87 %
PLATELET # BLD AUTO: 366 K/UL (ref 150–450)
POTASSIUM SERPL-SCNC: 4.2 MMOL/L (ref 3.5–5.1)
PROT SERPL-MCNC: 5.8 G/DL (ref 6.3–8.2)
SODIUM SERPL-SCNC: 140 MMOL/L (ref 137–145)
WBC # BLD AUTO: 12.5 K/UL (ref 3.8–10.6)

## 2022-07-03 RX ADMIN — CLOPIDOGREL BISULFATE SCH MG: 75 TABLET ORAL at 07:44

## 2022-07-03 RX ADMIN — IPRATROPIUM BROMIDE AND ALBUTEROL SULFATE SCH ML: .5; 3 SOLUTION RESPIRATORY (INHALATION) at 10:44

## 2022-07-03 RX ADMIN — ATORVASTATIN CALCIUM SCH MG: 80 TABLET, FILM COATED ORAL at 07:43

## 2022-07-03 RX ADMIN — HEPARIN SODIUM SCH UNIT: 5000 INJECTION INTRAVENOUS; SUBCUTANEOUS at 15:41

## 2022-07-03 RX ADMIN — ASPIRIN 325 MG ORAL TABLET SCH MG: 325 PILL ORAL at 07:43

## 2022-07-03 RX ADMIN — IPRATROPIUM BROMIDE AND ALBUTEROL SULFATE SCH ML: .5; 3 SOLUTION RESPIRATORY (INHALATION) at 14:56

## 2022-07-03 RX ADMIN — HYDRALAZINE HYDROCHLORIDE PRN MG: 20 INJECTION INTRAMUSCULAR; INTRAVENOUS at 03:23

## 2022-07-03 RX ADMIN — INSULIN ASPART SCH UNIT: 100 INJECTION, SOLUTION INTRAVENOUS; SUBCUTANEOUS at 20:06

## 2022-07-03 RX ADMIN — IPRATROPIUM BROMIDE AND ALBUTEROL SULFATE SCH ML: .5; 3 SOLUTION RESPIRATORY (INHALATION) at 07:11

## 2022-07-03 RX ADMIN — SODIUM CHLORIDE, PRESERVATIVE FREE SCH: 5 INJECTION INTRAVENOUS at 08:17

## 2022-07-03 RX ADMIN — CEFEPIME HYDROCHLORIDE SCH MLS/HR: 2 INJECTION, POWDER, FOR SOLUTION INTRAVENOUS at 00:48

## 2022-07-03 RX ADMIN — METOPROLOL TARTRATE SCH MG: 50 TABLET, FILM COATED ORAL at 20:06

## 2022-07-03 RX ADMIN — MUPIROCIN SCH APPLIC: 20 OINTMENT TOPICAL at 08:16

## 2022-07-03 RX ADMIN — PANTOPRAZOLE SODIUM SCH MG: 40 TABLET, DELAYED RELEASE ORAL at 06:20

## 2022-07-03 RX ADMIN — CEFEPIME HYDROCHLORIDE SCH MLS/HR: 2 INJECTION, POWDER, FOR SOLUTION INTRAVENOUS at 23:46

## 2022-07-03 RX ADMIN — DOCUSATE SODIUM AND SENNOSIDES SCH EACH: 50; 8.6 TABLET ORAL at 20:06

## 2022-07-03 RX ADMIN — INSULIN ASPART SCH: 100 INJECTION, SOLUTION INTRAVENOUS; SUBCUTANEOUS at 18:27

## 2022-07-03 RX ADMIN — SODIUM CHLORIDE SCH MLS/HR: 9 INJECTION, SOLUTION INTRAVENOUS at 11:10

## 2022-07-03 RX ADMIN — HEPARIN SODIUM SCH UNIT: 5000 INJECTION INTRAVENOUS; SUBCUTANEOUS at 07:44

## 2022-07-03 RX ADMIN — CEFEPIME HYDROCHLORIDE SCH MLS/HR: 2 INJECTION, POWDER, FOR SOLUTION INTRAVENOUS at 15:40

## 2022-07-03 RX ADMIN — METOPROLOL TARTRATE SCH MG: 50 TABLET, FILM COATED ORAL at 06:58

## 2022-07-03 RX ADMIN — SODIUM CHLORIDE, PRESERVATIVE FREE SCH ML: 5 INJECTION INTRAVENOUS at 20:06

## 2022-07-03 RX ADMIN — Medication SCH MG: at 20:06

## 2022-07-03 RX ADMIN — INSULIN ASPART SCH UNIT: 100 INJECTION, SOLUTION INTRAVENOUS; SUBCUTANEOUS at 11:10

## 2022-07-03 RX ADMIN — MAGNESIUM HYDROXIDE PRN MG: 2400 SUSPENSION ORAL at 06:20

## 2022-07-03 RX ADMIN — CEFEPIME HYDROCHLORIDE SCH MLS/HR: 2 INJECTION, POWDER, FOR SOLUTION INTRAVENOUS at 07:44

## 2022-07-03 RX ADMIN — INSULIN ASPART SCH: 100 INJECTION, SOLUTION INTRAVENOUS; SUBCUTANEOUS at 08:16

## 2022-07-03 RX ADMIN — Medication SCH: at 20:08

## 2022-07-03 RX ADMIN — INSULIN ASPART SCH: 100 INJECTION, SOLUTION INTRAVENOUS; SUBCUTANEOUS at 04:31

## 2022-07-03 RX ADMIN — HEPARIN SODIUM SCH UNIT: 5000 INJECTION INTRAVENOUS; SUBCUTANEOUS at 23:51

## 2022-07-03 RX ADMIN — IPRATROPIUM BROMIDE AND ALBUTEROL SULFATE SCH ML: .5; 3 SOLUTION RESPIRATORY (INHALATION) at 19:28

## 2022-07-03 NOTE — P.PN
Subjective


Progress Note Date: 07/03/22





PROGRESS NOTE


The patient is a 76-year-old female with history of carotid vascular disease who

presented with symptoms of progressive chest discomfort and an abnormal MPI.  

Underwent cardiac catheterization on the 24th and was found to have severe 

distal left main disease with mild-to-moderate disease in the RCA.  She is 

scheduled to undergo CABG today.  Her echo showed an ejection fraction of 50% 

with mild lateral wall hypokinesis and mild-to-moderate mitral regurgitation.  

She's feeling well this morning, denies any chest discomfort or dizziness.  She 

is in sinus mechanism.  Scheduled to undergo CABG this afternoon.  She continues

to be on aspirin, Lipitor 80 mg daily, isosorbide mononitrate 15 mg daily, 

lisinopril 30 mg daily, metoprolol succinate 25 mg daily





June 28:


The patient underwent off-pump CABG yesterday with LIMA to the LAD and SVG to 

the OM with ligation of the left atrial appendage and placement of intra-aortic 

balloon pump because of worsening ischemia at the start of surgery.  She had 

atrial arrhythmia requiring cardioversion earlier.  She is extubated, in sinus 

mechanism, intra-aortic balloon pump at 1:2 with good blood pressure and urinary

output.  She is on no vasopressor.  She is awake, alert and following commands. 

At the end of the procedure she had a good ejection fraction with mild mitral 

regurgitation.


She continues to be on aspirin, Lipitor 80 mg daily, Plavix 75 mg daily, 

metoprolol tartrate 12-1/2 mg twice a day.





June 29:


The patient intra-aortic balloon pump was removed yesterday.  She was hypoxemic 

during the night requiring BiPAP.  She denies any chest discomfort.  She 

continues to be in sinus mechanism.  She denies any chest discomfort, dizziness 

or palpitations.  She received diuretics yesterday with good output.  She 

continues to be on aspirin, Plavix, low-dose dopamine, metoprolol 12-1/2 mg 

twice a day, Lipitor 80 mg daily.  Her chest x-ray shows bilateral pleural 

effusion





June 30:


The patient is awake and alert, continues to be in sinus mechanism, she 

continues to be on the BiPAP but feels better.  Her breathing is stable.  She is

denying any chest discomfort, dizziness or palpitations.  She has no nausea.  

She has good urinary output.  She continues to be on aspirin once a day, 

amiodarone 400 mg twice a day, Lipitor 80 mg daily, Plavix 75 mg daily, insulin,

metoprolol tartrate 25 mg twice a day.  Her chest x-ray shows bilateral pleural 

effusion with mild congestion, she diuresed well yesterday the IV Lasix





July 1:


The patient continues to be on a BiPAP, hypoxic off of it.  She had an episode 

of atrial fibrillation back in sinus mechanism to be on amiodarone.  Her blood 

pressure is stable and has not required a suppressive.  She denies any chest 

discomfort but she is dyspneic.  She has no nausea or vomiting.  Her urinary 

output has been stable.  Her chest x-ray shows worsening infiltrate bilaterally.

 She continues to be on amiodarone 400 mg twice a day, aspirin, Lipitor 80 mg 

daily, Plavix 75 mg daily, Lasix 20 mg IV every 12 hours, metoprolol tartrate 25

mg 3 times a day





July 2:


She's feeling better today, sitting up in the chair, she continues to be on high

flow during the day but BiPAP during the night.  Her blood pressure has been on 

the higher side.  She denies any chest discomfort, dizziness or palpitations.  

Hemodynamically she is stable, in sinus mechanism.  She has a good urinary 

output.


She continues to be on aspirin once a day, Lipitor 80 mg daily, Plavix 75 mg 

daily, insulin, Zestril 5 mg daily, metoprolol 50 mg twice a day


July 3:


The patient is feeling better today, her breathing is better she is on airflow, 

is in sinus mechanism.  She denies any chest discomfort, dizziness or palp

itations.  She denies any nausea or vomiting.  She ambulated.  She is doing 

better with the incentive spirometry.  Hemodynamically she is stable.  She 

continues to be on aspirin, Plavix 75 mg daily, Lipitor 80 mg daily, lisinopril 

10 mg twice a day, metoprolol 50 mg twice a day








PHYSICAL EXAMINATION:


Blood pressure 129/80 with a heart rate in the 70s


LUNGS: Decreased breath sounds at the bases with improvement since yesterday


HEART: Regular rate and rhythm, S1, S2. No S3.  systolic murmur at the base


ABDOMEN: Soft, nontender, no organomegaly


EXTREMETIES: No edema, 


LAB:


Pending


IMPRESSION:


1.  Status post CABG, LIMA to the LAD and SVG to obtuse marginal branch with 

known severe left main disease.


2.  Post removal of Intra-aortic balloon pump


3.  History of hyperlipidemia 


4.  Hypoxemia with lung congestion, probable lung injury post CABG cannot rule 

out infection, no significant fluid overload


5.  Hypertension








PLAN:


1.  Continue present therapy


2.  Follow blood pressure


3.  If having further episodes of atrial fibrillation, persistent require 

anticoagulation


4.  Increase physical activity








Objective





- Vital Signs


Vital signs: 


                                   Vital Signs











Temp  98.2 F   07/03/22 08:00


 


Pulse  82   07/03/22 10:44


 


Resp  20   07/03/22 08:00


 


BP  129/86   07/03/22 08:00


 


Pulse Ox  98   07/03/22 08:00


 


FiO2  50   07/03/22 10:44








                                 Intake & Output











 07/02/22 07/03/22 07/03/22





 18:59 06:59 18:59


 


Intake Total 1180 600 340


 


Output Total 290 550 0


 


Balance 890 50 340


 


Weight  109.4 kg 


 


Intake:   


 


   600 100


 


    Cefepime 2 gm In Sodium  100 100





    Chloride 0.9% 100 ml @ 25   





    mls/hr IVPB Q8HR [facility] Rx#   





    [id number]   


 


    Vancomycin 2,000 mg In 500 500 





    Sodium Chloride 0.9% 500   





    ml 500 ml @ 167 mls/hr   





    IVPB Q12H [facility] Rx#:   





    [id number]   


 


  Intake, IV Titration 200  





  Amount   


 


    Cefepime 2 gm In Sodium 200  





    Chloride 0.9% 100 ml @ 25   





    mls/hr IVPB Q8HR [facility] Rx#   





    [id number]   


 


  Oral 480  240


 


Output:   


 


  Urine 290 550 0


 


Other:   


 


  Voiding Method External Catheter External Catheter Bedside Commode





   External Catheter


 


  # Voids  0 








                       ABP, PAP, CO, CI - Last Documented











Arterial Blood Pressure        281/281


 


Pulmonary Artery Pressure      43/24


 


Cardiac Output                 4


 


Cardiac Index                  1.9

















- Labs


CBC & Chem 7: 


                                 07/03/22 07:35





                                 07/03/22 07:35


Labs: 


                  Abnormal Lab Results - Last 24 Hours (Table)











  07/02/22 07/02/22 07/02/22 Range/Units





  11:44 16:35 20:33 


 


WBC     (3.8-10.6)  k/uL


 


RBC     (3.80-5.40)  m/uL


 


Hgb     (11.4-16.0)  gm/dL


 


Hct     (34.0-46.0)  %


 


Neutrophils #     (1.3-7.7)  k/uL


 


Lymphocytes #     (1.0-4.8)  k/uL


 


Chloride     ()  mmol/L


 


BUN     (7-17)  mg/dL


 


Glucose     (74-99)  mg/dL


 


POC Glucose (mg/dL)  215 H  143 H  123 H  ()  mg/dL


 


AST     (14-36)  U/L


 


ALT     (4-34)  U/L


 


Alkaline Phosphatase     ()  U/L


 


Total Protein     (6.3-8.2)  g/dL


 


Albumin     (3.5-5.0)  g/dL














  07/02/22 07/03/22 07/03/22 Range/Units





  23:48 04:20 07:35 


 


WBC     (3.8-10.6)  k/uL


 


RBC     (3.80-5.40)  m/uL


 


Hgb     (11.4-16.0)  gm/dL


 


Hct     (34.0-46.0)  %


 


Neutrophils #     (1.3-7.7)  k/uL


 


Lymphocytes #     (1.0-4.8)  k/uL


 


Chloride    108 H  ()  mmol/L


 


BUN    50 H  (7-17)  mg/dL


 


Glucose    123 H  (74-99)  mg/dL


 


POC Glucose (mg/dL)  118 H  114 H   ()  mg/dL


 


AST    62 H  (14-36)  U/L


 


ALT    78 H  (4-34)  U/L


 


Alkaline Phosphatase    176 H  ()  U/L


 


Total Protein    5.8 L  (6.3-8.2)  g/dL


 


Albumin    3.4 L  (3.5-5.0)  g/dL














  07/03/22 Range/Units





  07:35 


 


WBC  12.5 H  (3.8-10.6)  k/uL


 


RBC  3.39 L  (3.80-5.40)  m/uL


 


Hgb  10.2 L  (11.4-16.0)  gm/dL


 


Hct  31.1 L  (34.0-46.0)  %


 


Neutrophils #  10.9 H  (1.3-7.7)  k/uL


 


Lymphocytes #  0.6 L  (1.0-4.8)  k/uL


 


Chloride   ()  mmol/L


 


BUN   (7-17)  mg/dL


 


Glucose   (74-99)  mg/dL


 


POC Glucose (mg/dL)   ()  mg/dL


 


AST   (14-36)  U/L


 


ALT   (4-34)  U/L


 


Alkaline Phosphatase   ()  U/L


 


Total Protein   (6.3-8.2)  g/dL


 


Albumin   (3.5-5.0)  g/dL

## 2022-07-03 NOTE — P.PN
Subjective


Progress Note Date: 07/03/22


Principal diagnosis: 





Coronary artery disease with left main disease, unstable angina, atrial 

arrhythmias.  Past medical history significant for hypertension, hyperlipidemia,

SVT, left internal carotid artery stenosis, obesity, lifetime nonsmoker, remains

unvaccinated against Covid, and family history of coronary artery disease.





POD #6 off-pump coronary artery bypass grafting 2 with left internal mammary 

artery to the left anterior descending coronary artery, reverse saphenous vein 

graft to the obtuse marginal coronary artery, ligation of the left atrial 

appendage with a 40 mm AtriCure clip, endovascular vein harvest of the right 

greater saphenous vein, placement of intra-aortic balloon pump.





Postoperative acute blood loss anemia, expected given hemodilution.





Acute hypoxic respiratory failure requiring bipap.





The patient was seen and examined in follow-up today 07/03/2022 at her bedside 

in the intensive care unit.  Currently, the patient is sitting up to bedside 

chair, is awake, alert, oriented 3 and is in no acute distress.  She denies any

complaints of pain at this time and reports she does Have some shortness of 

breath with activity.  Oxygen saturation are 97% on AIRVO 60 L/m and 60% FiO2.  

She is achieving 1000 mL on her incentive spirometry with encouragement.  

Bedside telemetry is showing normal sinus rhythm heart rate 63 BPM.  The 

patient's nurse reports that she has had a few episodes of paroxysmal atrial 

fibrillation throughout the night.  She has been afebrile the last 24 hours, 

continues on cefepime and vancomycin for empiric antibiotic coverage.  

Laboratory results this morning show a WBC count which is trending down at 12.5,

hemoglobin 10.2, hematocrit 31.1, platelets 366, sodium 140, potassium 4.2, BUN 

50, creatinine 0.77, glucose 123, AST 62 and ALT 78.  The patient reports she 

has been up ambulating with assistance from nursing and therapy staff, has been 

walking outside of her room and back to the chair.  She remains on prednisone 40

mg by mouth daily.  Lasix continues to be on hold.  She remained hemodynamically

stable and currently on no inotropic or pressor support.  Torres catheter was 

removed yesterday and she continues to void with 550 mL of urine output in the 

last 8 hours.





Objective





- Vital Signs


Vital signs: 


                                   Vital Signs











Temp  98.2 F   07/03/22 08:00


 


Pulse  63   07/03/22 08:00


 


Resp  20   07/03/22 08:00


 


BP  129/86   07/03/22 08:00


 


Pulse Ox  98   07/03/22 08:00


 


FiO2  60   07/03/22 08:00








                                 Intake & Output











 07/02/22 07/03/22 07/03/22





 18:59 06:59 18:59


 


Intake Total 1180 600 340


 


Output Total 290 550 0


 


Balance 890 50 340


 


Weight  109.4 kg 


 


Intake:   


 


   600 100


 


    Cefepime 2 gm In Sodium  100 100





    Chloride 0.9% 100 ml @ 25   





    mls/hr IVPB Q8HR [facility] Rx#   





    [id number]   


 


    Vancomycin 2,000 mg In 500 500 





    Sodium Chloride 0.9% 500   





    ml 500 ml @ 167 mls/hr   





    IVPB Q12H [facility] Rx#:   





    [id number]   


 


  Intake, IV Titration 200  





  Amount   


 


    Cefepime 2 gm In Sodium 200  





    Chloride 0.9% 100 ml @ 25   





    mls/hr IVPB Q8HR [facility] Rx#   





    [id number]   


 


  Oral 480  240


 


Output:   


 


  Urine 290 550 0


 


Other:   


 


  Voiding Method External Catheter External Catheter 


 


  # Voids  0 








                       ABP, PAP, CO, CI - Last Documented











Arterial Blood Pressure        281/281


 


Pulmonary Artery Pressure      43/24


 


Cardiac Output                 4


 


Cardiac Index                  1.9

















- Exam





CONSTITUTIONAL: Currently sitting up to the bedside chair in the intensive care 

unit. Appears comfortable, cooperative, no acute distress.


RESPIRATORY:  Lungs sounds diminished bilaterally, right greater than left.  

Respirations are symmetrical and nonlabored.  Currently on AIRVO, FiO2 60%, 60 

L/m with oxygen saturation 97%.  Strong nonproductive cough.  Achieving 1000 mL 

on her incentive spirometry with encouragement.


CARDIOVASCULAR:  S1, S2 present.  Regular rate and rhythm, sinus rhythm on 

telemetry.  Sternum stable.   Palpable peripheral pulses bilaterally.  Trace 

generalized edema present.  No calf pain or tenderness noted.  Heart hugger in 

place with patient demonstrating appropriate use.  Antiembolism stockings, SCDs 

present.


GASTROINTESTINAL:  Abdomen soft, nontender, nondistended.  Active bowel sounds 

present 4 quadrants.  Tolerating diet.  Passing flatus.


GENITOURINARY:  Continues to void with 550 mL of urine output in the last 8 

hours.


INTEGUMENTARY:  Skin is warm and dry with evidence of good perfusion.  Midline 

sternal incision is well approximated and covered with dry intact dressing.  

Right lower extremity EVH site well approximated without redness or drainage.


NEUROLOGIC:  Cranial nerves II through XII intact.  No focal deficits.


MUSKULOSKELETAL:  Able to move all extremities, strength equal bilaterally.


PSYCHIATRIC:  Alert and oriented to person place and time, appropriate affect, 

intact judgment and insight.








- Allied health notes


Allied health notes reviewed: nursing





- Labs


CBC & Chem 7: 


                                 07/03/22 07:35





                                 07/03/22 07:35


Labs: 


                  Abnormal Lab Results - Last 24 Hours (Table)











  07/02/22 07/02/22 07/02/22 Range/Units





  11:44 16:35 20:33 


 


WBC     (3.8-10.6)  k/uL


 


RBC     (3.80-5.40)  m/uL


 


Hgb     (11.4-16.0)  gm/dL


 


Hct     (34.0-46.0)  %


 


Neutrophils #     (1.3-7.7)  k/uL


 


Lymphocytes #     (1.0-4.8)  k/uL


 


Chloride     ()  mmol/L


 


BUN     (7-17)  mg/dL


 


Glucose     (74-99)  mg/dL


 


POC Glucose (mg/dL)  215 H  143 H  123 H  ()  mg/dL


 


AST     (14-36)  U/L


 


ALT     (4-34)  U/L


 


Alkaline Phosphatase     ()  U/L


 


Total Protein     (6.3-8.2)  g/dL


 


Albumin     (3.5-5.0)  g/dL














  07/02/22 07/03/22 07/03/22 Range/Units





  23:48 04:20 07:35 


 


WBC     (3.8-10.6)  k/uL


 


RBC     (3.80-5.40)  m/uL


 


Hgb     (11.4-16.0)  gm/dL


 


Hct     (34.0-46.0)  %


 


Neutrophils #     (1.3-7.7)  k/uL


 


Lymphocytes #     (1.0-4.8)  k/uL


 


Chloride    108 H  ()  mmol/L


 


BUN    50 H  (7-17)  mg/dL


 


Glucose    123 H  (74-99)  mg/dL


 


POC Glucose (mg/dL)  118 H  114 H   ()  mg/dL


 


AST    62 H  (14-36)  U/L


 


ALT    78 H  (4-34)  U/L


 


Alkaline Phosphatase    176 H  ()  U/L


 


Total Protein    5.8 L  (6.3-8.2)  g/dL


 


Albumin    3.4 L  (3.5-5.0)  g/dL














  07/03/22 Range/Units





  07:35 


 


WBC  12.5 H  (3.8-10.6)  k/uL


 


RBC  3.39 L  (3.80-5.40)  m/uL


 


Hgb  10.2 L  (11.4-16.0)  gm/dL


 


Hct  31.1 L  (34.0-46.0)  %


 


Neutrophils #  10.9 H  (1.3-7.7)  k/uL


 


Lymphocytes #  0.6 L  (1.0-4.8)  k/uL


 


Chloride   ()  mmol/L


 


BUN   (7-17)  mg/dL


 


Glucose   (74-99)  mg/dL


 


POC Glucose (mg/dL)   ()  mg/dL


 


AST   (14-36)  U/L


 


ALT   (4-34)  U/L


 


Alkaline Phosphatase   ()  U/L


 


Total Protein   (6.3-8.2)  g/dL


 


Albumin   (3.5-5.0)  g/dL














- Imaging and Cardiology


Chest x-ray: report reviewed, image reviewed





Assessment and Plan


Assessment: 





1.  Coronary artery disease with left main disease, status post 2 vessel CABG


2.  Hypertension


3.  Hyperlipidemia, cholesterol 182, 


4.  SVT, intraoperative atrial arrhythmias with cardioversion, status post left 

atrial appendage ligation


5.  Left internal carotid artery stenosis


6.  Obesity


7.  Never smoker, preoperative FEV1 72% of predicted


8.  History of covid infection in December 2021, remains unvaccinated against 

Covid, negative COVID-19 PCR


9.  Family history of coronary artery disease


10.  Nasal swab positive for MSSA, treated with mupirocin


11.  Preserved LV function with mild to moderate mitral regurgitation on 

transthoracic echocardiogram


12.  Acute blood loss anemia


13.  Acute hypoxic respiratory failure requiring bipap


14.  Leukocytosis with low grade fever


Plan: 





1.  Continue to maximize medical management with aspirin, Plavix, statin, beta 

blocker and ACE inhibitor.  Will increase metoprolol tartrate as tolerated.


2.  Continue to hold amiodarone, due to her respiratory status.  No 

anticoagulation necessary at this point.


3.  Wean O2 as tolerated.  Encourage incentive spirometry 10 times every hour 

while awake.  Bronchodilators per pulmonology.


4.  Increase activity as tolerated.  PT/OT/cardiac rehab following.


5.  GI/DVT prophylaxis.


6.  Will monitor daily labs and chest x-ray.  Electrolyte replacement per 

protocol. 


7.  Pain control with current medication regimen.


8.  Insulin management per primary care service.  Patient is not diabetic, 

preoperative hemoglobin A1c 5.6%.


9.  Continue to record strict accurate intake and output.  May bladder scan 

every 6 hours and when necessary postvoid residual.  If greater than 300 mL of 

urine may straight cath.  Daily weights.


10.  Continue cefepime 2 g IV piggyback every 8 hours and vancomycin pharmacy to

dose per pulmonary/critical care medicine recommendations.


11.  Continue Prednisone 40 mg by mouth daily per pulmonary/critical care 

medicine recommendations.


12.  Encourage nutrition and advance diet as tolerated.


13.  More recommendations follow based on patient's clinical course.





Time with Patient: Greater than 30

## 2022-07-03 NOTE — XR
EXAMINATION TYPE: XR chest 1V portable

 

DATE OF EXAM: 7/3/2022 5:44 AM

 

COMPARISON: Chest radiographs from same day.

 

TECHNIQUE: XR chest 1V portable Portable AP radiograph of the chest..

 

CLINICAL INDICATION:Female, 76 years old with history of Postoperative cardiac surgery; 

 

FINDINGS: 

Lungs/Pleura: No evidence of focal consolidation or pneumothorax. Blunting of the costophrenic angles
 is present. 

Pulmonary vascularity: Pulmonary vascular congestion.

Heart/mediastinum: Cardiomediastinal silhouette is enlarged and stable. Left atrial appendage occlusi
on devices present. 

Musculoskeletal: No acute osseous pathology. Midline sternotomy wires are noted and stable.

 

IMPRESSION: 

Stable exam given patient positioning with pulmonary vascular congestion, cardiomegaly and small bila
teral pleural effusions.

## 2022-07-03 NOTE — P.PN
Subjective


Progress Note Date: 07/03/22








 HISTORY OF PRESENT ILLNESS


This is a pleasant 76 years old female with past medical history of Fibromyal

merritt, Hyperlipidemia, Hypertension, Osteoarthritis , Supraventricular Tachycardia

,urinary incontinence-wears pad, severe left internal carotid artery stenosis 

followed by Dr. Adam, frequent constipation, she was admitted under 

cardiology service for right arm pain, chest pain and back pain for the last 3 

weeks, where she underwent cardiac cath and 60/24 showing critical and complex 

lesion involving the distal left main coronary arteries and also involving the 

ostial left circumflex and the distal LAD with the stenotic range is 80-90% with

increase in LVEDP  , Patient will need bypass procedure to open his coronary 

arteries.


Currently she denies chest pain or dyspnea.  No incontinence of urine or bowel. 

No fever


Patient is hemodynamically stable


Labs including CBC, INR, BMP and liver enzymes are unremarkable.  TSH is 1.6.


Urine analysis showing trace blood.


coronavirus not detected.   Hepatitis panel is negative


Chest x-ray: Minimal interstitial edema noted


Carotid duplex: No significant stenosis


Echocardiogram showing ejection fraction of 50-55% with mild-to-moderate mitral 

regurgitation and mild tricuspid regurgitation


Patient currently on heparin drip, aspirin 81 mg twice a day and normal saline 

at 75 mL/h as well as Lipitor and metoprolol


However patient this morning she is asymptomatic she denies chest pain or 

dyspnea or abdominal pain.  No diarrhea or vomiting or dysuria.  No headache or 

weakness or numbness.





6/27: Patient is denying any chest pain, shortness of breath, palpitations, 

lightheadedness or dizziness.  She is scheduled for CABG this afternoon and 

patient states that she is ready to move forward.  No new concerns from her nurs

e.  Patient has been afebrile, heart rate 80, blood pressure 129/78 and pulse ox

96% on room air.  CBC is unremarkable.  Chloride 110, CO2 20, blood sugar 112, 

creatinine 0.68.





6/28: Patient is status post CABG 2 with left internal mammary artery to the 

left anterior descending artery, reverse saphenous vein graft to the obtuse 

marginal artery, ligation of the left atrial appendage with a 40 mm AtriCure 

clip, endovascular vein harvest of the right greater saphenous vein, placement 

of intra-aortic balloon pump.  Right groin intra-aortic balloon pump was 

discontinued this morning.  Right internal jugular Morven/Cordis, right radial 

arterial line, mediastinal/left pleural chest tubes remain in place.  Patient is

stating that she is feeling well.  She has been afebrile, heart rate 75, 121/54,

pulse ox 93% on 2 L.  Capillary blood glucose running between 110 and 120.  WBC 

9.7, hemoglobin 9.3 and platelet count 195.  Electrolytes and renal function 

normal.  AST 85 and ALT 41.





6/29: Patient remains in the intensive care unit.  Yesterday pulse ox continued 

to drop through the day with increased oxygen demands to the point where she is 

now on BiPAP.  She states she feels a little short of breath.  She denies Chest 

pain.  Temperature max 100.4, heart rate 76, respiratory rate 32, blood pressure

130/73.  Cardiac monitor sinus rhythm.  Patient is status post IV Lasix 

yesterday with good urine output.  Blood glucose running between 107 and 124 and

insulin drip will be discontinued, transition to NovoLog scale every 4 hours.  

Urinalysis was turbid, blood small.





6/30: Patient remains in the intensive care unit on BiPAP.  She has been unable 

to eat only taking a few sips of water.  Chest tubes have been removed, pacer 

wires to be removed today.  Torres catheter is in place, good urine output.  

Patient has been afebrile, heart rate in the 80s, blood pressure 144/76, pulse 

ox 90% on FiO2 80 BiPAP.  Cardiac monitor is sinus rhythm.  Repeat blood work 

reveals WBC 19.9, hemoglobin 10.2, platelet count 266.  Sodium 135, BUN 30 

creatinine 0.67.  AST 83 and ALT 60.  Blood sugars running between 123 and 160.


Chest x-ray reveals stable.  Persistent perihilar and basilar patchy densities 

bilateral small effusions.


Chest ultrasound reveals right pleural effusion 4.9 cm 2.2 cm fluid pocket.





7/1: Patient remains in intensive care unit.  She has just been transitioned to 

AirVo and off BiPAP which she utilized during the night and all day yesterday.  

Patient converted to atrial fibrillation when we walked into the room.  Torres 

remains in place.  We have added in a consult for Dr. Nunez in anticipation the 

patient will be ready for discharge to a week.  She has been afebrile, heart 

rate 101, respiratory rate 28, blood pressure 154/81, pulse ox 96% FiO2 of 90.  

Repeat blood work reveals WBC 19.2, hemoglobin 10.8 and platelet count 375.  

Electrolytes are normal.  Creatinine 0.69.  AST is 103 and ALT 95.  Coronavirus 

PCR not detected.  Patient has been started on cefepime and IV vancomycin 

prophylactically per pulmonary recommendations.  Pro-calcitonin has been ordered

Patient is continued on IV Lasix.





7/2, patient's remains in ICU, still with hypersomnia, nasal CPAP in place, on 

oral prednisone 40 mg, pulse ox 97%, high flow O2, FiO2 90%, vitals are stable, 

slightly tachypneic, with some conversations dyspnea, patient is sleeping in the

recliner often, has trace edema, melatonin 5 mg started, for sleep restoration, 

indwelling Torres catheter, clear urine, with adequate urine output.  Patient 

receiving IV vancomycin and cefepime, chest x-ray, similar airspace opacities 

and right pleural effusion, similar cardiomegaly, migjht need furosemide, if 

shortness of breath and the mass was..  Patient remains in sinus rhythm, no new 

episodes of atrial fibrillation, since the past 24 hours, creatinine 0.6 sodium 

140 hemoglobin 10.5 incentive spirometry, and 750 mL capacity





7/3 patient remains in ICU, still with hypersomnolence, was able to sleep well 

last night in bed, still has nasal CPAP, while drifting off to sleep, no chest 

pain no shortness of breath, leg swelling is improving, no aspirated events, 

cardiac seems to be stable, wbc count, 12.5 creatinine 0.77, glucose 123 blood 

pressure 1:30 to 135 systolic, pulse ox 98%, with 80% FiO2 multiple specialists 

are following, adequate urine output,








REVIEW OF SYSTEMS


Constitutional: No fever, no chills, no night sweats.  No weight change.  Noted 

weakness, fatigue no lethargy.  No daytime sleepiness.


EENT: No headache.  No blurred vision or double vision, no loss of vision.  No 

loss of Hearing, no ringing in the ears, no dizziness.  No nasal drainage or 

congestion.  No epistaxis.  No sore throat.


Lungs: Minimal shortness of breath, cough, no sputum production.  No wheezing.


Cardiovascular: Denies chest pain, no lower extremity edema.  No palpitations.  

No paroxysmal nocturnal dyspnea.  No orthopnea.  No lightheadedness or 

dizziness.  No syncopal episodes.


Abdominal: No abdominal pain.  No nausea, vomiting.  No diarrhea.  No 

constipation.  No bloody or tarry stools.  No loss of appetite.


Genitourinary: No dysuria, increased frequency, urgency.  No urinary retention.


Musculoskeletal: No myalgias.  No muscle weakness, no gait dysfunction, no 

frequent falls.  No back pain.  No neck pain.


Integumentary: No wounds, no lesions.  No rash or pruritus.  No unusual 

bruising.  No change in hair or nails.


Neurologic: No aphasia. No facial droop. No change in mentation. No head injury.

No headache. No paralysis. No paresthesia.


Psychiatric: No depression.  No anxiety.  No mood swings.


Endocrine: No abnormal blood sugars.  No weight change.  No excessive sweating 

or thirst.  








PHYSICAL EXAMINATION


Gen: This is a 76-year-old overweight  female.  She is resting in bed 

and appears to be comfortable and in no acute distress.


HEENT: Head is atraumatic, normocephalic. Pupils equal, round. Sclerae is 

anicteric.  


NECK: Supple. No JVD. No lymphadenopathy. No thyromegaly. 


LUNGS: Clear to auscultation. No wheezes or rhonchi.  No intercostal 

retractions.  


HEART: Regular rate and rhythm.  Systolic murmur. 


ABDOMEN: Soft. Bowel sounds are present. No masses.  No tenderness.  Torres 

catheter draining [place] urine


EXTREMITIES:    No calf tenderness.  Trace edema bilateral ankles 2+1 edema


NEUROLOGICAL: Patient is awake, alert and oriented x3. Cranial nerves 2 through 

12 are grossly intact. 





ASSESSMENT AND PLAN


1.  Severe coronary artery disease status post 2 vessel CABG 6/27.  Continue 

current plan per cardiothoracic team.  Continue aspirin 325 mg daily, Lipitor 80

mg daily, Plavix 75 mg daily, continue Norco as needed for pain, DuoNeb 

treatments 4 times daily and as needed, Lopressor increased to 50 mg twice 

daily.  Patient is also been started on cefepime and IV vancomycin, prednisone 

40 mg daily.  Pro-calcitonin pending.





2.  Acute hypoxic respiratory failure.  Patient has been on BiPAP, transition to

 AirVo on 7/1. 





3.  Worsening bilateral lung infiltrates and small right pleural effusion.  

Patient has been started on cefepime and IV vancomycin, pro-calcitonin, Lasix 20

g IV every 12 hours.





4.  Hypertension.  Patient started on lisinopril 5 mg daily, continue Lopressor,

hydralazine as needed, Lasix every 12 hours IV 20 mg





5.  Hyperlipidemia.  Continue atorvastatin.





6.  Carotid artery disease.  Continue aspirin, atorvastatin.





7.  Hyperglycemia.  Continue patient on NovoLog scale every 4 hours.





8.  New onset paroxysmal atrial fibrillation.  Lopressor was increased to 50 mg 

twice daily.





9.  GI prophylaxis.  Protonix 40 mg daily





10.  DVT prophylaxis.  Heparin subcu.














DISCHARGE PLAN


Most likely a good candidate for inpatient rehab.  Consult with Dr. Nunez.


                               Current Medications





Acetaminophen (Acetaminophen Tab 325 Mg Tab)  650 mg PO Q4HR PRN


   PRN Reason: Fever and/ or Pain


Albuterol/Ipratropium (Ipratropium-Albuterol 3 Ml Neb)  3 ml INHALATION RT-Q2H 

PRN


   PRN Reason: Shortness Of Breath Or Wheezing


   Last Admin: 06/30/22 00:45 Dose:  3 ml


   


Albuterol/Ipratropium (Ipratropium-Albuterol 3 Ml Neb)  3 ml INHALATION RT-QID 

[facility]


   Last Admin: 07/03/22 10:44 Dose:  3 ml


   


Aspirin (Aspirin 325 Mg Tab)  325 mg PO DAILY [facility]


   Last Admin: 07/03/22 07:43 Dose:  325 mg


   


Atorvastatin Calcium (Atorvastatin 80 Mg Tab)  80 mg PO DAILY [facility]


   Stop: 07/25/22 09:01


   Last Admin: 07/03/22 07:43 Dose:  80 mg


   


Benzocaine/Menthol (Benzocaine/Menthol Lozeng 1 Each Lozenge)  1 each MUCOUS MEM

Q2H PRN


   PRN Reason: Sore Throat


Bisacodyl (Bisacodyl 10 Mg Supp)  10 mg RECTAL DAILY PRN


   PRN Reason: Constipation


   Last Admin: 07/02/22 07:57 Dose:  10 mg


   


Clopidogrel Bisulfate (Clopidogrel 75 Mg Tab)  75 mg PO DAILY [facility]


   Last Admin: 07/03/22 07:44 Dose:  75 mg


   


Heparin Sodium (Porcine) (Heparin Sodium,Porcine/Pf 5,000 Unit/0.5 Ml Syringe)  

5,000 unit SQ Q8HR [facility]


   Last Admin: 07/03/22 07:44 Dose:  5,000 unit


   


Hydralazine HCl (Hydralazine Hcl 20 Mg/Ml 1 Ml Vial)  10 mg IVP Q1H PRN


   PRN Reason: Blood Pressure - High


   Last Admin: 07/03/22 03:23 Dose:  10 mg


   


Calcium Gluconate/Sodium (Chloride 2 gm/ IV Solution)  100 mls @ 100 mls/hr IVPB

ONCE PRN


   PRN Reason: Ionized Calcium less than 4.4


   Stop: 07/07/22 17:34


Cefepime HCl 2 gm/ Sodium (Chloride)  100 mls @ 25 mls/hr IVPB Q8HR [facility]


   Last Admin: 07/03/22 07:44 Dose:  25 mls/hr


   


Vancomycin HCl 2,000 mg/ (Sodium Chloride)  500 mls @ 167 mls/hr IVPB Q12H [facility]


   Last Admin: 07/03/22 11:10 Dose:  167 mls/hr


   


Insulin Aspart (Insulin Aspart (Novolog) 100 Unit/Ml Vial)  0 unit SQ Q4H [facility]; 

Protocol


   Last Admin: 07/03/22 11:10 Dose:  4 unit


   


Lisinopril (Lisinopril 10 Mg Tab)  10 mg PO BID [facility]


   Last Admin: 07/03/22 07:43 Dose:  10 mg


   


Magnesium Hydroxide (Magnesium Hydroxide 2,400 Mg/10 Ml Cup)  2,400 mg PO BID 

PRN


   PRN Reason: Constipation


   Last Admin: 07/03/22 06:20 Dose:  2,400 mg


   


Melatonin (Melatonin 5 Mg Tablet)  5 mg PO [facility]


   Last Admin: 07/02/22 20:55 Dose:  5 mg


   


Metoclopramide HCl (Metoclopramide 5 Mg/Ml 2 Ml Vial)  10 mg IVP Q4H PRN


   PRN Reason: Nausea And Vomiting


   Last Admin: 06/29/22 06:57 Dose:  10 mg


   


Metoprolol Tartrate (Metoprolol Tartrate 50 Mg Tab)  50 mg PO BID [facility]


   Last Admin: 07/03/22 06:58 Dose:  50 mg


   


Miscellaneous Information (Potassium Replacement Protocol 1 Each Misc)  1 each 

MISCELLANE DAILY PRN; Protocol


   PRN Reason: Per Protocol


Miscellaneous Information (Magnesium Replacement Protocol 1 Each Misc)  1 each 

MISCELLANE DAILY PRN; Protocol


   PRN Reason: Per Protocol


Ondansetron HCl (Ondansetron 4 Mg/2 Ml Vial)  4 mg IVP Q6HR PRN


   PRN Reason: Nausea And Vomiting


   Last Admin: 06/27/22 22:57 Dose:  4 mg


   


Pantoprazole Sodium (Pantoprazole 40 Mg Tablet)  40 mg PO [id number] [facility]


   Last Admin: 07/03/22 06:20 Dose:  40 mg


   


Prednisone (Prednisone 20 Mg Tab)  40 mg PO DAILY [facility]


   Last Admin: 07/03/22 07:43 Dose:  40 mg


   


Senna/Docusate Sodium (Sennosides-Docusate Sodium 1 Each Tab)  2 each PO HS [facility]


   Last Admin: 07/02/22 20:55 Dose:  2 each


   


Sodium Chloride (Sodium Chloride 0.9% Flush 10 Ml Syringe)  10 ml IV BID [facility]


   Last Admin: 07/03/22 08:17 Dose:  Not Given


   





                       Laboratory Results - Last 24 Hours











  07/02/22 07/02/22 07/02/22





  16:35 20:33 23:48


 


WBC   


 


RBC   


 


Hgb   


 


Hct   


 


MCV   


 


MCH   


 


MCHC   


 


RDW   


 


Plt Count   


 


MPV   


 


Neutrophils %   


 


Lymphocytes %   


 


Monocytes %   


 


Eosinophils %   


 


Basophils %   


 


Neutrophils #   


 


Lymphocytes #   


 


Monocytes #   


 


Eosinophils #   


 


Basophils #   


 


Hypochromasia   


 


Sodium   


 


Potassium   


 


Chloride   


 


Carbon Dioxide   


 


Anion Gap   


 


BUN   


 


Creatinine   


 


Est GFR (CKD-EPI)AfAm   


 


Est GFR (CKD-EPI)NonAf   


 


Glucose   


 


POC Glucose (mg/dL)  143 H  123 H  118 H


 


POC Glu Operater ID  Caty Polanco Daniel Rich, Daniel


 


Calcium   


 


Total Bilirubin   


 


AST   


 


ALT   


 


Alkaline Phosphatase   


 


Total Protein   


 


Albumin   


 


Vancomycin Trough   














  07/03/22 07/03/22 07/03/22





  04:20 07:35 07:35


 


WBC    12.5 H


 


RBC    3.39 L


 


Hgb    10.2 L


 


Hct    31.1 L


 


MCV    91.7


 


MCH    29.9


 


MCHC    32.6


 


RDW    15.2


 


Plt Count    366


 


MPV    7.6


 


Neutrophils %    87


 


Lymphocytes %    5


 


Monocytes %    6


 


Eosinophils %    0


 


Basophils %    0


 


Neutrophils #    10.9 H


 


Lymphocytes #    0.6 L


 


Monocytes #    0.7


 


Eosinophils #    0.0


 


Basophils #    0.0


 


Hypochromasia    Slight


 


Sodium   140 


 


Potassium   4.2 


 


Chloride   108 H 


 


Carbon Dioxide   23 


 


Anion Gap   9 


 


BUN   50 H 


 


Creatinine   0.77 


 


Est GFR (CKD-EPI)AfAm   87 


 


Est GFR (CKD-EPI)NonAf   76 


 


Glucose   123 H 


 


POC Glucose (mg/dL)  114 H  


 


POC Glu Operater ID  Terry Robertsel  


 


Calcium   8.4 


 


Total Bilirubin   0.9 


 


AST   62 H 


 


ALT   78 H 


 


Alkaline Phosphatase   176 H 


 


Total Protein   5.8 L 


 


Albumin   3.4 L 


 


Vancomycin Trough   














  07/03/22 07/03/22





  07:35 11:05


 


WBC  


 


RBC  


 


Hgb  


 


Hct  


 


MCV  


 


MCH  


 


MCHC  


 


RDW  


 


Plt Count  


 


MPV  


 


Neutrophils %  


 


Lymphocytes %  


 


Monocytes %  


 


Eosinophils %  


 


Basophils %  


 


Neutrophils #  


 


Lymphocytes #  


 


Monocytes #  


 


Eosinophils #  


 


Basophils #  


 


Hypochromasia  


 


Sodium  


 


Potassium  


 


Chloride  


 


Carbon Dioxide  


 


Anion Gap  


 


BUN  


 


Creatinine  


 


Est GFR (CKD-EPI)AfAm  


 


Est GFR (CKD-EPI)NonAf  


 


Glucose  


 


POC Glucose (mg/dL)   202 H


 


POC Glu Operater ID   Caty Polanco


 


Calcium  


 


Total Bilirubin  


 


AST  


 


ALT  


 


Alkaline Phosphatase  


 


Total Protein  


 


Albumin  


 


Vancomycin Trough  23.6 





                               Vital Signs - 24 hr











  07/02/22 07/02/22 07/02/22





  13:00 14:00 15:00


 


Temperature   


 


Pulse Rate  84 85


 


Respiratory 16 9 L 24





Rate   


 


Blood Pressure 129/75 135/57 130/68


 


O2 Sat by Pulse 96 97 98





Oximetry   


 


Fraction of   





Inspired Oxygen   





(FIO2)   














  07/02/22 07/02/22 07/02/22





  16:00 16:11 16:17


 


Temperature   


 


Pulse Rate 72 68 


 


Respiratory 24  





Rate   


 


Blood Pressure 131/75  


 


O2 Sat by Pulse   97





Oximetry   


 


Fraction of 70  80





Inspired Oxygen   





(FIO2)   














  07/02/22 07/02/22 07/02/22





  16:25 17:00 18:00


 


Temperature   


 


Pulse Rate 74 71 73


 


Respiratory  20 24





Rate   


 


Blood Pressure  139/75 138/77


 


O2 Sat by Pulse  95 94 L





Oximetry   


 


Fraction of   





Inspired Oxygen   





(FIO2)   














  07/02/22 07/02/22 07/02/22





  19:00 19:37 19:41


 


Temperature   


 


Pulse Rate 74 106 H 


 


Respiratory 27 H  





Rate   


 


Blood Pressure 152/86  


 


O2 Sat by Pulse 95  





Oximetry   


 


Fraction of   80





Inspired Oxygen   





(FIO2)   














  07/02/22 07/02/22 07/02/22





  19:50 20:00 21:00


 


Temperature  97.5 F L 


 


Pulse Rate 104 H 92 74


 


Respiratory  19 26 H





Rate   


 


Blood Pressure  131/75 161/85


 


O2 Sat by Pulse  96 96





Oximetry   


 


Fraction of  70 





Inspired Oxygen   





(FIO2)   














  07/02/22 07/02/22 07/03/22





  22:00 23:00 00:00


 


Temperature   97.6 F


 


Pulse Rate 97 62 65


 


Respiratory 17 16 25 H





Rate   


 


Blood Pressure 159/93 138/65 153/76


 


O2 Sat by Pulse 98 98 96





Oximetry   


 


Fraction of   70





Inspired Oxygen   





(FIO2)   














  07/03/22 07/03/22 07/03/22





  00:06 00:09 01:00


 


Temperature   


 


Pulse Rate  64 66


 


Respiratory  22 26 H





Rate   


 


Blood Pressure   112/95


 


O2 Sat by Pulse 96 96 96





Oximetry   


 


Fraction of 70  





Inspired Oxygen   





(FIO2)   














  07/03/22 07/03/22 07/03/22





  02:00 03:00 03:39


 


Temperature   


 


Pulse Rate 66 68 


 


Respiratory 24 20 





Rate   


 


Blood Pressure 164/91 152/93 


 


O2 Sat by Pulse 96 97 96





Oximetry   


 


Fraction of   70





Inspired Oxygen   





(FIO2)   














  07/03/22 07/03/22 07/03/22





  04:00 05:00 06:00


 


Temperature 97.6 F  


 


Pulse Rate 79 85 93


 


Respiratory 19 19 19





Rate   


 


Blood Pressure 168/89 142/67 162/92


 


O2 Sat by Pulse  98 98





Oximetry   


 


Fraction of 70  





Inspired Oxygen   





(FIO2)   














  07/03/22 07/03/22 07/03/22





  07:00 07:12 07:17


 


Temperature   


 


Pulse Rate 66 101 H 125 H


 


Respiratory 15  





Rate   


 


Blood Pressure 153/90  


 


O2 Sat by Pulse 99 98 





Oximetry   


 


Fraction of  70 





Inspired Oxygen   





(FIO2)   














  07/03/22 07/03/22 07/03/22





  08:00 09:00 09:30


 


Temperature 98.2 F  


 


Pulse Rate 63 65 


 


Respiratory 20 16 





Rate   


 


Blood Pressure 129/86 112/62 


 


O2 Sat by Pulse 98 98 





Oximetry   


 


Fraction of 60  50





Inspired Oxygen   





(FIO2)   














  07/03/22 07/03/22 07/03/22





  10:15 10:44 10:55


 


Temperature   


 


Pulse Rate 82 82 82


 


Respiratory 22  





Rate   


 


Blood Pressure   


 


O2 Sat by Pulse 98  





Oximetry   


 


Fraction of  50 





Inspired Oxygen   





(FIO2)   














  07/03/22 07/03/22





  11:00 11:46


 


Temperature  


 


Pulse Rate 82 


 


Respiratory 17 





Rate  


 


Blood Pressure 116/72 


 


O2 Sat by Pulse 98 97





Oximetry  


 


Fraction of 50 50





Inspired Oxygen  





(FIO2)  














Objective





- Vital Signs


Vital signs: 


                                   Vital Signs











Temp  98.2 F   07/03/22 08:00


 


Pulse  82   07/03/22 11:00


 


Resp  17   07/03/22 11:00


 


BP  116/72   07/03/22 11:00


 


Pulse Ox  97   07/03/22 11:46


 


FiO2  50   07/03/22 11:46








                                 Intake & Output











 07/02/22 07/03/22 07/03/22





 18:59 06:59 18:59


 


Intake Total 1180 600 340


 


Output Total 290 550 0


 


Balance 890 50 340


 


Weight  109.4 kg 


 


Intake:   


 


   600 100


 


    Cefepime 2 gm In Sodium  100 100





    Chloride 0.9% 100 ml @ 25   





    mls/hr IVPB Q8HR [facility] Rx#   





    [id number]   


 


    Vancomycin 2,000 mg In 500 500 





    Sodium Chloride 0.9% 500   





    ml 500 ml @ 167 mls/hr   





    IVPB Q12H [facility] Rx#:   





    [id number]   


 


  Intake, IV Titration 200  





  Amount   


 


    Cefepime 2 gm In Sodium 200  





    Chloride 0.9% 100 ml @ 25   





    mls/hr IVPB Q8HR [facility] Rx#   





    [id number]   


 


  Oral 480  240


 


Output:   


 


  Urine 290 550 0


 


Other:   


 


  Voiding Method External Catheter External Catheter Bedside Commode





   External Catheter


 


  # Voids  0 








                       ABP, PAP, CO, CI - Last Documented











Arterial Blood Pressure        281/281


 


Pulmonary Artery Pressure      43/24


 


Cardiac Output                 4


 


Cardiac Index                  1.9

















- Labs


CBC & Chem 7: 


                                 07/03/22 07:35





                                 07/03/22 07:35


Labs: 


                  Abnormal Lab Results - Last 24 Hours (Table)











  07/02/22 07/02/22 07/02/22 Range/Units





  16:35 20:33 23:48 


 


WBC     (3.8-10.6)  k/uL


 


RBC     (3.80-5.40)  m/uL


 


Hgb     (11.4-16.0)  gm/dL


 


Hct     (34.0-46.0)  %


 


Neutrophils #     (1.3-7.7)  k/uL


 


Lymphocytes #     (1.0-4.8)  k/uL


 


Chloride     ()  mmol/L


 


BUN     (7-17)  mg/dL


 


Glucose     (74-99)  mg/dL


 


POC Glucose (mg/dL)  143 H  123 H  118 H  ()  mg/dL


 


AST     (14-36)  U/L


 


ALT     (4-34)  U/L


 


Alkaline Phosphatase     ()  U/L


 


Total Protein     (6.3-8.2)  g/dL


 


Albumin     (3.5-5.0)  g/dL














  07/03/22 07/03/22 07/03/22 Range/Units





  04:20 07:35 07:35 


 


WBC    12.5 H  (3.8-10.6)  k/uL


 


RBC    3.39 L  (3.80-5.40)  m/uL


 


Hgb    10.2 L  (11.4-16.0)  gm/dL


 


Hct    31.1 L  (34.0-46.0)  %


 


Neutrophils #    10.9 H  (1.3-7.7)  k/uL


 


Lymphocytes #    0.6 L  (1.0-4.8)  k/uL


 


Chloride   108 H   ()  mmol/L


 


BUN   50 H   (7-17)  mg/dL


 


Glucose   123 H   (74-99)  mg/dL


 


POC Glucose (mg/dL)  114 H    ()  mg/dL


 


AST   62 H   (14-36)  U/L


 


ALT   78 H   (4-34)  U/L


 


Alkaline Phosphatase   176 H   ()  U/L


 


Total Protein   5.8 L   (6.3-8.2)  g/dL


 


Albumin   3.4 L   (3.5-5.0)  g/dL














  07/03/22 Range/Units





  11:05 


 


WBC   (3.8-10.6)  k/uL


 


RBC   (3.80-5.40)  m/uL


 


Hgb   (11.4-16.0)  gm/dL


 


Hct   (34.0-46.0)  %


 


Neutrophils #   (1.3-7.7)  k/uL


 


Lymphocytes #   (1.0-4.8)  k/uL


 


Chloride   ()  mmol/L


 


BUN   (7-17)  mg/dL


 


Glucose   (74-99)  mg/dL


 


POC Glucose (mg/dL)  202 H  ()  mg/dL


 


AST   (14-36)  U/L


 


ALT   (4-34)  U/L


 


Alkaline Phosphatase   ()  U/L


 


Total Protein   (6.3-8.2)  g/dL


 


Albumin   (3.5-5.0)  g/dL

## 2022-07-03 NOTE — P.PN
Subjective


Progress Note Date: 07/03/22











This is a patient who had a recent heart catheterization, showing a critical and

complex lesion involving the distal left main coronary artery, also, the ostial 

left circumflex, and ostial LAD.  The lesions appear to be in the range of 80-

90%, and the patient was also noted to have elevated left ventricular end-

diastolic filling pressures.  The patient is currently being evaluated by 

cardiothoracic surgery for possible bypass grafting.  We will reassess to see 

the patient preoperatively, and evaluate her lung function.  The patient is a 

lifelong nonsmoker.  She has no history of lung disease.  Based on her FEV1, and

her MVV, she was in the low operative risk range.  Outpatient medications includ

ed amlodipine, aspirin, metoprolol, lisinopril, zinc, vitamin D3, ascorbic acid,

lactulose, and indoor.  Her only major medical problem is hypertension.  She 

does have a history of previous coronavirus infection and was seen by my partner

in December 2021.  CBC is completely normal.  PTT is 39.7.  Sodium 141, 

potassium 3.7, chlorides 111, CO2 24, BUN 12, creatinine 0.63.  Cholesterol was 

182.  Urine was negative.  Testing for coronavirus was negative.  Chest x-ray 

shows mild interstitial edema.  Currently she is on room air.





Progress note dated 06/26/2022.





76-year-old female we saw yesterday in consultation.  The patient has 

significant coronary disease, involving the left main coronary artery.  The 

patient is apparently going to have open heart surgery one day this week.  The 

exact day has not been decided yet.  Clinically, she's.  Stable.  She is on room

air.  She is a lifelong nonsmoker.  Lung function would suggest that she's at a 

very low increased operative risk based on her FEV1 and MVV.  Labs today show 

white count of 6.1, with a normal hemoglobin, hematocrit, and platelet count.  

The patient's PTT is 60.2.  Sodium 141, potassium 3.6, chlorides 111, CO2 24, 

BUN 12, creatinine 0.72.








06/27/2022, the patient is awaiting bypass surgery.  The patient is calm and 

comfortable.  No respiratory difficulties whatsoever.  She is using incentive 

spirometer.  Her preop FEV1 is order of 72% of predicted.  No angina.  No 

palpitations.  No chest pain.  She is hemodynamically stable at this point in 

time.  She remains on IV heparin.  I did introduce myself and I will take care o

f this patient postop, managed to ventilator and the necessity pulmonary care 

following her thoracotomy bypass surgery.no other active issues for now.  The 

patient was sent comes at 6.2 with a hemoglobin 4.5.  She is on IV heparin with 

a PTT of 55.  BUN is at 12 with a creatinine 0.6 and the sodium level is at 140.





06/28/2022, I'm seeing the patient for a follow-up.  The patient was taken to 

the operating room yesterday and the patient underwent an off-pump coronary 

artery bypass surgery with LIMA to LAD and saphenous vein graft to obtuse 

marginal.  The patient also had a left facial appendage clipping.  Estimated 

blood loss was 400 mL.  The patient received a total of 3 L of intraoperative IV

fluids.  Note that the patient was having issues with hypotension and there was 

some interval worsening of the mitral regurgitation intraoperatively.  At that 

point, it was decided to insert an intra-aortic balloon pump and this was done 

through the right common femoral artery.  Once the intra-aortic balloon pump was

initiated, the patient's hemodynamics improved dramatically.  The patient was 

given a postop echo cardiac exam that showed a mild MR and good ejection 

fraction.  The intra-aortic balloon pump was placed on a one-to-one augmentation

and following that the patient was brought into the intensive care unit.  The 

patient subsequently was weaned off the sedation and the patient was extubated 

without any major difficulties within a few hours.  The patient was extubated 

and the fourth hours after arriving to the ICU.  The patient had with good 

weaning parameters.  The patient had a blood gas that showed adequate 

oxygenation and ventilation.  Based on that, the patient was extubated.  This 

morning, the patient remains on oxygen at 6 L per minute nasal cannula.  The 

chest x-ray showing cardiomegaly.  The patient is a mediastinal and left pleural

chest tube.  Output from the chest tubes have been 250 mL from the mediastinum 

and 280 from the left pleural since surgery.  No evidence of any air leak.  The 

chest x-ray shows no evidence of any pneumothorax.  Hemodynamically, the patient

is currently receiving intra-aortic balloon pump augmentation of 1-2.  The 

patient has adequate cardiac output of 4.3 and an index of 2.  She is on no 

pressors for now.  The intra-aortic balloon pump will be removed for now.  The 

patient is stable to systemic adequate blood pressure was the patient being off 

the balloon pump.  Urine output is in order of 20 mL an hour.  The patient is a

febrile.  The patient is awake and following commands thoracic questions 

appropriately.  Pulmonary artery pressures are 38 over 18 mmHg.  Lower blood 

work from work today is showing a sodium of 139, potassium of 4, bicarb of 26, 

BUN of 14 with a creatinine of 0.6.  The white cell count is at 9.7 with a 

hemoglobin of 9.3 and a platelet count of 195.  AST is 85 with an ALT of 60 and 

alkaline phosphatase of 41.  Magnesium level is at 2.1.  Note that the patient 

had a run of atrial fibrillation intraoperatively.  The patient was loaded with 

amiodarone and currently the patient is on 0.5 mg per minute of amiodarone 

infusion.  The patient is in a normal sinus rhythm.  The patient is also on an 

insulin drip running at 2.5 units an hour with adequate blood sugar control.  

The patient's of lactated Ringer at the rate of 50 mL an hour.  As mentioned, 

awake and alert and the sternum is dry clean and intact.  No other issues 

otherwise for now.  Breaks





06/29/2022, I'm seeing the patient for a follow-up.  The patient is postop day 

#2.  Note that this patient was extubated successfully without any issues.  

Subsequently, as of yesterday afternoon, the patient became progressively more 

hypoxic.  Note that during the day, the patient was given IV albumin a total of 

750 mL to improve her urine output.  This did help and ultimately dopamine was 

added at renal dose to improve her urine output.  Subsequently, she became more 

hypoxic and the patient became more short of breath and initially she went up to

15 L high flow and later on the patient was placed on a BiPAP.  She was kept on 

BiPAP throughout the night and the patient is currently on a BiPAP at a pressure

of 12/5 cm of water and FiO2 of on the percent.  She is able to generate tidal 

volumes above 500.  Respiratory rate is in the mid 20s.  Her breathing is 

slightly labored even on the BiPAP, yet she is able to tolerate the machine w

ithout any major difficulties and she is awake and alert and she is following 

commands and answering questions and she is neurologically intact.  At the same 

time, the patient had a blood.  This morning that showed a pH of 7.97.49 with a 

pCO2 of 35 and a pO2 of 55 and this was on FiO2 of 85% and based on that the 

FiO2 was brought up to 100%.  The chest x-ray showing cardiomegaly.  There is 

some infiltration of the right lung and some atelectatic changes in lung bases. 

There is concern for an evolving right lung pneumonia although this is quite 

early in the postoperative course.  In any rate, the patient is being diuresed 

for now and the patient is producing adequate amount of urine output.  She did 

have a spike of temperature 100.4 yesterday and currently she is afebrile.  The 

white cell count today is at 12.5 which is comparable to yesterday with a 

hemoglobin of 9.8 and a platelet count of 192.  In terms of the chest tubes, the

patient is a mediastinal and left pleural chest tube, output has been noted and 

it's in the order of overnight 80 mL overnight and 400 mL over the past 24 hours

in the mediastinal chest tube, 6 disease overnight and 400 mL over the past 24 

hours in the left pleural chest tube.  Krypton-Brunilda catheter still in place.  The 

cardiac output is currently at 4.3 with an index of 2.0.  The PA diastolic is in

the order of extreme millimeters of mercury.  The patient otherwise has normal 

renal function.  Creatinine is at 0.6.  Sodium is at 136.  She is obviously n

othing by mouth at this point in time as the patient is BiPAP dependent.  

Cardiac rhythm is still sinus and the patient remains on amiodarone.  She 

remains on aspirin and Plavix.  Routine postoperative care is being implemented 

this point in time.  Insulin drip is off and the patient is currently on slice K

coverage every 4 hours.





06/30/2022, I'm seeing the patient for a follow-up.  The patient is postop day 

#3.  Note that after a successful extubation, the patient was placed on a BiPAP 

because of ongoing hypoxic respiratory failure.  The patient stayed on BiPAP 

throughout the day yesterday and this morning the patient remains on a BiPAP.  

Current BiPAP settings of 12/5 with an FiO2 of 80%.  While in the BiPAP, the 

patient is urinating a tidal volume of about 450 with a respiratory rate in the 

mid 20s and a minute ventilation of 13 L per minute.  The chest x-ray showing 

some further investigation right lung base.  Based on that, an ultrasound of the

chest was done and there was minimal amount of pleural effusion the right lung 

base and I think the predominant findings are significant atelectasis in the 

lung bases right more than left.  Note that the chest tubes were all removed ye

sterday and the patient the left pleural and mediastinal chest tube both of them

removed.  Morning blood gases showed a pH of 7.49 with a pCO2 of 33 and pO2 of 

60.  The patient's FiO2 is currently at 80% on the BiPAP and she is pulse oxing 

95%.  I dropped her down to 60%.  Meanwhile, the pro-calcitonin level obtained 

yesterday was low at 0.18.  The patient was being diuresis with IV Lasix and 

input output balance has been negative for on 22 mL over the past 24 hours and 

the patient was taken off the dopamine.  The the white cell count is slightly 

higher compared to yesterday.  He 0.9 with a hemoglobin of 10.2 and a platelet 

count of 266.  Sodium is at 135 with a BUN of 30 and creatinine 0.6.  The 

patient is awake.  The patient is alert.  She is following commands and moving 

extremities.  She is currently on examination aspirin and Plavix.  She is also 

on metoprolol 25 mg by mouth 3 times a day.  Cardiac rhythm was sinus and later 

on she did have a epidural of atrial fibrillation.  There is being managed by 

the cardiothoracic team.  The patient will be started back on amiodarone bolus 

and maintenance.  No pressors for now.





07/01/2022, the patient is postop day #4.  The patient continues to have diffuse

but the pulmonary infiltrates with hypoxic respiratory failure that occurred p

ost extubation.  The patient remains on a BiPAP at a pressure of 10/5 cm of 

water with an FiO2 of 70%.  Current pulse ox is around 96%.  She is a bit 

disturbed by the BiPAP and she wants to give herself a break.  She is urinating 

adequate tidal volumes above 700 mL an minute ventilation remains quite 

elevated.  The chest x-ray shows small lung volumes, atelectatic changes in lung

bases with diffuse breath and pulmonary infiltrates.  She has also cardiomegaly.

 All of the chest tubes are removed.  The patient is afebrile.  White cell count

remains elevated at 19.  The patient is producing adequate amount of urine 

output.  The patient is on IV Lasix and overall fluid balance over the past 24 h

ours shows that she has been in a negative fluid balance in the urine output is 

adequate.  Input output over the past 24 hours has been in the order of -1.2 L. 

Otherwise, the rest of the blood work shows a white cell count of 19.2 with a 

hemoglobin of 10.8 and a platelet count of 375.  BUN is 42 with a creatinine of 

0.69 the sodium level is at 138 with a potassium level of 4.1.  As mentioned, 

the ultrasound of the chest was done yesterday and there was no evidence of any 

significant sizable pleural effusion.  The chest x-ray from today shows a small 

right-sided pleural effusion, cardiomegaly along with diffuse breath and 

pulmonary infiltrates.  The patient also had an episode of atrial fibrillation 

with rapid ventricular response.  She was given amiodarone bolus and currently 

she is on amiodarone at a dose of 400 mg by mouth twice a day.  Her current 

cardiac rhythm is back to sinus.





2022, the patient is postop day #5.  The patient is sitting up on a chair.  The 

patient is currently unable to 60 L with an FiO2 of 90%.  Chest x-ray 

essentially unchanged.  There is cardiomegaly.  There is extensive atelectatic 

changes with small effusions bilaterally.  The patient is currently off the 

BiPAP.  Denies having any significant respiratory distress.  Using incentive 

spirometer and the patient is pulling approximately 7 50 mL.  Hemodynamically, 

she is on no pressors.  She is stable.  Her cardiac rhythm is sinus and there is

no further episodes of atrial fibrillation since yesterday.  The overall input 

output fluid balance has been -1.2 L in the urine output and 95 to on 35 mL over

the past 8 hours.  The patient has no specific complaints.  Surgical with that 

is dry clean and intact.  The white cell count is slightly lower compared to 

yesterday down to 17 with a hemoglobin of 10.5 and a platelet count of 354.  

Sodium is at 140 with a mean of 51 acute creatinine of 0.6.  Chest x-ray was 

noted.  The patient is currently  on empiric antibiotic coverage with a 

combination of cefepime and vancomycin.  A limited ultrasound that was done at 

the bedside revealed no significant pleural effusion on the right.  There is 

cardiomegaly and there is some consented the patient may have a underlying 

pericardial effusion.





07/03/2022 the patient is postop day #6.  She is still recovering from her acute

hypoxic respiratory failure which we believe is in acute lung injury post bypass

surgery.  Possibility of pneumonia is also contemplated and the patient is 

covered empirically with IV antibiotics.  She is showing ongoing aggressive 

improvement.  Her breathing is less labored.  The chest x-ray from today shows 

improvement in aeration the right lung base.  She remains on Arava at 60 L of 

FiO2 being down to 60%.  She is using incentive spirometer.  No diuretics for 

now.  Cardiac rhythm is bouncing back and forth between normal sinus rhythm and 

atrial fibrillation.  She is on no anticoagulants for now.  Her blood work shows

a hemoglobin of 10.2 with a white cell count improving down to 12.5 and a BNP is

at 50 with a creatinine of 0.7 and sodium level is at 140 with a glucose of 123.

 LFTs are normal.  Slightly elevated and AST/ALT.  Chest x-ray was noted.  On 

the incentive spirometer, she is pulling approximately thousand.  Urine output 

is adequate for now in the order of 500 mL over the past 8 hours.  No focal 

neurological deficits.





Objective





- Vital Signs


Vital signs: 


                                   Vital Signs











Temp  98.2 F   07/03/22 08:00


 


Pulse  63   07/03/22 08:00


 


Resp  20   07/03/22 08:00


 


BP  129/86   07/03/22 08:00


 


Pulse Ox  98   07/03/22 08:00


 


FiO2  60   07/03/22 08:00








                                 Intake & Output











 07/02/22 07/03/22 07/03/22





 18:59 06:59 18:59


 


Intake Total 1180 600 340


 


Output Total 290 550 0


 


Balance 890 50 340


 


Weight  109.4 kg 


 


Intake:   


 


   600 100


 


    Cefepime 2 gm In Sodium  100 100





    Chloride 0.9% 100 ml @ 25   





    mls/hr IVPB Q8HR [facility] Rx#   





    [id number]   


 


    Vancomycin 2,000 mg In 500 500 





    Sodium Chloride 0.9% 500   





    ml 500 ml @ 167 mls/hr   





    IVPB Q12H [facility] Rx#:   





    [id number]   


 


  Intake, IV Titration 200  





  Amount   


 


    Cefepime 2 gm In Sodium 200  





    Chloride 0.9% 100 ml @ 25   





    mls/hr IVPB Q8HR [facility] Rx#   





    [id number]   


 


  Oral 480  240


 


Output:   


 


  Urine 290 550 0


 


Other:   


 


  Voiding Method External Catheter External Catheter Bedside Commode





   External Catheter


 


  # Voids  0 








                       ABP, PAP, CO, CI - Last Documented











Arterial Blood Pressure        281/281


 


Pulmonary Artery Pressure      43/24


 


Cardiac Output                 4


 


Cardiac Index                  1.9

















- Exam











CONSTITUTIONAL: Appears comfortable, cooperative, no acute distress mild 

currently on a Airvo, 60 L with an FiO2 of 60%


Breathing is nonlabored and the patient is comfortable sitting up on a chair.


RESPIRATORY:  Lungs sounds diminished bilaterally, right greater than left.  

Marked diminished breath on the right lung base


CARDIOVASCULAR:  S1, S2 present.  Regular rate and rhythm, sinus rhythm on 

telemetry.  Sternum stable.   Palpable peripheral pulses bilaterally.  Trace 

generalized edema present.  No calf pain or tenderness noted.  Heart hugger in 

place with patient demonstrating appropriate use.  Antiembolism stockings, SCDs 

present.


GASTROINTESTINAL:  Abdomen soft, nontender, nondistended.  Active bowel sounds 

present 4 quadrants, tympanic to percussion.  Tolerating liquids for 

medications.  Denies flatus


GENITOURINARY:  Torres present draining cloudy, yellow urine.  Output overnight 

is in order of 30-40 mL an hour


INTEGUMENTARY:  Skin is warm and dry with evidence of good perfusion.  Anterior 

chest incision well approximated and covered with dry intact dressing.  Right 

lower extremity EVH site well approximated without redness or drainage.


NEUROLOGIC:  Cranial nerves II through XII intact


MUSKULOSKELETAL:  Able to move all extremities, strength equal bilaterally


PSYCHIATRIC:  Alert and oriented to person place and time, appropriate affect, 

intact judgment and insight


INVASIVE LINES AND TUBES:   All of the lines have been removed 





- Labs


CBC & Chem 7: 


                                 07/03/22 07:35





                                 07/03/22 07:35


Labs: 


                  Abnormal Lab Results - Last 24 Hours (Table)











  07/02/22 07/02/22 07/02/22 Range/Units





  11:44 16:35 20:33 


 


WBC     (3.8-10.6)  k/uL


 


RBC     (3.80-5.40)  m/uL


 


Hgb     (11.4-16.0)  gm/dL


 


Hct     (34.0-46.0)  %


 


Neutrophils #     (1.3-7.7)  k/uL


 


Lymphocytes #     (1.0-4.8)  k/uL


 


Chloride     ()  mmol/L


 


BUN     (7-17)  mg/dL


 


Glucose     (74-99)  mg/dL


 


POC Glucose (mg/dL)  215 H  143 H  123 H  ()  mg/dL


 


AST     (14-36)  U/L


 


ALT     (4-34)  U/L


 


Alkaline Phosphatase     ()  U/L


 


Total Protein     (6.3-8.2)  g/dL


 


Albumin     (3.5-5.0)  g/dL














  07/02/22 07/03/22 07/03/22 Range/Units





  23:48 04:20 07:35 


 


WBC     (3.8-10.6)  k/uL


 


RBC     (3.80-5.40)  m/uL


 


Hgb     (11.4-16.0)  gm/dL


 


Hct     (34.0-46.0)  %


 


Neutrophils #     (1.3-7.7)  k/uL


 


Lymphocytes #     (1.0-4.8)  k/uL


 


Chloride    108 H  ()  mmol/L


 


BUN    50 H  (7-17)  mg/dL


 


Glucose    123 H  (74-99)  mg/dL


 


POC Glucose (mg/dL)  118 H  114 H   ()  mg/dL


 


AST    62 H  (14-36)  U/L


 


ALT    78 H  (4-34)  U/L


 


Alkaline Phosphatase    176 H  ()  U/L


 


Total Protein    5.8 L  (6.3-8.2)  g/dL


 


Albumin    3.4 L  (3.5-5.0)  g/dL














  07/03/22 Range/Units





  07:35 


 


WBC  12.5 H  (3.8-10.6)  k/uL


 


RBC  3.39 L  (3.80-5.40)  m/uL


 


Hgb  10.2 L  (11.4-16.0)  gm/dL


 


Hct  31.1 L  (34.0-46.0)  %


 


Neutrophils #  10.9 H  (1.3-7.7)  k/uL


 


Lymphocytes #  0.6 L  (1.0-4.8)  k/uL


 


Chloride   ()  mmol/L


 


BUN   (7-17)  mg/dL


 


Glucose   (74-99)  mg/dL


 


POC Glucose (mg/dL)   ()  mg/dL


 


AST   (14-36)  U/L


 


ALT   (4-34)  U/L


 


Alkaline Phosphatase   ()  U/L


 


Total Protein   (6.3-8.2)  g/dL


 


Albumin   (3.5-5.0)  g/dL














Assessment and Plan


Plan: 








Symptomatic coronary disease, post coronary artery bypass surgery 2, off-pump 

and the patient is postop day #6.  The patient is post intra-aortic balloon pump

insertion for hemodynamic support inserted at a time of surgery was subsequently

removed and the patient remains hemodynamically stable.  Note that the patient 

was extubated without any major difficulties and over the past 12 hours,  and 

since then, the patient has developed diffuse but the pulmonary infiltrates 60%.

 Suspect post surgical ARDS.  There is also some atelectatic changes in lung 

bases bilaterally contributing to this patient's hypoxic respiratory failure.  

Bedside ultrasound was done revealing no evidence of any pericardial effusion or

pleural effusion and sizable amounts.  He continues to improve slowly.  Her 

chest x-ray from today showing some interval improvement in the past in the 

right lung base and there is improved aeration.  There is still diffuse 

bilateral pulmonary infiltrates.  Nevertheless, breathing is less labored and 

the patient's oxygenation is gradually improving.





Post thoracotomy, chest tubes are removed, small right-sided pleural effusion on

ultrasound the chest that was conducted yesterday.  Less on the right lung base 

remains diminished and the patient remains on Airvo 60 L an FiO2 of 90%





Acute hypoxic history failure, likely due to postsurgical ARDS in addition to 

extensive atelectatic changes in lung bases more so on the right with a small 

right-sided pleural effusion.  The patient is currently is off the BiPAP and the

patient is currently on Airvo extremely liters, 90%








Paroxysmal atrial fibrillation, expected outcome of surgery currently in sinus 

rhythm and the patient is currently on po amiodarone





Hyperglycemia, postop, the patient is off IV insulin and the patient is 

currently on insulin scale coverage





History of hypertension.





No history of any lung disease, and patient at low increased operative risk 

based on lung function.





Prior history of coronavirus infection, December 2021.





Postoperative anemia, expected outcome of surgery, hemoglobin is stable for now





Leukocytosis, improving





plan











Wean down the FiO2 slowly,  flow at 50l and wean down the FiO2 down to lower 

levels to maintain a saturation above 90% on suggest starting at 50%


Continue using incentive spirometer


Keep antibiotics for another 24 hours 


No diuretics 


Continue the prednisone 40 mg by mouth daily


Continue beta blockers with metoprolol  


Diuretics to be stopped today


No amiodarone


Repeat labs and chest x-ray in the morning


We'll continue to follow

## 2022-07-04 LAB
ALBUMIN SERPL-MCNC: 3.5 G/DL (ref 3.5–5)
ALP SERPL-CCNC: 162 U/L (ref 38–126)
ALT SERPL-CCNC: 75 U/L (ref 4–34)
ANION GAP SERPL CALC-SCNC: 9 MMOL/L
AST SERPL-CCNC: 49 U/L (ref 14–36)
BASOPHILS # BLD AUTO: 0 K/UL (ref 0–0.2)
BASOPHILS NFR BLD AUTO: 0 %
BUN SERPL-SCNC: 42 MG/DL (ref 7–17)
CALCIUM SPEC-MCNC: 8.5 MG/DL (ref 8.4–10.2)
CHLORIDE SERPL-SCNC: 111 MMOL/L (ref 98–107)
CO2 SERPL-SCNC: 22 MMOL/L (ref 22–30)
EOSINOPHIL # BLD AUTO: 0 K/UL (ref 0–0.7)
EOSINOPHIL NFR BLD AUTO: 0 %
ERYTHROCYTE [DISTWIDTH] IN BLOOD BY AUTOMATED COUNT: 3.5 M/UL (ref 3.8–5.4)
ERYTHROCYTE [DISTWIDTH] IN BLOOD: 14.8 % (ref 11.5–15.5)
GLUCOSE BLD-MCNC: 112 MG/DL (ref 70–110)
GLUCOSE BLD-MCNC: 116 MG/DL (ref 70–110)
GLUCOSE BLD-MCNC: 126 MG/DL (ref 70–110)
GLUCOSE BLD-MCNC: 126 MG/DL (ref 70–110)
GLUCOSE BLD-MCNC: 170 MG/DL (ref 70–110)
GLUCOSE BLD-MCNC: 208 MG/DL (ref 70–110)
GLUCOSE SERPL-MCNC: 108 MG/DL (ref 74–99)
HCT VFR BLD AUTO: 32 % (ref 34–46)
HGB BLD-MCNC: 10.3 GM/DL (ref 11.4–16)
LYMPHOCYTES # SPEC AUTO: 0.9 K/UL (ref 1–4.8)
LYMPHOCYTES NFR SPEC AUTO: 7 %
MCH RBC QN AUTO: 29.6 PG (ref 25–35)
MCHC RBC AUTO-ENTMCNC: 32.3 G/DL (ref 31–37)
MCV RBC AUTO: 91.7 FL (ref 80–100)
MONOCYTES # BLD AUTO: 0.9 K/UL (ref 0–1)
MONOCYTES NFR BLD AUTO: 7 %
NEUTROPHILS # BLD AUTO: 10.2 K/UL (ref 1.3–7.7)
NEUTROPHILS NFR BLD AUTO: 84 %
PLATELET # BLD AUTO: 360 K/UL (ref 150–450)
POTASSIUM SERPL-SCNC: 4 MMOL/L (ref 3.5–5.1)
PROT SERPL-MCNC: 5.9 G/DL (ref 6.3–8.2)
SODIUM SERPL-SCNC: 142 MMOL/L (ref 137–145)
WBC # BLD AUTO: 12.2 K/UL (ref 3.8–10.6)

## 2022-07-04 RX ADMIN — INSULIN ASPART SCH: 100 INJECTION, SOLUTION INTRAVENOUS; SUBCUTANEOUS at 17:49

## 2022-07-04 RX ADMIN — IPRATROPIUM BROMIDE AND ALBUTEROL SULFATE SCH ML: .5; 3 SOLUTION RESPIRATORY (INHALATION) at 08:08

## 2022-07-04 RX ADMIN — SODIUM CHLORIDE, PRESERVATIVE FREE SCH: 5 INJECTION INTRAVENOUS at 07:41

## 2022-07-04 RX ADMIN — METOPROLOL TARTRATE SCH MG: 50 TABLET, FILM COATED ORAL at 20:02

## 2022-07-04 RX ADMIN — INSULIN ASPART SCH: 100 INJECTION, SOLUTION INTRAVENOUS; SUBCUTANEOUS at 07:41

## 2022-07-04 RX ADMIN — INSULIN ASPART SCH: 100 INJECTION, SOLUTION INTRAVENOUS; SUBCUTANEOUS at 04:14

## 2022-07-04 RX ADMIN — METOPROLOL TARTRATE SCH MG: 50 TABLET, FILM COATED ORAL at 07:10

## 2022-07-04 RX ADMIN — INSULIN ASPART SCH: 100 INJECTION, SOLUTION INTRAVENOUS; SUBCUTANEOUS at 04:07

## 2022-07-04 RX ADMIN — IPRATROPIUM BROMIDE AND ALBUTEROL SULFATE SCH ML: .5; 3 SOLUTION RESPIRATORY (INHALATION) at 12:17

## 2022-07-04 RX ADMIN — ATORVASTATIN CALCIUM SCH MG: 80 TABLET, FILM COATED ORAL at 07:40

## 2022-07-04 RX ADMIN — CEFEPIME HYDROCHLORIDE SCH MLS/HR: 2 INJECTION, POWDER, FOR SOLUTION INTRAVENOUS at 23:41

## 2022-07-04 RX ADMIN — CLOPIDOGREL BISULFATE SCH MG: 75 TABLET ORAL at 07:40

## 2022-07-04 RX ADMIN — ASPIRIN 325 MG ORAL TABLET SCH MG: 325 PILL ORAL at 07:40

## 2022-07-04 RX ADMIN — IPRATROPIUM BROMIDE AND ALBUTEROL SULFATE SCH ML: .5; 3 SOLUTION RESPIRATORY (INHALATION) at 15:44

## 2022-07-04 RX ADMIN — INSULIN ASPART SCH UNIT: 100 INJECTION, SOLUTION INTRAVENOUS; SUBCUTANEOUS at 12:22

## 2022-07-04 RX ADMIN — INSULIN ASPART SCH UNIT: 100 INJECTION, SOLUTION INTRAVENOUS; SUBCUTANEOUS at 20:01

## 2022-07-04 RX ADMIN — CEFEPIME HYDROCHLORIDE SCH MLS/HR: 2 INJECTION, POWDER, FOR SOLUTION INTRAVENOUS at 15:51

## 2022-07-04 RX ADMIN — IPRATROPIUM BROMIDE AND ALBUTEROL SULFATE SCH ML: .5; 3 SOLUTION RESPIRATORY (INHALATION) at 20:17

## 2022-07-04 RX ADMIN — HEPARIN SODIUM SCH UNIT: 5000 INJECTION INTRAVENOUS; SUBCUTANEOUS at 23:41

## 2022-07-04 RX ADMIN — INSULIN ASPART SCH: 100 INJECTION, SOLUTION INTRAVENOUS; SUBCUTANEOUS at 23:39

## 2022-07-04 RX ADMIN — Medication SCH: at 19:40

## 2022-07-04 RX ADMIN — HEPARIN SODIUM SCH UNIT: 5000 INJECTION INTRAVENOUS; SUBCUTANEOUS at 07:40

## 2022-07-04 RX ADMIN — HEPARIN SODIUM SCH UNIT: 5000 INJECTION INTRAVENOUS; SUBCUTANEOUS at 15:52

## 2022-07-04 RX ADMIN — SODIUM CHLORIDE, PRESERVATIVE FREE SCH: 5 INJECTION INTRAVENOUS at 20:05

## 2022-07-04 RX ADMIN — CEFEPIME HYDROCHLORIDE SCH MLS/HR: 2 INJECTION, POWDER, FOR SOLUTION INTRAVENOUS at 07:41

## 2022-07-04 RX ADMIN — PANTOPRAZOLE SODIUM SCH MG: 40 TABLET, DELAYED RELEASE ORAL at 06:54

## 2022-07-04 RX ADMIN — DOCUSATE SODIUM AND SENNOSIDES SCH EACH: 50; 8.6 TABLET ORAL at 20:02

## 2022-07-04 NOTE — P.PN
Subjective


Progress Note Date: 07/04/22


Principal diagnosis: 





Coronary artery disease with left main disease, unstable angina, atrial 

arrhythmias.  Past medical history significant for hypertension, hyperlipidemia,

SVT, left internal carotid artery stenosis, obesity, lifetime nonsmoker, remains

unvaccinated against Covid, and family history of coronary artery disease.





POD #7 off-pump coronary artery bypass grafting 2 with left internal mammary 

artery to the left anterior descending coronary artery, reverse saphenous vein 

graft to the obtuse marginal coronary artery, ligation of the left atrial 

appendage with a 40 mm AtriCure clip, endovascular vein harvest of the right 

greater saphenous vein, placement of intra-aortic balloon pump.





Postoperative acute blood loss anemia, expected given hemodilution.





Acute hypoxic respiratory failure requiring bipap, and Airvo.  Currently on 

oxygen 5 L nasal cannula.





Paroxysmal atrial fibrillation, a known common occurrence after cardiac surgery.





The patient was seen and examined today 07/04/2022 at her bedside in the 

intensive care unit.  Currently she is sitting up to the bedside chair, is 

awake, alert, oriented 3 and is in no acute distress.  Denies any complaints of

pain or shortness of breath at this time.  Oxygen has been titrated down to 5 L 

nasal cannula with oxygen saturations 95%.  She is achieving 1000 mL on her 

incentive spirometry with encouragement.  The patient reports she has been up 

ambulating in the intensive care unit hallway with minimal assistance from 

nursing and therapy staff.  Bedside telemetry showing normal sinus rhythm heart 

rate 72 BPM.  No further episodes of atrial fibrillation.  She has been afebrile

the last 24 hours and remains on vancomycin and cefepime for empiric antibiotic 

coverage.  Prednisone 40 mg by mouth daily remains in place.  She remained 

hemodynamically stable and is currently on no inotropic pressor support.  

Overall the patient reports that she feels much improved from a few days ago.  

The patient continues to void and had 550 mL of urine output in the last 8 

hours.  Laboratory results this morning show a WBC count continuing to trend 

down at 12.2, hemoglobin 10.3, hematocrit 32.0, platelets 360, sodium 142, 

potassium 4.0, BUN 42, creatinine 0.79, glucose 108, AST 49 and ALT 75.





Objective





- Vital Signs


Vital signs: 


                                   Vital Signs











Temp  98 F   07/04/22 04:00


 


Pulse  72   07/04/22 07:00


 


Resp  24   07/04/22 07:00


 


BP  151/79   07/04/22 07:00


 


Pulse Ox  96   07/04/22 07:27


 


FiO2  45   07/04/22 04:00








                                 Intake & Output











 07/03/22 07/04/22 07/04/22





 18:59 06:59 18:59


 


Intake Total 1540 459 167


 


Output Total 650 675 0


 


Balance 890 -216 167


 


Weight  109.5 kg 


 


Intake:   


 


   459 167


 


    Cefepime 2 gm In Sodium 200 125 0





    Chloride 0.9% 100 ml @ 25   





    mls/hr IVPB Q8HR [facility] Rx#   





    [id number]   


 


    Vancomycin 2,000 mg In 500  





    Sodium Chloride 0.9% 500   





    ml 500 ml @ 167 mls/hr   





    IVPB Q12H [facility] Rx#:   





    [id number]   


 


    Vancomycin 2,000 mg In  334 167





    Sodium Chloride 0.9% 500   





    ml 500 ml @ 167 mls/hr   





    IVPB Q16H [facility] Rx#:   





    [id number]   


 


  Oral 840  


 


Output:   


 


  Urine 650 675 0


 


Other:   


 


  Voiding Method Bedside Commode Bedside Commode 


 


  # Voids  1 0


 


  # Bowel Movements 1  








                       ABP, PAP, CO, CI - Last Documented











Arterial Blood Pressure        281/281


 


Pulmonary Artery Pressure      43/24


 


Cardiac Output                 4


 


Cardiac Index                  1.9

















- Exam





CONSTITUTIONAL: Currently sitting up to the bedside chair in the intensive care 

unit. Appears comfortable, cooperative, no acute distress.


RESPIRATORY:  Lungs sounds diminished bilaterally, right greater than left.  

Respirations are symmetrical and nonlabored.  Currently on 5 L nasal cannula 

with oxygen saturation 95%.  Strong nonproductive cough.  Achieving 1000 mL on 

her incentive spirometry with encouragement.


CARDIOVASCULAR:  S1, S2 present.  Regular rate and rhythm, sinus rhythm on 

telemetry, heart rate 72 BPM.  Sternum stable.   Palpable peripheral pulses 

bilaterally.  Trace generalized edema present.  No calf pain or tenderness 

noted.  Heart hugger in place with patient demonstrating appropriate use.  

Antiembolism stockings, SCDs present.


GASTROINTESTINAL:  Abdomen soft, nontender, nondistended.  Active bowel sounds 

present 4 quadrants.  Tolerating diet.  Passing flatus.


GENITOURINARY:  Continues to void with 675 mL of urine output in the last 8 

hours.


INTEGUMENTARY:  Skin is warm and dry with evidence of good perfusion.  Midline 

sternal incision is well approximated and covered with dry intact dressing.  

Right lower extremity EVH site well approximated without redness or drainage.


NEUROLOGIC:  Cranial nerves II through XII intact.  No focal deficits.


MUSKULOSKELETAL:  Able to move all extremities, strength equal bilaterally.


PSYCHIATRIC:  Alert and oriented to person place and time, appropriate affect, 

intact judgment and insight.








- Labs


CBC & Chem 7: 


                                 07/04/22 05:34





                                 07/04/22 05:34


Labs: 


                  Abnormal Lab Results - Last 24 Hours (Table)











  07/03/22 07/03/22 07/03/22 Range/Units





  07:35 07:35 11:05 


 


WBC   12.5 H   (3.8-10.6)  k/uL


 


RBC   3.39 L   (3.80-5.40)  m/uL


 


Hgb   10.2 L   (11.4-16.0)  gm/dL


 


Hct   31.1 L   (34.0-46.0)  %


 


Neutrophils #   10.9 H   (1.3-7.7)  k/uL


 


Lymphocytes #   0.6 L   (1.0-4.8)  k/uL


 


Chloride  108 H    ()  mmol/L


 


BUN  50 H    (7-17)  mg/dL


 


Glucose  123 H    (74-99)  mg/dL


 


POC Glucose (mg/dL)    202 H  ()  mg/dL


 


AST  62 H    (14-36)  U/L


 


ALT  78 H    (4-34)  U/L


 


Alkaline Phosphatase  176 H    ()  U/L


 


Total Protein  5.8 L    (6.3-8.2)  g/dL


 


Albumin  3.4 L    (3.5-5.0)  g/dL














  07/03/22 07/03/22 07/04/22 Range/Units





  16:27 19:42 01:36 


 


WBC     (3.8-10.6)  k/uL


 


RBC     (3.80-5.40)  m/uL


 


Hgb     (11.4-16.0)  gm/dL


 


Hct     (34.0-46.0)  %


 


Neutrophils #     (1.3-7.7)  k/uL


 


Lymphocytes #     (1.0-4.8)  k/uL


 


Chloride     ()  mmol/L


 


BUN     (7-17)  mg/dL


 


Glucose     (74-99)  mg/dL


 


POC Glucose (mg/dL)  119 H  196 H  126 H  ()  mg/dL


 


AST     (14-36)  U/L


 


ALT     (4-34)  U/L


 


Alkaline Phosphatase     ()  U/L


 


Total Protein     (6.3-8.2)  g/dL


 


Albumin     (3.5-5.0)  g/dL














  07/04/22 07/04/22 07/04/22 Range/Units





  04:12 05:34 05:34 


 


WBC    12.2 H  (3.8-10.6)  k/uL


 


RBC    3.50 L  (3.80-5.40)  m/uL


 


Hgb    10.3 L  (11.4-16.0)  gm/dL


 


Hct    32.0 L  (34.0-46.0)  %


 


Neutrophils #    10.2 H  (1.3-7.7)  k/uL


 


Lymphocytes #    0.9 L  (1.0-4.8)  k/uL


 


Chloride   111 H   ()  mmol/L


 


BUN   42 H   (7-17)  mg/dL


 


Glucose   108 H   (74-99)  mg/dL


 


POC Glucose (mg/dL)  112 H    ()  mg/dL


 


AST   49 H   (14-36)  U/L


 


ALT   75 H   (4-34)  U/L


 


Alkaline Phosphatase   162 H   ()  U/L


 


Total Protein   5.9 L   (6.3-8.2)  g/dL


 


Albumin     (3.5-5.0)  g/dL














- Imaging and Cardiology


Chest x-ray: report reviewed, image reviewed





Assessment and Plan


Assessment: 





1.  Coronary artery disease with left main disease, status post 2 vessel CABG


2.  Hypertension


3.  Hyperlipidemia, cholesterol 182, 


4.  SVT, intraoperative atrial arrhythmias with cardioversion, status post left 

atrial appendage ligation


5.  Left internal carotid artery stenosis


6.  Obesity


7.  Never smoker, preoperative FEV1 72% of predicted


8.  History of covid infection in December 2021, remains unvaccinated against 

Covid, negative COVID-19 PCR


9.  Family history of coronary artery disease


10.  Nasal swab positive for MSSA, treated with mupirocin


11.  Preserved LV function with mild to moderate mitral regurgitation on 

transthoracic echocardiogram


12.  Acute blood loss anemia


13.  Acute hypoxic respiratory failure requiring bipap


14.  Leukocytosis, improving


15.  Paroxysmal atrial fibrillation, a known common occurrence after cardiac 

surgery


Plan: 





1.  Continue to maximize medical management with aspirin, Plavix, statin, beta 

blocker and ACE inhibitor.  Will increase metoprolol tartrate as tolerated.


2.  Continue to hold amiodarone, due to her respiratory status.  No anticoagula

tion necessary at this point.


3.  Wean O2 as tolerated.  Encourage incentive spirometry 10 times every hour 

while awake.  Bronchodilators per pulmonology.


4.  Increase activity as tolerated.  PT/OT/cardiac rehab following.


5.  GI/DVT prophylaxis.


6.  Will monitor daily labs and chest x-ray.  Electrolyte replacement per 

protocol. 


7.  Pain control with current medication regimen.


8.  Insulin management per primary care service.  Patient is not diabetic, 

preoperative hemoglobin A1c 5.6%.


9.  Continue to record strict accurate intake and output.  May bladder scan 

every 6 hours and when necessary postvoid residual.  If greater than 300 mL of 

urine may straight cath.  Daily weights.


10.  Continue cefepime 2 g IV piggyback every 8 hours and vancomycin pharmacy to

dose per pulmonary/critical care medicine recommendations.


11.  Continue Prednisone 40 mg by mouth daily per pulmonary/critical care 

medicine recommendations.


12.  Continue to encourage nutrition and advance diet as tolerated.


13.  First postoperative shower today.  Then shower daily.


14.  Continue to hold diuretics. 


15.  More recommendations follow based on patient's clinical course.





Time with Patient: Greater than 30

## 2022-07-04 NOTE — P.PN
Subjective


Progress Note Date: 07/04/22








 HISTORY OF PRESENT ILLNESS


This is a pleasant 76 years old female with past medical history of Fibromyal

merritt, Hyperlipidemia, Hypertension, Osteoarthritis , Supraventricular Tachycardia

,urinary incontinence-wears pad, severe left internal carotid artery stenosis 

followed by Dr. Adam, frequent constipation, she was admitted under 

cardiology service for right arm pain, chest pain and back pain for the last 3 

weeks, where she underwent cardiac cath and 60/24 showing critical and complex 

lesion involving the distal left main coronary arteries and also involving the 

ostial left circumflex and the distal LAD with the stenotic range is 80-90% with

increase in LVEDP  , Patient will need bypass procedure to open his coronary 

arteries.


Currently she denies chest pain or dyspnea.  No incontinence of urine or bowel. 

No fever


Patient is hemodynamically stable


Labs including CBC, INR, BMP and liver enzymes are unremarkable.  TSH is 1.6.


Urine analysis showing trace blood.


coronavirus not detected.   Hepatitis panel is negative


Chest x-ray: Minimal interstitial edema noted


Carotid duplex: No significant stenosis


Echocardiogram showing ejection fraction of 50-55% with mild-to-moderate mitral 

regurgitation and mild tricuspid regurgitation


Patient currently on heparin drip, aspirin 81 mg twice a day and normal saline 

at 75 mL/h as well as Lipitor and metoprolol


However patient this morning she is asymptomatic she denies chest pain or 

dyspnea or abdominal pain.  No diarrhea or vomiting or dysuria.  No headache or 

weakness or numbness.





6/27: Patient is denying any chest pain, shortness of breath, palpitations, 

lightheadedness or dizziness.  She is scheduled for CABG this afternoon and 

patient states that she is ready to move forward.  No new concerns from her nurs

e.  Patient has been afebrile, heart rate 80, blood pressure 129/78 and pulse ox

96% on room air.  CBC is unremarkable.  Chloride 110, CO2 20, blood sugar 112, 

creatinine 0.68.





6/28: Patient is status post CABG 2 with left internal mammary artery to the 

left anterior descending artery, reverse saphenous vein graft to the obtuse 

marginal artery, ligation of the left atrial appendage with a 40 mm AtriCure 

clip, endovascular vein harvest of the right greater saphenous vein, placement 

of intra-aortic balloon pump.  Right groin intra-aortic balloon pump was 

discontinued this morning.  Right internal jugular Whately/Cordis, right radial 

arterial line, mediastinal/left pleural chest tubes remain in place.  Patient is

stating that she is feeling well.  She has been afebrile, heart rate 75, 121/54,

pulse ox 93% on 2 L.  Capillary blood glucose running between 110 and 120.  WBC 

9.7, hemoglobin 9.3 and platelet count 195.  Electrolytes and renal function 

normal.  AST 85 and ALT 41.





6/29: Patient remains in the intensive care unit.  Yesterday pulse ox continued 

to drop through the day with increased oxygen demands to the point where she is 

now on BiPAP.  She states she feels a little short of breath.  She denies Chest 

pain.  Temperature max 100.4, heart rate 76, respiratory rate 32, blood pressure

130/73.  Cardiac monitor sinus rhythm.  Patient is status post IV Lasix 

yesterday with good urine output.  Blood glucose running between 107 and 124 and

insulin drip will be discontinued, transition to NovoLog scale every 4 hours.  

Urinalysis was turbid, blood small.





6/30: Patient remains in the intensive care unit on BiPAP.  She has been unable 

to eat only taking a few sips of water.  Chest tubes have been removed, pacer 

wires to be removed today.  Torres catheter is in place, good urine output.  

Patient has been afebrile, heart rate in the 80s, blood pressure 144/76, pulse 

ox 90% on FiO2 80 BiPAP.  Cardiac monitor is sinus rhythm.  Repeat blood work 

reveals WBC 19.9, hemoglobin 10.2, platelet count 266.  Sodium 135, BUN 30 

creatinine 0.67.  AST 83 and ALT 60.  Blood sugars running between 123 and 160.


Chest x-ray reveals stable.  Persistent perihilar and basilar patchy densities 

bilateral small effusions.


Chest ultrasound reveals right pleural effusion 4.9 cm 2.2 cm fluid pocket.





7/1: Patient remains in intensive care unit.  She has just been transitioned to 

AirVo and off BiPAP which she utilized during the night and all day yesterday.  

Patient converted to atrial fibrillation when we walked into the room.  Torres 

remains in place.  We have added in a consult for Dr. Nunez in anticipation the 

patient will be ready for discharge to a week.  She has been afebrile, heart 

rate 101, respiratory rate 28, blood pressure 154/81, pulse ox 96% FiO2 of 90.  

Repeat blood work reveals WBC 19.2, hemoglobin 10.8 and platelet count 375.  

Electrolytes are normal.  Creatinine 0.69.  AST is 103 and ALT 95.  Coronavirus 

PCR not detected.  Patient has been started on cefepime and IV vancomycin 

prophylactically per pulmonary recommendations.  Pro-calcitonin has been ordered

Patient is continued on IV Lasix.





7/2, patient's remains in ICU, still with hypersomnia, nasal CPAP in place, on 

oral prednisone 40 mg, pulse ox 97%, high flow O2, FiO2 90%, vitals are stable, 

slightly tachypneic, with some conversations dyspnea, patient is sleeping in the

recliner often, has trace edema, melatonin 5 mg started, for sleep restoration, 

indwelling Torres catheter, clear urine, with adequate urine output.  Patient 

receiving IV vancomycin and cefepime, chest x-ray, similar airspace opacities 

and right pleural effusion, similar cardiomegaly, migjht need furosemide, if 

shortness of breath and the mass was..  Patient remains in sinus rhythm, no new 

episodes of atrial fibrillation, since the past 24 hours, creatinine 0.6 sodium 

140 hemoglobin 10.5 incentive spirometry, and 750 mL capacity





7/3 patient remains in ICU, still with hypersomnolence, was able to sleep well 

last night in bed, still has nasal CPAP, while drifting off to sleep, no chest 

pain no shortness of breath, leg swelling is improving, no aspirated events, 

cardiac seems to be stable, wbc count, 12.5 creatinine 0.77, glucose 123 blood 

pressure 1:30 to 135 systolic, pulse ox 98%, with 80% FiO2 multiple specialists 

are following, adequate urine output,





7/40, patient's immense nicely, eating breakfast, hypersomnia seems to be 

improved today, patient has more edema arms and legs today, Lasix 40 mg 1 dose,

on 5 L nasal cannula, when not using94% on 5 L and skilled, tachypneic at 25 RR,

blood pressure 125/91labs are stable, except for mild transaminitis,and WBCs 

elevated at 12.2








REVIEW OF SYSTEMS


Constitutional: No fever, no chills, no night sweats.  No weight change.  Noted 

weakness, fatigue no lethargy.  No daytime sleepiness.


EENT: No headache.  No blurred vision or double vision, no loss of vision.  No l

oss of Hearing, no ringing in the ears, no dizziness.  No nasal drainage or 

congestion.  No epistaxis.  No sore throat.


Lungs: Minimal shortness of breath, cough, no sputum production.  No wheezing.


Cardiovascular: Denies chest pain, no lower extremity edema.  No palpitations.  

No paroxysmal nocturnal dyspnea.  No orthopnea.  No lightheadedness or 

dizziness.  No syncopal episodes.


Abdominal: No abdominal pain.  No nausea, vomiting.  No diarrhea.  No 

constipation.  No bloody or tarry stools.  No loss of appetite.


Genitourinary: No dysuria, increased frequency, urgency.  No urinary retention.


Musculoskeletal: No myalgias.  No muscle weakness, no gait dysfunction, no 

frequent falls.  No back pain.  No neck pain.


Integumentary: No wounds, no lesions.  No rash or pruritus.  No unusual 

bruising.  No change in hair or nails.


Neurologic: No aphasia. No facial droop. No change in mentation. No head injury.

No headache. No paralysis. No paresthesia.


Psychiatric: No depression.  No anxiety.  No mood swings.


Endocrine: No abnormal blood sugars.  No weight change.  No excessive sweating 

or thirst.  








PHYSICAL EXAMINATION


Gen: This is a 76-year-old overweight  female.  She is resting in bed 

and appears to be comfortable and in no acute distress.


HEENT: Head is atraumatic, normocephalic. Pupils equal, round. Sclerae is 

anicteric.  


NECK: Supple. No JVD. No lymphadenopathy. No thyromegaly. 


LUNGS: Clear to auscultation. No wheezes or rhonchi.  No intercostal 

retractions.  


HEART: Regular rate and rhythm.  Systolic murmur. 


ABDOMEN: Soft. Bowel sounds are present. No masses.  No tenderness.  Torres 

catheter draining virginia urine


EXTREMITIES:    No calf tenderness.  Trace edema bilateral ankles 2+1 edema


NEUROLOGICAL: Patient is awake, alert and oriented x3. Cranial nerves 2 through 

12 are grossly intact. 





ASSESSMENT AND PLAN


1.  Severe coronary artery disease status post 2 vessel CABG 6/27.  Continue 

current plan per cardiothoracic team.  Continue aspirin 325 mg daily, Lipitor 80

mg daily, Plavix 75 mg daily, continue Norco as needed for pain, DuoNeb 

treatments 4 times daily and as needed, Lopressor increased to 50 mg twice 

daily.  Patient is also been started on cefepime and IV vancomycin, prednisone 

40 mg daily.  Pro-calcitonin pending.





2.  Acute hypoxic respiratory failure.  Patient has been on BiPAP, transition to

 AirVo on 7/1. 





3.  Worsening bilateral lung infiltrates and small right pleural effusion.  

Patient has been started on cefepime and IV vancomycin, pro-calcitonin, Lasix 20

g IV every 12 hours.





4.  Hypertension.  Patient started on lisinopril 5 mg daily, continue Lopressor,

hydralazine as needed, Lasix every 12 hours IV 20 mg





5.  Hyperlipidemia.  Continue atorvastatin.





6.  Carotid artery disease.  Continue aspirin, atorvastatin.





7.  Hyperglycemia.  Continue patient on NovoLog scale every 4 hours.





8.  New onset paroxysmal atrial fibrillation.  Lopressor was increased to 50 mg 

twice daily.





9.  GI prophylaxis.  Protonix 40 mg daily





10.  DVT prophylaxis.  Heparin subcu.














DISCHARGE PLAN


Most likely a good candidate for inpatient rehab.  Consult with Dr. Nunez.


                               Current Medications





Acetaminophen (Acetaminophen Tab 325 Mg Tab)  650 mg PO Q4HR PRN


   PRN Reason: Fever and/ or Pain


Albuterol/Ipratropium (Ipratropium-Albuterol 3 Ml Neb)  3 ml INHALATION RT-Q2H 

PRN


   PRN Reason: Shortness Of Breath Or Wheezing


   Last Admin: 06/30/22 00:45 Dose:  3 ml


   


Albuterol/Ipratropium (Ipratropium-Albuterol 3 Ml Neb)  3 ml INHALATION RT-QID 

[facility]


   Last Admin: 07/03/22 10:44 Dose:  3 ml


   


Aspirin (Aspirin 325 Mg Tab)  325 mg PO DAILY [facility]


   Last Admin: 07/03/22 07:43 Dose:  325 mg


   


Atorvastatin Calcium (Atorvastatin 80 Mg Tab)  80 mg PO DAILY [facility]


   Stop: 07/25/22 09:01


   Last Admin: 07/03/22 07:43 Dose:  80 mg


   


Benzocaine/Menthol (Benzocaine/Menthol Lozeng 1 Each Lozenge)  1 each MUCOUS MEM

Q2H PRN


   PRN Reason: Sore Throat


Bisacodyl (Bisacodyl 10 Mg Supp)  10 mg RECTAL DAILY PRN


   PRN Reason: Constipation


   Last Admin: 07/02/22 07:57 Dose:  10 mg


   


Clopidogrel Bisulfate (Clopidogrel 75 Mg Tab)  75 mg PO DAILY [facility]


   Last Admin: 07/03/22 07:44 Dose:  75 mg


   


Heparin Sodium (Porcine) (Heparin Sodium,Porcine/Pf 5,000 Unit/0.5 Ml Syringe)  

5,000 unit SQ Q8HR [facility]


   Last Admin: 07/03/22 07:44 Dose:  5,000 unit


   


Hydralazine HCl (Hydralazine Hcl 20 Mg/Ml 1 Ml Vial)  10 mg IVP Q1H PRN


   PRN Reason: Blood Pressure - High


   Last Admin: 07/03/22 03:23 Dose:  10 mg


   


Calcium Gluconate/Sodium (Chloride 2 gm/ IV Solution)  100 mls @ 100 mls/hr IVPB

ONCE PRN


   PRN Reason: Ionized Calcium less than 4.4


   Stop: 07/07/22 17:34


Cefepime HCl 2 gm/ Sodium (Chloride)  100 mls @ 25 mls/hr IVPB Q8HR [facility]


   Last Admin: 07/03/22 07:44 Dose:  25 mls/hr


   


Vancomycin HCl 2,000 mg/ (Sodium Chloride)  500 mls @ 167 mls/hr IVPB Q12H [facility]


   Last Admin: 07/03/22 11:10 Dose:  167 mls/hr


   


Insulin Aspart (Insulin Aspart (Novolog) 100 Unit/Ml Vial)  0 unit SQ Q4H [facility]; P

rotocol


   Last Admin: 07/03/22 11:10 Dose:  4 unit


   


Lisinopril (Lisinopril 10 Mg Tab)  10 mg PO BID [facility]


   Last Admin: 07/03/22 07:43 Dose:  10 mg


   


Magnesium Hydroxide (Magnesium Hydroxide 2,400 Mg/10 Ml Cup)  2,400 mg PO BID 

PRN


   PRN Reason: Constipation


   Last Admin: 07/03/22 06:20 Dose:  2,400 mg


   


Melatonin (Melatonin 5 Mg Tablet)  5 mg PO HS [facility]


   Last Admin: 07/02/22 20:55 Dose:  5 mg


   


Metoclopramide HCl (Metoclopramide 5 Mg/Ml 2 Ml Vial)  10 mg IVP Q4H PRN


   PRN Reason: Nausea And Vomiting


   Last Admin: 06/29/22 06:57 Dose:  10 mg


   


Metoprolol Tartrate (Metoprolol Tartrate 50 Mg Tab)  50 mg PO BID [facility]


   Last Admin: 07/03/22 06:58 Dose:  50 mg


   


Miscellaneous Information (Potassium Replacement Protocol 1 Each Misc)  1 each 

MISCELLANE DAILY PRN; Protocol


   PRN Reason: Per Protocol


Miscellaneous Information (Magnesium Replacement Protocol 1 Each Misc)  1 each 

MISCELLANE DAILY PRN; Protocol


   PRN Reason: Per Protocol


Ondansetron HCl (Ondansetron 4 Mg/2 Ml Vial)  4 mg IVP Q6HR PRN


   PRN Reason: Nausea And Vomiting


   Last Admin: 06/27/22 22:57 Dose:  4 mg


   


Pantoprazole Sodium (Pantoprazole 40 Mg Tablet)  40 mg PO [id number] [facility]


   Last Admin: 07/03/22 06:20 Dose:  40 mg


   


Prednisone (Prednisone 20 Mg Tab)  40 mg PO DAILY [facility]


   Last Admin: 07/03/22 07:43 Dose:  40 mg


   


Senna/Docusate Sodium (Sennosides-Docusate Sodium 1 Each Tab)  2 each PO HS [facility]


   Last Admin: 07/02/22 20:55 Dose:  2 each


   


Sodium Chloride (Sodium Chloride 0.9% Flush 10 Ml Syringe)  10 ml IV BID [facility]


   Last Admin: 07/03/22 08:17 Dose:  Not Given


   


                              Abnormal Lab Results











  07/03/22 07/03/22 07/04/22





  16:27 19:42 01:36


 


WBC   


 


RBC   


 


Hgb   


 


Hct   


 


MCV   


 


MCH   


 


MCHC   


 


RDW   


 


Plt Count   


 


MPV   


 


Neutrophils %   


 


Lymphocytes %   


 


Monocytes %   


 


Eosinophils %   


 


Basophils %   


 


Neutrophils #   


 


Lymphocytes #   


 


Monocytes #   


 


Eosinophils #   


 


Basophils #   


 


Hypochromasia   


 


Sodium   


 


Potassium   


 


Chloride   


 


Carbon Dioxide   


 


Anion Gap   


 


BUN   


 


Creatinine   


 


Est GFR (CKD-EPI)AfAm   


 


Est GFR (CKD-EPI)NonAf   


 


Glucose   


 


POC Glucose (mg/dL)  119 H  196 H  126 H


 


POC Glu Operater ID  Caty Polanco Daniel Marcath, Daniel


 


Calcium   


 


Total Bilirubin   


 


AST   


 


ALT   


 


Alkaline Phosphatase   


 


Total Protein   


 


Albumin   














  07/04/22 07/04/22 07/04/22





  04:12 05:34 05:34


 


WBC    12.2 H


 


RBC    3.50 L


 


Hgb    10.3 L


 


Hct    32.0 L


 


MCV    91.7


 


MCH    29.6


 


MCHC    32.3


 


RDW    14.8


 


Plt Count    360


 


MPV    7.5


 


Neutrophils %    84


 


Lymphocytes %    7


 


Monocytes %    7


 


Eosinophils %    0


 


Basophils %    0


 


Neutrophils #    10.2 H


 


Lymphocytes #    0.9 L


 


Monocytes #    0.9


 


Eosinophils #    0.0


 


Basophils #    0.0


 


Hypochromasia    Slight


 


Sodium   142 


 


Potassium   4.0 


 


Chloride   111 H 


 


Carbon Dioxide   22 


 


Anion Gap   9 


 


BUN   42 H 


 


Creatinine   0.79 


 


Est GFR (CKD-EPI)AfAm   85 


 


Est GFR (CKD-EPI)NonAf   74 


 


Glucose   108 H 


 


POC Glucose (mg/dL)  112 H  


 


POC Glu Operater ID  Johnathon Puckett  


 


Calcium   8.5 


 


Total Bilirubin   0.8 


 


AST   49 H 


 


ALT   75 H 


 


Alkaline Phosphatase   162 H 


 


Total Protein   5.9 L 


 


Albumin   3.5 














  07/04/22





  11:38


 


WBC 


 


RBC 


 


Hgb 


 


Hct 


 


MCV 


 


MCH 


 


MCHC 


 


RDW 


 


Plt Count 


 


MPV 


 


Neutrophils % 


 


Lymphocytes % 


 


Monocytes % 


 


Eosinophils % 


 


Basophils % 


 


Neutrophils # 


 


Lymphocytes # 


 


Monocytes # 


 


Eosinophils # 


 


Basophils # 


 


Hypochromasia 


 


Sodium 


 


Potassium 


 


Chloride 


 


Carbon Dioxide 


 


Anion Gap 


 


BUN 


 


Creatinine 


 


Est GFR (CKD-EPI)AfAm 


 


Est GFR (CKD-EPI)NonAf 


 


Glucose 


 


POC Glucose (mg/dL)  208 H


 


POC Glu Operater ID  Cate Juarez


 


Calcium 


 


Total Bilirubin 


 


AST 


 


ALT 


 


Alkaline Phosphatase 


 


Total Protein 


 


Albumin 








                               Vital Signs - 24 hr











  07/03/22 07/03/22 07/03/22





  14:00 14:56 15:00


 


Temperature   


 


Pulse Rate 85 85 85


 


Respiratory 19  29 H





Rate   


 


Blood Pressure 147/82  145/84


 


O2 Sat by Pulse 94 L 94 L 94 L





Oximetry   


 


Fraction of  45 





Inspired Oxygen   





(FIO2)   














  07/03/22 07/03/22 07/03/22





  15:05 15:51 16:00


 


Temperature   98.4 F


 


Pulse Rate 84  85


 


Respiratory  22 24





Rate   


 


Blood Pressure   146/85


 


O2 Sat by Pulse   94 L





Oximetry   


 


Fraction of   





Inspired Oxygen   





(FIO2)   














  07/03/22 07/03/22 07/03/22





  17:00 18:00 19:00


 


Temperature   


 


Pulse Rate 87 87 68


 


Respiratory 22 26 H 27 H





Rate   


 


Blood Pressure 154/72 143/94 117/83


 


O2 Sat by Pulse 94 L 96 95





Oximetry   


 


Fraction of   





Inspired Oxygen   





(FIO2)   














  07/03/22 07/03/22 07/03/22





  19:28 19:43 19:54


 


Temperature   


 


Pulse Rate 70  72


 


Respiratory  27 H 





Rate   


 


Blood Pressure   


 


O2 Sat by Pulse 96  





Oximetry   


 


Fraction of 45  





Inspired Oxygen   





(FIO2)   














  07/03/22 07/03/22 07/03/22





  20:00 21:00 22:00


 


Temperature 9738 F H  


 


Pulse Rate 70 73 66


 


Respiratory 19 23 24





Rate   


 


Blood Pressure 131/56 156/89 154/92


 


O2 Sat by Pulse 96 92 L 92 L





Oximetry   


 


Fraction of 45  





Inspired Oxygen   





(FIO2)   














  07/03/22 07/03/22 07/03/22





  23:00 23:03 23:12


 


Temperature   


 


Pulse Rate 70  


 


Respiratory 28 H 20 





Rate   


 


Blood Pressure 144/80  


 


O2 Sat by Pulse 92 L  





Oximetry   


 


Fraction of   45





Inspired Oxygen   





(FIO2)   














  07/04/22 07/04/22 07/04/22





  00:00 01:00 02:00


 


Temperature 97.4 F L  


 


Pulse Rate 63 62 63


 


Respiratory 21 12 16





Rate   


 


Blood Pressure 143/94 126/95 140/80


 


O2 Sat by Pulse 95 94 L 95





Oximetry   


 


Fraction of 45  





Inspired Oxygen   





(FIO2)   














  07/04/22 07/04/22 07/04/22





  03:00 03:19 04:00


 


Temperature   98 F


 


Pulse Rate 66  67


 


Respiratory 21  20





Rate   


 


Blood Pressure 138/85  151/79


 


O2 Sat by Pulse 95  95





Oximetry   


 


Fraction of  45 45





Inspired Oxygen   





(FIO2)   














  07/04/22 07/04/22 07/04/22





  05:00 06:00 07:00


 


Temperature   


 


Pulse Rate 70 73 72


 


Respiratory 22 24 24





Rate   


 


Blood Pressure 150/79 161/79 151/79


 


O2 Sat by Pulse 95 95 96





Oximetry   


 


Fraction of   





Inspired Oxygen   





(FIO2)   














  07/04/22 07/04/22 07/04/22





  07:27 08:00 08:08


 


Temperature  97.5 F L 


 


Pulse Rate  68 70


 


Respiratory  29 H 





Rate   


 


Blood Pressure  153/69 


 


O2 Sat by Pulse 96 96 





Oximetry   


 


Fraction of   





Inspired Oxygen   





(FIO2)   














  07/04/22 07/04/22 07/04/22





  08:19 09:00 10:00


 


Temperature   


 


Pulse Rate 70 75 76


 


Respiratory  30 H 24





Rate   


 


Blood Pressure  129/60 147/76


 


O2 Sat by Pulse  93 L 95





Oximetry   


 


Fraction of   





Inspired Oxygen   





(FIO2)   














  07/04/22 07/04/22 07/04/22





  11:00 12:00 12:17


 


Temperature  98.4 F 


 


Pulse Rate 65 64 86


 


Respiratory 23 25 H 





Rate   


 


Blood Pressure 153/84 125/91 


 


O2 Sat by Pulse 93 L 94 L 





Oximetry   


 


Fraction of   





Inspired Oxygen   





(FIO2)   














  07/04/22





  12:29


 


Temperature 


 


Pulse Rate 86


 


Respiratory 





Rate 


 


Blood Pressure 


 


O2 Sat by Pulse 





Oximetry 


 


Fraction of 





Inspired Oxygen 





(FIO2) 














Objective





- Vital Signs


Vital signs: 


                                   Vital Signs











Temp  98.4 F   07/04/22 12:00


 


Pulse  86   07/04/22 12:29


 


Resp  25 H  07/04/22 12:00


 


BP  125/91   07/04/22 12:00


 


Pulse Ox  94 L  07/04/22 12:00


 


FiO2  45   07/04/22 04:00








                                 Intake & Output











 07/03/22 07/04/22 07/04/22





 18:59 06:59 18:59


 


Intake Total 1540 459 987


 


Output Total 650 675 200


 


Balance 890 -216 787


 


Weight  109.5 kg 


 


Intake:   


 


   459 267


 


    Cefepime 2 gm In Sodium 200 125 100





    Chloride 0.9% 100 ml @ 25   





    mls/hr IVPB Q8HR [facility] Rx#   





    [id number]   


 


    Vancomycin 2,000 mg In 500  





    Sodium Chloride 0.9% 500   





    ml 500 ml @ 167 mls/hr   





    IVPB Q12H [facility] Rx#:   





    [id number]   


 


    Vancomycin 2,000 mg In  334 167





    Sodium Chloride 0.9% 500   





    ml 500 ml @ 167 mls/hr   





    IVPB Q16H [facility] Rx#:   





    [id number]   


 


  Oral 840  720


 


Output:   


 


  Urine 650 675 200


 


Other:   


 


  Voiding Method Bedside Commode Bedside Commode 


 


  # Voids  1 0


 


  # Bowel Movements 1  








                       ABP, PAP, CO, CI - Last Documented











Arterial Blood Pressure        281/281


 


Pulmonary Artery Pressure      43/24


 


Cardiac Output                 4


 


Cardiac Index                  1.9

















- Labs


CBC & Chem 7: 


                                 07/04/22 05:34





                                 07/04/22 05:34


Labs: 


                  Abnormal Lab Results - Last 24 Hours (Table)











  07/03/22 07/03/22 07/04/22 Range/Units





  16:27 19:42 01:36 


 


WBC     (3.8-10.6)  k/uL


 


RBC     (3.80-5.40)  m/uL


 


Hgb     (11.4-16.0)  gm/dL


 


Hct     (34.0-46.0)  %


 


Neutrophils #     (1.3-7.7)  k/uL


 


Lymphocytes #     (1.0-4.8)  k/uL


 


Chloride     ()  mmol/L


 


BUN     (7-17)  mg/dL


 


Glucose     (74-99)  mg/dL


 


POC Glucose (mg/dL)  119 H  196 H  126 H  ()  mg/dL


 


AST     (14-36)  U/L


 


ALT     (4-34)  U/L


 


Alkaline Phosphatase     ()  U/L


 


Total Protein     (6.3-8.2)  g/dL














  07/04/22 07/04/22 07/04/22 Range/Units





  04:12 05:34 05:34 


 


WBC    12.2 H  (3.8-10.6)  k/uL


 


RBC    3.50 L  (3.80-5.40)  m/uL


 


Hgb    10.3 L  (11.4-16.0)  gm/dL


 


Hct    32.0 L  (34.0-46.0)  %


 


Neutrophils #    10.2 H  (1.3-7.7)  k/uL


 


Lymphocytes #    0.9 L  (1.0-4.8)  k/uL


 


Chloride   111 H   ()  mmol/L


 


BUN   42 H   (7-17)  mg/dL


 


Glucose   108 H   (74-99)  mg/dL


 


POC Glucose (mg/dL)  112 H    ()  mg/dL


 


AST   49 H   (14-36)  U/L


 


ALT   75 H   (4-34)  U/L


 


Alkaline Phosphatase   162 H   ()  U/L


 


Total Protein   5.9 L   (6.3-8.2)  g/dL














  07/04/22 Range/Units





  11:38 


 


WBC   (3.8-10.6)  k/uL


 


RBC   (3.80-5.40)  m/uL


 


Hgb   (11.4-16.0)  gm/dL


 


Hct   (34.0-46.0)  %


 


Neutrophils #   (1.3-7.7)  k/uL


 


Lymphocytes #   (1.0-4.8)  k/uL


 


Chloride   ()  mmol/L


 


BUN   (7-17)  mg/dL


 


Glucose   (74-99)  mg/dL


 


POC Glucose (mg/dL)  208 H  ()  mg/dL


 


AST   (14-36)  U/L


 


ALT   (4-34)  U/L


 


Alkaline Phosphatase   ()  U/L


 


Total Protein   (6.3-8.2)  g/dL

## 2022-07-04 NOTE — XR
EXAMINATION TYPE: XR chest 1V portable

 

DATE OF EXAM: 7/4/2022 5:39 AM

 

COMPARISON: Chest radiograph from one day prior.

 

TECHNIQUE: XR chest 1V portable Portable AP radiograph of the chest..

 

CLINICAL INDICATION:Female, 76 years old with history of Postoperative cardiac surgery; 

 

FINDINGS: 

Lungs/Pleura: No evidence of focal consolidation or pneumothorax. Blunting of the costophrenic angles
 is present. 

Pulmonary vascularity: Pulmonary vascular congestion.

Heart/mediastinum: Cardiomediastinal silhouette is enlarged and stable. Left atrial appendage occlusi
on devices present. 

Musculoskeletal: No acute osseous pathology. Midline sternotomy wires are noted and stable.

 

IMPRESSION: 

Stable exam given patient positioning with pulmonary vascular congestion, cardiomegaly and small bila
teral pleural effusions.

## 2022-07-04 NOTE — P.PN
Subjective


Progress Note Date: 07/04/22





PROGRESS NOTE


The patient is a 76-year-old female with history of carotid vascular disease who

presented with symptoms of progressive chest discomfort and an abnormal MPI.  

Underwent cardiac catheterization on the 24th and was found to have severe 

distal left main disease with mild-to-moderate disease in the RCA.  She is 

scheduled to undergo CABG today.  Her echo showed an ejection fraction of 50% 

with mild lateral wall hypokinesis and mild-to-moderate mitral regurgitation.  

She's feeling well this morning, denies any chest discomfort or dizziness.  She 

is in sinus mechanism.  Scheduled to undergo CABG this afternoon.  She continues

to be on aspirin, Lipitor 80 mg daily, isosorbide mononitrate 15 mg daily, 

lisinopril 30 mg daily, metoprolol succinate 25 mg daily





June 28:


The patient underwent off-pump CABG yesterday with LIMA to the LAD and SVG to 

the OM with ligation of the left atrial appendage and placement of intra-aortic 

balloon pump because of worsening ischemia at the start of surgery.  She had 

atrial arrhythmia requiring cardioversion earlier.  She is extubated, in sinus 

mechanism, intra-aortic balloon pump at 1:2 with good blood pressure and urinary

output.  She is on no vasopressor.  She is awake, alert and following commands. 

At the end of the procedure she had a good ejection fraction with mild mitral 

regurgitation.


She continues to be on aspirin, Lipitor 80 mg daily, Plavix 75 mg daily, 

metoprolol tartrate 12-1/2 mg twice a day.





June 29:


The patient intra-aortic balloon pump was removed yesterday.  She was hypoxemic 

during the night requiring BiPAP.  She denies any chest discomfort.  She 

continues to be in sinus mechanism.  She denies any chest discomfort, dizziness 

or palpitations.  She received diuretics yesterday with good output.  She 

continues to be on aspirin, Plavix, low-dose dopamine, metoprolol 12-1/2 mg 

twice a day, Lipitor 80 mg daily.  Her chest x-ray shows bilateral pleural 

effusion





June 30:


The patient is awake and alert, continues to be in sinus mechanism, she 

continues to be on the BiPAP but feels better.  Her breathing is stable.  She is

denying any chest discomfort, dizziness or palpitations.  She has no nausea.  

She has good urinary output.  She continues to be on aspirin once a day, 

amiodarone 400 mg twice a day, Lipitor 80 mg daily, Plavix 75 mg daily, insulin,

metoprolol tartrate 25 mg twice a day.  Her chest x-ray shows bilateral pleural 

effusion with mild congestion, she diuresed well yesterday the IV Lasix





July 1:


The patient continues to be on a BiPAP, hypoxic off of it.  She had an episode 

of atrial fibrillation back in sinus mechanism to be on amiodarone.  Her blood 

pressure is stable and has not required a suppressive.  She denies any chest 

discomfort but she is dyspneic.  She has no nausea or vomiting.  Her urinary 

output has been stable.  Her chest x-ray shows worsening infiltrate bilaterally.

 She continues to be on amiodarone 400 mg twice a day, aspirin, Lipitor 80 mg 

daily, Plavix 75 mg daily, Lasix 20 mg IV every 12 hours, metoprolol tartrate 25

mg 3 times a day





July 2:


She's feeling better today, sitting up in the chair, she continues to be on high

flow during the day but BiPAP during the night.  Her blood pressure has been on 

the higher side.  She denies any chest discomfort, dizziness or palpitations.  

Hemodynamically she is stable, in sinus mechanism.  She has a good urinary 

output.


She continues to be on aspirin once a day, Lipitor 80 mg daily, Plavix 75 mg 

daily, insulin, Zestril 5 mg daily, metoprolol 50 mg twice a day


July 3:


The patient is feeling better today, her breathing is better she is on airflow, 

is in sinus mechanism.  She denies any chest discomfort, dizziness or palp

itations.  She denies any nausea or vomiting.  She ambulated.  She is doing 

better with the incentive spirometry.  Hemodynamically she is stable.  She 

continues to be on aspirin, Plavix 75 mg daily, Lipitor 80 mg daily, lisinopril 

10 mg twice a day, metoprolol 50 mg twice a day


July 24:


The patient is sitting up in the chair him a feels better and lower oxygen 

supply.  Her O2 sats are stable.  She is in sinus mechanism.  She denies any 

chest discomfort or dizziness, no nausea or vomiting.  Hemodynamically she is 

stable on no vasopressors.  She is using incentive spirometry.  No further 

episodes of atrial fibrillation She continues to be on aspirin, Plavix 75 mg 

daily, Lipitor 80 mg daily, Zestril 10 mg twice a day, metoprolol 50 mg twice a 

day








PHYSICAL EXAMINATION:


Blood pressure 153/69 with a heart rate in the 70s


LUNGS: Mild decrease in the breath sounds at the bases


HEART: Regular rate and rhythm, S1, S2. No S3.  systolic murmur at the base


ABDOMEN: Soft, nontender, no organomegaly


EXTREMETIES: No edema, 


LAB: BUN 42, creatinine 0.79, potassium 4.0, hemoglobin 10.3


Pending


IMPRESSION:


1.  Status post CABG, LIMA to the LAD and SVG to obtuse marginal branch with 

known severe left main disease.


2.  Post removal of Intra-aortic balloon pump


3.  History of hyperlipidemia 


4.  Hypoxemia with lung congestion, probable lung injury post CABG cannot rule 

out infection, no significant fluid overload, improved


5.  Hypertension remains elevated








PLAN:


1.  Add Norvasc


2.  Increase physical activity


3.  Follow blood pressure


4.  Continue incentive spirometry








Objective





- Vital Signs


Vital signs: 


                                   Vital Signs











Temp  97.5 F L  07/04/22 08:00


 


Pulse  70   07/04/22 08:19


 


Resp  20   07/04/22 08:00


 


BP  153/69   07/04/22 08:00


 


Pulse Ox  96   07/04/22 08:00


 


FiO2  45   07/04/22 04:00








                                 Intake & Output











 07/03/22 07/04/22 07/04/22





 18:59 06:59 18:59


 


Intake Total 1540 459 167


 


Output Total 650 675 0


 


Balance 890 -216 167


 


Weight  109.5 kg 


 


Intake:   


 


   459 167


 


    Cefepime 2 gm In Sodium 200 125 0





    Chloride 0.9% 100 ml @ 25   





    mls/hr IVPB Q8HR [facility] Rx#   





    [id number]   


 


    Vancomycin 2,000 mg In 500  





    Sodium Chloride 0.9% 500   





    ml 500 ml @ 167 mls/hr   





    IVPB Q12H [facility] Rx#:   





    [id number]   


 


    Vancomycin 2,000 mg In  334 167





    Sodium Chloride 0.9% 500   





    ml 500 ml @ 167 mls/hr   





    IVPB Q16H [facility] Rx#:   





    [id number]   


 


  Oral 840  


 


Output:   


 


  Urine 650 675 0


 


Other:   


 


  Voiding Method Bedside Commode Bedside Commode 


 


  # Voids  1 0


 


  # Bowel Movements 1  








                       ABP, PAP, CO, CI - Last Documented











Arterial Blood Pressure        281/281


 


Pulmonary Artery Pressure      43/24


 


Cardiac Output                 4


 


Cardiac Index                  1.9

















- Labs


CBC & Chem 7: 


                                 07/04/22 05:34





                                 07/04/22 05:34


Labs: 


                  Abnormal Lab Results - Last 24 Hours (Table)











  07/03/22 07/03/22 07/03/22 Range/Units





  11:05 16:27 19:42 


 


WBC     (3.8-10.6)  k/uL


 


RBC     (3.80-5.40)  m/uL


 


Hgb     (11.4-16.0)  gm/dL


 


Hct     (34.0-46.0)  %


 


Neutrophils #     (1.3-7.7)  k/uL


 


Lymphocytes #     (1.0-4.8)  k/uL


 


Chloride     ()  mmol/L


 


BUN     (7-17)  mg/dL


 


Glucose     (74-99)  mg/dL


 


POC Glucose (mg/dL)  202 H  119 H  196 H  ()  mg/dL


 


AST     (14-36)  U/L


 


ALT     (4-34)  U/L


 


Alkaline Phosphatase     ()  U/L


 


Total Protein     (6.3-8.2)  g/dL














  07/04/22 07/04/22 07/04/22 Range/Units





  01:36 04:12 05:34 


 


WBC     (3.8-10.6)  k/uL


 


RBC     (3.80-5.40)  m/uL


 


Hgb     (11.4-16.0)  gm/dL


 


Hct     (34.0-46.0)  %


 


Neutrophils #     (1.3-7.7)  k/uL


 


Lymphocytes #     (1.0-4.8)  k/uL


 


Chloride    111 H  ()  mmol/L


 


BUN    42 H  (7-17)  mg/dL


 


Glucose    108 H  (74-99)  mg/dL


 


POC Glucose (mg/dL)  126 H  112 H   ()  mg/dL


 


AST    49 H  (14-36)  U/L


 


ALT    75 H  (4-34)  U/L


 


Alkaline Phosphatase    162 H  ()  U/L


 


Total Protein    5.9 L  (6.3-8.2)  g/dL














  07/04/22 Range/Units





  05:34 


 


WBC  12.2 H  (3.8-10.6)  k/uL


 


RBC  3.50 L  (3.80-5.40)  m/uL


 


Hgb  10.3 L  (11.4-16.0)  gm/dL


 


Hct  32.0 L  (34.0-46.0)  %


 


Neutrophils #  10.2 H  (1.3-7.7)  k/uL


 


Lymphocytes #  0.9 L  (1.0-4.8)  k/uL


 


Chloride   ()  mmol/L


 


BUN   (7-17)  mg/dL


 


Glucose   (74-99)  mg/dL


 


POC Glucose (mg/dL)   ()  mg/dL


 


AST   (14-36)  U/L


 


ALT   (4-34)  U/L


 


Alkaline Phosphatase   ()  U/L


 


Total Protein   (6.3-8.2)  g/dL

## 2022-07-04 NOTE — P.PN
Subjective


Progress Note Date: 07/04/22











This is a patient who had a recent heart catheterization, showing a critical and

complex lesion involving the distal left main coronary artery, also, the ostial 

left circumflex, and ostial LAD.  The lesions appear to be in the range of 80-

90%, and the patient was also noted to have elevated left ventricular end-

diastolic filling pressures.  The patient is currently being evaluated by 

cardiothoracic surgery for possible bypass grafting.  We will reassess to see 

the patient preoperatively, and evaluate her lung function.  The patient is a 

lifelong nonsmoker.  She has no history of lung disease.  Based on her FEV1, and

her MVV, she was in the low operative risk range.  Outpatient medications includ

ed amlodipine, aspirin, metoprolol, lisinopril, zinc, vitamin D3, ascorbic acid,

lactulose, and indoor.  Her only major medical problem is hypertension.  She 

does have a history of previous coronavirus infection and was seen by my partner

in December 2021.  CBC is completely normal.  PTT is 39.7.  Sodium 141, 

potassium 3.7, chlorides 111, CO2 24, BUN 12, creatinine 0.63.  Cholesterol was 

182.  Urine was negative.  Testing for coronavirus was negative.  Chest x-ray 

shows mild interstitial edema.  Currently she is on room air.





Progress note dated 06/26/2022.





76-year-old female we saw yesterday in consultation.  The patient has 

significant coronary disease, involving the left main coronary artery.  The 

patient is apparently going to have open heart surgery one day this week.  The 

exact day has not been decided yet.  Clinically, she's.  Stable.  She is on room

air.  She is a lifelong nonsmoker.  Lung function would suggest that she's at a 

very low increased operative risk based on her FEV1 and MVV.  Labs today show 

white count of 6.1, with a normal hemoglobin, hematocrit, and platelet count.  

The patient's PTT is 60.2.  Sodium 141, potassium 3.6, chlorides 111, CO2 24, 

BUN 12, creatinine 0.72.








06/27/2022, the patient is awaiting bypass surgery.  The patient is calm and 

comfortable.  No respiratory difficulties whatsoever.  She is using incentive 

spirometer.  Her preop FEV1 is order of 72% of predicted.  No angina.  No 

palpitations.  No chest pain.  She is hemodynamically stable at this point in 

time.  She remains on IV heparin.  I did introduce myself and I will take care o

f this patient postop, managed to ventilator and the necessity pulmonary care 

following her thoracotomy bypass surgery.no other active issues for now.  The 

patient was sent comes at 6.2 with a hemoglobin 4.5.  She is on IV heparin with 

a PTT of 55.  BUN is at 12 with a creatinine 0.6 and the sodium level is at 140.





06/28/2022, I'm seeing the patient for a follow-up.  The patient was taken to 

the operating room yesterday and the patient underwent an off-pump coronary 

artery bypass surgery with LIMA to LAD and saphenous vein graft to obtuse 

marginal.  The patient also had a left facial appendage clipping.  Estimated 

blood loss was 400 mL.  The patient received a total of 3 L of intraoperative IV

fluids.  Note that the patient was having issues with hypotension and there was 

some interval worsening of the mitral regurgitation intraoperatively.  At that 

point, it was decided to insert an intra-aortic balloon pump and this was done 

through the right common femoral artery.  Once the intra-aortic balloon pump was

initiated, the patient's hemodynamics improved dramatically.  The patient was 

given a postop echo cardiac exam that showed a mild MR and good ejection 

fraction.  The intra-aortic balloon pump was placed on a one-to-one augmentation

and following that the patient was brought into the intensive care unit.  The 

patient subsequently was weaned off the sedation and the patient was extubated 

without any major difficulties within a few hours.  The patient was extubated 

and the fourth hours after arriving to the ICU.  The patient had with good 

weaning parameters.  The patient had a blood gas that showed adequate 

oxygenation and ventilation.  Based on that, the patient was extubated.  This 

morning, the patient remains on oxygen at 6 L per minute nasal cannula.  The 

chest x-ray showing cardiomegaly.  The patient is a mediastinal and left pleural

chest tube.  Output from the chest tubes have been 250 mL from the mediastinum 

and 280 from the left pleural since surgery.  No evidence of any air leak.  The 

chest x-ray shows no evidence of any pneumothorax.  Hemodynamically, the patient

is currently receiving intra-aortic balloon pump augmentation of 1-2.  The 

patient has adequate cardiac output of 4.3 and an index of 2.  She is on no 

pressors for now.  The intra-aortic balloon pump will be removed for now.  The 

patient is stable to systemic adequate blood pressure was the patient being off 

the balloon pump.  Urine output is in order of 20 mL an hour.  The patient is a

febrile.  The patient is awake and following commands thoracic questions 

appropriately.  Pulmonary artery pressures are 38 over 18 mmHg.  Lower blood 

work from work today is showing a sodium of 139, potassium of 4, bicarb of 26, 

BUN of 14 with a creatinine of 0.6.  The white cell count is at 9.7 with a 

hemoglobin of 9.3 and a platelet count of 195.  AST is 85 with an ALT of 60 and 

alkaline phosphatase of 41.  Magnesium level is at 2.1.  Note that the patient 

had a run of atrial fibrillation intraoperatively.  The patient was loaded with 

amiodarone and currently the patient is on 0.5 mg per minute of amiodarone 

infusion.  The patient is in a normal sinus rhythm.  The patient is also on an 

insulin drip running at 2.5 units an hour with adequate blood sugar control.  

The patient's of lactated Ringer at the rate of 50 mL an hour.  As mentioned, 

awake and alert and the sternum is dry clean and intact.  No other issues 

otherwise for now.  Breaks





06/29/2022, I'm seeing the patient for a follow-up.  The patient is postop day 

#2.  Note that this patient was extubated successfully without any issues.  

Subsequently, as of yesterday afternoon, the patient became progressively more 

hypoxic.  Note that during the day, the patient was given IV albumin a total of 

750 mL to improve her urine output.  This did help and ultimately dopamine was 

added at renal dose to improve her urine output.  Subsequently, she became more 

hypoxic and the patient became more short of breath and initially she went up to

15 L high flow and later on the patient was placed on a BiPAP.  She was kept on 

BiPAP throughout the night and the patient is currently on a BiPAP at a pressure

of 12/5 cm of water and FiO2 of on the percent.  She is able to generate tidal 

volumes above 500.  Respiratory rate is in the mid 20s.  Her breathing is 

slightly labored even on the BiPAP, yet she is able to tolerate the machine w

ithout any major difficulties and she is awake and alert and she is following 

commands and answering questions and she is neurologically intact.  At the same 

time, the patient had a blood.  This morning that showed a pH of 7.97.49 with a 

pCO2 of 35 and a pO2 of 55 and this was on FiO2 of 85% and based on that the 

FiO2 was brought up to 100%.  The chest x-ray showing cardiomegaly.  There is 

some infiltration of the right lung and some atelectatic changes in lung bases. 

There is concern for an evolving right lung pneumonia although this is quite 

early in the postoperative course.  In any rate, the patient is being diuresed 

for now and the patient is producing adequate amount of urine output.  She did 

have a spike of temperature 100.4 yesterday and currently she is afebrile.  The 

white cell count today is at 12.5 which is comparable to yesterday with a 

hemoglobin of 9.8 and a platelet count of 192.  In terms of the chest tubes, the

patient is a mediastinal and left pleural chest tube, output has been noted and 

it's in the order of overnight 80 mL overnight and 400 mL over the past 24 hours

in the mediastinal chest tube, 6 disease overnight and 400 mL over the past 24 

hours in the left pleural chest tube.  Mobile-Brunilda catheter still in place.  The 

cardiac output is currently at 4.3 with an index of 2.0.  The PA diastolic is in

the order of extreme millimeters of mercury.  The patient otherwise has normal 

renal function.  Creatinine is at 0.6.  Sodium is at 136.  She is obviously n

othing by mouth at this point in time as the patient is BiPAP dependent.  

Cardiac rhythm is still sinus and the patient remains on amiodarone.  She 

remains on aspirin and Plavix.  Routine postoperative care is being implemented 

this point in time.  Insulin drip is off and the patient is currently on slice K

coverage every 4 hours.





06/30/2022, I'm seeing the patient for a follow-up.  The patient is postop day 

#3.  Note that after a successful extubation, the patient was placed on a BiPAP 

because of ongoing hypoxic respiratory failure.  The patient stayed on BiPAP 

throughout the day yesterday and this morning the patient remains on a BiPAP.  

Current BiPAP settings of 12/5 with an FiO2 of 80%.  While in the BiPAP, the 

patient is urinating a tidal volume of about 450 with a respiratory rate in the 

mid 20s and a minute ventilation of 13 L per minute.  The chest x-ray showing 

some further investigation right lung base.  Based on that, an ultrasound of the

chest was done and there was minimal amount of pleural effusion the right lung 

base and I think the predominant findings are significant atelectasis in the 

lung bases right more than left.  Note that the chest tubes were all removed ye

sterday and the patient the left pleural and mediastinal chest tube both of them

removed.  Morning blood gases showed a pH of 7.49 with a pCO2 of 33 and pO2 of 

60.  The patient's FiO2 is currently at 80% on the BiPAP and she is pulse oxing 

95%.  I dropped her down to 60%.  Meanwhile, the pro-calcitonin level obtained 

yesterday was low at 0.18.  The patient was being diuresis with IV Lasix and 

input output balance has been negative for on 22 mL over the past 24 hours and 

the patient was taken off the dopamine.  The the white cell count is slightly 

higher compared to yesterday.  He 0.9 with a hemoglobin of 10.2 and a platelet 

count of 266.  Sodium is at 135 with a BUN of 30 and creatinine 0.6.  The 

patient is awake.  The patient is alert.  She is following commands and moving 

extremities.  She is currently on examination aspirin and Plavix.  She is also 

on metoprolol 25 mg by mouth 3 times a day.  Cardiac rhythm was sinus and later 

on she did have a epidural of atrial fibrillation.  There is being managed by 

the cardiothoracic team.  The patient will be started back on amiodarone bolus 

and maintenance.  No pressors for now.





07/01/2022, the patient is postop day #4.  The patient continues to have diffuse

but the pulmonary infiltrates with hypoxic respiratory failure that occurred p

ost extubation.  The patient remains on a BiPAP at a pressure of 10/5 cm of 

water with an FiO2 of 70%.  Current pulse ox is around 96%.  She is a bit 

disturbed by the BiPAP and she wants to give herself a break.  She is urinating 

adequate tidal volumes above 700 mL an minute ventilation remains quite 

elevated.  The chest x-ray shows small lung volumes, atelectatic changes in lung

bases with diffuse breath and pulmonary infiltrates.  She has also cardiomegaly.

 All of the chest tubes are removed.  The patient is afebrile.  White cell count

remains elevated at 19.  The patient is producing adequate amount of urine 

output.  The patient is on IV Lasix and overall fluid balance over the past 24 h

ours shows that she has been in a negative fluid balance in the urine output is 

adequate.  Input output over the past 24 hours has been in the order of -1.2 L. 

Otherwise, the rest of the blood work shows a white cell count of 19.2 with a 

hemoglobin of 10.8 and a platelet count of 375.  BUN is 42 with a creatinine of 

0.69 the sodium level is at 138 with a potassium level of 4.1.  As mentioned, 

the ultrasound of the chest was done yesterday and there was no evidence of any 

significant sizable pleural effusion.  The chest x-ray from today shows a small 

right-sided pleural effusion, cardiomegaly along with diffuse breath and 

pulmonary infiltrates.  The patient also had an episode of atrial fibrillation 

with rapid ventricular response.  She was given amiodarone bolus and currently 

she is on amiodarone at a dose of 400 mg by mouth twice a day.  Her current 

cardiac rhythm is back to sinus.





2022, the patient is postop day #5.  The patient is sitting up on a chair.  The 

patient is currently unable to 60 L with an FiO2 of 90%.  Chest x-ray 

essentially unchanged.  There is cardiomegaly.  There is extensive atelectatic 

changes with small effusions bilaterally.  The patient is currently off the 

BiPAP.  Denies having any significant respiratory distress.  Using incentive 

spirometer and the patient is pulling approximately 7 50 mL.  Hemodynamically, 

she is on no pressors.  She is stable.  Her cardiac rhythm is sinus and there is

no further episodes of atrial fibrillation since yesterday.  The overall input 

output fluid balance has been -1.2 L in the urine output and 95 to on 35 mL over

the past 8 hours.  The patient has no specific complaints.  Surgical with that 

is dry clean and intact.  The white cell count is slightly lower compared to 

yesterday down to 17 with a hemoglobin of 10.5 and a platelet count of 354.  

Sodium is at 140 with a mean of 51 acute creatinine of 0.6.  Chest x-ray was 

noted.  The patient is currently  on empiric antibiotic coverage with a 

combination of cefepime and vancomycin.  A limited ultrasound that was done at 

the bedside revealed no significant pleural effusion on the right.  There is 

cardiomegaly and there is some consented the patient may have a underlying 

pericardial effusion.





07/03/2022 the patient is postop day #6.  She is still recovering from her acute

hypoxic respiratory failure which we believe is in acute lung injury post bypass

surgery.  Possibility of pneumonia is also contemplated and the patient is 

covered empirically with IV antibiotics.  She is showing ongoing aggressive 

improvement.  Her breathing is less labored.  The chest x-ray from today shows 

improvement in aeration the right lung base.  She remains on Arava at 60 L of 

FiO2 being down to 60%.  She is using incentive spirometer.  No diuretics for 

now.  Cardiac rhythm is bouncing back and forth between normal sinus rhythm and 

atrial fibrillation.  She is on no anticoagulants for now.  Her blood work shows

a hemoglobin of 10.2 with a white cell count improving down to 12.5 and a BNP is

at 50 with a creatinine of 0.7 and sodium level is at 140 with a glucose of 123.

 LFTs are normal.  Slightly elevated and AST/ALT.  Chest x-ray was noted.  On 

the incentive spirometer, she is pulling approximately thousand.  Urine output 

is adequate for now in the order of 500 mL over the past 8 hours.  No focal 

neurological deficits.





07/04/2022 the patient is postop day #7.  Patient is doing well and the patient 

was taken off the Airvo and the patient is currently on 5 L O2 2 by nasal 

cannula.  She did suffer a post surgical ARDS from which she is gradually 

recovering pH remains on prednisone 40 mg by mouth daily.  She remains on IV 

cefepime and vancomycin can be discontinued knowing that the antibiotics was 

essentially hepatic in nature.  The patient has no new complaints otherwise for 

now.  The patient has a white cell count of 12.2 with a hemoglobin of 10.3.  

Electrodes are all within normal limits.  No nausea.  No vomiting.  Using 

incentive spirometer, pulling approximately 1000 on the I asked.  On terms of 

the renal function, the patient is stable creatinine of 0.7 with a mean of 42.  

Surgical with that is dry clean and intact.  Cardiac rhythm is sinus and the 

patient is still having on and off episodes of atrial fibrillation.





Objective





- Vital Signs


Vital signs: 


                                   Vital Signs











Temp  98.4 F   07/04/22 12:00


 


Pulse  86   07/04/22 12:29


 


Resp  25 H  07/04/22 12:00


 


BP  125/91   07/04/22 12:00


 


Pulse Ox  94 L  07/04/22 12:00


 


FiO2  45   07/04/22 04:00








                                 Intake & Output











 07/03/22 07/04/22 07/04/22





 18:59 06:59 18:59


 


Intake Total 1540 459 987


 


Output Total 650 675 200


 


Balance 890 -216 787


 


Weight  109.5 kg 


 


Intake:   


 


   459 267


 


    Cefepime 2 gm In Sodium 200 125 100





    Chloride 0.9% 100 ml @ 25   





    mls/hr IVPB Q8HR [facility] Rx#   





    [id number]   


 


    Vancomycin 2,000 mg In 500  





    Sodium Chloride 0.9% 500   





    ml 500 ml @ 167 mls/hr   





    IVPB Q12H [facility] Rx#:   





    [id number]   


 


    Vancomycin 2,000 mg In  334 167





    Sodium Chloride 0.9% 500   





    ml 500 ml @ 167 mls/hr   





    IVPB Q16H [facility] Rx#:   





    [id number]   


 


  Oral 840  720


 


Output:   


 


  Urine 650 675 200


 


Other:   


 


  Voiding Method Bedside Commode Bedside Commode 


 


  # Voids  1 0


 


  # Bowel Movements 1  








                       ABP, PAP, CO, CI - Last Documented











Arterial Blood Pressure        281/281


 


Pulmonary Artery Pressure      43/24


 


Cardiac Output                 4


 


Cardiac Index                  1.9

















- Exam











CONSTITUTIONAL: Appears comfortable, cooperative, no acute distress mild 

currently on 5 L O2 nasal cannula


Breathing is nonlabored and the patient is comfortable sitting up on a chair.


RESPIRATORY:  Lungs sounds diminished bilaterally, right greater than left.  

Marked diminished breath on the right lung base


CARDIOVASCULAR:  S1, S2 present.  Regular rate and rhythm, sinus rhythm on 

telemetry.  Sternum stable.   Palpable peripheral pulses bilaterally.  Trace 

generalized edema present.  No calf pain or tenderness noted.  Heart hugger in p

lace with patient demonstrating appropriate use.  Antiembolism stockings, SCDs 

present.


GASTROINTESTINAL:  Abdomen soft, nontender, nondistended.  Active bowel sounds 

present 4 quadrants, tympanic to percussion.  Tolerating liquids for 

medications.  Denies flatus


GENITOURINARY:  Torres present draining cloudy, yellow urine.  Output overnight 

is in order of 30-40 mL an hour


INTEGUMENTARY:  Skin is warm and dry with evidence of good perfusion.  Anterior 

chest incision well approximated and covered with dry intact dressing.  Right 

lower extremity EVH site well approximated without redness or drainage.


NEUROLOGIC:  Cranial nerves II through XII intact


MUSKULOSKELETAL:  Able to move all extremities, strength equal bilaterally


PSYCHIATRIC:  Alert and oriented to person place and time, appropriate affect, 

intact judgment and insight


INVASIVE LINES AND TUBES:   All of the lines have been removed 





- Labs


CBC & Chem 7: 


                                 07/04/22 05:34





                                 07/04/22 05:34


Labs: 


                  Abnormal Lab Results - Last 24 Hours (Table)











  07/03/22 07/03/22 07/04/22 Range/Units





  16:27 19:42 01:36 


 


WBC     (3.8-10.6)  k/uL


 


RBC     (3.80-5.40)  m/uL


 


Hgb     (11.4-16.0)  gm/dL


 


Hct     (34.0-46.0)  %


 


Neutrophils #     (1.3-7.7)  k/uL


 


Lymphocytes #     (1.0-4.8)  k/uL


 


Chloride     ()  mmol/L


 


BUN     (7-17)  mg/dL


 


Glucose     (74-99)  mg/dL


 


POC Glucose (mg/dL)  119 H  196 H  126 H  ()  mg/dL


 


AST     (14-36)  U/L


 


ALT     (4-34)  U/L


 


Alkaline Phosphatase     ()  U/L


 


Total Protein     (6.3-8.2)  g/dL














  07/04/22 07/04/22 07/04/22 Range/Units





  04:12 05:34 05:34 


 


WBC    12.2 H  (3.8-10.6)  k/uL


 


RBC    3.50 L  (3.80-5.40)  m/uL


 


Hgb    10.3 L  (11.4-16.0)  gm/dL


 


Hct    32.0 L  (34.0-46.0)  %


 


Neutrophils #    10.2 H  (1.3-7.7)  k/uL


 


Lymphocytes #    0.9 L  (1.0-4.8)  k/uL


 


Chloride   111 H   ()  mmol/L


 


BUN   42 H   (7-17)  mg/dL


 


Glucose   108 H   (74-99)  mg/dL


 


POC Glucose (mg/dL)  112 H    ()  mg/dL


 


AST   49 H   (14-36)  U/L


 


ALT   75 H   (4-34)  U/L


 


Alkaline Phosphatase   162 H   ()  U/L


 


Total Protein   5.9 L   (6.3-8.2)  g/dL














  07/04/22 Range/Units





  11:38 


 


WBC   (3.8-10.6)  k/uL


 


RBC   (3.80-5.40)  m/uL


 


Hgb   (11.4-16.0)  gm/dL


 


Hct   (34.0-46.0)  %


 


Neutrophils #   (1.3-7.7)  k/uL


 


Lymphocytes #   (1.0-4.8)  k/uL


 


Chloride   ()  mmol/L


 


BUN   (7-17)  mg/dL


 


Glucose   (74-99)  mg/dL


 


POC Glucose (mg/dL)  208 H  ()  mg/dL


 


AST   (14-36)  U/L


 


ALT   (4-34)  U/L


 


Alkaline Phosphatase   ()  U/L


 


Total Protein   (6.3-8.2)  g/dL














Assessment and Plan


Plan: 








Symptomatic coronary disease, post coronary artery bypass surgery 2, off-pump 

and the patient is postop day # 7.  The patient is post intra-aortic balloon 

pump insertion for hemodynamic support inserted at a time of surgery was 

subsequently removed and the patient remains hemodynamically stable.  Note that 

the patient was extubated without any major difficulties and over the past 12 

hours,  and since then, the patient has developed diffuse but the pulmonary 

infiltrates 60%.  Patient sustained a post surgical ARDS/hypoxemic respiratory 

failure, recovered slowly with medical support and BiPAP support initially and 

later on Airvo and currently she is on 5 L of O2 nasal cannula.  She is on 

prednisone.  Antibiotic coverage is emphatic.





Post thoracotomy, chest tubes are removed, small right-sided pleural effusion on

ultrasound the chest that was conducted yesterday.  Less on the right lung base 

remains diminished and the patient remains on 5 L O2 nasal cannula and Arava was

discontinued





Acute hypoxic history failure, likely due to postsurgical ARDS in addition to 

extensive atelectatic changes in lung bases more so on the right with a small 

right-sided pleural effusion.  The patient is currently is on 5 L O2 nasal 

cannula





Paroxysmal atrial fibrillation, expected outcome of surgery currently in sinus 

rhythm and the patient is currently on po amiodarone





Hyperglycemia, postop, the patient is off IV insulin and the patient is 

currently on insulin scale coverage





History of hypertension.





No history of any lung disease, and patient at low increased operative risk 

based on lung function.





Prior history of coronavirus infection, December 2021.





Postoperative anemia, expected outcome of surgery, hemoglobin is stable for now





Leukocytosis, improving





plan











Wean down the FiO2 slowly,  currently on 5 L


Continue using incentive spirometer


Stop vancomycin and keep cefepime for now


No diuretics 


Continue the prednisone 40 mg by mouth daily


Continue beta blockers with metoprolol  


Diuretics to be stopped today


No amiodarone


Repeat labs and chest x-ray in the morning


We'll continue to follow

## 2022-07-05 LAB
ALBUMIN SERPL-MCNC: 3.5 G/DL (ref 3.5–5)
ALP SERPL-CCNC: 146 U/L (ref 38–126)
ALT SERPL-CCNC: 66 U/L (ref 4–34)
ANION GAP SERPL CALC-SCNC: 7 MMOL/L
AST SERPL-CCNC: 43 U/L (ref 14–36)
BASOPHILS # BLD AUTO: 0 K/UL (ref 0–0.2)
BASOPHILS NFR BLD AUTO: 0 %
BUN SERPL-SCNC: 31 MG/DL (ref 7–17)
CALCIUM SPEC-MCNC: 8.6 MG/DL (ref 8.4–10.2)
CHLORIDE SERPL-SCNC: 108 MMOL/L (ref 98–107)
CO2 SERPL-SCNC: 28 MMOL/L (ref 22–30)
EOSINOPHIL # BLD AUTO: 0.1 K/UL (ref 0–0.7)
EOSINOPHIL NFR BLD AUTO: 0 %
ERYTHROCYTE [DISTWIDTH] IN BLOOD BY AUTOMATED COUNT: 3.5 M/UL (ref 3.8–5.4)
ERYTHROCYTE [DISTWIDTH] IN BLOOD: 15.2 % (ref 11.5–15.5)
GLUCOSE BLD-MCNC: 107 MG/DL (ref 70–110)
GLUCOSE BLD-MCNC: 124 MG/DL (ref 70–110)
GLUCOSE BLD-MCNC: 128 MG/DL (ref 70–110)
GLUCOSE BLD-MCNC: 128 MG/DL (ref 70–110)
GLUCOSE BLD-MCNC: 186 MG/DL (ref 70–110)
GLUCOSE SERPL-MCNC: 105 MG/DL (ref 74–99)
HCT VFR BLD AUTO: 32.2 % (ref 34–46)
HGB BLD-MCNC: 10.7 GM/DL (ref 11.4–16)
LYMPHOCYTES # SPEC AUTO: 1 K/UL (ref 1–4.8)
LYMPHOCYTES NFR SPEC AUTO: 8 %
MCH RBC QN AUTO: 30.6 PG (ref 25–35)
MCHC RBC AUTO-ENTMCNC: 33.3 G/DL (ref 31–37)
MCV RBC AUTO: 92 FL (ref 80–100)
MONOCYTES # BLD AUTO: 0.8 K/UL (ref 0–1)
MONOCYTES NFR BLD AUTO: 7 %
NEUTROPHILS # BLD AUTO: 9.9 K/UL (ref 1.3–7.7)
NEUTROPHILS NFR BLD AUTO: 82 %
PLATELET # BLD AUTO: 377 K/UL (ref 150–450)
POTASSIUM SERPL-SCNC: 3.6 MMOL/L (ref 3.5–5.1)
PROT SERPL-MCNC: 5.7 G/DL (ref 6.3–8.2)
SODIUM SERPL-SCNC: 143 MMOL/L (ref 137–145)
WBC # BLD AUTO: 12.1 K/UL (ref 3.8–10.6)

## 2022-07-05 RX ADMIN — CEFEPIME HYDROCHLORIDE SCH MLS/HR: 2 INJECTION, POWDER, FOR SOLUTION INTRAVENOUS at 08:24

## 2022-07-05 RX ADMIN — INSULIN ASPART SCH UNIT: 100 INJECTION, SOLUTION INTRAVENOUS; SUBCUTANEOUS at 21:29

## 2022-07-05 RX ADMIN — HEPARIN SODIUM SCH UNIT: 5000 INJECTION INTRAVENOUS; SUBCUTANEOUS at 08:24

## 2022-07-05 RX ADMIN — CEFEPIME HYDROCHLORIDE SCH MLS/HR: 2 INJECTION, POWDER, FOR SOLUTION INTRAVENOUS at 17:00

## 2022-07-05 RX ADMIN — SODIUM CHLORIDE, PRESERVATIVE FREE SCH: 5 INJECTION INTRAVENOUS at 08:25

## 2022-07-05 RX ADMIN — METOPROLOL TARTRATE SCH MG: 50 TABLET, FILM COATED ORAL at 21:29

## 2022-07-05 RX ADMIN — CEFEPIME HYDROCHLORIDE SCH MLS/HR: 2 INJECTION, POWDER, FOR SOLUTION INTRAVENOUS at 23:33

## 2022-07-05 RX ADMIN — INSULIN ASPART SCH: 100 INJECTION, SOLUTION INTRAVENOUS; SUBCUTANEOUS at 12:07

## 2022-07-05 RX ADMIN — METOPROLOL TARTRATE SCH MG: 50 TABLET, FILM COATED ORAL at 08:24

## 2022-07-05 RX ADMIN — IPRATROPIUM BROMIDE AND ALBUTEROL SULFATE SCH ML: .5; 3 SOLUTION RESPIRATORY (INHALATION) at 08:02

## 2022-07-05 RX ADMIN — IPRATROPIUM BROMIDE AND ALBUTEROL SULFATE SCH: .5; 3 SOLUTION RESPIRATORY (INHALATION) at 16:34

## 2022-07-05 RX ADMIN — INSULIN ASPART SCH: 100 INJECTION, SOLUTION INTRAVENOUS; SUBCUTANEOUS at 04:05

## 2022-07-05 RX ADMIN — DOCUSATE SODIUM AND SENNOSIDES SCH EACH: 50; 8.6 TABLET ORAL at 21:29

## 2022-07-05 RX ADMIN — INSULIN ASPART SCH: 100 INJECTION, SOLUTION INTRAVENOUS; SUBCUTANEOUS at 16:47

## 2022-07-05 RX ADMIN — ASPIRIN 325 MG ORAL TABLET SCH MG: 325 PILL ORAL at 08:24

## 2022-07-05 RX ADMIN — HEPARIN SODIUM SCH UNIT: 5000 INJECTION INTRAVENOUS; SUBCUTANEOUS at 23:33

## 2022-07-05 RX ADMIN — IPRATROPIUM BROMIDE AND ALBUTEROL SULFATE SCH ML: .5; 3 SOLUTION RESPIRATORY (INHALATION) at 19:25

## 2022-07-05 RX ADMIN — Medication SCH MG: at 21:29

## 2022-07-05 RX ADMIN — HEPARIN SODIUM SCH UNIT: 5000 INJECTION INTRAVENOUS; SUBCUTANEOUS at 17:00

## 2022-07-05 RX ADMIN — INSULIN ASPART SCH: 100 INJECTION, SOLUTION INTRAVENOUS; SUBCUTANEOUS at 17:42

## 2022-07-05 RX ADMIN — SODIUM CHLORIDE, PRESERVATIVE FREE SCH ML: 5 INJECTION INTRAVENOUS at 21:30

## 2022-07-05 RX ADMIN — PANTOPRAZOLE SODIUM SCH MG: 40 TABLET, DELAYED RELEASE ORAL at 06:33

## 2022-07-05 RX ADMIN — ATORVASTATIN CALCIUM SCH MG: 80 TABLET, FILM COATED ORAL at 08:24

## 2022-07-05 RX ADMIN — IPRATROPIUM BROMIDE AND ALBUTEROL SULFATE SCH ML: .5; 3 SOLUTION RESPIRATORY (INHALATION) at 11:40

## 2022-07-05 RX ADMIN — CLOPIDOGREL BISULFATE SCH MG: 75 TABLET ORAL at 08:24

## 2022-07-05 RX ADMIN — INSULIN ASPART SCH: 100 INJECTION, SOLUTION INTRAVENOUS; SUBCUTANEOUS at 08:33

## 2022-07-05 NOTE — XR
EXAMINATION TYPE: XR chest 1V portable

 

DATE OF EXAM: 7/5/2022

 

Comparison: 7/4/2022

 

Clinical History: 76-year-old female Postoperative cardiac surgery

 

Findings:

Median sternotomy wires are present with post-CABG clips the mediastinum. Heart remains enlarged. Int
erstitial and vascular densities are similar to slightly increased. Bibasilar opacities and patchy pe
rihilar opacities are similar to slightly increased. Prominent external artifacts projecting over the
 right upper lung. Attention on follow-up.

 

 

Impression:

 

1. Continued CHF with interstitial pulmonary edema, similar to slightly worsened.

2. Continued small pleural effusions with prominent adjacent atelectasis and or consolidation, slight
ly worsened in the interval.

3. External artifacts projecting at the right upper lung. A line here extends outside of the thoracic
 wall suggesting external artifact rather than pneumothorax. Attention on follow-up.

## 2022-07-05 NOTE — P.PN
Subjective


Progress Note Date: 07/05/22


Principal diagnosis: 





Coronary artery disease with left main disease, unstable angina, atrial 

arrhythmias.  Past medical history significant for hypertension, hyperlipidemia,

SVT, left internal carotid artery stenosis, obesity, lifetime nonsmoker, remains

unvaccinated against Covid, and family history of coronary artery disease.





POD #8 off-pump coronary artery bypass grafting 2 with left internal mammary 

artery to the left anterior descending coronary artery, reverse saphenous vein 

graft to the obtuse marginal coronary artery, ligation of the left atrial 

appendage with a 40 mm AtriCure clip, endovascular vein harvest of the right 

greater saphenous vein, placement of intra-aortic balloon pump.





Postoperative acute blood loss anemia, expected given hemodilution.





Acute hypoxic respiratory failure requiring bipap, and Airvo.  Currently on room

air with oxygen saturations 95%.





Paroxysmal atrial fibrillation, a known common occurrence after cardiac surgery.





The patient was seen and examined today 07/05/2022 at her bedside in the 

intensive care unit.  She is awake, alert, oriented 3 and is in no acute 

distress.  She is sitting up to the bedside chair, oxygen saturation are 95% on 

room air.  Achieving 1000 mL on her incentive spirometry up encouragement.  She 

was given 1 dose of Lasix 40 mg IV with good diuresis yesterday.  Urine output 

in the last 8 hours was 400 mL.  She remains hemodynamically stable and is 

currently on no inotropic or pressor support.  She has been afebrile in the last

24 hours, her vancomycin has been discontinued and she remains on cefepime 2 g 

every 8 hours IV piggyback for empiric antibiotic coverage.  Prednisone 40 mg by

mouth daily also remains in place.  Laboratory results this morning show a WBC 

count continuing to trend down at 12.1, hemoglobin 10.7, hematocrit 32.2, 

platelets 377, sodium 143, potassium 3.6, BUN 31, creatinine 0.77, AST 43 and 

ALT 66.  The patient reports she has been up ambulating in the intensive care 

unit hallway with standby assistance from nursing and therapy staff, tolerating 

well.  Bedside telemetry showing normal sinus rhythm heart rate 68 BPM.





Objective





- Vital Signs


Vital signs: 


                                   Vital Signs











Temp  98.2 F   07/05/22 04:00


 


Pulse  64   07/05/22 07:00


 


Resp  25 H  07/05/22 07:00


 


BP  142/85   07/05/22 07:00


 


Pulse Ox  93 L  07/05/22 07:00


 


FiO2  45   07/04/22 04:00








                                 Intake & Output











 07/04/22 07/05/22 07/05/22





 18:59 06:59 18:59


 


Intake Total 1207 220 20


 


Output Total 1800 500 150


 


Balance -593 -280 -130


 


Weight 109.5 kg 108 kg 


 


Intake:   


 


   120 20


 


    Cefepime 2 gm In Sodium 200  





    Chloride 0.9% 100 ml @ 25   





    mls/hr IVPB Q8HR [facility] Rx#   





    :[id number]   


 


    Lactated Ringers 1,000 ml  120 20





    @ 20 mls/hr IV .Q24H [facility]   





    Rx#:[id number]   


 


    Vancomycin 2,000 mg In 167  





    Sodium Chloride 0.9% 500   





    ml 500 ml @ 167 mls/hr   





    IVPB Q16H [facility] Rx#:   





    [id number]   


 


  Intake, IV Titration  100 





  Amount   


 


    Cefepime 2 gm In Sodium  100 





    Chloride 0.9% 100 ml @ 25   





    mls/hr IVPB Q8HR [facility] Rx#   





    :[id number]   


 


  Oral 840  


 


Output:   


 


  Urine 1800 500 150


 


Other:   


 


  Voiding Method Bedside Commode Bedside Commode 


 


  # Voids 0 1 








                       ABP, PAP, CO, CI - Last Documented











Arterial Blood Pressure        281/281


 


Pulmonary Artery Pressure      43/24


 


Cardiac Output                 4


 


Cardiac Index                  1.9

















- Exam





CONSTITUTIONAL: Currently sitting up to the bedside chair in the intensive care 

unit. Appears comfortable, cooperative, no acute distress.


RESPIRATORY:  Lungs sounds diminished bilaterally, right greater than left.  

Respirations are symmetrical and nonlabored.  Currently on room air with oxygen 

saturation 95%.  Strong nonproductive cough.  Achieving 1000 mL on her incentive

spirometry with encouragement.


CARDIOVASCULAR:  S1, S2 present.  Regular rate and rhythm, sinus rhythm on 

telemetry, heart rate 68 BPM.  Sternum stable.   Palpable peripheral pulses 

bilaterally.  Trace generalized edema present.  No calf pain or tenderness 

noted.  Heart hugger in place with patient demonstrating appropriate use.  

Antiembolism stockings, SCDs present.


GASTROINTESTINAL:  Abdomen soft, nontender, nondistended.  Active bowel sounds 

present 4 quadrants.  Tolerating diet.  Passing flatus.  Bowel movement on 

07/03/2022.


GENITOURINARY:  Continues to void with 400 mL of urine output in the last 8 

hours.


INTEGUMENTARY:  Skin is warm and dry with evidence of good perfusion.  Midline 

sternal incision is well approximated and covered with dry intact dressing.  

Right lower extremity EVH site well approximated without redness or drainage.


NEUROLOGIC:  Cranial nerves II through XII intact.  No focal deficits.


MUSKULOSKELETAL:  Able to move all extremities, strength equal bilaterally.


PSYCHIATRIC:  Alert and oriented to person place and time, appropriate affect, 

intact judgment and insight.








- Allied health notes


Allied health notes reviewed: nursing





- Labs


CBC & Chem 7: 


                                 07/05/22 05:11





                                 07/05/22 05:11


Labs: 


                  Abnormal Lab Results - Last 24 Hours (Table)











  07/04/22 07/04/22 07/04/22 Range/Units





  11:38 16:35 19:56 


 


WBC     (3.8-10.6)  k/uL


 


RBC     (3.80-5.40)  m/uL


 


Hgb     (11.4-16.0)  gm/dL


 


Hct     (34.0-46.0)  %


 


Neutrophils #     (1.3-7.7)  k/uL


 


Chloride     ()  mmol/L


 


BUN     (7-17)  mg/dL


 


Glucose     (74-99)  mg/dL


 


POC Glucose (mg/dL)  208 H  126 H  170 H  ()  mg/dL


 


AST     (14-36)  U/L


 


ALT     (4-34)  U/L


 


Alkaline Phosphatase     ()  U/L


 


Total Protein     (6.3-8.2)  g/dL














  07/04/22 07/05/22 07/05/22 Range/Units





  23:36 05:11 05:11 


 


WBC   12.1 H   (3.8-10.6)  k/uL


 


RBC   3.50 L   (3.80-5.40)  m/uL


 


Hgb   10.7 L   (11.4-16.0)  gm/dL


 


Hct   32.2 L   (34.0-46.0)  %


 


Neutrophils #   9.9 H   (1.3-7.7)  k/uL


 


Chloride    108 H  ()  mmol/L


 


BUN    31 H  (7-17)  mg/dL


 


Glucose    105 H  (74-99)  mg/dL


 


POC Glucose (mg/dL)  116 H    ()  mg/dL


 


AST    43 H  (14-36)  U/L


 


ALT    66 H  (4-34)  U/L


 


Alkaline Phosphatase    146 H  ()  U/L


 


Total Protein    5.7 L  (6.3-8.2)  g/dL














- Imaging and Cardiology


Chest x-ray: report reviewed, image reviewed





Assessment and Plan


Assessment: 





1.  Coronary artery disease with left main disease, status post 2 vessel CABG


2.  Hypertension


3.  Hyperlipidemia, cholesterol 182, 


4.  SVT, intraoperative atrial arrhythmias with cardioversion, status post left 

atrial appendage ligation


5.  Left internal carotid artery stenosis


6.  Obesity


7.  Never smoker, preoperative FEV1 72% of predicted


8.  History of covid infection in December 2021, remains unvaccinated against 

Covid, negative COVID-19 PCR


9.  Family history of coronary artery disease


10.  Nasal swab positive for MSSA, treated with mupirocin


11.  Preserved LV function with mild to moderate mitral regurgitation on 

transthoracic echocardiogram


12.  Acute blood loss anemia


13.  Acute hypoxic respiratory failure requiring bipap


14.  Leukocytosis, improving


15.  Paroxysmal atrial fibrillation, a known common occurrence after cardiac 

surgery


Plan: 





1.  Continue to maximize medical management with aspirin, Plavix, statin, beta 

blocker and ACE inhibitor.  Will increase metoprolol tartrate as tolerated.


2.  No further episodes of atrial fibrillation.  No anticoagulation necessary at

this point.


3.  Wean O2 as tolerated.  Encourage incentive spirometry 10 times every hour 

while awake.  Bronchodilators per pulmonology.


4.  Increase activity as tolerated.  PT/OT/cardiac rehab following.


5.  GI/DVT prophylaxis.


6.  Will monitor daily labs and chest x-ray.  Electrolyte replacement per 

protocol. 


7.  Pain control with current medication regimen.


8.  Insulin management per primary care service.  Patient is not diabetic, 

preoperative hemoglobin A1c 5.6%.


9.  Continue to record strict accurate intake and output. Daily weights.


10.  Continue cefepime 2 g IV piggyback every 8 hours per pulmonary/critical 

care medicine management.


11.  Continue Prednisone 40 mg by mouth daily per pulmonary/critical care 

medicine management.


12.  Continue to encourage nutrition and advance diet as tolerated.


13.  First postoperative shower today.  Then shower daily.


14.  We will place transfer orders to the third floor cardiac stepdown unit.  

Discharge planning is in place, anticipate discharge home with home health care 

in the next 24-48 hours.


15.  More recommendations follow based on patient's clinical course.





Time with Patient: Greater than 30

## 2022-07-05 NOTE — P.PN
Subjective


Progress Note Date: 07/05/22





 HISTORY OF PRESENT ILLNESS


This is a pleasant 76 years old female with past medical history of Fibromyalg

ia, Hyperlipidemia, Hypertension, Osteoarthritis , Supraventricular Tachycardia 

,urinary incontinence-wears pad, severe left internal carotid artery stenosis 

followed by Dr. Adam, frequent constipation, she was admitted under 

cardiology service for right arm pain, chest pain and back pain for the last 3 

weeks, where she underwent cardiac cath and 60/24 showing critical and complex 

lesion involving the distal left main coronary arteries and also involving the 

ostial left circumflex and the distal LAD with the stenotic range is 80-90% with

increase in LVEDP  , Patient will need bypass procedure to open his coronary 

arteries.


Currently she denies chest pain or dyspnea.  No incontinence of urine or bowel. 

No fever


Patient is hemodynamically stable


Labs including CBC, INR, BMP and liver enzymes are unremarkable.  TSH is 1.6.


Urine analysis showing trace blood.


coronavirus not detected.   Hepatitis panel is negative


Chest x-ray: Minimal interstitial edema noted


Carotid duplex: No significant stenosis


Echocardiogram showing ejection fraction of 50-55% with mild-to-moderate mitral 

regurgitation and mild tricuspid regurgitation


Patient currently on heparin drip, aspirin 81 mg twice a day and normal saline 

at 75 mL/h as well as Lipitor and metoprolol


However patient this morning she is asymptomatic she denies chest pain or 

dyspnea or abdominal pain.  No diarrhea or vomiting or dysuria.  No headache or 

weakness or numbness.





6/27: Patient is denying any chest pain, shortness of breath, palpitations, 

lightheadedness or dizziness.  She is scheduled for CABG this afternoon and 

patient states that she is ready to move forward.  No new concerns from her 

nurse.  Patient has been afebrile, heart rate 80, blood pressure 129/78 and 

pulse ox 96% on room air.  CBC is unremarkable.  Chloride 110, CO2 20, blood 

sugar 112, creatinine 0.68.





6/28: Patient is status post CABG 2 with left internal mammary artery to the 

left anterior descending artery, reverse saphenous vein graft to the obtuse 

marginal artery, ligation of the left atrial appendage with a 40 mm AtriCure 

clip, endovascular vein harvest of the right greater saphenous vein, placement 

of intra-aortic balloon pump.  Right groin intra-aortic balloon pump was 

discontinued this morning.  Right internal jugular [place]/Cordis, right radial 

arterial line, mediastinal/left pleural chest tubes remain in place.  Patient is

stating that she is feeling well.  She has been afebrile, heart rate 75, 121/54,

pulse ox 93% on 2 L.  Capillary blood glucose running between 110 and 120.  WBC 

9.7, hemoglobin 9.3 and platelet count 195.  Electrolytes and renal function 

normal.  AST 85 and ALT 41.





6/29: Patient remains in the intensive care unit.  Yesterday pulse ox continued 

to drop through the day with increased oxygen demands to the point where she is 

now on BiPAP.  She states she feels a little short of breath.  She denies Chest 

pain.  Temperature max 100.4, heart rate 76, respiratory rate 32, blood pressure

130/73.  Cardiac monitor sinus rhythm.  Patient is status post IV Lasix 

yesterday with good urine output.  Blood glucose running between 107 and 124 and

insulin drip will be discontinued, transition to NovoLog scale every 4 hours.  

Urinalysis was turbid, blood small.





6/30: Patient remains in the intensive care unit on BiPAP.  She has been unable 

to eat only taking a few sips of water.  Chest tubes have been removed, pacer 

wires to be removed today.  Torres catheter is in place, good urine output.  

Patient has been afebrile, heart rate in the 80s, blood pressure 144/76, pulse 

ox 90% on FiO2 80 BiPAP.  Cardiac monitor is sinus rhythm.  Repeat blood work 

reveals WBC 19.9, hemoglobin 10.2, platelet count 266.  Sodium 135, BUN 30 

creatinine 0.67.  AST 83 and ALT 60.  Blood sugars running between 123 and 160.


Chest x-ray reveals stable.  Persistent perihilar and basilar patchy densities 

bilateral small effusions.


Chest ultrasound reveals right pleural effusion 4.9 cm 2.2 cm fluid pocket.





7/1: Patient remains in intensive care unit.  She has just been transitioned to 

AirVo and off BiPAP which she utilized during the night and all day yesterday.  

Patient converted to atrial fibrillation when we walked into the room.  Torres 

remains in place.  We have added in a consult for Dr. Nunez in anticipation the 

patient will be ready for discharge to a week.  She has been afebrile, heart r

ate 101, respiratory rate 28, blood pressure 154/81, pulse ox 96% FiO2 of 90.  

Repeat blood work reveals WBC 19.2, hemoglobin 10.8 and platelet count 375.  

Electrolytes are normal.  Creatinine 0.69.  AST is 103 and ALT 95.  Coronavirus 

PCR not detected.  Patient has been started on cefepime and IV vancomycin 

prophylactically per pulmonary recommendations.  Pro-calcitonin has been ordered

Patient is continued on IV Lasix.





7/2, patient's remains in ICU, still with hypersomnia, nasal CPAP in place, on 

oral prednisone 40 mg, pulse ox 97%, high flow O2, FiO2 90%, vitals are stable, 

slightly tachypneic, with some conversations dyspnea, patient is sleeping in the

recliner often, has trace edema, melatonin 5 mg started, for sleep restoration, 

indwelling Torres catheter, clear urine, with adequate urine output.  Patient 

receiving IV vancomycin and cefepime, chest x-ray, similar airspace opacities 

and right pleural effusion, similar cardiomegaly, migjht need furosemide, if 

shortness of breath and the mass was..  Patient remains in sinus rhythm, no new 

episodes of atrial fibrillation, since the past 24 hours, creatinine 0.6 sodium 

140 hemoglobin 10.5 incentive spirometry, and 750 mL capacity





7/3 patient remains in ICU, still with hypersomnolence, was able to sleep well 

last night in bed, still has nasal CPAP, while drifting off to sleep, no chest 

pain no shortness of breath, leg swelling is improving, no aspirated events, 

cardiac seems to be stable, wbc count, 12.5 creatinine 0.77, glucose 123 blood 

pressure 1:30 to 135 systolic, pulse ox 98%, with 80% FiO2 multiple specialists 

are following, adequate urine output,





7/5: Patient is seen today in the intensive care unit.  She is resting 

comfortably in a recliner and appears to be in no acute distress.  She is 

scheduled to get up for her shower today.  She is off oxygen with pulse ox of 

90-94%.  Blood pressure 140/74, heart rate in the 60s and 70s.  Afebrile.  

Cardiac monitor is sinus rhythm.  Cardiology increased lisinopril.  WBC 12.1, 

hemoglobin 10.7, platelet count 377.  Sodium 143, potassium 3.6, chloride 108, 

CO2 28, BUN 31 creatinine 0.77.  Capillary blood glucose running between 107 and

170.  Total bilirubin 0.8, AST 43, ALT 66, alkaline phosphatase 146.  Chest x-

ray reveals continued CHF interstitial pulmonary edema slightly worsened.  Small

pleural effusion with prominent adjacent atelectasis or consolidation, external 

artifacts.





REVIEW OF SYSTEMS


Constitutional: No fever, no chills, no night sweats.  No weight change.  Noted 

weakness, fatigue no lethargy.  No daytime sleepiness.


EENT: No headache.  No blurred vision or double vision, no loss of vision.  No 

loss of Hearing, no ringing in the ears, no dizziness.  No nasal drainage or 

congestion.  No epistaxis.  No sore throat.


Lungs: Minimal shortness of breath, cough, no sputum production.  No wheezing.


Cardiovascular: Denies chest pain, no lower extremity edema.  No palpitations.  

No paroxysmal nocturnal dyspnea.  No orthopnea.  No lightheadedness or 

dizziness.  No syncopal episodes.


Abdominal: No abdominal pain.  No nausea, vomiting.  No diarrhea.  No 

constipation.  No bloody or tarry stools.  No loss of appetite.


Genitourinary: No dysuria, increased frequency, urgency.  No urinary retention.


Musculoskeletal: No myalgias.  No muscle weakness, no gait dysfunction, no 

frequent falls.  No back pain.  No neck pain.


Integumentary: No wounds, no lesions.  No rash or pruritus.  No unusual 

bruising.  No change in hair or nails.


Neurologic: No aphasia. No facial droop. No change in mentation. No head injury.

No headache. No paralysis. No paresthesia.


Psychiatric: No depression.  No anxiety.  No mood swings.


Endocrine: No abnormal blood sugars.  No weight change.  No excessive sweating 

or thirst.  








PHYSICAL EXAMINATION


Gen: This is a 76-year-old overweight  female.  She is resting in 

recliner and appears to be comfortable and in no acute distress.  Patient 

patient is off oxygen


NECK: Supple. No JVD. No lymphadenopathy. No thyromegaly. 


LUNGS: Decreased breath sounds at the bases otherwise Clear to auscultation. No 

wheezes or rhonchi.  No intercostal retractions.  


HEART: Regular rate and rhythm.  Systolic murmur. 


ABDOMEN: Soft. Bowel sounds are present. No masses.  No tenderness.  Torres 

catheter draining [place] urine


EXTREMITIES:    No calf tenderness.  Trace edema bilateral ankles 2+1 edema


NEUROLOGICAL: Patient is awake, alert and oriented x3. Cranial nerves 2 through 

12 are grossly intact. 





ASSESSMENT AND PLAN


1.  Severe coronary artery disease status post 2 vessel CABG 6/27.  Continue 

current plan per cardiothoracic team.  Continue aspirin 325 mg daily, Lipitor 80

mg daily, Plavix 75 mg daily, continue Norco as needed for pain, DuoNeb 

treatments 4 times daily and as needed, Lopressor 50 mg twice daily.  Patient is

also been started on cefepime and IV vancomycin, prednisone 40 mg daily.  Pro-

calcitonin pending.





2.  Acute hypoxic respiratory failure.  Patient is off oxygen. 





3.  Worsening bilateral lung infiltrates and small right pleural effusion.  

Patient has been started on cefepime and IV vancomycin, pro-calcitonin, Lasix as

needed.





4.  Hypertension.  Continue lisinopril increased to 20 mg twice daily, continue 

Lopressor. 





5.  Hyperlipidemia.  Continue atorvastatin.





6.  Carotid artery disease.  Continue aspirin, atorvastatin.





7.  Hyperglycemia.  Continue patient on NovoLog scale every 4 hours.





8.  New onset paroxysmal atrial fibrillation.  Lopressor 50 mg twice daily.





9.  GI prophylaxis.  Protonix 40 mg daily





10.  DVT prophylaxis.  Heparin subcu.





DISCHARGE PLAN


IP REHAB AT [facility] OR discharge to son's home





Impression and plan of care have been directed as dictated by the signing 

physician.  Princess Nava nurse practitioner acting as scribe for signing 

physician. 





Objective





- Vital Signs


Vital signs: 


                                   Vital Signs











Temp  97.6 F   07/05/22 08:00


 


Pulse  72   07/05/22 09:00


 


Resp  21   07/05/22 09:00


 


BP  140/70   07/05/22 09:00


 


Pulse Ox  94 L  07/05/22 09:00


 


FiO2  45   07/04/22 04:00








                                 Intake & Output











 07/04/22 07/05/22 07/05/22





 18:59 06:59 18:59


 


Intake Total 1207 220 65


 


Output Total 1800 500 650


 


Balance -593 -280 -585


 


Weight 109.5 kg 108 kg 


 


Intake:   


 


   120 65


 


    Cefepime 2 gm In Sodium 200  25





    Chloride 0.9% 100 ml @ 25   





    mls/hr IVPB Q8HR [facility] Rx#   





    [id number]   


 


    Lactated Ringers 1,000 ml  120 40





    @ 20 mls/hr IV .Q24H [facility]   





    Rx#:[id number]   


 


    Vancomycin 2,000 mg In 167  





    Sodium Chloride 0.9% 500   





    ml 500 ml @ 167 mls/hr   





    IVPB Q16H [facility] Rx#:   





    [id number]   


 


  Intake, IV Titration  100 





  Amount   


 


    Cefepime 2 gm In Sodium  100 





    Chloride 0.9% 100 ml @ 25   





    mls/hr IVPB Q8HR [facility] Rx#   





    [id number]   


 


  Oral 840  


 


Output:   


 


  Urine 1800 500 650


 


Other:   


 


  Voiding Method Bedside Commode Bedside Commode Bedside Commode


 


  # Voids 0 1 








                       ABP, PAP, CO, CI - Last Documented











Arterial Blood Pressure        281/281


 


Pulmonary Artery Pressure      43/24


 


Cardiac Output                 4


 


Cardiac Index                  1.9

















- Labs


CBC & Chem 7: 


                                 07/05/22 05:11





                                 07/05/22 05:11


Labs: 


                  Abnormal Lab Results - Last 24 Hours (Table)











  07/04/22 07/04/22 07/04/22 Range/Units





  11:38 16:35 19:56 


 


WBC     (3.8-10.6)  k/uL


 


RBC     (3.80-5.40)  m/uL


 


Hgb     (11.4-16.0)  gm/dL


 


Hct     (34.0-46.0)  %


 


Neutrophils #     (1.3-7.7)  k/uL


 


Chloride     ()  mmol/L


 


BUN     (7-17)  mg/dL


 


Glucose     (74-99)  mg/dL


 


POC Glucose (mg/dL)  208 H  126 H  170 H  ()  mg/dL


 


AST     (14-36)  U/L


 


ALT     (4-34)  U/L


 


Alkaline Phosphatase     ()  U/L


 


Total Protein     (6.3-8.2)  g/dL














  07/04/22 07/05/22 07/05/22 Range/Units





  23:36 05:11 05:11 


 


WBC   12.1 H   (3.8-10.6)  k/uL


 


RBC   3.50 L   (3.80-5.40)  m/uL


 


Hgb   10.7 L   (11.4-16.0)  gm/dL


 


Hct   32.2 L   (34.0-46.0)  %


 


Neutrophils #   9.9 H   (1.3-7.7)  k/uL


 


Chloride    108 H  ()  mmol/L


 


BUN    31 H  (7-17)  mg/dL


 


Glucose    105 H  (74-99)  mg/dL


 


POC Glucose (mg/dL)  116 H    ()  mg/dL


 


AST    43 H  (14-36)  U/L


 


ALT    66 H  (4-34)  U/L


 


Alkaline Phosphatase    146 H  ()  U/L


 


Total Protein    5.7 L  (6.3-8.2)  g/dL














  07/05/22 Range/Units





  08:32 


 


WBC   (3.8-10.6)  k/uL


 


RBC   (3.80-5.40)  m/uL


 


Hgb   (11.4-16.0)  gm/dL


 


Hct   (34.0-46.0)  %


 


Neutrophils #   (1.3-7.7)  k/uL


 


Chloride   ()  mmol/L


 


BUN   (7-17)  mg/dL


 


Glucose   (74-99)  mg/dL


 


POC Glucose (mg/dL)  124 H  ()  mg/dL


 


AST   (14-36)  U/L


 


ALT   (4-34)  U/L


 


Alkaline Phosphatase   ()  U/L


 


Total Protein   (6.3-8.2)  g/dL

## 2022-07-05 NOTE — P.PN
Subjective





PROGRESS NOTE


The patient is a 76-year-old female with history of carotid vascular disease who

presented with symptoms of progressive chest discomfort and an abnormal MPI.  

Underwent cardiac catheterization on the 24th and was found to have severe 

distal left main disease with mild-to-moderate disease in the RCA.  She is 

scheduled to undergo CABG today.  Her echo showed an ejection fraction of 50% 

with mild lateral wall hypokinesis and mild-to-moderate mitral regurgitation.  

She's feeling well this morning, denies any chest discomfort or dizziness.  She 

is in sinus mechanism.  Scheduled to undergo CABG this afternoon.  She continues

to be on aspirin, Lipitor 80 mg daily, isosorbide mononitrate 15 mg daily, 

lisinopril 30 mg daily, metoprolol succinate 25 mg daily





June 28:


The patient underwent off-pump CABG yesterday with LIMA to the LAD and SVG to 

the OM with ligation of the left atrial appendage and placement of intra-aortic 

balloon pump because of worsening ischemia at the start of surgery.  She had 

atrial arrhythmia requiring cardioversion earlier.  She is extubated, in sinus 

mechanism, intra-aortic balloon pump at 1:2 with good blood pressure and urinary

output.  She is on no vasopressor.  She is awake, alert and following commands. 

At the end of the procedure she had a good ejection fraction with mild mitral 

regurgitation.


She continues to be on aspirin, Lipitor 80 mg daily, Plavix 75 mg daily, 

metoprolol tartrate 12-1/2 mg twice a day.





June 29:


The patient intra-aortic balloon pump was removed yesterday.  She was hypoxemic 

during the night requiring BiPAP.  She denies any chest discomfort.  She 

continues to be in sinus mechanism.  She denies any chest discomfort, dizziness 

or palpitations.  She received diuretics yesterday with good output.  She 

continues to be on aspirin, Plavix, low-dose dopamine, metoprolol 12-1/2 mg 

twice a day, Lipitor 80 mg daily.  Her chest x-ray shows bilateral pleural 

effusion





June 30:


The patient is awake and alert, continues to be in sinus mechanism, she 

continues to be on the BiPAP but feels better.  Her breathing is stable.  She is

denying any chest discomfort, dizziness or palpitations.  She has no nausea.  

She has good urinary output.  She continues to be on aspirin once a day, 

amiodarone 400 mg twice a day, Lipitor 80 mg daily, Plavix 75 mg daily, insulin,

metoprolol tartrate 25 mg twice a day.  Her chest x-ray shows bilateral pleural 

effusion with mild congestion, she diuresed well yesterday the IV Lasix





July 1:


The patient continues to be on a BiPAP, hypoxic off of it.  She had an episode 

of atrial fibrillation back in sinus mechanism to be on amiodarone.  Her blood 

pressure is stable and has not required a suppressive.  She denies any chest 

discomfort but she is dyspneic.  She has no nausea or vomiting.  Her urinary 

output has been stable.  Her chest x-ray shows worsening infiltrate bilaterally.

 She continues to be on amiodarone 400 mg twice a day, aspirin, Lipitor 80 mg 

daily, Plavix 75 mg daily, Lasix 20 mg IV every 12 hours, metoprolol tartrate 25

mg 3 times a day





July 2:


She's feeling better today, sitting up in the chair, she continues to be on high

flow during the day but BiPAP during the night.  Her blood pressure has been on 

the higher side.  She denies any chest discomfort, dizziness or palpitations.  

Hemodynamically she is stable, in sinus mechanism.  She has a good urinary 

output.


She continues to be on aspirin once a day, Lipitor 80 mg daily, Plavix 75 mg 

daily, insulin, Zestril 5 mg daily, metoprolol 50 mg twice a day


July 3:


The patient is feeling better today, her breathing is better she is on airflow, 

is in sinus mechanism.  She denies any chest discomfort, dizziness or 

palpitations.  She denies any nausea or vomiting.  She ambulated.  She is doing 

better with the incentive spirometry.  Hemodynamically she is stable.  She 

continues to be on aspirin, Plavix 75 mg daily, Lipitor 80 mg daily, lisinopril 

10 mg twice a day, metoprolol 50 mg twice a day


July 4:


The patient is sitting up in the chair him a feels better and lower oxygen 

supply.  Her O2 sats are stable.  She is in sinus mechanism.  She denies any reed

st discomfort or dizziness, no nausea or vomiting.  Hemodynamically she is 

stable on no vasopressors.  She is using incentive spirometry.  No further 

episodes of atrial fibrillation She continues to be on aspirin, Plavix 75 mg 

daily, Lipitor 80 mg daily, Zestril 10 mg twice a day, metoprolol 50 mg twice a 

day





7/5


Patient seen and examined.  Patient denies any chest pain or shortness breath.  

Continue to be in sinus rhythm.  Amiodarone has been on hold secondary to prior 

episode of respiratory distress with amiodarone IV.





PHYSICAL EXAMINATION:


Vitals reviewed


LUNGS: Mild decrease in the breath sounds at the bases


HEART: Regular rate and rhythm, S1, S2. No S3.  systolic murmur at the base


ABDOMEN: Soft, nontender, no organomegaly


EXTREMETIES: No edema








IMPRESSION:


1.  Status post CABG, LIMA to the LAD and SVG to obtuse marginal branch with 

known severe left main disease.


2.  Status post removal of Intra-aortic balloon pump


3.  History of hyperlipidemia 


4.  Hypoxemia with lung congestion, probable lung injury post CABG cannot rule 

out infection, no significant fluid overload, improved


5.  Hypertension remains elevated


6.  Postoperative A. fib, currently sinus rhythm








PLAN:


Blood pressure remains somewhat elevated.  Norvasc was added yesterday.  We will

increase lisinopril from 10-20 mg twice a day.  Patient continues to be in sinus

rhythm.  Amiodarone has been on hold secondary to respiratory distress 

previously with IV amiodarone.  If patient continues to have episodes of A. fib 

would consider anticoagulation however at this time continue with aspirin and 

Plavix.











Objective





- Vital Signs


Vital signs: 


                                   Vital Signs











Temp  97.6 F   07/05/22 08:00


 


Pulse  72   07/05/22 09:00


 


Resp  21   07/05/22 09:00


 


BP  140/70   07/05/22 09:00


 


Pulse Ox  94 L  07/05/22 09:00


 


FiO2  45   07/04/22 04:00








                                 Intake & Output











 07/04/22 07/05/22 07/05/22





 18:59 06:59 18:59


 


Intake Total 1207 220 65


 


Output Total 1800 500 650


 


Balance -593 -280 -585


 


Weight 109.5 kg 108 kg 


 


Intake:   


 


   120 65


 


    Cefepime 2 gm In Sodium 200  25





    Chloride 0.9% 100 ml @ 25   





    mls/hr IVPB Q8HR [facility] Rx#   





    :[id number]   


 


    Lactated Ringers 1,000 ml  120 40





    @ 20 mls/hr IV .Q24H [facility]   





    Rx#:[id number]   


 


    Vancomycin 2,000 mg In 167  





    Sodium Chloride 0.9% 500   





    ml 500 ml @ 167 mls/hr   





    IVPB Q16H [facility] Rx#:   





    [id number]   


 


  Intake, IV Titration  100 





  Amount   


 


    Cefepime 2 gm In Sodium  100 





    Chloride 0.9% 100 ml @ 25   





    mls/hr IVPB Q8HR [facility] Rx#   





    :[id number]   


 


  Oral 840  


 


Output:   


 


  Urine 1800 500 650


 


Other:   


 


  Voiding Method Bedside Commode Bedside Commode Bedside Commode


 


  # Voids 0 1 








                       ABP, PAP, CO, CI - Last Documented











Arterial Blood Pressure        281/281


 


Pulmonary Artery Pressure      43/24


 


Cardiac Output                 4


 


Cardiac Index                  1.9

















- Labs


CBC & Chem 7: 


                                 07/05/22 05:11





                                 07/05/22 05:11


Labs: 


                  Abnormal Lab Results - Last 24 Hours (Table)











  07/04/22 07/04/22 07/04/22 Range/Units





  11:38 16:35 19:56 


 


WBC     (3.8-10.6)  k/uL


 


RBC     (3.80-5.40)  m/uL


 


Hgb     (11.4-16.0)  gm/dL


 


Hct     (34.0-46.0)  %


 


Neutrophils #     (1.3-7.7)  k/uL


 


Chloride     ()  mmol/L


 


BUN     (7-17)  mg/dL


 


Glucose     (74-99)  mg/dL


 


POC Glucose (mg/dL)  208 H  126 H  170 H  ()  mg/dL


 


AST     (14-36)  U/L


 


ALT     (4-34)  U/L


 


Alkaline Phosphatase     ()  U/L


 


Total Protein     (6.3-8.2)  g/dL














  07/04/22 07/05/22 07/05/22 Range/Units





  23:36 05:11 05:11 


 


WBC   12.1 H   (3.8-10.6)  k/uL


 


RBC   3.50 L   (3.80-5.40)  m/uL


 


Hgb   10.7 L   (11.4-16.0)  gm/dL


 


Hct   32.2 L   (34.0-46.0)  %


 


Neutrophils #   9.9 H   (1.3-7.7)  k/uL


 


Chloride    108 H  ()  mmol/L


 


BUN    31 H  (7-17)  mg/dL


 


Glucose    105 H  (74-99)  mg/dL


 


POC Glucose (mg/dL)  116 H    ()  mg/dL


 


AST    43 H  (14-36)  U/L


 


ALT    66 H  (4-34)  U/L


 


Alkaline Phosphatase    146 H  ()  U/L


 


Total Protein    5.7 L  (6.3-8.2)  g/dL














  07/05/22 Range/Units





  08:32 


 


WBC   (3.8-10.6)  k/uL


 


RBC   (3.80-5.40)  m/uL


 


Hgb   (11.4-16.0)  gm/dL


 


Hct   (34.0-46.0)  %


 


Neutrophils #   (1.3-7.7)  k/uL


 


Chloride   ()  mmol/L


 


BUN   (7-17)  mg/dL


 


Glucose   (74-99)  mg/dL


 


POC Glucose (mg/dL)  124 H  ()  mg/dL


 


AST   (14-36)  U/L


 


ALT   (4-34)  U/L


 


Alkaline Phosphatase   ()  U/L


 


Total Protein   (6.3-8.2)  g/dL

## 2022-07-05 NOTE — P.PN
Subjective


Progress Note Date: 07/05/22


Principal diagnosis: 


Symptomatic coronary disease, post coronary artery bypass surgery 2, off-pump 

and the patient is postop day #8








07/04/2022 the patient is postop day #7.  Patient is doing well and the patient 

was taken off the Airvo and the patient is currently on 5 L O2 2 by nasal 

cannula.  She did suffer a post surgical ARDS from which she is gradually 

recovering pH remains on prednisone 40 mg by mouth daily.  She remains on IV 

cefepime and vancomycin can be discontinued knowing that the antibiotics was 

essentially hepatic in nature.  The patient has no new complaints otherwise for 

now.  The patient has a white cell count of 12.2 with a hemoglobin of 10.3.  

Electrodes are all within normal limits.  No nausea.  No vomiting.  Using 

incentive spirometer, pulling approximately 1000 on the I asked.  On terms of 

the renal function, the patient is stable creatinine of 0.7 with a mean of 42.  

Surgical with that is dry clean and intact.  Cardiac rhythm is sinus and the 

patient is still having on and off episodes of atrial fibrillation.





Reevaluated today on 7/5/2022, patient is sitting at a bedside chair, in no 

distress, patient is on room air.  Overall the patient is doing great.  

Apparently she suffered postsurgical ARDS and she recovered nicely, patient 

remains on prednisone she is was also on antibiotics in the form of cefepime and

vancomycin which was eventually discontinued.  Patient has no symptoms today.  

Her WBC count is 12.1 hemoglobin is 10.7 electrolytes and renal profile are 

normal.  Chest x-ray is suggestive of interstitial edema/ARDS.  And she has 

small pleural effusions





Objective





- Vital Signs


Vital signs: 


                                   Vital Signs











Temp  98 F   07/05/22 12:00


 


Pulse  72   07/05/22 12:00


 


Resp  19   07/05/22 12:00


 


BP  133/87   07/05/22 12:00


 


Pulse Ox  95   07/05/22 12:00


 


FiO2  45   07/04/22 04:00








                                 Intake & Output











 07/04/22 07/05/22 07/05/22





 18:59 06:59 18:59


 


Intake Total 1207 220 180


 


Output Total 1800 500 650


 


Balance -593 -280 -470


 


Weight 109.5 kg 108 kg 


 


Intake:   


 


   120 180


 


    Cefepime 2 gm In Sodium 200  100





    Chloride 0.9% 100 ml @ 25   





    mls/hr IVPB Q8HR [facility] Rx#   





    [id number]   


 


    Lactated Ringers 1,000 ml  120 80





    @ 20 mls/hr IV .Q24H [facility]   





    Rx#:[id number]   


 


    Vancomycin 2,000 mg In 167  





    Sodium Chloride 0.9% 500   





    ml 500 ml @ 167 mls/hr   





    IVPB Q16H [facility] Rx#:   





    [id number]   


 


  Intake, IV Titration  100 





  Amount   


 


    Cefepime 2 gm In Sodium  100 





    Chloride 0.9% 100 ml @ 25   





    mls/hr IVPB Q8HR [facility] Rx#   





    :[id number]   


 


  Oral 840  


 


Output:   


 


  Urine 1800 500 650


 


Other:   


 


  Voiding Method Bedside Commode Bedside Commode Bedside Commode


 


  # Voids 0 1 








                       ABP, PAP, CO, CI - Last Documented











Arterial Blood Pressure        281/281


 


Pulmonary Artery Pressure      43/24


 


Cardiac Output                 4


 


Cardiac Index                  1.9

















- Exam





Physical Exam: Revealed 76-year-old female in no distress, on room air.


Head: Atraumatic, normocephalic


HEENT:[Neck is supple.] [No neck masses.] [No thyromegaly.] [No JVD.]


Chest: [Symmetrical chest expansion.  Crackles at the bases no rhonchi and no 

wheezes


Cardiac Exam: [Normal S1 and S2, no S3 gallop, no murmur.]


Abdomen: [Soft, nontender,  no megaly, no rebound, no guarding, normal bowel 

sounds.]


Extremities: [No clubbing, no edema, no cyanosis.]


Neurological Exam: [No focal neurologic deficit.]  Alert oriented 3.


Psychiatric: Normal mood, affect and normal mental status examination.


Skin: No rashes





- Labs


CBC & Chem 7: 


                                 07/05/22 05:11





                                 07/05/22 05:11


Labs: 


                  Abnormal Lab Results - Last 24 Hours (Table)











  07/04/22 07/04/22 07/04/22 Range/Units





  16:35 19:56 23:36 


 


WBC     (3.8-10.6)  k/uL


 


RBC     (3.80-5.40)  m/uL


 


Hgb     (11.4-16.0)  gm/dL


 


Hct     (34.0-46.0)  %


 


Neutrophils #     (1.3-7.7)  k/uL


 


Chloride     ()  mmol/L


 


BUN     (7-17)  mg/dL


 


Glucose     (74-99)  mg/dL


 


POC Glucose (mg/dL)  126 H  170 H  116 H  ()  mg/dL


 


AST     (14-36)  U/L


 


ALT     (4-34)  U/L


 


Alkaline Phosphatase     ()  U/L


 


Total Protein     (6.3-8.2)  g/dL














  07/05/22 07/05/22 07/05/22 Range/Units





  05:11 05:11 08:32 


 


WBC  12.1 H    (3.8-10.6)  k/uL


 


RBC  3.50 L    (3.80-5.40)  m/uL


 


Hgb  10.7 L    (11.4-16.0)  gm/dL


 


Hct  32.2 L    (34.0-46.0)  %


 


Neutrophils #  9.9 H    (1.3-7.7)  k/uL


 


Chloride   108 H   ()  mmol/L


 


BUN   31 H   (7-17)  mg/dL


 


Glucose   105 H   (74-99)  mg/dL


 


POC Glucose (mg/dL)    124 H  ()  mg/dL


 


AST   43 H   (14-36)  U/L


 


ALT   66 H   (4-34)  U/L


 


Alkaline Phosphatase   146 H   ()  U/L


 


Total Protein   5.7 L   (6.3-8.2)  g/dL














  07/05/22 Range/Units





  12:01 


 


WBC   (3.8-10.6)  k/uL


 


RBC   (3.80-5.40)  m/uL


 


Hgb   (11.4-16.0)  gm/dL


 


Hct   (34.0-46.0)  %


 


Neutrophils #   (1.3-7.7)  k/uL


 


Chloride   ()  mmol/L


 


BUN   (7-17)  mg/dL


 


Glucose   (74-99)  mg/dL


 


POC Glucose (mg/dL)  128 H  ()  mg/dL


 


AST   (14-36)  U/L


 


ALT   (4-34)  U/L


 


Alkaline Phosphatase   ()  U/L


 


Total Protein   (6.3-8.2)  g/dL














Assessment and Plan


Assessment: 





Impression:


Status post CABG, postoperative day #8


Acute hypoxic respiratory failure postsurgical ARDS, unexpected.  Improving 

steadily over the last few days.


Paroxysmal atrial fibrillation


Postoperative anemia/expected





Recommendation:


Continue present supportive care measures


Continue incentive spirometry


Continue cardiac meds and beta blockers


This continue diuretics


Discontinue amiodarone


We will continue to follow.


Time with Patient: Less than 30

## 2022-07-06 VITALS — TEMPERATURE: 98.2 F | SYSTOLIC BLOOD PRESSURE: 134 MMHG | DIASTOLIC BLOOD PRESSURE: 78 MMHG

## 2022-07-06 VITALS — RESPIRATION RATE: 16 BRPM | HEART RATE: 68 BPM

## 2022-07-06 LAB
ALBUMIN SERPL-MCNC: 3.7 G/DL (ref 3.5–5)
ALP SERPL-CCNC: 146 U/L (ref 38–126)
ALT SERPL-CCNC: 66 U/L (ref 4–34)
ANION GAP SERPL CALC-SCNC: 8 MMOL/L
AST SERPL-CCNC: 51 U/L (ref 14–36)
BASOPHILS # BLD AUTO: 0 K/UL (ref 0–0.2)
BASOPHILS NFR BLD AUTO: 0 %
BUN SERPL-SCNC: 27 MG/DL (ref 7–17)
CALCIUM SPEC-MCNC: 8.6 MG/DL (ref 8.4–10.2)
CHLORIDE SERPL-SCNC: 110 MMOL/L (ref 98–107)
CO2 SERPL-SCNC: 24 MMOL/L (ref 22–30)
EOSINOPHIL # BLD AUTO: 0.1 K/UL (ref 0–0.7)
EOSINOPHIL NFR BLD AUTO: 1 %
ERYTHROCYTE [DISTWIDTH] IN BLOOD BY AUTOMATED COUNT: 3.68 M/UL (ref 3.8–5.4)
ERYTHROCYTE [DISTWIDTH] IN BLOOD: 14.9 % (ref 11.5–15.5)
GLUCOSE BLD-MCNC: 105 MG/DL (ref 70–110)
GLUCOSE BLD-MCNC: 114 MG/DL (ref 70–110)
GLUCOSE BLD-MCNC: 120 MG/DL (ref 70–110)
GLUCOSE SERPL-MCNC: 114 MG/DL (ref 74–99)
HCT VFR BLD AUTO: 33.6 % (ref 34–46)
HGB BLD-MCNC: 10.8 GM/DL (ref 11.4–16)
LYMPHOCYTES # SPEC AUTO: 1.2 K/UL (ref 1–4.8)
LYMPHOCYTES NFR SPEC AUTO: 8 %
MAGNESIUM SPEC-SCNC: 2.4 MG/DL (ref 1.6–2.3)
MCH RBC QN AUTO: 29.4 PG (ref 25–35)
MCHC RBC AUTO-ENTMCNC: 32.2 G/DL (ref 31–37)
MCV RBC AUTO: 91.3 FL (ref 80–100)
MONOCYTES # BLD AUTO: 1 K/UL (ref 0–1)
MONOCYTES NFR BLD AUTO: 7 %
NEUTROPHILS # BLD AUTO: 12.4 K/UL (ref 1.3–7.7)
NEUTROPHILS NFR BLD AUTO: 83 %
PLATELET # BLD AUTO: 475 K/UL (ref 150–450)
POTASSIUM SERPL-SCNC: 3.7 MMOL/L (ref 3.5–5.1)
PROT SERPL-MCNC: 6.2 G/DL (ref 6.3–8.2)
SODIUM SERPL-SCNC: 142 MMOL/L (ref 137–145)
WBC # BLD AUTO: 14.9 K/UL (ref 3.8–10.6)

## 2022-07-06 RX ADMIN — INSULIN ASPART SCH: 100 INJECTION, SOLUTION INTRAVENOUS; SUBCUTANEOUS at 13:13

## 2022-07-06 RX ADMIN — ATORVASTATIN CALCIUM SCH MG: 80 TABLET, FILM COATED ORAL at 08:26

## 2022-07-06 RX ADMIN — IPRATROPIUM BROMIDE AND ALBUTEROL SULFATE SCH ML: .5; 3 SOLUTION RESPIRATORY (INHALATION) at 11:12

## 2022-07-06 RX ADMIN — IPRATROPIUM BROMIDE AND ALBUTEROL SULFATE SCH ML: .5; 3 SOLUTION RESPIRATORY (INHALATION) at 15:43

## 2022-07-06 RX ADMIN — ASPIRIN 325 MG ORAL TABLET SCH MG: 325 PILL ORAL at 08:26

## 2022-07-06 RX ADMIN — METOPROLOL TARTRATE SCH MG: 50 TABLET, FILM COATED ORAL at 06:13

## 2022-07-06 RX ADMIN — PANTOPRAZOLE SODIUM SCH MG: 40 TABLET, DELAYED RELEASE ORAL at 06:30

## 2022-07-06 RX ADMIN — INSULIN ASPART SCH: 100 INJECTION, SOLUTION INTRAVENOUS; SUBCUTANEOUS at 06:29

## 2022-07-06 RX ADMIN — IPRATROPIUM BROMIDE AND ALBUTEROL SULFATE SCH ML: .5; 3 SOLUTION RESPIRATORY (INHALATION) at 08:05

## 2022-07-06 RX ADMIN — CLOPIDOGREL BISULFATE SCH MG: 75 TABLET ORAL at 08:26

## 2022-07-06 RX ADMIN — SODIUM CHLORIDE, PRESERVATIVE FREE SCH ML: 5 INJECTION INTRAVENOUS at 08:26

## 2022-07-06 RX ADMIN — HEPARIN SODIUM SCH UNIT: 5000 INJECTION INTRAVENOUS; SUBCUTANEOUS at 08:25

## 2022-07-06 NOTE — P.PN
Subjective


Progress Note Date: 07/06/22


Principal diagnosis: 


 Coronary artery disease





 07/04/2022 the patient is postop day #7.  Patient is doing well and the patient

was taken off the Airvo and the patient is currently on 5 L O2 2 by nasal 

cannula.  She did suffer a post surgical ARDS from which she is gradually recov

ering pH remains on prednisone 40 mg by mouth daily.  She remains on IV cefepime

and vancomycin can be discontinued knowing that the antibiotics was essentially 

hepatic in nature.  The patient has no new complaints otherwise for now.  The 

patient has a white cell count of 12.2 with a hemoglobin of 10.3.  Electrodes 

are all within normal limits.  No nausea.  No vomiting.  Using incentive 

spirometer, pulling approximately 1000 on the I asked.  On terms of the renal 

function, the patient is stable creatinine of 0.7 with a mean of 42.  Surgical 

with that is dry clean and intact.  Cardiac rhythm is sinus and the patient is 

still having on and off episodes of atrial fibrillation.





Reevaluated today on 7/5/2022, patient is sitting at a bedside chair, in no 

distress, patient is on room air.  Overall the patient is doing great.  

Apparently she suffered postsurgical ARDS and she recovered nicely, patient 

remains on prednisone she is was also on antibiotics in the form of cefepime and

vancomycin which was eventually discontinued.  Patient has no symptoms today.  

Her WBC count is 12.1 hemoglobin is 10.7 electrolytes and renal profile are 

normal.  Chest x-ray is suggestive of interstitial edema/ARDS.  And she has 

small pleural effusions





On 07/06/2022 patient seen in follow-up on selective care unit.  Today is 

postoperative day #9, status post coronary artery bypass grafting surgery 2, 

off pump.  She is doing well, she is awake and alert, remains a pulse ox of 93%,

breathing comfortably, denies any specific complaints, chest x-ray from today 

showing persistent low lung volumes, cardiomegaly with small bilateral pleural 

effusions and bibasilar atelectasis.  Patient received a dose of IV Lasix per CT

surgery.  Otherwise vital signs have been stable.  Patient has completed a 

course of cefepime and vancomycin.  She's had no fever or chills, incisions are 

clean dry and intact.











Objective





- Vital Signs


Vital signs: 


                                   Vital Signs











Temp  98.2 F   07/06/22 12:00


 


Pulse  68   07/06/22 12:00


 


Resp  18   07/06/22 12:00


 


BP  134/78   07/06/22 12:00


 


Pulse Ox  93 L  07/06/22 12:00


 


FiO2  21   07/05/22 19:25








                                 Intake & Output











 07/05/22 07/06/22 07/06/22





 18:59 06:59 18:59


 


Intake Total 200  222


 


Output Total 900 200 


 


Balance -700 -200 222


 


Weight  107.2 kg 


 


Intake:   


 


    


 


    Cefepime 2 gm In Sodium 100  





    Chloride 0.9% 100 ml @ 25   





    mls/hr IVPB Q8HR [facility] Rx#   





    :[id number]   


 


    Lactated Ringers 1,000 ml 100  





    @ 20 mls/hr IV .Q24H [facility]   





    Rx#:[id number]   


 


  Oral   222


 


Output:   


 


  Urine 900 200 


 


Other:   


 


  Voiding Method Bedside Commode Toilet Toilet


 


  # Voids   1








                       ABP, PAP, CO, CI - Last Documented











Arterial Blood Pressure        281/281


 


Pulmonary Artery Pressure      43/24


 


Cardiac Output                 4


 


Cardiac Index                  1.9

















- Exam


 GENERAL EXAM: Alert, very pleasant, 76-year-old white female, on room air, with

a pulse ox of 93%, comfortable in no apparent distress.


HEAD: Normocephalic/atraumatic.


EYES: Normal reaction of pupils, equal size.  Conjunctiva pink, sclera white.


NOSE: Clear with pink turbinates.


THROAT: No erythema or exudates.


NECK: No masses, no JVD, no thyroid enlargement, no adenopathy.


CHEST: No chest wall deformity.  Symmetrical expansion.  Midsternal incision and

chest tube sites are clean dry and intact


LUNGS: Equal air entry with no crackles, wheeze, rhonchi or dullness.


CVS: Regular rate and rhythm, normal S1 and S2, no gallops, no murmurs, no rubs


ABDOMEN: Soft, nontender.  No hepatosplenomegaly, normal bowel sounds, no 

guarding or rigidity.


EXTREMITIES: No clubbing, no edema, no cyanosis, 2+ pulses and upper and lower 

extremities.


MUSCULOSKELETAL: Muscle strength and tone normal.


SPINE: No scoliosis or deformity


SKIN: No rashes


CENTRAL NERVOUS SYSTEM: Alert and oriented -3.  No focal deficits, tone is 

normal in all 4 extremities.


PSYCHIATRIC: Alert and oriented -3.  Appropriate affect.  Intact judgment and 

insight.











- Labs


CBC & Chem 7: 


                                 07/06/22 06:28





                                 07/06/22 06:28


Labs: 


                  Abnormal Lab Results - Last 24 Hours (Table)











  07/05/22 07/05/22 07/06/22 Range/Units





  16:34 20:15 06:07 


 


WBC     (3.8-10.6)  k/uL


 


RBC     (3.80-5.40)  m/uL


 


Hgb     (11.4-16.0)  gm/dL


 


Hct     (34.0-46.0)  %


 


Plt Count     (150-450)  k/uL


 


Neutrophils #     (1.3-7.7)  k/uL


 


Chloride     ()  mmol/L


 


BUN     (7-17)  mg/dL


 


Glucose     (74-99)  mg/dL


 


POC Glucose (mg/dL)  128 H  186 H  114 H  ()  mg/dL


 


Magnesium     (1.6-2.3)  mg/dL


 


AST     (14-36)  U/L


 


ALT     (4-34)  U/L


 


Alkaline Phosphatase     ()  U/L


 


Total Protein     (6.3-8.2)  g/dL














  07/06/22 07/06/22 07/06/22 Range/Units





  06:28 06:28 11:42 


 


WBC  14.9 H    (3.8-10.6)  k/uL


 


RBC  3.68 L    (3.80-5.40)  m/uL


 


Hgb  10.8 L    (11.4-16.0)  gm/dL


 


Hct  33.6 L    (34.0-46.0)  %


 


Plt Count  475 H    (150-450)  k/uL


 


Neutrophils #  12.4 H    (1.3-7.7)  k/uL


 


Chloride   110 H   ()  mmol/L


 


BUN   27 H   (7-17)  mg/dL


 


Glucose   114 H   (74-99)  mg/dL


 


POC Glucose (mg/dL)    120 H  ()  mg/dL


 


Magnesium   2.4 H   (1.6-2.3)  mg/dL


 


AST   51 H   (14-36)  U/L


 


ALT   66 H   (4-34)  U/L


 


Alkaline Phosphatase   146 H   ()  U/L


 


Total Protein   6.2 L   (6.3-8.2)  g/dL














Assessment and Plan


Plan: 


 Assessment:





#1.  Symptomatic coronary artery disease, status post coronary artery bypass 

grafting surgery 2, off pump, postoperative day #9.  Patient required intra-

aortic balloon pump insertion for hemodynamic support at the time of surgery wh

ich was subsequently discontinued.





#2.  Acute hypoxic respiratory failure the possibility of ARDS, and initially 

patient was requiring high flow oxygen per Airvo which has been gradually weaned

down, and clinically patient has improved with treatment which included 

steroids, diuretics, and empiric antibiotics





#3.  Postoperative atelectasis and small right-sided pleural effusion





#4.  Paroxysmal atrial fibrillation expected outcome of surgery currently in 

sinus mechanism





#5.  Postoperative hyperglycemia, improved insulin drip has been transitioned to

subcutaneous insulin





#6.  Hypertension





#7.  Prior history of coronary 19 infection in December 2021





#8.  Postoperative anemia, expected outcome of surgery, hemoglobin is stable





#9.  Leukocytosis, improved





Plan:





Today's chest x-ray and labs have been reviewed


Patient has been given a dose of IV Lasix per CT surgery


Continue encouraging deep breathing and coughing


Encourage ambulation


Patient has completed a course of empiric antibiotics


Vital signs are stable, no fever or chills


Discharge pending to patient rehab possibly today


Outpatient follow-up with Dr. Lynn in the office in 7-10 days





I have personally seen and examined the patient, performed the documentation and

the assessment and  plan as written.  Number of minutes spent on the visit: [10]

 





Time with Patient: Less than 30

## 2022-07-06 NOTE — P.PN
Subjective


Progress Note Date: 07/06/22





 HISTORY OF PRESENT ILLNESS


This is a pleasant 76 years old female with past medical history of Fibromyalg

ia, Hyperlipidemia, Hypertension, Osteoarthritis , Supraventricular Tachycardia 

,urinary incontinence-wears pad, severe left internal carotid artery stenosis 

followed by Dr. Adam, frequent constipation, she was admitted under 

cardiology service for right arm pain, chest pain and back pain for the last 3 

weeks, where she underwent cardiac cath and 60/24 showing critical and complex 

lesion involving the distal left main coronary arteries and also involving the 

ostial left circumflex and the distal LAD with the stenotic range is 80-90% with

increase in LVEDP  , Patient will need bypass procedure to open his coronary 

arteries.


Currently she denies chest pain or dyspnea.  No incontinence of urine or bowel. 

No fever


Patient is hemodynamically stable


Labs including CBC, INR, BMP and liver enzymes are unremarkable.  TSH is 1.6.


Urine analysis showing trace blood.


coronavirus not detected.   Hepatitis panel is negative


Chest x-ray: Minimal interstitial edema noted


Carotid duplex: No significant stenosis


Echocardiogram showing ejection fraction of 50-55% with mild-to-moderate mitral 

regurgitation and mild tricuspid regurgitation


Patient currently on heparin drip, aspirin 81 mg twice a day and normal saline 

at 75 mL/h as well as Lipitor and metoprolol


However patient this morning she is asymptomatic she denies chest pain or 

dyspnea or abdominal pain.  No diarrhea or vomiting or dysuria.  No headache or 

weakness or numbness.





6/27: Patient is denying any chest pain, shortness of breath, palpitations, 

lightheadedness or dizziness.  She is scheduled for CABG this afternoon and 

patient states that she is ready to move forward.  No new concerns from her 

nurse.  Patient has been afebrile, heart rate 80, blood pressure 129/78 and 

pulse ox 96% on room air.  CBC is unremarkable.  Chloride 110, CO2 20, blood 

sugar 112, creatinine 0.68.





6/28: Patient is status post CABG 2 with left internal mammary artery to the 

left anterior descending artery, reverse saphenous vein graft to the obtuse 

marginal artery, ligation of the left atrial appendage with a 40 mm AtriCure 

clip, endovascular vein harvest of the right greater saphenous vein, placement 

of intra-aortic balloon pump.  Right groin intra-aortic balloon pump was 

discontinued this morning.  Right internal jugular [place]/Cordis, right radial 

arterial line, mediastinal/left pleural chest tubes remain in place.  Patient is

stating that she is feeling well.  She has been afebrile, heart rate 75, 121/54,

pulse ox 93% on 2 L.  Capillary blood glucose running between 110 and 120.  WBC 

9.7, hemoglobin 9.3 and platelet count 195.  Electrolytes and renal function 

normal.  AST 85 and ALT 41.





6/29: Patient remains in the intensive care unit.  Yesterday pulse ox continued 

to drop through the day with increased oxygen demands to the point where she is 

now on BiPAP.  She states she feels a little short of breath.  She denies Chest 

pain.  Temperature max 100.4, heart rate 76, respiratory rate 32, blood pressure

130/73.  Cardiac monitor sinus rhythm.  Patient is status post IV Lasix 

yesterday with good urine output.  Blood glucose running between 107 and 124 and

insulin drip will be discontinued, transition to NovoLog scale every 4 hours.  

Urinalysis was turbid, blood small.





6/30: Patient remains in the intensive care unit on BiPAP.  She has been unable 

to eat only taking a few sips of water.  Chest tubes have been removed, pacer 

wires to be removed today.  Torres catheter is in place, good urine output.  

Patient has been afebrile, heart rate in the 80s, blood pressure 144/76, pulse 

ox 90% on FiO2 80 BiPAP.  Cardiac monitor is sinus rhythm.  Repeat blood work 

reveals WBC 19.9, hemoglobin 10.2, platelet count 266.  Sodium 135, BUN 30 

creatinine 0.67.  AST 83 and ALT 60.  Blood sugars running between 123 and 160.


Chest x-ray reveals stable.  Persistent perihilar and basilar patchy densities 

bilateral small effusions.


Chest ultrasound reveals right pleural effusion 4.9 cm 2.2 cm fluid pocket.





7/1: Patient remains in intensive care unit.  She has just been transitioned to 

AirVo and off BiPAP which she utilized during the night and all day yesterday.  

Patient converted to atrial fibrillation when we walked into the room.  Torres 

remains in place.  We have added in a consult for Dr. Nunez in anticipation the 

patient will be ready for discharge to a week.  She has been afebrile, heart r

ate 101, respiratory rate 28, blood pressure 154/81, pulse ox 96% FiO2 of 90.  

Repeat blood work reveals WBC 19.2, hemoglobin 10.8 and platelet count 375.  

Electrolytes are normal.  Creatinine 0.69.  AST is 103 and ALT 95.  Coronavirus 

PCR not detected.  Patient has been started on cefepime and IV vancomycin 

prophylactically per pulmonary recommendations.  Pro-calcitonin has been ordered

Patient is continued on IV Lasix.





7/2, patient's remains in ICU, still with hypersomnia, nasal CPAP in place, on 

oral prednisone 40 mg, pulse ox 97%, high flow O2, FiO2 90%, vitals are stable, 

slightly tachypneic, with some conversations dyspnea, patient is sleeping in the

recliner often, has trace edema, melatonin 5 mg started, for sleep restoration, 

indwelling Torres catheter, clear urine, with adequate urine output.  Patient 

receiving IV vancomycin and cefepime, chest x-ray, similar airspace opacities 

and right pleural effusion, similar cardiomegaly, migjht need furosemide, if 

shortness of breath and the mass was..  Patient remains in sinus rhythm, no new 

episodes of atrial fibrillation, since the past 24 hours, creatinine 0.6 sodium 

140 hemoglobin 10.5 incentive spirometry, and 750 mL capacity





7/3 patient remains in ICU, still with hypersomnolence, was able to sleep well 

last night in bed, still has nasal CPAP, while drifting off to sleep, no chest 

pain no shortness of breath, leg swelling is improving, no aspirated events, 

cardiac seems to be stable, wbc count, 12.5 creatinine 0.77, glucose 123 blood 

pressure 1:30 to 135 systolic, pulse ox 98%, with 80% FiO2 multiple specialists 

are following, adequate urine output,





7/5: Patient is seen today in the intensive care unit.  She is resting 

comfortably in a recliner and appears to be in no acute distress.  She is 

scheduled to get up for her shower today.  She is off oxygen with pulse ox of 

90-94%.  Blood pressure 140/74, heart rate in the 60s and 70s.  Afebrile.  

Cardiac monitor is sinus rhythm.  Cardiology increased lisinopril.  WBC 12.1, 

hemoglobin 10.7, platelet count 377.  Sodium 143, potassium 3.6, chloride 108, 

CO2 28, BUN 31 creatinine 0.77.  Capillary blood glucose running between 107 and

170.  Total bilirubin 0.8, AST 43, ALT 66, alkaline phosphatase 146.  Chest x-

ray reveals continued CHF interstitial pulmonary edema slightly worsened.  Small

pleural effusion with prominent adjacent atelectasis or consolidation, external 

artifacts.





7/6: Repeat chest x-ray revealed continued CHF exacerbation with cardiomegaly a

nd small bilateral pleural effusions.  No significant change.  Patient is found 

sleeping in recliner awakens easily.  She states she is feeling tired.  No 

significant shortness of breath or chest pain.  She states that her discharge 

plan is for inpatient rehab at [facility] anticipate discharge 

today if arrangements are completed.  Patient has been afebrile, heart rate in 

the 60s and 70s, blood pressure 167/74, pulse ox 94% on room air.  Repeat blood 

work reveals WBC 14.9, hemoglobin 10.8 and platelet count 475.  BUN 27 

creatinine 0.69.  Blood sugar 114.  AST 51, ALT 66, alkaline phosphatase 146.








REVIEW OF SYSTEMS


Constitutional: No fever, no chills, no night sweats.  No weight change.  Noted 

weakness, fatigue-improving no lethargy.  No daytime sleepiness.


EENT: No headache.  No blurred vision or double vision, no loss of vision.  No 

loss of Hearing, no ringing in the ears, no dizziness.  No nasal drainage or 

congestion.  No epistaxis.  No sore throat.


Lungs: Minimal shortness of breath, cough, no sputum production.  No wheezing.


Cardiovascular: Denies chest pain, no lower extremity edema.  No palpitations.  

No paroxysmal nocturnal dyspnea.  No orthopnea.  No lightheadedness or 

dizziness.  No syncopal episodes.


Abdominal: No abdominal pain.  No nausea, vomiting.  No diarrhea.  No 

constipation.  No bloody or tarry stools.  No loss of appetite.


Genitourinary: No dysuria, increased frequency, urgency.  No urinary retention.


Musculoskeletal: No myalgias.  No muscle weakness, no gait dysfunction, no 

frequent falls.  No back pain.  No neck pain.


Integumentary: No wounds, no lesions.  No rash or pruritus.  No unusual 

bruising.  No change in hair or nails.


Neurologic: No aphasia. No facial droop. No change in mentation. No head injury.

No headache. No paralysis. No paresthesia.


Psychiatric: No depression.  No anxiety.  No mood swings.


Endocrine: No abnormal blood sugars.  No weight change.  No excessive sweating 

or thirst.  








PHYSICAL EXAMINATION


Gen: This is a 76-year-old overweight  female.  She is resting in 

recliner and appears to be comfortable and in no acute distress.  Patient 

patient is off oxygen


NECK: Supple. No JVD. No lymphadenopathy. No thyromegaly. 


LUNGS: Decreased breath sounds at the bases otherwise Clear to auscultation. No 

wheezes or rhonchi.  No intercostal retractions.  


HEART: Regular rate and rhythm.  Systolic murmur. 


ABDOMEN: Soft. Bowel sounds are present. No masses.  No tenderness.  


EXTREMITIES:    No calf tenderness.  Trace edema bilateral ankles 2+1 edema


NEUROLOGICAL: Patient is awake, alert and oriented x3. Cranial nerves 2 through 

12 are grossly intact. 





ASSESSMENT AND PLAN


1.  Severe coronary artery disease status post 2 vessel CABG 6/27.  Continue 

current plan per cardiothoracic team.  Continue aspirin 325 mg daily, Lipitor 80

mg daily, Plavix 75 mg daily, continue Norco as needed for pain, DuoNeb 

treatments 4 times daily and as needed, Lopressor 50 mg twice daily.  


2.  Acute hypoxic respiratory failure.  Patient is off oxygen. 





3.  Worsening bilateral lung infiltrates and small right pleural effusion.  





4.  Hypertension.  Continue lisinopril  20 mg twice daily, continue Lopressor. 





5.  Hyperlipidemia.  Continue atorvastatin.





6.  Carotid artery disease.  Continue aspirin, atorvastatin.





7.  Hyperglycemia.  Continue patient on NovoLog scale every 4 hours.





8.  New onset paroxysmal atrial fibrillation.  Lopressor 50 mg twice daily.





9.  GI prophylaxis.  Protonix 40 mg daily





10.  DVT prophylaxis.  Heparin subcu.





DISCHARGE PLAN


IP REHAB AT [facility] 








Impression and plan of care have been directed as dictated by the signing 

physician.  Princess Nava nurse practitioner acting as scribe for signing 

physician. 





Objective





- Vital Signs


Vital signs: 


                                   Vital Signs











Temp  97.7 F   07/06/22 04:00


 


Pulse  72   07/06/22 08:16


 


Resp  16   07/06/22 04:00


 


BP  167/74   07/06/22 04:00


 


Pulse Ox  94 L  07/06/22 04:00


 


FiO2  21   07/05/22 19:25








                                 Intake & Output











 07/05/22 07/06/22 07/06/22





 18:59 06:59 18:59


 


Intake Total 200  222


 


Output Total 900 200 


 


Balance -700 -200 222


 


Weight  107.2 kg 


 


Intake:   


 


    


 


    Cefepime 2 gm In Sodium 100  





    Chloride 0.9% 100 ml @ 25   





    mls/hr IVPB Q8HR [facility] Rx#   





    :[id number]   


 


    Lactated Ringers 1,000 ml 100  





    @ 20 mls/hr IV .Q24H [facility]   





    Rx#:[id number]   


 


  Oral   222


 


Output:   


 


  Urine 900 200 


 


Other:   


 


  Voiding Method Bedside Commode Toilet 


 


  # Voids   1








                       ABP, PAP, CO, CI - Last Documented











Arterial Blood Pressure        281/281


 


Pulmonary Artery Pressure      43/24


 


Cardiac Output                 4


 


Cardiac Index                  1.9

















- Labs


CBC & Chem 7: 


                                 07/06/22 06:28





                                 07/06/22 06:28


Labs: 


                  Abnormal Lab Results - Last 24 Hours (Table)











  07/05/22 07/05/22 07/05/22 Range/Units





  12:01 16:34 20:15 


 


WBC     (3.8-10.6)  k/uL


 


RBC     (3.80-5.40)  m/uL


 


Hgb     (11.4-16.0)  gm/dL


 


Hct     (34.0-46.0)  %


 


Plt Count     (150-450)  k/uL


 


Neutrophils #     (1.3-7.7)  k/uL


 


Chloride     ()  mmol/L


 


BUN     (7-17)  mg/dL


 


Glucose     (74-99)  mg/dL


 


POC Glucose (mg/dL)  128 H  128 H  186 H  ()  mg/dL


 


Magnesium     (1.6-2.3)  mg/dL


 


AST     (14-36)  U/L


 


ALT     (4-34)  U/L


 


Alkaline Phosphatase     ()  U/L


 


Total Protein     (6.3-8.2)  g/dL














  07/06/22 07/06/22 07/06/22 Range/Units





  06:07 06:28 06:28 


 


WBC   14.9 H   (3.8-10.6)  k/uL


 


RBC   3.68 L   (3.80-5.40)  m/uL


 


Hgb   10.8 L   (11.4-16.0)  gm/dL


 


Hct   33.6 L   (34.0-46.0)  %


 


Plt Count   475 H   (150-450)  k/uL


 


Neutrophils #   12.4 H   (1.3-7.7)  k/uL


 


Chloride    110 H  ()  mmol/L


 


BUN    27 H  (7-17)  mg/dL


 


Glucose    114 H  (74-99)  mg/dL


 


POC Glucose (mg/dL)  114 H    ()  mg/dL


 


Magnesium    2.4 H  (1.6-2.3)  mg/dL


 


AST    51 H  (14-36)  U/L


 


ALT    66 H  (4-34)  U/L


 


Alkaline Phosphatase    146 H  ()  U/L


 


Total Protein    6.2 L  (6.3-8.2)  g/dL

## 2022-07-06 NOTE — XR
EXAMINATION TYPE: XR chest 2V

 

DATE OF EXAM: 7/6/2022

 

COMPARISON: Chest x-ray from one day earlier and older studies.

 

HISTORY: Postoperative CABG.

 

TECHNIQUE:  Frontal and lateral views of the chest are obtained.

 

FINDINGS:  Overlying sternal wires and mediastinal clips along with left atrial appendage clip all re
demonstrated.

 

Persistent low lung volumes and cardiomegaly with small bilateral pleural effusions and associated bi
basilar compressive atelectasis. No visualized pneumothorax seen on current study. Osseous structures
 are intact.

 

IMPRESSION:  Continued CHF exacerbation with cardiomegaly and small bilateral pleural effusions. No s
ignificant change from one day earlier.

## 2022-07-06 NOTE — P.PN
Subjective


The patient is a 76-year-old female with history of carotid vascular disease who

presented with symptoms of progressive chest discomfort and an abnormal MPI.  

Underwent cardiac catheterization on the 24th and was found to have severe 

distal left main disease with mild-to-moderate disease in the RCA.  Her echo 

showed an ejection fraction of 50% with mild lateral wall hypokinesis and 

mild-to-moderate mitral regurgitation.  


She underwent 2 vessel CABG on 6/27/2022 7/6


Patient seen and examined.  Patient denies any chest pain or shortness breath.  

Continue to be in sinus rhythm.  Amiodarone PO restarted. Vital signs are 

stable. BP has improved. Likely discharge home today. 


she is currently maintained on amiodarone 400 mg twice a day, amlodipine 10 mg 

daily, aspirin 325 mg daily, atorvastatin 80 mg daily, Plavix and 20 mg daily, 

lisinopril 20 mg twice a day, metoprolol tartrate 50 mg twice a day





PHYSICAL EXAMINATION:


Vitals reviewed


LUNGS: Mild decrease in the breath sounds at the bases


HEART: Regular rate and rhythm, S1, S2. No S3.  systolic murmur at the base


ABDOMEN: Soft, nontender, no organomegaly


EXTREMETIES: No edema








IMPRESSION:


Status post CABG, LIMA to the LAD and SVG to obtuse marginal branch with known 

severe left main disease.


Status post removal of Intra-aortic balloon pump


History of hyperlipidemia 


Hypoxemia with lung congestion, probable lung injury post CABG cannot rule out 

infection, no significant fluid overload, improved


Hypertension remains elevated


Postoperative A. fib, currently sinus rhythm








PLAN:


Continue current medication with aspirin, Plavix, statin, beta blocker


Amiodarone has bee restarted


Encourage incentive spirometry 


Patient continues to be in sinus rhythm.  If patient continues to have episodes 

of A. fib would consider anticoagulation however at this time continue with 

aspirin and Plavix.


Possible discharge home today


Follow up outpatient with Dr. Santana





Nurse practitioner note has been reviewed by physician. Signing provider agrees 

with the documented findings, assessment, and plan of care. 








Objective





- Vital Signs


Vital signs: 


                                   Vital Signs











Temp  98.2 F   07/06/22 12:00


 


Pulse  68   07/06/22 12:00


 


Resp  18   07/06/22 12:00


 


BP  134/78   07/06/22 12:00


 


Pulse Ox  93 L  07/06/22 12:00


 


FiO2  21   07/05/22 19:25








                                 Intake & Output











 07/05/22 07/06/22 07/06/22





 18:59 06:59 18:59


 


Intake Total 200  222


 


Output Total 900 200 


 


Balance -700 -200 222


 


Weight  107.2 kg 


 


Intake:   


 


    


 


    Cefepime 2 gm In Sodium 100  





    Chloride 0.9% 100 ml @ 25   





    mls/hr IVPB Q8HR [facility] Rx#   





    :[id number]   


 


    Lactated Ringers 1,000 ml 100  





    @ 20 mls/hr IV .Q24H [facility]   





    Rx#:[id number]   


 


  Oral   222


 


Output:   


 


  Urine 900 200 


 


Other:   


 


  Voiding Method Bedside Commode Toilet Toilet


 


  # Voids   1








                       ABP, PAP, CO, CI - Last Documented











Arterial Blood Pressure        281/281


 


Pulmonary Artery Pressure      43/24


 


Cardiac Output                 4


 


Cardiac Index                  1.9

















- Labs


CBC & Chem 7: 


                                 07/06/22 06:28





                                 07/06/22 06:28


Labs: 


                  Abnormal Lab Results - Last 24 Hours (Table)











  07/05/22 07/05/22 07/06/22 Range/Units





  16:34 20:15 06:07 


 


WBC     (3.8-10.6)  k/uL


 


RBC     (3.80-5.40)  m/uL


 


Hgb     (11.4-16.0)  gm/dL


 


Hct     (34.0-46.0)  %


 


Plt Count     (150-450)  k/uL


 


Neutrophils #     (1.3-7.7)  k/uL


 


Chloride     ()  mmol/L


 


BUN     (7-17)  mg/dL


 


Glucose     (74-99)  mg/dL


 


POC Glucose (mg/dL)  128 H  186 H  114 H  ()  mg/dL


 


Magnesium     (1.6-2.3)  mg/dL


 


AST     (14-36)  U/L


 


ALT     (4-34)  U/L


 


Alkaline Phosphatase     ()  U/L


 


Total Protein     (6.3-8.2)  g/dL














  07/06/22 07/06/22 07/06/22 Range/Units





  06:28 06:28 11:42 


 


WBC  14.9 H    (3.8-10.6)  k/uL


 


RBC  3.68 L    (3.80-5.40)  m/uL


 


Hgb  10.8 L    (11.4-16.0)  gm/dL


 


Hct  33.6 L    (34.0-46.0)  %


 


Plt Count  475 H    (150-450)  k/uL


 


Neutrophils #  12.4 H    (1.3-7.7)  k/uL


 


Chloride   110 H   ()  mmol/L


 


BUN   27 H   (7-17)  mg/dL


 


Glucose   114 H   (74-99)  mg/dL


 


POC Glucose (mg/dL)    120 H  ()  mg/dL


 


Magnesium   2.4 H   (1.6-2.3)  mg/dL


 


AST   51 H   (14-36)  U/L


 


ALT   66 H   (4-34)  U/L


 


Alkaline Phosphatase   146 H   ()  U/L


 


Total Protein   6.2 L   (6.3-8.2)  g/dL

## 2022-07-06 NOTE — P.PN
Subjective


Progress Note Date: 07/06/22


Principal diagnosis: 





Coronary artery disease with left main disease, unstable angina, atrial 

arrhythmias.  Previous medical history of hypertension, hyperlipidemia, SVT, 

left internal carotid artery stenosis, obesity, never smoker, remains 

unvaccinated against Covid, family history of coronary artery disease





POD #9 off-pump coronary artery bypass grafting 2 with left internal mammary 

artery to the left anterior descending artery, reverse saphenous vein graft to 

the obtuse marginal artery, ligation of the left atrial appendage with a 40 mm 

AtriCure clip, endovascular vein harvest of the right greater saphenous vein, 

placement of intra-aortic balloon pump





Postoperative acute blood loss anemia, expected given hemodilution





Acute hypoxic respiratory failure requiring bipap





Paroxysmal atrial fibrillation, known common occurrence after open heart surgery





The patient was seen and examined this morning sitting up in a recliner on the 

cardiac stepdown unit in no acute distress.  She denies pain except when 

coughing hard, denies shortness of breath.  Currently in sinus rhythm, 

hemodynamically stable.  Remains on room air with oxygen saturation in the mid 

90s.  She has been ambulatory in the hallway but still feels a bit weak.  Plan 

is for discharge today vs tomorrow to [facility].





Objective





- Vital Signs


Vital signs: 


                                   Vital Signs











Temp  97.7 F   07/06/22 04:00


 


Pulse  66   07/06/22 04:00


 


Resp  16   07/06/22 04:00


 


BP  167/74   07/06/22 04:00


 


Pulse Ox  94 L  07/06/22 04:00


 


FiO2  21   07/05/22 19:25








                                 Intake & Output











 07/05/22 07/06/22 07/06/22





 18:59 06:59 18:59


 


Intake Total 200  


 


Output Total 900 200 


 


Balance -700 -200 


 


Weight  107.2 kg 


 


Intake:   


 


    


 


    Cefepime 2 gm In Sodium 100  





    Chloride 0.9% 100 ml @ 25   





    mls/hr IVPB Q8HR [facility] Rx#   





    :[id number]   


 


    Lactated Ringers 1,000 ml 100  





    @ 20 mls/hr IV .Q24H [facility]   





    Rx#:[id number]   


 


Output:   


 


  Urine 900 200 


 


Other:   


 


  Voiding Method Bedside Commode Toilet 








                       ABP, PAP, CO, CI - Last Documented











Arterial Blood Pressure        281/281


 


Pulmonary Artery Pressure      43/24


 


Cardiac Output                 4


 


Cardiac Index                  1.9

















- Exam





CONSTITUTIONAL: Appears comfortable, cooperative, no acute distress


RESPIRATORY:  Lungs sounds diminished bilaterally, right greater than left.  

Respirations even, nonlabored.  Currently on room air with oxygen saturation 

94%.  Able to achieve 1000 mL on incentive spirometry.  Strong dry cough.  


CARDIOVASCULAR:  S1, S2 present.  Regular rate and rhythm, sinus rhythm on 

telemetry.  Sternum stable.   Palpable peripheral pulses bilaterally.  Trace 

bilateral lower extremity edema present.  No calf pain or tenderness noted.  

Heart hugger in place with patient demonstrating appropriate use.  Antiembolism 

stockings, SCDs present.


GASTROINTESTINAL:  Abdomen soft, nontender, nondistended.  Active bowel sounds 

present 4 quadrants.  Tolerating diet.  Positive bowel movement.


GENITOURINARY:  Continues to void although not always measured


INTEGUMENTARY:  Skin is warm and dry with evidence of good perfusion.  Anterior 

chest incision well approximated and covered with dry intact dressing.  EVH site

well approximated without redness or drainage.


NEUROLOGIC:  Cranial nerves II through XII intact


MUSKULOSKELETAL:  Able to move all extremities, strength equal bilaterally, gait

normal


PSYCHIATRIC:  Alert and oriented to person place and time, appropriate affect, 

intact judgment and insight








- Allied health notes


Allied health notes reviewed: nursing





- Labs


CBC & Chem 7: 


                                 07/06/22 06:28





                                 07/06/22 06:28


Labs: 


                  Abnormal Lab Results - Last 24 Hours (Table)











  07/05/22 07/05/22 07/05/22 Range/Units





  08:32 12:01 16:34 


 


WBC     (3.8-10.6)  k/uL


 


RBC     (3.80-5.40)  m/uL


 


Hgb     (11.4-16.0)  gm/dL


 


Hct     (34.0-46.0)  %


 


Plt Count     (150-450)  k/uL


 


Neutrophils #     (1.3-7.7)  k/uL


 


Chloride     ()  mmol/L


 


BUN     (7-17)  mg/dL


 


Glucose     (74-99)  mg/dL


 


POC Glucose (mg/dL)  124 H  128 H  128 H  ()  mg/dL


 


Magnesium     (1.6-2.3)  mg/dL


 


AST     (14-36)  U/L


 


ALT     (4-34)  U/L


 


Alkaline Phosphatase     ()  U/L


 


Total Protein     (6.3-8.2)  g/dL














  07/05/22 07/06/22 07/06/22 Range/Units





  20:15 06:07 06:28 


 


WBC    14.9 H  (3.8-10.6)  k/uL


 


RBC    3.68 L  (3.80-5.40)  m/uL


 


Hgb    10.8 L  (11.4-16.0)  gm/dL


 


Hct    33.6 L  (34.0-46.0)  %


 


Plt Count    475 H  (150-450)  k/uL


 


Neutrophils #    12.4 H  (1.3-7.7)  k/uL


 


Chloride     ()  mmol/L


 


BUN     (7-17)  mg/dL


 


Glucose     (74-99)  mg/dL


 


POC Glucose (mg/dL)  186 H  114 H   ()  mg/dL


 


Magnesium     (1.6-2.3)  mg/dL


 


AST     (14-36)  U/L


 


ALT     (4-34)  U/L


 


Alkaline Phosphatase     ()  U/L


 


Total Protein     (6.3-8.2)  g/dL














  07/06/22 Range/Units





  06:28 


 


WBC   (3.8-10.6)  k/uL


 


RBC   (3.80-5.40)  m/uL


 


Hgb   (11.4-16.0)  gm/dL


 


Hct   (34.0-46.0)  %


 


Plt Count   (150-450)  k/uL


 


Neutrophils #   (1.3-7.7)  k/uL


 


Chloride  110 H  ()  mmol/L


 


BUN  27 H  (7-17)  mg/dL


 


Glucose  114 H  (74-99)  mg/dL


 


POC Glucose (mg/dL)   ()  mg/dL


 


Magnesium  2.4 H  (1.6-2.3)  mg/dL


 


AST  51 H  (14-36)  U/L


 


ALT  66 H  (4-34)  U/L


 


Alkaline Phosphatase  146 H  ()  U/L


 


Total Protein  6.2 L  (6.3-8.2)  g/dL














- Imaging and Cardiology


Chest x-ray: report reviewed, image reviewed





Assessment and Plan


Assessment: 





1.  Coronary artery disease with left main disease, status post 2 vessel CABG


2.  Hypertension


3.  Hyperlipidemia, cholesterol 182, 


4.  SVT, intraoperative atrial arrhythmias with cardioversion, status post left 

atrial appendage ligation


5.  Left internal carotid artery stenosis


6.  Obesity


7.  Never smoker, preoperative FEV1 72% of predicted


8.  History of covid infection in December 2021, remains unvaccinated against 

Covid


9.  Family history of coronary artery disease


10.  Nasal swab positive for MSSA, treated with mupirocin


11.  Preserved LV function with mild to moderate mitral regurgitation on 

transthoracic echocardiogram


12.  Acute blood loss anemia


13.  Acute hypoxic respiratory failure requiring bipap


14.  Leukocytosis with low grade fever


15.  Paroxysmal atrial fibrillation, status post left atrial appendage ligation


Plan: 





1.  Continue to maximize medical management with aspirin, Plavix, statin, beta 

blocker


2.  Restart amiodarone for A. fib prophylaxis.  No anticoagulation necessary 


3.  Encourage incentive spirometry 10 times every hour while awake.  

Bronchodilators per pulmonology


4.  Increase activity as tolerated.  PT/OT/cardiac rehab following


5.  GI/DVT prophylaxis


6.  Will monitor daily labs and CXRs.  Electrolyte replacement per protocol. 


7.  Pain control with current medication regimen


8.  Insulin management per primary care service.  Patient is not diabetic, 

preoperative hemoglobin A1c 5.6%


9.  Strict accurate intake and output.  Daily weights


10.  Discontinue cefepime, prednisone


11.  Discharge planning in progress.  Anticipate discharge to [facility] today vs. 

tomorrow


12.  More recommendations to follow

## 2023-05-18 NOTE — P.PN
Progress Note - Text


Progress Note Date: 06/08/18


73 yo female status post Total Left knee arthroplasty. Post-op day #1. Patient 

received intrathecal Duramorph. Patient was seen today, sitting up in bed no 

complaints, pain VAS score 3/10, no headache, no itching, no nausea and 

vomiting.


Assessment and plan: Doing well in general no complications from anesthesia.
Subjective


Progress Note Date: 06/08/18





Patient seen and examined at the bedside on rounds with Dr. Nj. Patient is 

POD #1 left TKA. Patient is awake and alert. Sitting up in bed. States her pain 

is tolerable at this time. Patient denies chest pain or pressure. Denies 

shortness of breath. Vitals are stable.








Objective





- Vital Signs


Vital signs: 


 Vital Signs











Temp  97.1 F L  06/08/18 07:00


 


Pulse  66   06/08/18 07:00


 


Resp  16   06/08/18 12:00


 


BP  148/60   06/08/18 07:00


 


Pulse Ox  92 L  06/08/18 07:18








 Intake & Output











 06/07/18 06/08/18 06/08/18





 18:59 06:59 18:59


 


Intake Total 1351 750 480


 


Output Total 100  


 


Balance 1251 750 480


 


Weight 101.605 kg  


 


Intake:   


 


  IV 1351  


 


  Intake, IV Titration  750 





  Amount   


 


    Lactated Ringers 1,000 ml  750 





    @ 75 mls/hr IV .[id number]   





    [facility] Rx#:[id number]   


 


  Oral   480


 


Output:   


 


  Estimated Blood Loss 100  


 


Other:   


 


  Voiding Method  Bedside Commode Toilet


 


  # Voids  1 














- Constitutional


Constitutional Comment(s): 





72-year-old  female





General appearance: Present: cooperative, no acute distress





- EENT


Eyes: Present: EOMI, PERRLA


ENT: Present: hearing grossly normal





- Neck


Neck: Present: normal ROM.  Absent: rigidity, stridor





- Respiratory


Respiratory: bilateral: CTA, negative: rales, rhonchi, wheezing





- Cardiovascular


Rhythm: regular


Heart sounds: normal: S1, S2


Abnormal Heart Sounds: Absent: systolic murmur, diastolic murmur





- Gastrointestinal


General gastrointestinal: Present: normal bowel sounds, soft.  Absent: distended

, rigid, tenderness





- Integumentary


Integumentary: Present: normal.  Absent: cellulitis, cyanotic, flushed, 

jaundiced





- Neurologic


Neurologic: Present: CNII-XII intact





- Musculoskeletal


Musculoskeletal: Present: strength equal bilaterally





- Psychiatric


Psychiatric: Present: A&O x's 3, appropriate affect, intact judgment & insight





- Labs


CBC & Chem 7: 


 06/08/18 06:46





 06/07/18 10:45


Labs: 


 Abnormal Lab Results - Last 24 Hours (Table)











  06/08/18 Range/Units





  06:46 


 


Neutrophils #  8.4 H  (1.3-7.7)  k/uL


 


Lymphocytes #  0.9 L  (1.0-4.8)  k/uL














Assessment and Plan


Plan: 





ASSESSMENT:





Osteoarthritis, status post left total knee arthroplasty, POD #1





Hypertension





PLAN:





Continue postoperative management per Dr. Montemayor


Resume meds as appropriate


Pain control


Incentive spirometer 10 times hour while awake


Activity as tolerated.  Encourage ambulation


Monitor labs


GI/DVT prophylaxis


Monitor vital signs and address as appropriate


Further recommendations pending patient's course


Patient is cleared for discharge from medical standpoint when she is cleared by 

attending physician








Nurse practitioner note has been reviewed by physician. Signing provider agrees 

with the documented findings, assessment, and plan of care.
Subjective


Progress Note Date: 06/08/18


Principal diagnosis: 


S/P Left TKA





Patient is seen at bedside this morning.  She is postop day #1 from Left Total 

Knee arthroplasty.  She has mild pain at the surgical site as expected but 

denies any new complaints.  She denies numbness, tingling or calf pain.  Review 

of systems is negative for fever, chills, chest pain, shortness of breath or 

other








Objective





- Vital Signs


Vital signs: 


 Vital Signs











Temp  97.9 F   06/08/18 00:50


 


Pulse  70   06/08/18 00:50


 


Resp  18   06/08/18 05:40


 


BP  112/67   06/08/18 00:50


 


Pulse Ox  92 L  06/08/18 07:18








 Intake & Output











 06/07/18 06/08/18 06/08/18





 18:59 06:59 18:59


 


Intake Total 1351 750 


 


Output Total 100  


 


Balance 1251 750 


 


Weight 101.605 kg  


 


Intake:   


 


  IV 1351  


 


  Intake, IV Titration  750 





  Amount   


 


    Lactated Ringers 1,000 ml  750 





    @ 75 mls/hr IV .[id number]   





    [facility] Rx#:[id number]   


 


Output:   


 


  Estimated Blood Loss 100  


 


Other:   


 


  Voiding Method  Bedside Commode 


 


  # Voids  1 














- Exam


Inspection reveals a benign surgical wound.  There is no active bleeding or 

drainage.  Neurovascular status is intact throughout the lower extremity with 

motor and sensation fully intact.  Calf is soft and nontender.  2+ dorsalis 

pedis pulse and less than 2 second cap refill is present








- Constitutional


General appearance: Present: no acute distress





- Psychiatric


Psychiatric: Present: A&O x's 3, appropriate affect, intact judgment & insight





- Labs


CBC & Chem 7: 


 06/08/18 06:46





 06/07/18 10:45


Labs: 


 Abnormal Lab Results - Last 24 Hours (Table)











  06/08/18 Range/Units





  06:46 


 


Neutrophils #  8.4 H  (1.3-7.7)  k/uL


 


Lymphocytes #  0.9 L  (1.0-4.8)  k/uL














Assessment and Plan


(1) S/P total knee arthroplasty


Narrative/Plan: 


She will continue with routine postop orthopedic protocol including pain 

management, wound care, physical therapy, DVT prophylaxis and medical 

management.  Expect that she will d/c to home tomorrow. 


Current Visit: Yes   Status: Acute   Priority: Medium   Code(s): Z96.659 - 

PRESENCE OF UNSPECIFIED ARTIFICIAL KNEE JOINT   SNOMED Code(s): 8372302705386


   


Time with Patient: Less than 30
1

## 2024-08-07 NOTE — P.PN
Subjective


Progress Note Date: 06/27/20





Chief Complaint: Dizziness





History of Present Illness


This is a 74-year-old  female patient of Dr. Nj with past medical 

history of hypertension.  Patient states that she has had dizziness on and off 

for the last 3 months.  About 2 months ago she was placed on losartan 100 mg 

daily and thinks that her symptoms are worse since that time.  Last initial 

episode where she did feel palpitations in her heart rate was up to 140s and she

went to [facility] was told that she had hypertension was sent 

home.  She states her episode that time was very severe and she had tingling in 

her hands and arms.  She was previously in the ER and February at [facility] as well.  She states she never loses consciousness but she usually sits 

mostly today when she's had an episode.  Episodes come and go.  Yesterday 

morning it was 180/116.  A blood pressure is low.  She denies any spinning 

sensation, chest pain, shortness of breath.





Patient came into [facility] emergency center for evaluation. 

EKG reveals sinus mechanism heart rate is 75 with no acute ST or T wave 

abnormalities noted. Chest xray negative for an acute cardiopulmonary process. 

CBC unremarkable, sodium 140, potassium 4.1, creatinine 0.74, troponin negative 

3.  Patient has been seen by cardiology, echocardiogram is pending, 

orthostatics to be checked, apply cardiac monitoring, carotid Doppler ordered.  

At rest, patient denies any symptoms at this time.





6/27: Patient was seen for follow-up today, she had a dizziness spell yesterday 

evening, patient denies any palpitations during this time, however her symptoms 

seems to be worse with positional changes of the head.  Not positional changes 

for orthostasis..  Patient denies any chest pain palpitations during this event,

not dyspnea, patient does not have any edema, no pleurisy, no dysplasia or 

aspirate events.  Patient denies any diplopia or headache.  She still will be 

seen by vascular surgery secondary to critical stenosis left side, or 70% left 

ICA, EEG of the brain is essentially unremarkable cat scan of the brain 

available for review, we'll going to request 41, patient would need ENT eval for

benign positional vertigo and eval for vestibular vertigo, patient might need 

MRI of the brain plus minus MRI of the internal auditory canal.  Patient denies 

any tinnitus , to evaluate for central vertigo, patient might need at least a 15

day or 30 day event monitor as an outpatient, urinalysis negative for pathogen. 

We will keep the patient another day,, orthostatic vitals to be done, along with

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 

maneuver, start meclizine





Review of Systems


Constitutional: No fever, no chills, no night sweats.  No weight change.  No 

weakness, fatigue or lethargy.  No daytime sleepiness.


EENT: No headache.  No blurred vision or double vision, no loss of vision.  No 

loss of Hearing, no ringing in the ears, no dizziness.  No nasal drainage or c

ongestion.  No epistaxis.  No sore throat.


Lungs: No shortness of breath, cough, no sputum production.  No wheezing.


Cardiovascular: No chest pain, no lower extremity edema.  No palpitations.  No 

paroxysmal nocturnal dyspnea.  No orthopnea.  Reports lightheadedness or 

dizziness.  No syncopal episodes.


Abdominal: No abdominal pain.  No nausea, vomiting.  No diarrhea.  No 

constipation.  No bloody or tarry stools..  No loss of appetite.


Genitourinary: No dysuria, increased frequency, urgency.  No urinary retention.


Musculoskeletal: No myalgias.  No muscle weakness, no gait dysfunction, no 

frequent falls.  No back pain.  No neck pain.


Integumentary: No wounds, no lesions.  No rash or pruritus.  No unusual 

bruising.  No change in hair or nails.


Neurologic: No aphasia. No facial droop. No change in mentation. No head injury.

No headache. No paralysis. No paresthesia.


Psychiatric: No depression.  No anxiety.  No mood swings.


Endocrine: No abnormal blood sugars.  No weight change.  No excessive sweating 

or thirst.  No cold intolerance.  








Objective





- Vital Signs


Vital signs: 


                                   Vital Signs











Temp  97.9 F   06/27/20 12:04


 


Pulse  79   06/27/20 12:04


 


Resp  16   06/27/20 12:04


 


BP  146/78   06/27/20 12:04


 


Pulse Ox  98   06/27/20 12:04








                                 Intake & Output











 06/26/20 06/27/20 06/27/20





 18:59 06:59 18:59


 


Intake Total 75  


 


Balance 75  


 


Weight 101.151 kg  


 


Intake:   


 


  Intake, IV Titration 75  





  Amount   


 


    Sodium Chloride 0.9% 1, 75  





    000 ml @ 75 mls/hr IV .   





    [id number] [facility] Rx#:[id number]   


 


Other:   


 


  Voiding Method Toilet Toilet Toilet


 


  # Voids 1  2














- Constitutional


General appearance: Present: cooperative, no acute distress





- EENT


Eyes: Present: anicteric sclerae, EOMI, normal appearance


ENT: Present: hearing grossly normal, NA/AT, normal oropharynx





- Neck


Neck: Present: normal ROM





- Respiratory


Respiratory: bilateral: CTA, negative: diminished, dullness





- Cardiovascular


Rhythm: regular


Heart sounds: normal: S1, S2


Abnormal Heart Sounds: Absent: systolic murmur, diastolic murmur, rub, S3 

Gallop, S4 Gallop, click, other





- Gastrointestinal


General gastrointestinal: Present: normal bowel sounds, soft





- Integumentary


Integumentary: Present: normal turgor





- Neurologic


Neurologic: Present: CNII-XII intact





- Musculoskeletal


Musculoskeletal: Present: gait normal





- Psychiatric


Psychiatric: Present: A&O x's 3, appropriate affect





- Labs


CBC & Chem 7: 


                                 06/25/20 20:39





                                 06/25/20 20:39


Labs: 


                      Microbiology - Last 24 Hours (Table)











 06/25/20 20:51 Urine Culture - Final





 Urine,Voided 














Assessment and Plan


Plan: 











Assessment and Plan


1.  Near syncopal episode episodes of SVT,.  Orthostatic vital signs, 

echocardiogram, carotid Doppler, cardiac monitoring.  Cardiology consult 

appreciated.  Patient can benefit from arrhythmia evaluation with either 15 or 

30 day monitor event





2.  Critical internal carotid stenosis left side, vascular surgeon to see the 

patient,





3.  Vertigo, suspect 9 positional vertigo, cannot rule out central vertigo, CAT 

scan of the brain to be done for screening purposes, patient may need MRI of the

brain with and without contrast as well as MRI of the internal carotid auditory 

canal, start meclizine, patient can benefit from outpatient physical therapy 

should this be benign positional vertigo.  Patient needs to be evaluated for 

cerumen impaction is noted patient, ENT consult to be done as an outpatient





2.  Hypertension.  Losartan discontinued.  Continue amlodipine 5 mg daily and 

add hydralazine 50 mg twice daily.





3.  Peripheral vascular disease, stable





4.  DVT prophylaxis.  Heparin subcu.





5.  GI prophylaxis.  Protonix.





6.  COVID-19 testing in process.





Patient placed as an observation status.





Discharge plan: Return home
Detail Level: Detailed

## 2024-08-21 ENCOUNTER — HOSPITAL ENCOUNTER (OUTPATIENT)
Dept: HOSPITAL 47 - RADMAMWWP | Age: 79
Discharge: HOME | End: 2024-08-21
Attending: FAMILY MEDICINE
Payer: MEDICARE

## 2024-08-21 DIAGNOSIS — Z90.721: ICD-10-CM

## 2024-08-21 DIAGNOSIS — Z12.31: Primary | ICD-10-CM

## 2024-08-21 DIAGNOSIS — R92.323: ICD-10-CM

## 2024-08-21 DIAGNOSIS — Z78.0: ICD-10-CM

## 2024-08-21 PROCEDURE — 77067 SCR MAMMO BI INCL CAD: CPT

## 2024-08-21 PROCEDURE — 77063 BREAST TOMOSYNTHESIS BI: CPT

## 2024-09-09 NOTE — MM
Reason for Exam: Screening  (asymptomatic). 

Last mammogram was performed 1 year(s) and 5 month(s) ago. 





Patient History: 

Menarche at age 14. First Full-Term Pregnancy at age 20. Left ovary removed at age 39. Right ovary

removed at age 39. Hysterectomy at age 39. Postmenopausal. Patient has history of breast feeding.

Estrogen for 3 years from age 39 until age 42. 





Risk Values: 

Ann 5 year model risk: 1.3%.

NCI Lifetime model risk: 2.4%.





Prior Study Comparison: 

10/30/2017 Bilateral Screening Mammogram, [facility]. 12/11/2018 Bilateral Screening Mammogram, [facility].

3/3/2023 Bilateral MG 3D screening mammo w/cad, [facility]. 





Tissue Density: 

There are scattered areas of fibroglandular density.





Findings: 

Analyzed By CAD. 

Bilateral vascular calcifications. Unchanged intramammary lymph node posterior upper-outer quadrant

left breast.

There is no suspicious group of microcalcifications or new suspicious mass in either breast. 





Overall Assessment: Benign, BI-RAD 2





Management: 

Screening Mammogram of both breasts in 1 year.

.



Patient should continue monthly self-breast exams.  A clinical breast exam by your physician is

recommended on an annual basis.

This exam should not preclude additional follow-up of suspicious palpable abnormalities.



Note on Ann scores and lifetime risk:

1. A Ann score greater than 3% is considered moderate risk. If this is the case, consider

specialist referral to assess eligibility for a risk reducing agent.

2. If overall lifetime risk for the development of breast cancer is 20% or higher, the patient may

qualify for future screening with alternating mammogram and breast MRI.



Electronically signed and approved by: Adarsh Gold M.D. Radiologist

## 2024-11-04 ENCOUNTER — HOSPITAL ENCOUNTER (OUTPATIENT)
Dept: HOSPITAL 47 - RADXRMAIN | Age: 79
Discharge: HOME | End: 2024-11-04
Attending: NURSE PRACTITIONER
Payer: MEDICARE

## 2024-11-04 DIAGNOSIS — Z98.1: ICD-10-CM

## 2024-11-04 DIAGNOSIS — M43.22: Primary | ICD-10-CM

## 2024-11-04 PROCEDURE — 72050 X-RAY EXAM NECK SPINE 4/5VWS: CPT
